# Patient Record
Sex: MALE | Race: WHITE | NOT HISPANIC OR LATINO | Employment: OTHER | URBAN - METROPOLITAN AREA
[De-identification: names, ages, dates, MRNs, and addresses within clinical notes are randomized per-mention and may not be internally consistent; named-entity substitution may affect disease eponyms.]

---

## 2017-04-26 ENCOUNTER — OFFICE VISIT (OUTPATIENT)
Dept: FAMILY MEDICINE | Facility: CLINIC | Age: 77
End: 2017-04-26
Payer: COMMERCIAL

## 2017-04-26 VITALS
HEART RATE: 48 BPM | HEIGHT: 72 IN | TEMPERATURE: 98.5 F | SYSTOLIC BLOOD PRESSURE: 122 MMHG | DIASTOLIC BLOOD PRESSURE: 70 MMHG | BODY MASS INDEX: 27.41 KG/M2 | WEIGHT: 202.4 LBS

## 2017-04-26 DIAGNOSIS — K20.90 ESOPHAGITIS: ICD-10-CM

## 2017-04-26 DIAGNOSIS — I71.40 ABDOMINAL AORTIC ANEURYSM (AAA) WITHOUT RUPTURE (H): ICD-10-CM

## 2017-04-26 DIAGNOSIS — Z23 NEED FOR VACCINATION: ICD-10-CM

## 2017-04-26 DIAGNOSIS — K22.70 BARRETT'S ESOPHAGUS WITHOUT DYSPLASIA: Chronic | ICD-10-CM

## 2017-04-26 DIAGNOSIS — I10 HTN, GOAL BELOW 130/80: ICD-10-CM

## 2017-04-26 DIAGNOSIS — I25.810 CORONARY ATHEROSCLEROSIS OF AUTOLOGOUS VEIN BYPASS GRAFT WITHOUT ANGINA: ICD-10-CM

## 2017-04-26 DIAGNOSIS — E78.5 HYPERLIPIDEMIA LDL GOAL <100: Primary | ICD-10-CM

## 2017-04-26 LAB
ALT SERPL W P-5'-P-CCNC: 26 U/L (ref 0–70)
ANION GAP SERPL CALCULATED.3IONS-SCNC: 9 MMOL/L (ref 3–14)
BUN SERPL-MCNC: 29 MG/DL (ref 7–30)
CALCIUM SERPL-MCNC: 8.6 MG/DL (ref 8.5–10.1)
CHLORIDE SERPL-SCNC: 107 MMOL/L (ref 94–109)
CHOLEST SERPL-MCNC: 147 MG/DL
CO2 SERPL-SCNC: 24 MMOL/L (ref 20–32)
CREAT SERPL-MCNC: 1.11 MG/DL (ref 0.66–1.25)
GFR SERPL CREATININE-BSD FRML MDRD: 64 ML/MIN/1.7M2
GLUCOSE SERPL-MCNC: 88 MG/DL (ref 70–99)
HDLC SERPL-MCNC: 41 MG/DL
LDLC SERPL CALC-MCNC: 63 MG/DL
NONHDLC SERPL-MCNC: 106 MG/DL
POTASSIUM SERPL-SCNC: 3.9 MMOL/L (ref 3.4–5.3)
SODIUM SERPL-SCNC: 140 MMOL/L (ref 133–144)
TRIGL SERPL-MCNC: 213 MG/DL

## 2017-04-26 PROCEDURE — 99214 OFFICE O/P EST MOD 30 MIN: CPT | Mod: 25 | Performed by: FAMILY MEDICINE

## 2017-04-26 PROCEDURE — 90670 PCV13 VACCINE IM: CPT | Performed by: FAMILY MEDICINE

## 2017-04-26 PROCEDURE — 80048 BASIC METABOLIC PNL TOTAL CA: CPT | Performed by: FAMILY MEDICINE

## 2017-04-26 PROCEDURE — 36415 COLL VENOUS BLD VENIPUNCTURE: CPT | Performed by: FAMILY MEDICINE

## 2017-04-26 PROCEDURE — 84460 ALANINE AMINO (ALT) (SGPT): CPT | Performed by: FAMILY MEDICINE

## 2017-04-26 PROCEDURE — G0009 ADMIN PNEUMOCOCCAL VACCINE: HCPCS | Performed by: FAMILY MEDICINE

## 2017-04-26 PROCEDURE — 80061 LIPID PANEL: CPT | Performed by: FAMILY MEDICINE

## 2017-04-26 RX ORDER — OMEPRAZOLE 40 MG/1
40 CAPSULE, DELAYED RELEASE ORAL DAILY
Qty: 90 CAPSULE | Refills: 3 | Status: SHIPPED | OUTPATIENT
Start: 2017-04-26 | End: 2017-12-07

## 2017-04-26 RX ORDER — METOPROLOL SUCCINATE 100 MG/1
100 TABLET, EXTENDED RELEASE ORAL DAILY
Qty: 90 TABLET | Refills: 3 | Status: SHIPPED | OUTPATIENT
Start: 2017-04-26 | End: 2017-12-07

## 2017-04-26 RX ORDER — LISINOPRIL AND HYDROCHLOROTHIAZIDE 12.5; 2 MG/1; MG/1
2 TABLET ORAL DAILY
Qty: 180 TABLET | Refills: 3 | Status: SHIPPED | OUTPATIENT
Start: 2017-04-26 | End: 2017-12-07

## 2017-04-26 RX ORDER — ATORVASTATIN CALCIUM 80 MG/1
80 TABLET, FILM COATED ORAL DAILY
Qty: 90 TABLET | Refills: 3 | Status: SHIPPED | OUTPATIENT
Start: 2017-04-26 | End: 2017-12-07

## 2017-04-26 ASSESSMENT — PAIN SCALES - GENERAL: PAINLEVEL: NO PAIN (0)

## 2017-04-26 NOTE — PROGRESS NOTES
SUBJECTIVE:                                                    Richard You is a 77 year old male who presents to clinic today for the following health issues:    Chief Complaint   Patient presents with     Ear Problem     would like ears checked for wax     Hypertension     medication recheck on all medications and refills     Medication Reconciliation     pt know longer on Topamax and hydrocodone         Hyperlipidemia Follow-Up      Rate your low fat/cholesterol diet?: good    Taking statin?  Yes, no muscle aches from statin    Other lipid medications/supplements?:  none     Hypertension Follow-up      Outpatient blood pressures are being checked at home.  Results are 121/69.    Low Salt Diet: no added salt       Amount of exercise or physical activity: 6-7 days/week for an average of 45-60 minutes    Problems taking medications regularly: No    Medication side effects: none    Diet: regular (no restrictions)    ADDITIONAL HPI: 77 year old male here for above issue.  Also needs refills of omeprazole for GERD. This is working well. Also has a history of barrets, due for EGD this fall. Also has a history of AAA due for follow-up imaging.    ROS: 10 point review of systems negative except as per HPI.    PAST MEDICAL HISTORY:  Past Medical History:   Diagnosis Date     Abdominal aneurysm without mention of rupture 6/24/2005    Repaired with percutaneous stent 8/05   Has annual CT scan to monitor stent placement. Last done in sept 2012 and stable      Atrial fibrillation (H)      CAD 4/5/2005 12/05/12 Patient denies any recent chest pain or NTG use.  Patient is taking meds regularly and denies significant side effects. Last stress test many years ago, but bikes 30-40 mi/day without symptoms       Colon polyp, hyperplastic 5/14/2013    Hyperplastic  Polyp on colonoscopy 5-2013. 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        ACTIVE MEDICAL PROBLEMS:  Patient Active Problem List   Diagnosis     Coronary atherosclerosis     Abdominal aortic aneurysm (H)     Benign shuddering attack     Hyperlipidemia LDL goal <100     Advanced directives, counseling/discussion     HTN, goal below 130/80     [facility]     Macular degeneration, wet (H)     Colon polyp, hyperplastic     Heart murmur     Former smoker     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)     Esophagitis     Carey's esophagus     Arteriosclerotic vascular disease        FAMILY HISTORY:  Family History   Problem Relation Age of Onset     Cancer - colorectal Mother      Cardiovascular Father      Alcohol/Drug Father      Neurologic Disorder Father      tremor     Cardiovascular Brother      Breast Cancer Sister      CANCER Brother      bladder cancer       SOCIAL HISTORY:  Social History     Social History     Marital status:      Spouse name: Soledad You     Number of children: 2     Years of education: 12     Occupational History      Retired     Social History Main Topics     Smoking status: Former Smoker     Packs/day: 0.50     Years: 10.00     Types: Cigarettes     Quit date: 11/29/1995     Smokeless tobacco: Never Used     Alcohol use Yes      Comment: beer occ,     Drug use: No     Sexual activity: Not Currently     Partners: Female     Other Topics Concern     Parent/Sibling W/ Cabg, Mi Or Angioplasty Before 65f 55m? Yes     Social History Narrative        [employer]    Nonsmoker as of 2  Yrs ago    Drinks wine daily    Lives with wife soledad in [place]           MEDICATIONS:  Current Outpatient Prescriptions   Medication Sig Dispense Refill      metoprolol (TOPROL-XL) 100 MG 24 hr tablet Take 1 tablet (100 mg) by mouth daily 90 tablet 3     atorvastatin (LIPITOR) 80 MG tablet Take 1 tablet (80 mg) by mouth daily 90 tablet 3     omeprazole (PRILOSEC) 40 MG capsule Take 1 capsule (40 mg) by mouth daily Take 30-60 minutes before a meal. 90 capsule 3     lisinopril-hydrochlorothiazide (PRINZIDE/ZESTORETIC) 20-12.5 MG per tablet Take 2 tablets by mouth daily 180 tablet 3     aspirin 81 MG chewable tablet Take 1 tablet (81 mg) by mouth daily 36 tablet 3     Multiple Vitamins-Minerals (ICAPS PO)        Multiple Vitamin (MULTI-VITAMIN DAILY PO) Take 1 tablet by mouth daily       FISH OIL 1000 MG OR CAPS 2 DAILY  0     HYDROcodone-acetaminophen (NORCO) 5-325 MG per tablet Take 1-2 tablets by mouth every 4 hours as needed for moderate to severe pain (Patient not taking: Reported on 4/26/2017) 15 tablet 0     topiramate (TOPAMAX) 25 MG tablet Take 1 tablet (25 mg) at bedtime for 1 week, then 2 tablets daily for 1 week, then 3 tabs daily 1 week, then 4 tabs daily. (Patient not taking: Reported on 4/26/2017) 120 tablet 0     [DISCONTINUED] metoprolol (TOPROL-XL) 100 MG 24 hr tablet Take 1 tablet (100 mg) by mouth daily 90 tablet 3     [DISCONTINUED] atorvastatin (LIPITOR) 80 MG tablet Take 1 tablet (80 mg) by mouth daily 90 tablet 3     [DISCONTINUED] lisinopril-hydrochlorothiazide (PRINZIDE,ZESTORETIC) 20-12.5 MG per tablet Take 2 tablets by mouth daily 180 tablet 3       ALLERGIES:     Allergies   Allergen Reactions     Nka [No Known Allergies]        Problem list, Medication list, Allergies, and Medical/Social/Surgical histories reviewed in [facility] and updated as appropriate.    OBJECTIVE:                                                    VITALS: /70 (BP Location: Right arm, Patient Position: Chair, Cuff Size: Adult Regular)  Pulse (!) 48  Temp 98.5  F (36.9  C) (Tympanic)  Ht 6' (1.829 m)  Wt 202 lb 6.4 oz (91.8 kg)  BMI 27.45 kg/m2 Body mass index is 27.45  kg/(m^2).  GENERAL: Pleasant, well appearing male.  HEENT: PERRL, EOMI, oropharynx clear.  NECK: supple, no thyromegaly or thyroid masses, no lymphadenopathy.  CV: RRR, no murmurs, rubs or gallops.  LUNGS: Clear to auscultation bilaterally, normal effort.  ABDOMEN: Soft, non-distended, non-tender.  No hepatosplenomegaly or palpable masses.    SKIN: warm and dry without obvious rashes.   EXTREMITIES: No edema, normal pulses.     ASSESSMENT/PLAN:                                                    1. Hyperlipidemia LDL goal <100  Stable. Refilled medication and checked labs.    - atorvastatin (LIPITOR) 80 MG tablet; Take 1 tablet (80 mg) by mouth daily  Dispense: 90 tablet; Refill: 3  - Lipid Profile (Chol, Trig, HDL, LDL calc)  - ALT    2. 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 Refilled medication and checked labs.    - atorvastatin (LIPITOR) 80 MG tablet; Take 1 tablet (80 mg) by mouth daily  Dispense: 90 tablet; Refill: 3    3. Abdominal aortic aneurysm (AAA) without rupture (H)  Due for follow-up imaging.    4. Esophagitis  Stable. Refilled medication.    - omeprazole (PRILOSEC) 40 MG capsule; Take 1 capsule (40 mg) by mouth daily Take 30-60 minutes before a meal.  Dispense: 90 capsule; Refill: 3    5. Carey's esophagus without dysplasia  Stable. Refilled medication.  Due for EGD this fall  - omeprazole (PRILOSEC) 40 MG capsule; Take 1 capsule (40 mg) by mouth daily Take 30-60 minutes before a meal.  Dispense: 90 capsule; Refill: 3    6. HTN, goal below 130/80  Stable. Refilled medication and checked labs.    - metoprolol (TOPROL-XL) 100 MG 24 hr tablet; Take 1 tablet (100 mg) by mouth daily  Dispense: 90 tablet; Refill: 3  - lisinopril-hydrochlorothiazide (PRINZIDE/ZESTORETIC) 20-12.5 MG per tablet; Take 2 tablets by mouth daily  Dispense: 180 tablet; Refill: 3  - Basic metabolic panel    7. Need for vaccination  - 1st  Administration  [81756]  - Pneumococcal vaccine 13  valent PCV13 IM (Prevnar) [29611]

## 2017-04-26 NOTE — PATIENT INSTRUCTIONS
Thank you for choosing [facility].  You may be receiving a survey in the mail from [facility] regarding your visit today.  Please take a few minutes to complete and return the survey to let us know how we are doing.      Our Clinic hours are:  Mondays    7:20 am - 7 pm  Tues -  Fri  7:20 am - 5 pm    Clinic Phone: [phone]    The clinic lab opens at 7:30 am Mon - Fri and appointments are required.    [facility]. [phone]  Monday-Thursday 8 am - 7pm  Tues/Wed/Fri 8 am - 5:30 pm

## 2017-04-26 NOTE — MR AVS SNAPSHOT
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  You may be receiving a survey in the mail from [facility] regarding your visit today.  Please take a few minutes to complete and return the survey to let us know how we are doing.      Our Clinic hours are:  Mondays    7:20 am - 7 pm  Tues -  Fri  7:20 am - 5 pm    Clinic Phone: [phone]    The clinic lab opens at 7:30 am Mon - Fri and appointments are required.    [facility]  Ph. [phone]  Monday-Thursday 8 am - 7pm  Tues/Wed/Fri 8 am - 5:30 pm               Follow-ups after your visit        Who to contact     If you have questions or need follow up information about today's clinic visit or your schedule please contact [facility] directly at [phone].  Normal or non-critical lab and imaging results will be communicated to you by MyChart, letter or phone within 4 business days after the clinic has received the results. If you do not hear from us within 7 days, please contact the clinic through MyChart or phone. If you have a critical or abnormal lab result, we will notify you by phone as soon as possible.  Submit refill requests through [employer] or call your pharmacy and they will forward the refill request to us. Please allow 3 business days for your refill to be completed.          Additional Information About Your Visit        MyChart Information      [employer] gives you secure access to your electronic health record. If you see a primary care provider, you can also send messages to your care team and make appointments. If you have questions, please call your primary care clinic.  If you do not have a primary care provider, please call [phone] and they will assist you.        Care EveryWhere ID     This is your Care EveryWhere ID. This could be used by other organizations to access your [place] medical records  [id number]        Your Vitals Were     Pulse Temperature Height BMI (Body Mass Index)          48 98.5  F (36.9  C) (Tympanic) 6' (1.829 m) 27.45 kg/m2         Blood Pressure from Last 3 Encounters:   04/26/17 122/70   12/15/16 134/75   11/30/16 138/86    Weight from Last 3 Encounters:   04/26/17 202 lb 6.4 oz (91.8 kg)   12/14/16 200 lb (90.7 kg)   11/30/16 206 lb 6.4 oz (93.6 kg)              We Performed the Following     1st  Administration  [00967]     ALT     Basic metabolic panel     Lipid Profile (Chol, Trig, HDL, LDL calc)     Pneumococcal vaccine 13 valent PCV13 IM (Prevnar) [35782]          Where to get your medicines      These medications were sent to [facility] [place], [place] - [address]   [address] [facility] [address]     Phone:  [phone]     atorvastatin 80 MG tablet    lisinopril-hydrochlorothiazide 20-12.5 MG per tablet    metoprolol 100 MG 24 hr tablet    omeprazole 40 MG capsule          Primary Care Provider Office Phone # Fax #    Kamari Rojas [phone]       [facility] [address] [facility] [address]        Thank you!     Thank you for choosing [facility]  for your care. Our goal is always to provide you with excellent care. Hearing back from our patients is one way we can continue to improve our services. Please take a few minutes to complete the written survey that you may receive in the mail after your visit  with us. Thank you!             Your Updated Medication List - Protect others around you: Learn how to safely use, store and throw away your medicines at www.disposemymeds.org.          This list is accurate as of: 4/26/17  2:19 PM.  Always use your most recent med list.                   Brand Name Dispense Instructions for use    aspirin 81 MG chewable tablet     36 tablet    Take 1 tablet (81 mg) by mouth daily       atorvastatin 80 MG tablet    LIPITOR    90 tablet    Take 1 tablet (80 mg) by mouth daily       fish oil-omega-3 fatty acids 1000 MG capsule      2 DAILY       HYDROcodone-acetaminophen 5-325 MG per tablet    NORCO    15 tablet    Take 1-2 tablets by mouth every 4 hours as needed for moderate to severe pain       ICAPS PO          lisinopril-hydrochlorothiazide 20-12.5 MG per tablet    PRINZIDE/ZESTORETIC    180 tablet    Take 2 tablets by mouth daily       metoprolol 100 MG 24 hr tablet    TOPROL-XL    90 tablet    Take 1 tablet (100 mg) by mouth daily       MULTI-VITAMIN DAILY PO      Take 1 tablet by mouth daily       omeprazole 40 MG capsule    priLOSEC    90 capsule    Take 1 capsule (40 mg) by mouth daily Take 30-60 minutes before a meal.       topiramate 25 MG tablet    TOPAMAX    120 tablet    Take 1 tablet (25 mg) at bedtime for 1 week, then 2 tablets daily for 1 week, then 3 tabs daily 1 week, then 4 tabs daily.

## 2017-04-26 NOTE — NURSING NOTE
Chief Complaint   Patient presents with     Ear Problem     would like ears checked for wax     Hypertension     medication recheck on all medications and refills     Medication Reconciliation     pt know longer on Topamax and hydrocodone       Initial /70 (BP Location: Right arm, Patient Position: Chair, Cuff Size: Adult Regular)  Pulse (!) 48  Temp 98.5  F (36.9  C) (Tympanic)  Ht 6' (1.829 m)  Wt 202 lb 6.4 oz (91.8 kg)  BMI 27.45 kg/m2 Estimated body mass index is 27.45 kg/(m^2) as calculated from the following:    Height as of this encounter: 6' (1.829 m).    Weight as of this encounter: 202 lb 6.4 oz (91.8 kg).  Medication Reconciliation: complete

## 2017-10-20 DIAGNOSIS — I71.40 ABDOMINAL AORTIC ANEURYSM (H): Primary | ICD-10-CM

## 2017-10-24 ENCOUNTER — ALLIED HEALTH/NURSE VISIT (OUTPATIENT)
Dept: FAMILY MEDICINE | Facility: CLINIC | Age: 77
End: 2017-10-24
Payer: COMMERCIAL

## 2017-10-24 DIAGNOSIS — Z23 NEED FOR PROPHYLACTIC VACCINATION AND INOCULATION AGAINST INFLUENZA: Primary | ICD-10-CM

## 2017-10-24 PROCEDURE — G0008 ADMIN INFLUENZA VIRUS VAC: HCPCS

## 2017-10-24 PROCEDURE — 99207 ZZC NO CHARGE NURSE ONLY: CPT

## 2017-10-24 PROCEDURE — 90662 IIV NO PRSV INCREASED AG IM: CPT

## 2017-10-24 NOTE — MR AVS SNAPSHOT
After Visit Summary   10/24/2017    Richard You    MRN: [id number]           Patient Information     Date Of Birth          1940        Visit Information        Provider Department      10/24/2017 10:30 AM Shira/Zulema Kuo [facility]        Today's Diagnoses     Need for prophylactic vaccination and inoculation against influenza    -  1       Follow-ups after your visit        Who to contact     If you have questions or need follow up information about today's clinic visit or your schedule please contact [facility] directly at [phone].  Normal or non-critical lab and imaging results will be communicated to you by [employer], letter or phone within 4 business days after the clinic has received the results. If you do not hear from us within 7 days, please contact the clinic through "[employer]t or phone. If you have a critical or abnormal lab result, we will notify you by phone as soon as possible.  Submit refill requests through [employer] or call your pharmacy and they will forward the refill request to us. Please allow 3 business days for your refill to be completed.          Additional Information About Your Visit        MyChart Information     [employer] gives you secure access to your electronic health record. If you see a primary care provider, you can also send messages to your care team and make appointments. If you have questions, please call your primary care clinic.  If you do not have a primary care provider, please call [phone] and they will assist you.        Care EveryWhere ID     This is your Care EveryWhere ID. This could be used by other organizations to access your [place] medical records  [id number]         Blood Pressure from Last 3 Encounters:   04/26/17 122/70   12/15/16 134/75   11/30/16 138/86    Weight from Last 3 Encounters:   04/26/17 202 lb 6.4 oz (91.8 kg)   12/14/16 200 lb (90.7 kg)   11/30/16 206 lb 6.4 oz (93.6 kg)               We Performed the Following     ADMIN INFLUENZA (For MEDICARE Patients ONLY) []     FLU VACCINE, INCREASED ANTIGEN, PRESV FREE, AGE 65+ [27820]        Primary Care Provider Office Phone # Fax #    Lolitaewamarissa Jennifer Rojas [phone] [address]  [facility] [address]        Equal Access to Services     [facility] : Hadii aad ku hadasho Soomaali, waaxda luqadaha, qaybta kaalmada adeegyada, waxay idiin hayaan adeeg kharash la'aan . So [facility] [phone].    ATENCIÓN: Si habla español, tiene a lechuga disposición servicios gratuitos de asistencia lingüística. Llame al [phone].    We comply with applicable federal civil rights laws and [place] laws. We do not discriminate on the basis of race, color, national origin, age, disability, sex, sexual orientation, or gender identity.            Thank you!     Thank you for choosing [facility]  for your care. Our goal is always to provide you with excellent care. Hearing back from our patients is one way we can continue to improve our services. Please take a few minutes to complete the written survey that you may receive in the mail after your visit with us. Thank you!             Your Updated Medication List - Protect others around you: Learn how to safely use, store and throw away your medicines at www.disposemymeds.org.          This list is accurate as of: 10/24/17 10:30 AM.  Always use your most recent med list.                   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       fish  oil-omega-3 fatty acids 1000 MG capsule      2 DAILY        HYDROcodone-acetaminophen 5-325 MG per tablet    NORCO    15 tablet    Take 1-2 tablets by mouth every 4 hours as needed for moderate to severe pain        ICAPS PO           lisinopril-hydrochlorothiazide 20-12.5 MG per tablet    PRINZIDE/ZESTORETIC    180 tablet    Take 2 tablets by mouth daily    HTN, goal below 130/80       metoprolol 100 MG 24 hr tablet    TOPROL-XL    90 tablet    Take 1 tablet (100 mg) by mouth daily    HTN, goal below 130/80       MULTI-VITAMIN DAILY PO      Take 1 tablet by mouth daily        omeprazole 40 MG capsule    priLOSEC    90 capsule    Take 1 capsule (40 mg) by mouth daily Take 30-60 minutes before a meal.    Esophagitis, Carey's esophagus without dysplasia       topiramate 25 MG tablet    TOPAMAX    120 tablet    Take 1 tablet (25 mg) at bedtime for 1 week, then 2 tablets daily for 1 week, then 3 tabs daily 1 week, then 4 tabs daily.    Tremor

## 2017-10-24 NOTE — NURSING NOTE
Prior to injection verified patient identity using patient's name and date of birth.  Per orders of Dr. Vargas, injection of Influenza Vaccine given by Marina Jackson. Patient instructed to remain in clinic for 15 minutes afterwards, and to report any adverse reaction to me immediately.

## 2017-10-24 NOTE — PROGRESS NOTES

## 2017-12-07 ENCOUNTER — OFFICE VISIT (OUTPATIENT)
Dept: FAMILY MEDICINE | Facility: CLINIC | Age: 77
End: 2017-12-07
Payer: COMMERCIAL

## 2017-12-07 ENCOUNTER — TELEPHONE (OUTPATIENT)
Dept: PEDIATRICS | Facility: CLINIC | Age: 77
End: 2017-12-07

## 2017-12-07 VITALS
TEMPERATURE: 98.2 F | DIASTOLIC BLOOD PRESSURE: 84 MMHG | HEIGHT: 72 IN | HEART RATE: 62 BPM | BODY MASS INDEX: 28.5 KG/M2 | SYSTOLIC BLOOD PRESSURE: 148 MMHG | WEIGHT: 210.4 LBS

## 2017-12-07 DIAGNOSIS — E78.5 HYPERLIPIDEMIA LDL GOAL <100: ICD-10-CM

## 2017-12-07 DIAGNOSIS — K20.90 ESOPHAGITIS: ICD-10-CM

## 2017-12-07 DIAGNOSIS — I25.810 CORONARY ATHEROSCLEROSIS OF AUTOLOGOUS VEIN BYPASS GRAFT WITHOUT ANGINA: ICD-10-CM

## 2017-12-07 DIAGNOSIS — K22.70 BARRETT'S ESOPHAGUS WITHOUT DYSPLASIA: Chronic | ICD-10-CM

## 2017-12-07 DIAGNOSIS — R25.1 TREMOR: ICD-10-CM

## 2017-12-07 DIAGNOSIS — G25.0 BENIGN ESSENTIAL TREMOR: Primary | ICD-10-CM

## 2017-12-07 DIAGNOSIS — I10 HTN, GOAL BELOW 130/80: ICD-10-CM

## 2017-12-07 PROCEDURE — 99214 OFFICE O/P EST MOD 30 MIN: CPT | Performed by: FAMILY MEDICINE

## 2017-12-07 RX ORDER — LISINOPRIL AND HYDROCHLOROTHIAZIDE 12.5; 2 MG/1; MG/1
2 TABLET ORAL DAILY
Qty: 180 TABLET | Refills: 3 | Status: SHIPPED | OUTPATIENT
Start: 2017-12-07 | End: 2018-05-03

## 2017-12-07 RX ORDER — ATORVASTATIN CALCIUM 80 MG/1
80 TABLET, FILM COATED ORAL DAILY
Qty: 90 TABLET | Refills: 3 | Status: SHIPPED | OUTPATIENT
Start: 2017-12-07 | End: 2018-12-15

## 2017-12-07 RX ORDER — METOPROLOL SUCCINATE 100 MG/1
100 TABLET, EXTENDED RELEASE ORAL DAILY
Qty: 90 TABLET | Refills: 3 | Status: SHIPPED | OUTPATIENT
Start: 2017-12-07 | End: 2018-05-03

## 2017-12-07 RX ORDER — OMEPRAZOLE 40 MG/1
40 CAPSULE, DELAYED RELEASE ORAL DAILY
Qty: 90 CAPSULE | Refills: 3 | Status: SHIPPED | OUTPATIENT
Start: 2017-12-07 | End: 2018-12-15

## 2017-12-07 RX ORDER — TOPIRAMATE 25 MG/1
TABLET, FILM COATED ORAL
Qty: 60 TABLET | Refills: 5 | Status: SHIPPED | OUTPATIENT
Start: 2017-12-07 | End: 2018-05-03

## 2017-12-07 ASSESSMENT — PAIN SCALES - GENERAL: PAINLEVEL: NO PAIN (0)

## 2017-12-07 NOTE — TELEPHONE ENCOUNTER
Wife called with concerns about metoprolol  patient was seen today 12/7; and restarted on metoprolol but he has been taking metoprolol. The reason for the visit was tremors, wife thought Topamax would be restarted.     Marika COOLEY  Station

## 2017-12-07 NOTE — PATIENT INSTRUCTIONS
Thank you for choosing [facility].  You may be receiving a survey in the mail from [facility] regarding your visit today.  Please take a few minutes to complete and return the survey to let us know how we are doing.      Our Clinic hours are:  Mondays    7:20 am - 7 pm  Tues -  Fri  7:20 am - 5 pm    Clinic Phone: [phone]    The clinic lab opens at 7:30 am Mon - Fri and appointments are required.    [facility]. [phone]  Monday-Thursday 8 am - 7pm  Tues/Wed/Fri 8 am - 5:30 pm

## 2017-12-07 NOTE — TELEPHONE ENCOUNTER
If he's only taken a few doses of the topamax then I would not expect it to work. Let's plan to re-start that and take daily as per the titration schedule. Give this 2-4 weeks and let me know if it's not working.

## 2017-12-07 NOTE — MR AVS SNAPSHOT
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  You may be receiving a survey in the mail from [facility] your visit today.  Please take a few minutes to complete and return the survey to let us know how we are doing.      Our Clinic hours are:  Mondays    7:20 am - 7 pm  Tues -  Fri  7:20 am - 5 pm    Clinic Phone: [phone]    The clinic lab opens at 7:30 am Mon - Fri and appointments are required.    [facility]  Ph. [phone]  Monday-Thursday 8 am - 7pm  Tues/Wed/Fri 8 am - 5:30 pm                 Follow-ups after your visit        Your next 10 appointments already scheduled     Dec 19, 2017  7:30 AM CST   US AORTA/IVC/ILIAC DUPLEX COMPLETE with [id number] [place] [address] Ultrasound ([facility])    [address] [facility] [address] [phone]           Please bring a list of your medicines (including vitamins, minerals and over-the-counter drugs). Also, tell your doctor about any allergies you may have. Wear comfortable clothes and leave your valuables at home.  Adults: No eating or drinking for 8 hours before the exam. You may take medicine with a small sip of water.  Children: - Children 6+ years: No food or drink for 6 hours before exam. - Children 1-5 years: No food or drink for 4 hours before exam. - Infants, breast-fed: may have breast milk up to 2 hours before exam. - Infants, formula: may have bottle until  4 hours before exam.  Please call the Imaging Department at your exam site with any questions.              Who to contact     If you have questions or need follow up information about today's clinic visit or your schedule please contact [facility] directly at [phone].  Normal or non-critical lab and imaging results will be communicated to you by MyChart, letter or phone within 4 business days after the clinic has received the results. If you do not hear from us within 7 days, please contact the clinic through [employer] or phone. If you have a critical or abnormal lab result, we will notify you by phone as soon as possible.  Submit refill requests through [employer] or call your pharmacy and they will forward the refill request to us. Please allow 3 business days for your refill to be completed.          Additional Information About Your Visit        [employer] Information     [employer] gives you secure access to your electronic health record. If you see a primary care provider, you can also send messages to your care team and make appointments. If you have questions, please call your primary care clinic.  If you do not have a primary care provider, please call [phone] and they will assist you.        Care EveryWhere ID     This is your Care EveryWhere ID. This could be used by other organizations to access your [facility] medical records  [id number]        Your Vitals Were     Pulse Temperature Height BMI (Body Mass Index)          48 98.2  F (36.8  C) (Tympanic) 6' (1.829 m) 28.54 kg/m2         Blood Pressure from Last 3 Encounters:   12/07/17 154/80   04/26/17 122/70   12/15/16 134/75    Weight from Last 3 Encounters:   12/07/17 210 lb 6.4 oz (95.4 kg)   04/26/17 202 lb 6.4 oz (91.8 kg)   12/14/16 200 lb (90.7 kg)              Today, you had the following     No orders found for display         Today's Medication Changes          These changes are accurate as of: 12/7/17  8:24 AM.  If you have  any questions, ask your nurse or doctor.               Stop taking these medicines if you haven't already. Please contact your care team if you have questions.     HYDROcodone-acetaminophen 5-325 MG per tablet   Commonly known as:  NORCO   Stopped by:  Kamari Rojas MD           topiramate 25 MG tablet   Commonly known as:  TOPAMAX   Stopped by:  Kamari Rojas MD                Where to get your medicines      These medications were sent to [facility] [place], [place] - [address]   [address] [facility] [address]     Phone:  [phone]     atorvastatin 80 MG tablet    lisinopril-hydrochlorothiazide 20-12.5 MG per tablet    metoprolol 100 MG 24 hr tablet    omeprazole 40 MG capsule                Primary Care Provider Office Phone # Fax #    Kamari Rojas [phone] [address] [facility] [address]        Equal Access to Services     [facility] AH: Hadii aad ku hadasho Soomaali, waaxda luqadaha, qaybta kaalmada adeegyada, waxay idiin hayaan nuhaeg kharash eileen . So [facility] [phone].    ATENCIÓN: Si paula shah, tiene a lechuga disposición servicios gratuitos de asistencia lingüística. Llame al [phone].    We comply with applicable federal civil rights laws and [place] laws. We do not discriminate on the basis of race, color, national origin, age, disability, sex, sexual orientation, or gender identity.            Thank you!     Thank you for choosing [facility]  for your care. Our goal is always to provide you with excellent care. Hearing back from our patients is one way we can continue to improve our services. Please take a few minutes to complete the written survey that you may receive in the mail after your visit with us. Thank you!             Your Updated Medication List - Protect others around you: Learn how to safely use, store and throw away your medicines at www.disposemymeds.org.           This list is accurate as of: 12/7/17  8:24 AM.  Always use your most recent med list.                   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    Esophagitis, Carey's esophagus without dysplasia

## 2017-12-07 NOTE — NURSING NOTE
Chief Complaint   Patient presents with     Tremors     hand tremors seem to be worse       Initial /82 (BP Location: Right arm, Cuff Size: Adult Regular)  Pulse (!) 48  Temp 98.2  F (36.8  C) (Tympanic)  Ht 6' (1.829 m)  Wt 210 lb 6.4 oz (95.4 kg)  BMI 28.54 kg/m2 Estimated body mass index is 28.54 kg/(m^2) as calculated from the following:    Height as of this encounter: 6' (1.829 m).    Weight as of this encounter: 210 lb 6.4 oz (95.4 kg).  Medication Reconciliation: complete

## 2017-12-07 NOTE — PROGRESS NOTES
SUBJECTIVE:   Richard You is a 77 year old male who presents to clinic today for the following health issues:      Problem:   Chief Complaint   Patient presents with     Tremors     hand tremors seem to be worse     ADDITIONAL HPI: 77 year old male here for above issue.  He thinks he stopped metoprolol - couldn't remember which drug he stopped but had been on it for tremors. We had tried topamax at one point but prescription was without refills so he could not really have taken that for any significant amount of time.  I think he means the metoprolol but he is unsure.  BP is slightly higher today so that's probable.    Also has a history of hyperlipidemia, hypertension, GERD with [facility] needs medication refills. Going to [place] for the winter 12/25/17-5/2018    ROS: 10 point review of systems negative except as per HPI.    PAST MEDICAL HISTORY:  Past Medical History:   Diagnosis Date     Abdominal aneurysm without mention of rupture 6/24/2005    Repaired with percutaneous stent 8/05   Has annual CT scan to monitor stent placement. Last done in sept 2012 and stable      Atrial fibrillation (H)      CAD 4/5/2005 12/05/12 Patient denies any recent chest pain or NTG use.  Patient is taking meds regularly and denies significant side effects. Last stress test many years ago, but bikes 30-40 mi/day without symptoms       Colon polyp, hyperplastic 5/14/2013    Hyperplastic  Polyp on colonoscopy 5-2013. 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     Macular degeneration, wet (H)     Colon polyp, hyperplastic     Heart murmur     Former smoker     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)     Esophagitis     Carey's esophagus     Arteriosclerotic vascular disease        FAMILY HISTORY:  Family History   Problem Relation Age of Onset     Cancer - colorectal Mother      Cardiovascular Father      Alcohol/Drug Father      Neurologic Disorder Father      tremor     Cardiovascular Brother      Breast Cancer Sister      CANCER Brother      bladder cancer       SOCIAL HISTORY:  Social History     Social History     Marital status:      Spouse name: Soledad You     Number of children: 2     Years of education: 12     Occupational History      Retired     Social History Main Topics     Smoking status: Former Smoker     Packs/day: 0.50     Years: 10.00     Types: Cigarettes     Quit date: 11/29/1995     Smokeless tobacco: Never Used     Alcohol use Yes      Comment: beer occ,     Drug use: No     Sexual activity: Not Currently     Partners: Female     Other Topics Concern     Parent/Sibling W/ Cabg, Mi Or Angioplasty Before 65f 55m? Yes     Social History Narrative        [employer]    Nonsmoker as of 2  Yrs ago    Drinks wine daily    Lives with wife soledad in [place]           MEDICATIONS:  Current Outpatient Prescriptions   Medication Sig Dispense Refill     metoprolol (TOPROL-XL) 100 MG 24 hr tablet Take 1 tablet (100 mg) by mouth daily 90 tablet 3     atorvastatin (LIPITOR) 80 MG tablet Take 1 tablet (80 mg) by mouth daily 90 tablet 3     omeprazole (PRILOSEC) 40 MG capsule Take 1 capsule (40 mg) by mouth daily Take 30-60 minutes before a meal. 90 capsule 3     lisinopril-hydrochlorothiazide (PRINZIDE/ZESTORETIC) 20-12.5 MG per tablet Take 2 tablets  "by mouth daily 180 tablet 3     aspirin 81 MG chewable tablet Take 1 tablet (81 mg) by mouth daily 36 tablet 3     Multiple Vitamins-Minerals (ICAPS PO)        Multiple Vitamin (MULTI-VITAMIN DAILY PO) Take 1 tablet by mouth daily       FISH OIL 1000 MG OR CAPS 2 DAILY  0     [DISCONTINUED] metoprolol (TOPROL-XL) 100 MG 24 hr tablet Take 1 tablet (100 mg) by mouth daily 90 tablet 3     [DISCONTINUED] atorvastatin (LIPITOR) 80 MG tablet Take 1 tablet (80 mg) by mouth daily 90 tablet 3     [DISCONTINUED] lisinopril-hydrochlorothiazide (PRINZIDE/ZESTORETIC) 20-12.5 MG per tablet Take 2 tablets by mouth daily 180 tablet 3       ALLERGIES:     Allergies   Allergen Reactions     Nka [No Known Allergies]        Problem list, Medication list, Allergies, and Medical/Social/Surgical histories reviewed in EPIC and updated as appropriate.    OBJECTIVE:                                                    VITALS: /84  Pulse 62  Temp 98.2  F (36.8  C) (Tympanic)  Ht 6' (1.829 m)  Wt 210 lb 6.4 oz (95.4 kg)  BMI 28.54 kg/m2 Body mass index is 28.54 kg/(m^2).  GENERAL: Pleasant, well appearing male.  NEURO: Cranial nerves II through XII grossly intact. No focal deficits. He has a high frequency intention tremor. No resting component to the tremor. No cogwheel rigidity. DTR 2+ and symmetric.    ASSESSMENT/PLAN:                                                    1. Benign essential tremor  Advised to re-start metoprolol.  Will have him start at 1/2 tab daily and if lightheadedness or dizziness then call me.  If not the  Increase to 1 tab daily. If he gets home and it turns out that he has been taking metoprolol then advised him to call me and we'll try re-starting topamax again.  - metoprolol (TOPROL-XL) 100 MG 24 hr tablet; Take 1 tablet (100 mg) by mouth daily  Dispense: 90 tablet; Refill: 3    Addendum: after visit wife called and he has been taking metoprolol. Only ever took \"a few\" doses of topamax. Did not titrate as " instructed. Did not have side effects.  Advised to retry this since mysoline had not been helpful. Start 25mg daily x 1 week the increase to 50mg daily. Can increase further PRN but will see how he responds to this initial titration.     2. HTN, goal below 130/80  Suboptimally controlled likely from metoprolol discontinuation. Given history of CAD would like to see him back on this.  - metoprolol (TOPROL-XL) 100 MG 24 hr tablet; Take 1 tablet (100 mg) by mouth daily  Dispense: 90 tablet; Refill: 3  - lisinopril-hydrochlorothiazide (PRINZIDE/ZESTORETIC) 20-12.5 MG per tablet; Take 2 tablets by mouth daily  Dispense: 180 tablet; Refill: 3    3. Hyperlipidemia LDL goal <100  Stable. Refilled medication.    - atorvastatin (LIPITOR) 80 MG tablet; Take 1 tablet (80 mg) by mouth daily  Dispense: 90 tablet; Refill: 3    4. 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 Refilled medication.    - atorvastatin (LIPITOR) 80 MG tablet; Take 1 tablet (80 mg) by mouth daily  Dispense: 90 tablet; Refill: 3    5. Esophagitis  Stable. Refilled medication.    - omeprazole (PRILOSEC) 40 MG capsule; Take 1 capsule (40 mg) by mouth daily Take 30-60 minutes before a meal.  Dispense: 90 capsule; Refill: 3    6. Carey's esophagus without dysplasia  Stable. Refilled medication.    - omeprazole (PRILOSEC) 40 MG capsule; Take 1 capsule (40 mg) by mouth daily Take 30-60 minutes before a meal.  Dispense: 90 capsule; Refill: 3

## 2017-12-07 NOTE — TELEPHONE ENCOUNTER
"Patient was seen today for tremors and HTN.  In clinic patient believes he had d/c'd his metoprolol.  When he got home and discussed with his wife, he has NOT stopped his metoprolol (taking 100mg daily).  Wife states last year he had tried the Topamax a \"couple times\" and did not notice a difference in tremors.   They are willing to give this med another try for a longer period of time to see if this will help - med and pharm ready.    In regards to HTN:  Wife states they take their bps at home daily and patient's systolic never runs higher than 130.  His diastolic averages about 70's.    Routing to provider.  Zulema ANAYA RN      "

## 2017-12-19 ENCOUNTER — HOSPITAL ENCOUNTER (OUTPATIENT)
Dept: ULTRASOUND IMAGING | Facility: CLINIC | Age: 77
Discharge: HOME | End: 2017-12-19
Attending: RADIOLOGY | Admitting: RADIOLOGY
Payer: MEDICARE

## 2017-12-19 DIAGNOSIS — I71.40 ABDOMINAL AORTIC ANEURYSM (H): ICD-10-CM

## 2017-12-19 PROCEDURE — 93978 VASCULAR STUDY: CPT

## 2017-12-21 ENCOUNTER — TELEPHONE (OUTPATIENT)
Dept: OTHER | Facility: CLINIC | Age: 77
End: 2017-12-21

## 2017-12-21 DIAGNOSIS — I71.40 ABDOMINAL AORTIC ANEURYSM (H): Primary | ICD-10-CM

## 2017-12-21 NOTE — TELEPHONE ENCOUNTER
Called patient with results. Recommend f/u in 2 years.  Pt has been stable for 10 years.  Pt agreed to plan.  [facility] will call patient to yair.  La Zacarias RN  IR nurse clinician  [phone]  Show images for US Aorta/Ivc/Iliac Duplex Complete   Study Result   ULTRASOUND AORTA/IVC/ILIAC DUPLEX COMPLETE  12/19/2017 7:55 AM      HISTORY:  History of EVAR done on 6/30/2005 with Dr. Mo/Pancho.   Comparison CT scan done on 12/14/2016.  Annual followup. Abdominal  aortic aneurysm (H).     COMPARISON: 12/14/2016     TECHNIQUE: Color Doppler and spectral waveform analysis performed.     FINDINGS: There is an aortic endograft for treatment of abdominal  aortic aneurysm. Maximum aneurysm sac size is 4.9 x 5.0 cm, similar to  the prior CT scan. Right common iliac artery measures up to 1.0 cm.  Left common iliac artery measures up to 0.9 cm. Measured aortic and  iliac artery velocities are normal. There is no Doppler evidence of  endoleak.         IMPRESSION: Stable aortic aneurysm sac size. No evidence of endoleak.  The graft is patent.     KADY LARIOS MD

## 2018-02-22 ENCOUNTER — ALLIED HEALTH/NURSE VISIT (OUTPATIENT)
Dept: FAMILY MEDICINE | Facility: CLINIC | Age: 78
End: 2018-02-22
Payer: COMMERCIAL

## 2018-02-22 DIAGNOSIS — I70.90 ARTERIOSCLEROTIC VASCULAR DISEASE: Primary | ICD-10-CM

## 2018-02-22 PROCEDURE — 99207 ZZC NO CHARGE LOS: CPT | Performed by: FAMILY MEDICINE

## 2018-05-03 ENCOUNTER — TELEPHONE (OUTPATIENT)
Dept: FAMILY MEDICINE | Facility: CLINIC | Age: 78
End: 2018-05-03

## 2018-05-03 ENCOUNTER — OFFICE VISIT (OUTPATIENT)
Dept: FAMILY MEDICINE | Facility: CLINIC | Age: 78
End: 2018-05-03
Payer: COMMERCIAL

## 2018-05-03 VITALS
SYSTOLIC BLOOD PRESSURE: 137 MMHG | WEIGHT: 209.6 LBS | HEIGHT: 72 IN | RESPIRATION RATE: 20 BRPM | BODY MASS INDEX: 28.39 KG/M2 | TEMPERATURE: 97.9 F | HEART RATE: 82 BPM | DIASTOLIC BLOOD PRESSURE: 70 MMHG

## 2018-05-03 DIAGNOSIS — G25.0 BENIGN ESSENTIAL TREMOR: Primary | ICD-10-CM

## 2018-05-03 DIAGNOSIS — I10 HTN, GOAL BELOW 130/80: ICD-10-CM

## 2018-05-03 DIAGNOSIS — K22.70 BARRETT'S ESOPHAGUS WITHOUT DYSPLASIA: Chronic | ICD-10-CM

## 2018-05-03 DIAGNOSIS — G25.0 BENIGN ESSENTIAL TREMOR: ICD-10-CM

## 2018-05-03 DIAGNOSIS — Z00.00 WELL ADULT EXAM: Primary | ICD-10-CM

## 2018-05-03 DIAGNOSIS — I71.40 ABDOMINAL AORTIC ANEURYSM (AAA) WITHOUT RUPTURE (H): ICD-10-CM

## 2018-05-03 LAB
ANION GAP SERPL CALCULATED.3IONS-SCNC: 4 MMOL/L (ref 3–14)
BUN SERPL-MCNC: 32 MG/DL (ref 7–30)
CALCIUM SERPL-MCNC: 9.3 MG/DL (ref 8.5–10.1)
CHLORIDE SERPL-SCNC: 110 MMOL/L (ref 94–109)
CHOLEST SERPL-MCNC: 154 MG/DL
CO2 SERPL-SCNC: 30 MMOL/L (ref 20–32)
CREAT SERPL-MCNC: 1.01 MG/DL (ref 0.66–1.25)
GFR SERPL CREATININE-BSD FRML MDRD: 71 ML/MIN/1.7M2
GLUCOSE SERPL-MCNC: 89 MG/DL (ref 70–99)
HDLC SERPL-MCNC: 47 MG/DL
LDLC SERPL CALC-MCNC: 83 MG/DL
NONHDLC SERPL-MCNC: 107 MG/DL
POTASSIUM SERPL-SCNC: 4.3 MMOL/L (ref 3.4–5.3)
SODIUM SERPL-SCNC: 144 MMOL/L (ref 133–144)
TRIGL SERPL-MCNC: 118 MG/DL

## 2018-05-03 PROCEDURE — 36415 COLL VENOUS BLD VENIPUNCTURE: CPT | Performed by: FAMILY MEDICINE

## 2018-05-03 PROCEDURE — 80061 LIPID PANEL: CPT | Performed by: FAMILY MEDICINE

## 2018-05-03 PROCEDURE — 99213 OFFICE O/P EST LOW 20 MIN: CPT | Mod: 25 | Performed by: FAMILY MEDICINE

## 2018-05-03 PROCEDURE — 80048 BASIC METABOLIC PNL TOTAL CA: CPT | Performed by: FAMILY MEDICINE

## 2018-05-03 PROCEDURE — G0438 PPPS, INITIAL VISIT: HCPCS | Performed by: FAMILY MEDICINE

## 2018-05-03 RX ORDER — PRIMIDONE 50 MG/1
50 TABLET ORAL AT BEDTIME
Qty: 35 TABLET | Refills: 0 | Status: SHIPPED | OUTPATIENT
Start: 2018-05-03 | End: 2018-06-01

## 2018-05-03 RX ORDER — METOPROLOL SUCCINATE 100 MG/1
100 TABLET, EXTENDED RELEASE ORAL DAILY
Qty: 90 TABLET | Refills: 3 | Status: ON HOLD | OUTPATIENT
Start: 2018-05-03 | End: 2018-07-27

## 2018-05-03 RX ORDER — LISINOPRIL AND HYDROCHLOROTHIAZIDE 12.5; 2 MG/1; MG/1
2 TABLET ORAL DAILY
Qty: 180 TABLET | Refills: 3 | Status: SHIPPED | OUTPATIENT
Start: 2018-05-03 | End: 2019-04-26

## 2018-05-03 NOTE — TELEPHONE ENCOUNTER
Please call Primidone doesn't come as a liquid formulation - possible that this was discontinued since his 2014 visit with neurology. Our pharmacists even checked with the .    What we could have him do is get the 50mg tabs (that's the lowest dose tab) and have him 1/4 these (that would equal 12.5mg). They may crumble but can just gather together about 1/4 tab of fragments and take that and then toss the rest. I will write the prescription for 1 tab daily so that insurance provides a full tab per day of titration given likelihood of it crumbling.     Since the bottle will say 1 tab daily Follow the following titration at first:  1/4 tab once daily x 1 week   then 1/2 tab once daily x 1 week   then 3/4 tab once daily x 1 week   then full tab daily    Then stay at full tab daily x 1 month and let me know how it goes. If not helping as much as he'd like we can go up to as high as 150mg from there.

## 2018-05-03 NOTE — PROGRESS NOTES
SUBJECTIVE:   Richard You is a 78 year old male who presents for Preventive Visit.      Are you in the first 12 months of your Medicare Part B coverage?  No    Healthy Habits:    Do you get at least three servings of calcium containing foods daily (dairy, green leafy vegetables, etc.)? yes    Amount of exercise or daily activities, outside of work: 6 day(s) per week    Problems taking medications regularly No    Medication side effects: No    Have you had an eye exam in the past two years? yes    Do you see a dentist twice per year? No, pt. Has dentures    Do you have sleep apnea, excessive snoring or daytime drowsiness?no      Ability to successfully perform activities of daily living: Yes, no assistance needed    Home safety:  none identified     Hearing impairment: No    Fall risk:  Fallen 2 or more times in the past year?: No  Any fall with injury in the past year?: No        COGNITIVE SCREEN  1) Repeat 3 items (Banana, Sunrise, Chair)    2) Clock draw: NORMAL  3) 3 item recall: Recalls 2 objects   Results: NORMAL clock, 1-2 items recalled: COGNITIVE IMPAIRMENT LESS LIKELY    Mini-CogTM Copyright S Ghanshyam. Licensed by the author for use in [facility]; reprinted with permission (chan@[email or web address]). All rights reserved.            Hypertension Follow-up      Outpatient blood pressures are not being checked.    Low Salt Diet: no added salt    Also has a history of Barrets esophagus with/o dysplasia and need follow-up EGD. Also has a history of AAA repair and needs yearly CT angio to follow-up on this.  Also has a history of benign essential tremor. On metoprolol for this.    Reviewed and updated as needed this visit by clinical staff  Allergies         Reviewed and updated as needed this visit by Provider        Social History   Substance Use Topics     Smoking status: Former Smoker     Packs/day: 0.50     Years: 10.00     Types: Cigarettes     Quit date: 11/29/1995     Smokeless tobacco: Never Used      Alcohol use Yes      Comment: beer occ,       If you drink alcohol do you typically have >3 drinks per day or >7 drinks per week? No                        Today's PHQ-2 Score:   PHQ-2 ( 1999 Pf[employer]) 5/3/2018 12/7/2017   Q1: Little interest or pleasure in doing things 0 0   Q2: Feeling down, depressed or hopeless 0 0   PHQ-2 Score 0 0       Do you feel safe in your environment - Yes    Do you have a Health Care Directive?: Yes: Advance Directive has been received and scanned.    Current providers sharing in care for this patient include:   Patient Care Team:  Kamari Rojas MD as PCP - General (Family Practice)    The following health maintenance items are reviewed in Epic and correct as of today:  Health Maintenance   Topic Date Due     BMP Q1 YR  04/26/2018     LIPID MONITORING Q1 YEAR  04/26/2018     COLONOSCOPY Q5 YR  05/10/2018     TETANUS IMMUNIZATION (SYSTEM ASSIGNED)  07/01/2018     ADVANCE DIRECTIVE PLANNING Q5 YRS  10/24/2018     FALL RISK ASSESSMENT  12/07/2018     PNEUMOCOCCAL  Completed     INFLUENZA VACCINE  Completed     Labs reviewed in EPIC  Patient Active Problem List   Diagnosis     Coronary atherosclerosis     Abdominal aortic aneurysm (H)     Benign shuddering attack     Hyperlipidemia LDL goal <100     Advanced directives, counseling/discussion     HTN, goal below 130/80     [facility]     Macular degeneration, wet (H)     Colon polyp, hyperplastic     Heart murmur     Former smoker     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)     Esophagitis     Carey's esophagus     Arteriosclerotic vascular disease     Benign essential tremor     Past Surgical History:   Procedure Laterality Date     BYPASS CARDIOPULMONARY  12/1996    CA bypass x2 LAD     CARDIAC SURGERY       CYSTECTOMY PILONIDAL       ESOPHAGOSCOPY, GASTROSCOPY, DUODENOSCOPY (EGD), COMBINED N/A 9/4/2015    Procedure: COMBINED ESOPHAGOSCOPY, GASTROSCOPY, DUODENOSCOPY (EGD), BIOPSY SINGLE OR MULTIPLE;   Surgeon: Fuad Holder MD;  Location: [facility]     ESOPHAGOSCOPY, GASTROSCOPY, DUODENOSCOPY (EGD), COMBINED N/A 9/20/2016    Procedure: COMBINED ESOPHAGOSCOPY, GASTROSCOPY, DUODENOSCOPY (EGD), BIOPSY SINGLE OR MULTIPLE;  Surgeon: Lucas Dang MD;  Location: [facility]     HERNIA REPAIR       VASECTOMY         Social History   Substance Use Topics     Smoking status: Former Smoker     Packs/day: 0.50     Years: 10.00     Types: Cigarettes     Quit date: 11/29/1995     Smokeless tobacco: Never Used     Alcohol use Yes      Comment: beer occ,     Family History   Problem Relation Age of Onset     Cancer - colorectal Mother      Cardiovascular Father      Alcohol/Drug Father      Neurologic Disorder Father      tremor     Cardiovascular Brother      Breast Cancer Sister      CANCER Brother      bladder cancer         Current Outpatient Prescriptions   Medication Sig Dispense Refill     aspirin 81 MG chewable tablet Take 1 tablet (81 mg) by mouth daily 36 tablet 3     atorvastatin (LIPITOR) 80 MG tablet Take 1 tablet (80 mg) by mouth daily 90 tablet 3     FISH OIL 1000 MG OR CAPS 2 DAILY  0     lisinopril-hydrochlorothiazide (PRINZIDE/ZESTORETIC) 20-12.5 MG per tablet Take 2 tablets by mouth daily 180 tablet 3     metoprolol succinate (TOPROL-XL) 100 MG 24 hr tablet Take 1 tablet (100 mg) by mouth daily 90 tablet 3     Multiple Vitamin (MULTI-VITAMIN DAILY PO) Take 1 tablet by mouth daily       Multiple Vitamins-Minerals (ICAPS PO)        omeprazole (PRILOSEC) 40 MG capsule Take 1 capsule (40 mg) by mouth daily Take 30-60 minutes before a meal. 90 capsule 3     primidone (MYSOLINE) 50 MG tablet Take 1 tablet (50 mg) by mouth At Bedtime 35 tablet 0     Allergies   Allergen Reactions     Nka [No Known Allergies]        ROS:  Constitutional, neuro, ENT, endocrine, pulmonary, cardiac, gastrointestinal, genitourinary, musculoskeletal, integument and psychiatric systems are negative, except as otherwise noted.      OBJECTIVE:   /70  Pulse 82  Temp 97.9  F (36.6  C) (Tympanic)  Resp 20  Ht 6' (1.829 m)  Wt 209 lb 9.6 oz (95.1 kg)  BMI 28.43 kg/m2 Estimated body mass index is 28.43 kg/(m^2) as calculated from the following:    Height as of this encounter: 6' (1.829 m).    Weight as of this encounter: 209 lb 9.6 oz (95.1 kg).  EXAM:   GENERAL: healthy, alert and no distress  EYES: Eyes grossly normal to inspection, PERRL and conjunctivae and sclerae normal  HENT: ear canals and TM's normal, nose and mouth without ulcers or lesions  NECK: no adenopathy, no asymmetry, masses, or scars and thyroid normal to palpation  RESP: lungs clear to auscultation - no rales, rhonchi or wheezes  CV: regular rate and rhythm, normal S1 S2, no S3 or S4, no murmur, click or rub, no peripheral edema and peripheral pulses strong  ABDOMEN: soft, nontender, no hepatosplenomegaly, no masses and bowel sounds normal  MS: no gross musculoskeletal defects noted, no edema  SKIN: no suspicious lesions or rashes  NEURO: Normal strength and tone, mentation intact and speech normal  PSYCH: mentation appears normal, affect normal/bright    ASSESSMENT / PLAN:   1. Well adult exam    2. Abdominal aortic aneurysm (AAA) without rupture (H)  CT angio due for annual monitoring.    3. HTN, goal below 130/80  Well controlled. Refilled medication. Check labs.    - lisinopril-hydrochlorothiazide (PRINZIDE/ZESTORETIC) 20-12.5 MG per tablet; Take 2 tablets by mouth daily  Dispense: 180 tablet; Refill: 3  - metoprolol succinate (TOPROL-XL) 100 MG 24 hr tablet; Take 1 tablet (100 mg) by mouth daily  Dispense: 90 tablet; Refill: 3  - Lipid panel reflex to direct LDL Fasting  - Basic metabolic panel    4. Benign essential tremor  - metoprolol succinate (TOPROL-XL) 100 MG 24 hr tablet; Take 1 tablet (100 mg) by mouth daily  Dispense: 90 tablet; Refill: 3    5. Carey's esophagus without dysplasia  Due for EGD for monitoring.      End of Life Planning:  Patient  currently has an advanced directive: Yes.  Practitioner is supportive of decision.    COUNSELING:  Reviewed preventive health counseling, as reflected in patient instructions        Estimated body mass index is 28.43 kg/(m^2) as calculated from the following:    Height as of this encounter: 6' (1.829 m).    Weight as of this encounter: 209 lb 9.6 oz (95.1 kg).  Weight management plan: Discussed healthy diet and exercise guidelines and patient will follow up in 12 months in clinic to re-evaluate.     reports that he quit smoking about 22 years ago. His smoking use included Cigarettes. He has a 5.00 pack-year smoking history. He has never used smokeless tobacco.      Appropriate preventive services were discussed with this patient, including applicable screening as appropriate for cardiovascular disease, diabetes, osteopenia/osteoporosis, and glaucoma.  As appropriate for age/gender, discussed screening for colorectal cancer, prostate cancer, breast cancer, and cervical cancer. Checklist reviewing preventive services available has been given to the patient.    Reviewed patients plan of care and provided an AVS. The Intermediate Care Plan ( asthma action plan, low back pain action plan, and migraine action plan) for Richard meets the Care Plan requirement. This Care Plan has been established and reviewed with the Patient and spouse.    Counseling Resources:  ATP IV Guidelines  Pooled Cohorts Equation Calculator  Breast Cancer Risk Calculator  FRAX Risk Assessment  ICSI Preventive Guidelines  Dietary Guidelines for Americans, 2010  USDA's MyPlate  ASA Prophylaxis  Lung CA Screening    Kamari Rojas MD  [facility]

## 2018-05-03 NOTE — MR AVS SNAPSHOT
After Visit Summary   5/3/2018    Richard You    MRN: [id number]           Patient Information     Date Of Birth          1940        Visit Information        Provider Department      5/3/2018 8:00 AM Kamari Rojas MD [facility]        Today's Diagnoses     HTN, goal below 130/80        Benign essential tremor          Care Instructions      Preventive Health Recommendations:       Male Ages 65 and over    Yearly exam:             See your health care provider every year in order to  o   Review health changes.   o   Discuss preventive care.    o   Review your medicines if your doctor has prescribed any.    Talk with your health care provider about whether you should have a test to screen for prostate cancer (PSA).    Every 3 years, have a diabetes test (fasting glucose). If you are at risk for diabetes, you should have this test more often.    Every 5 years, have a cholesterol test. Have this test more often if you are at risk for high cholesterol or heart disease.     Every 10 years, have a colonoscopy. Or, have a yearly FIT test (stool test). These exams will check for colon cancer.    Talk to with your health care provider about screening for Abdominal Aortic Aneurysm if you have a family history of AAA or have a history of smoking.  Shots:     Get a flu shot each year.     Get a tetanus shot every 10 years.     Talk to your doctor about your pneumonia vaccines. There are now two you should receive - Pneumovax (PPSV 23) and Prevnar (PCV 13).    Talk to your doctor about a shingles vaccine.     Talk to your doctor about the hepatitis B vaccine.  Nutrition:     Eat at least 5 servings of fruits and vegetables each day.     Eat whole-grain bread, whole-wheat pasta and brown rice instead of white grains and rice.     Talk to your doctor about Calcium and Vitamin D.   Lifestyle    Exercise for at least 150 minutes a week (30 minutes a day, 5 days a week). This will  help you control your weight and prevent disease.     Limit alcohol to one drink per day.     No smoking.     Wear sunscreen to prevent skin cancer.     See your dentist every six months for an exam and cleaning.     See your eye doctor every 1 to 2 years to screen for conditions such as glaucoma, macular degeneration and cataracts.          Follow-ups after your visit        Who to contact     If you have questions or need follow up information about today's clinic visit or your schedule please contact [facility] [place] directly at [phone].  Normal or non-critical lab and imaging results will be communicated to you by [employer], letter or phone within 4 business days after the clinic has received the results. If you do not hear from us within 7 days, please contact the clinic through [employer] or phone. If you have a critical or abnormal lab result, we will notify you by phone as soon as possible.  Submit refill requests through [employer] or call your pharmacy and they will forward the refill request to us. Please allow 3 business days for your refill to be completed.          Additional Information About Your Visit        [employer] Information     [employer] gives you secure access to your electronic health record. If you see a primary care provider, you can also send messages to your care team and make appointments. If you have questions, please call your primary care clinic.  If you do not have a primary care provider, please call [phone] and they will assist you.        Care EveryWhere ID     This is your Care EveryWhere ID. This could be used by other organizations to access your [place] medical records  [id number]        Your Vitals Were     Pulse Temperature Respirations Height BMI (Body Mass Index)       82 97.9  F (36.6  C) (Tympanic) 20 6' (1.829 m) 28.43 kg/m2        Blood Pressure from Last 3 Encounters:   05/03/18 137/70   12/07/17 148/84   04/26/17 122/70    Weight from Last 3  Encounters:   05/03/18 209 lb 9.6 oz (95.1 kg)   12/07/17 210 lb 6.4 oz (95.4 kg)   04/26/17 202 lb 6.4 oz (91.8 kg)              We Performed the Following     Basic metabolic panel     Lipid panel reflex to direct LDL Fasting          Today's Medication Changes          These changes are accurate as of 5/3/18  4:23 PM.  If you have any questions, ask your nurse or doctor.               Start taking these medicines.        Dose/Directions    primidone 50 MG tablet   Commonly known as:  MYSOLINE   Used for:  Benign essential tremor   Started by:  Kamari Rojas MD        Dose:  50 mg   Take 1 tablet (50 mg) by mouth At Bedtime   Quantity:  35 tablet   Refills:  0         Stop taking these medicines if you haven't already. Please contact your care team if you have questions.     topiramate 25 MG tablet   Commonly known as:  TOPAMAX   Stopped by:  Kamari Rojas MD                Where to get your medicines      These medications were sent to [facility] [address] [place] [address] [address]  [address] [facility] [address]     Phone:  [phone]     lisinopril-hydrochlorothiazide 20-12.5 MG per tablet    metoprolol succinate 100 MG 24 hr tablet    primidone 50 MG tablet                Primary Care Provider Office Phone # Fax #    Kamari Rojas [phone] [address] [facility] [address]        Equal Access to Services     [facility] AH: Hadii cyn mcnultyo Soantony, waaxda luqadaha, qaybta kaalmada adeegyada, cortney manuel. So [facility] [phone].    ATENCIÓN: Si habla español, tiene a lechuga disposición servicios gratuitos de asistencia lingüística. Llame al [phone].    We comply with applicable federal civil rights laws and [place] laws. We do not discriminate on the basis of race, color, national origin, age, disability, sex, sexual orientation, or gender identity.            Thank you!     Thank you  for choosing [facility]  for your care. Our goal is always to provide you with excellent care. Hearing back from our patients is one way we can continue to improve our services. Please take a few minutes to complete the written survey that you may receive in the mail after your visit with us. Thank you!             Your Updated Medication List - Protect others around you: Learn how to safely use, store and throw away your medicines at www.disposemymeds.org.          This list is accurate as of 5/3/18  4:23 PM.  Always use your most recent med list.                   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       fish oil-omega-3 fatty acids 1000 MG capsule      2 DAILY        ICAPS PO           lisinopril-hydrochlorothiazide 20-12.5 MG per tablet    PRINZIDE/ZESTORETIC    180 tablet    Take 2 tablets by mouth daily    HTN, goal below 130/80       metoprolol succinate 100 MG 24 hr tablet    TOPROL-XL    90 tablet    Take 1 tablet (100 mg) by mouth daily    HTN, goal below 130/80, Benign essential tremor       MULTI-VITAMIN DAILY PO      Take 1 tablet by mouth daily        omeprazole 40 MG capsule    priLOSEC    90 capsule    Take 1 capsule (40 mg) by mouth daily Take 30-60 minutes before a meal.    Esophagitis, Carey's esophagus without dysplasia       primidone 50 MG tablet    MYSOLINE    35 tablet    Take 1 tablet (50 mg) by mouth At Bedtime    Benign essential tremor

## 2018-05-13 ENCOUNTER — TELEPHONE (OUTPATIENT)
Dept: FAMILY MEDICINE | Facility: CLINIC | Age: 78
End: 2018-05-13

## 2018-05-13 DIAGNOSIS — I71.40 ABDOMINAL AORTIC ANEURYSM (AAA) WITHOUT RUPTURE (H): ICD-10-CM

## 2018-05-13 DIAGNOSIS — K22.70 BARRETT'S ESOPHAGUS WITHOUT DYSPLASIA: Primary | Chronic | ICD-10-CM

## 2018-05-14 NOTE — TELEPHONE ENCOUNTER
Please call: I was reviewing his chart after his visit and he is due for EGD to follow-up on Barrets changes. And abdomen CT angio for follow-up of his AAA repair. Orders placed.

## 2018-05-31 ENCOUNTER — MYC MEDICAL ADVICE (OUTPATIENT)
Dept: FAMILY MEDICINE | Facility: CLINIC | Age: 78
End: 2018-05-31

## 2018-05-31 DIAGNOSIS — G25.0 BENIGN ESSENTIAL TREMOR: ICD-10-CM

## 2018-06-01 RX ORDER — PRIMIDONE 50 MG/1
100 TABLET ORAL AT BEDTIME
Qty: 60 TABLET | Refills: 0 | Status: ON HOLD | OUTPATIENT
Start: 2018-06-01 | End: 2018-07-25

## 2018-06-07 ENCOUNTER — TELEPHONE (OUTPATIENT)
Dept: FAMILY MEDICINE | Facility: CLINIC | Age: 78
End: 2018-06-07

## 2018-06-07 NOTE — TELEPHONE ENCOUNTER
Reason for Call:  FYI ONLY    Procedure  has reached out to schedule diagnostic upper gi endoscopy X 3    Patient has failed to return call to schedule    Detailed comments: please follow up as necessary    Phone Number Patient can be reached at: Home number on file [phone] (home)    Best Time: NA    Can we leave a detailed message on this number? Not Applicable    Call taken on 6/7/2018 at 11:00 AM by Funmi Bruce

## 2018-07-10 ENCOUNTER — ANESTHESIA EVENT (OUTPATIENT)
Dept: GASTROENTEROLOGY | Facility: CLINIC | Age: 78
End: 2018-07-10
Payer: MEDICARE

## 2018-07-10 ASSESSMENT — ENCOUNTER SYMPTOMS: DYSRHYTHMIAS: 1

## 2018-07-10 ASSESSMENT — LIFESTYLE VARIABLES: TOBACCO_USE: 1

## 2018-07-10 NOTE — ANESTHESIA PREPROCEDURE EVALUATION
Anesthesia Evaluation     . Pt has had prior anesthetic. Type: General and MAC           ROS/MED HX    ENT/Pulmonary:     (+)tobacco use, Past use , . .    Neurologic: Comment: Macular degeneration, wet (H)    Benign shuddering attack     - neg neurologic ROS     Cardiovascular: Comment: Abdominal aneurysm without mention of rupture   murmur        (+) Dyslipidemia, hypertension--CAD, -past MI,CABG-date: 25 years ago, . : . . . :. dysrhythmias a-fib, valvular problems/murmurs . Previous cardiac testing Echodate:6-15-15results:Interpretation Summary     Ischemic Cardiomyopathy  Apical. anteroseptal, and inferoseptal hypokineis  The visual ejection fraction is estimated at 40%.  The transmitral spectral Doppler flow pattern is suggestive of impaired LV  relaxation.  The mitral valve is normal in structure and function.  Normal tricuspid aortic valve  Mild aortic root dilatation.  Dilatation of the ascending aorta  There is no pericardial effusion.  The rhythm was normal sinus.  LV dysfunction has developed since the previous study.  Stress Testdate:6-22-15 results:   GATED MYOCARDIAL PERFUSION SCINTIGRAPHY EXERCISE- ONE DAY STUDY            6/22/2015 10:49 AM  EDWIGE BLANCO  75 years  Male  1940.        Indication/Clinical History: coronary artery disease.        Impression  1.  Myocardial perfusion imaging using single isotope technique  demonstrated an chang-apical and apical non-transmural infarction in  the distribution of the LAD without signs of clinically significant  reversible ischemia..    2. Gated images demonstrated apical LV hypokinesis.  .  The left ventricular  EF was 49% post stress..  3. Compared to the prior study from 6/3/2013, no significant change  has occurred. The previous study also showed an apical infarction . date: results:Cath date: 6-26-15 results:LEFT HEART CATHETERIZATION     HISTORY: This is a gentleman who had bypass surgery and a previous  anterior infarct approximately 19  years ago with an LIMA graft to the  LAD and a vein graft to the diagonal. Patient has not had any anginal  symptoms. He had a stress test done for which he went into  nonsustained V. tach.  He did not reach target heart rate, and  therefore the test could not be interpreted for clear ischemia.  Because of the exercise-induced arrhythmia heart catheterization is  requested.  Echocardiogram had suggested ejection fraction of 40% with  anterior wall motion abnormality.     RESULTS:   Left main artery: There is very mild narrowing of the ostium of the  left main artery under 20%.    Left circumflex: The left circumflex is a large vessel. It has trivial  plaque. No narrowing appears to be greater than 20%.     Ramus branch: There is a small ramus branch which is normal.    Left anterior descending: The LAD has a flush occlusion at the origin.    Right coronary artery: The right coronary artery is a large dominant  vessel. It is calcified. There is mild disease in the proximal to  midportion of 20%. There is scattered disease at the mid to distal  right coronary artery of 35% to at the very most 40%. The PDA vessel  is normal. There is a large posterolateral network with mild scattered  disease of 20-30%. Of note there are no collaterals to the LAD from  the right coronary artery.     Vein graft to the diagonal: The vein graft to the diagonal is in very  good shape. It enters a small diagonal vessel which then backfills  towards the LAD, and one can see a septal  off the LAD, but  there is not backflow all the way to the origin, and there is not  forward flow past the LAD septal . The diagonal itself is  well revascularized.    LIMA graft to the LAD: The LIMA graft is a small graft but is in very  good shape. It enters the mid LAD and fills out the LAD distally. It  is a small LAD system. There is minimal backfilling retrogradely to a  small septal . It is likely that there is probably a  "\"bald  area\" in the proximal LAD before the portion that is filled from the  retrograde diagonal graft and then another \"bald area\" in the mid LAD  in between the first septal  and a small backfilling from  the LIMA graft. Otherwise all areas are well revascularized.    Left ventriculogram: The left ventricle size appears to be borderline  and mildly enlarged. There is hypokinesis but not akinesis along the  anterior wall and apex. Wall motion abnormality is actually a little  worse in the mid anterior wall and a little bit less on in the apex.  This would correspond to my interpretation of the LAD anatomy.    There is mild hypokinesis of the distal inferoapical wall.  Visual  estimated ejection fraction is proximal to 35-40%. There is no  significant mitral valve insufficiency. LV pressure 94/12. There is no  gradient on pullback. Total contrast 70 mL. Total fluoroscopy time  2.56 minutes.     CONCLUSION: All areas are relatively well revascularized. The ejection  fraction is reduced to approximately 35-40%. There may be \"bald areas\"  in the LAD distribution in the very proximal portion and in the  midportion but no intervention is indicated here.              METS/Exercise Tolerance:  >4 METS   Hematologic:     (+) History of Transfusion -      Musculoskeletal:  - neg musculoskeletal ROS       GI/Hepatic: Comment: Esophagitis     Carey's esophagus    Colon polyp, hyperplastic    (+) Other GI/Hepatic esophageal strictures, polyps      Renal/Genitourinary:  - ROS Renal section negative       Endo:  - neg endo ROS       Psychiatric:  - neg psychiatric ROS       Infectious Disease:  - neg infectious disease ROS       Malignancy:      - no malignancy   Other:    - neg other ROS                 Physical Exam  Normal systems: cardiovascular and pulmonary    Airway   Mallampati: I  TM distance: >3 FB  Neck ROM: full    Dental   (+) upper dentures and lower dentures    Cardiovascular       Pulmonary            "              Anesthesia Plan      History & Physical Review      ASA Status:  3 .    NPO Status:  > 4 hours    Plan for MAC with Propofol induction.          Postoperative Care      Consents  Anesthetic plan, risks, benefits and alternatives discussed with:  Patient..                          .

## 2018-07-12 ENCOUNTER — SURGERY (OUTPATIENT)
Age: 78
End: 2018-07-12

## 2018-07-12 ENCOUNTER — HOSPITAL ENCOUNTER (OUTPATIENT)
Facility: CLINIC | Age: 78
Discharge: HOME | End: 2018-07-12
Attending: SURGERY | Admitting: SURGERY
Payer: MEDICARE

## 2018-07-12 ENCOUNTER — ANESTHESIA (OUTPATIENT)
Dept: GASTROENTEROLOGY | Facility: CLINIC | Age: 78
End: 2018-07-12
Payer: MEDICARE

## 2018-07-12 VITALS
HEIGHT: 72 IN | BODY MASS INDEX: 27.77 KG/M2 | SYSTOLIC BLOOD PRESSURE: 145 MMHG | DIASTOLIC BLOOD PRESSURE: 56 MMHG | WEIGHT: 205 LBS | RESPIRATION RATE: 16 BRPM | TEMPERATURE: 97.7 F | OXYGEN SATURATION: 98 %

## 2018-07-12 LAB — UPPER GI ENDOSCOPY: NORMAL

## 2018-07-12 PROCEDURE — A9270 NON-COVERED ITEM OR SERVICE: HCPCS | Mod: GY | Performed by: SURGERY

## 2018-07-12 PROCEDURE — 43239 EGD BIOPSY SINGLE/MULTIPLE: CPT | Performed by: SURGERY

## 2018-07-12 PROCEDURE — 25000128 H RX IP 250 OP 636: Performed by: SURGERY

## 2018-07-12 PROCEDURE — 88305 TISSUE EXAM BY PATHOLOGIST: CPT | Performed by: SURGERY

## 2018-07-12 PROCEDURE — 25000125 ZZHC RX 250: Performed by: SURGERY

## 2018-07-12 PROCEDURE — 37000008 ZZH ANESTHESIA TECHNICAL FEE, 1ST 30 MIN: Performed by: SURGERY

## 2018-07-12 PROCEDURE — 88305 TISSUE EXAM BY PATHOLOGIST: CPT | Mod: 26 | Performed by: SURGERY

## 2018-07-12 PROCEDURE — 25000132 ZZH RX MED GY IP 250 OP 250 PS 637: Mod: GY | Performed by: SURGERY

## 2018-07-12 PROCEDURE — 25000128 H RX IP 250 OP 636: Performed by: NURSE ANESTHETIST, CERTIFIED REGISTERED

## 2018-07-12 PROCEDURE — 25000125 ZZHC RX 250: Performed by: NURSE ANESTHETIST, CERTIFIED REGISTERED

## 2018-07-12 RX ORDER — LIDOCAINE 40 MG/G
CREAM TOPICAL
Status: DISCONTINUED | OUTPATIENT
Start: 2018-07-12 | End: 2018-07-12 | Stop reason: HOSPADM

## 2018-07-12 RX ORDER — SODIUM CHLORIDE, SODIUM LACTATE, POTASSIUM CHLORIDE, CALCIUM CHLORIDE 600; 310; 30; 20 MG/100ML; MG/100ML; MG/100ML; MG/100ML
INJECTION, SOLUTION INTRAVENOUS CONTINUOUS
Status: DISCONTINUED | OUTPATIENT
Start: 2018-07-12 | End: 2018-07-12 | Stop reason: HOSPADM

## 2018-07-12 RX ORDER — PROPOFOL 10 MG/ML
INJECTION, EMULSION INTRAVENOUS PRN
Status: DISCONTINUED | OUTPATIENT
Start: 2018-07-12 | End: 2018-07-12

## 2018-07-12 RX ORDER — GLYCOPYRROLATE 0.2 MG/ML
INJECTION, SOLUTION INTRAMUSCULAR; INTRAVENOUS PRN
Status: DISCONTINUED | OUTPATIENT
Start: 2018-07-12 | End: 2018-07-12

## 2018-07-12 RX ORDER — SIMETHICONE 40MG/0.6ML
SUSPENSION, DROPS(FINAL DOSAGE FORM)(ML) ORAL PRN
Status: DISCONTINUED | OUTPATIENT
Start: 2018-07-12 | End: 2018-07-12 | Stop reason: HOSPADM

## 2018-07-12 RX ORDER — ONDANSETRON 2 MG/ML
4 INJECTION INTRAMUSCULAR; INTRAVENOUS
Status: DISCONTINUED | OUTPATIENT
Start: 2018-07-12 | End: 2018-07-12 | Stop reason: HOSPADM

## 2018-07-12 RX ADMIN — SIMETHICONE 40 MG: 20 SUSPENSION/ DROPS ORAL at 13:31

## 2018-07-12 RX ADMIN — TOPICAL ANESTHETIC 1 SPRAY: 200 SPRAY DENTAL; PERIODONTAL at 13:30

## 2018-07-12 RX ADMIN — SODIUM CHLORIDE, POTASSIUM CHLORIDE, SODIUM LACTATE AND CALCIUM CHLORIDE: 600; 310; 30; 20 INJECTION, SOLUTION INTRAVENOUS at 13:07

## 2018-07-12 RX ADMIN — LIDOCAINE HYDROCHLORIDE 1 ML: 10 INJECTION, SOLUTION EPIDURAL; INFILTRATION; INTRACAUDAL; PERINEURAL at 13:07

## 2018-07-12 RX ADMIN — GLYCOPYRROLATE 0.2 MG: 0.2 INJECTION, SOLUTION INTRAMUSCULAR; INTRAVENOUS at 13:28

## 2018-07-12 RX ADMIN — PROPOFOL 150 MG: 10 INJECTION, EMULSION INTRAVENOUS at 13:30

## 2018-07-12 NOTE — OP NOTE
EGD - GE junction consistent with Carey's. Biopsied. Mild gastritis without ulceration. Normal duodenum.

## 2018-07-12 NOTE — H&P
78 year old year old male here for upper endoscopy for Carey's        Patient Active Problem List   Diagnosis     Coronary atherosclerosis     Abdominal aortic aneurysm (H)     Benign shuddering attack     Hyperlipidemia LDL goal <100     Advanced directives, counseling/discussion     HTN, goal below 130/80     [facility]     Macular degeneration, wet (H)     Colon polyp, hyperplastic     Heart murmur     Former smoker     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)     Esophagitis     Carey's esophagus     Arteriosclerotic vascular disease     Benign essential tremor       Past Medical History:   Diagnosis Date     Abdominal aneurysm without mention of rupture 6/24/2005    Repaired with percutaneous stent 8/05   Has annual CT scan to monitor stent placement. Last done in sept 2012 and stable      Atrial fibrillation (H)      CAD 4/5/2005 12/05/12 Patient denies any recent chest pain or NTG use.  Patient is taking meds regularly and denies significant side effects. Last stress test many years ago, but bikes 30-40 mi/day without symptoms       Colon polyp, hyperplastic 5/14/2013    Hyperplastic  Polyp on colonoscopy 5-2013. 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       Past Surgical History:   Procedure Laterality Date     BYPASS CARDIOPULMONARY  12/1996    CA bypass x2 LAD     CARDIAC SURGERY       CYSTECTOMY PILONIDAL       ESOPHAGOSCOPY, GASTROSCOPY, DUODENOSCOPY (EGD), COMBINED N/A 9/4/2015    Procedure: COMBINED ESOPHAGOSCOPY, GASTROSCOPY, DUODENOSCOPY (EGD), BIOPSY SINGLE OR MULTIPLE;  Surgeon: Fuad Holder,  MD;  Location: [facility]     ESOPHAGOSCOPY, GASTROSCOPY, DUODENOSCOPY (EGD), COMBINED N/A 9/20/2016    Procedure: COMBINED ESOPHAGOSCOPY, GASTROSCOPY, DUODENOSCOPY (EGD), BIOPSY SINGLE OR MULTIPLE;  Surgeon: Lucas Dang MD;  Location: [facility]     HERNIA REPAIR       VASECTOMY         Family History   Problem Relation Age of Onset     Cancer - colorectal Mother      Cardiovascular Father      Alcohol/Drug Father      Neurologic Disorder Father      tremor     Cardiovascular Brother      Breast Cancer Sister      Cancer Brother      bladder cancer       No current outpatient prescriptions on file.       Allergies   Allergen Reactions     Nka [No Known Allergies]        Pt reports that he quit smoking about 22 years ago. His smoking use included Cigarettes. He has a 5.00 pack-year smoking history. He has never used smokeless tobacco. He reports that he drinks alcohol. He reports that he does not use illicit drugs.    Exam:    Awake, Alert OX3  Lungs - CTA bilaterally  CV - RRR, no murmurs, distal pulses intact  Abd - soft, non-distended, non-tender, +BS  Extr - No cyanosis or edema    A/P 78 year old year old male in need of upper endoscopy for lopez's. Risks, benefits, alternatives, and complications were discussed including the possibility of perforation and the patient agreed to proceed.    Richard Crowder MD

## 2018-07-12 NOTE — ANESTHESIA POSTPROCEDURE EVALUATION
Patient: Richard You    Procedure(s):  gastroscopy - Wound Class: II-Clean Contaminated    Diagnosis:Barretts esophagus without dysplasia    diagnostic  Diagnosis Additional Information: No value filed.    Anesthesia Type:  MAC    Note:  Anesthesia Post Evaluation    Patient location during evaluation: Bedside  Patient participation: Able to fully participate in evaluation  Level of consciousness: awake and alert  Pain management: adequate  Airway patency: patent  Cardiovascular status: acceptable  Respiratory status: acceptable  Hydration status: acceptable  PONV: none     Anesthetic complications: None          Last vitals:  Vitals:    07/12/18 1228   BP: 171/83   Resp: 16   Temp: 36.5  C (97.7  F)   SpO2: 97%         Electronically Signed By: MAMADOU Morales CRNA  July 12, 2018  1:51 PM

## 2018-07-14 ENCOUNTER — HEALTH MAINTENANCE LETTER (OUTPATIENT)
Age: 78
End: 2018-07-14

## 2018-07-19 LAB — COPATH REPORT: NORMAL

## 2018-07-25 ENCOUNTER — HOSPITAL ENCOUNTER (INPATIENT)
Facility: CLINIC | Age: 78
LOS: 2 days | Discharge: HOME | DRG: 310 | End: 2018-07-27
Attending: INTERNAL MEDICINE | Admitting: INTERNAL MEDICINE
Payer: MEDICARE

## 2018-07-25 ENCOUNTER — OFFICE VISIT (OUTPATIENT)
Dept: FAMILY MEDICINE | Facility: CLINIC | Age: 78
End: 2018-07-25
Payer: COMMERCIAL

## 2018-07-25 VITALS
OXYGEN SATURATION: 96 % | BODY MASS INDEX: 27.87 KG/M2 | SYSTOLIC BLOOD PRESSURE: 148 MMHG | RESPIRATION RATE: 20 BRPM | HEIGHT: 72 IN | HEART RATE: 44 BPM | DIASTOLIC BLOOD PRESSURE: 80 MMHG | WEIGHT: 205.8 LBS | TEMPERATURE: 99.6 F

## 2018-07-25 DIAGNOSIS — G25.0 BENIGN ESSENTIAL TREMOR: ICD-10-CM

## 2018-07-25 DIAGNOSIS — R00.1 BRADYCARDIA: Primary | ICD-10-CM

## 2018-07-25 DIAGNOSIS — I10 HTN, GOAL BELOW 130/80: ICD-10-CM

## 2018-07-25 DIAGNOSIS — I25.810 CORONARY ATHEROSCLEROSIS OF AUTOLOGOUS VEIN BYPASS GRAFT WITHOUT ANGINA: ICD-10-CM

## 2018-07-25 DIAGNOSIS — I48.91 ATRIAL FIBRILLATION, UNSPECIFIED TYPE (H): ICD-10-CM

## 2018-07-25 LAB
ANION GAP SERPL CALCULATED.3IONS-SCNC: 5 MMOL/L (ref 3–14)
BUN SERPL-MCNC: 27 MG/DL (ref 7–30)
CALCIUM SERPL-MCNC: 8.6 MG/DL (ref 8.5–10.1)
CHLORIDE SERPL-SCNC: 107 MMOL/L (ref 94–109)
CO2 SERPL-SCNC: 30 MMOL/L (ref 20–32)
CREAT SERPL-MCNC: 0.98 MG/DL (ref 0.66–1.25)
ERYTHROCYTE [DISTWIDTH] IN BLOOD BY AUTOMATED COUNT: 12.8 % (ref 10–15)
GFR SERPL CREATININE-BSD FRML MDRD: 74 ML/MIN/1.7M2
GLUCOSE SERPL-MCNC: 94 MG/DL (ref 70–99)
HCT VFR BLD AUTO: 40.6 % (ref 40–53)
HGB BLD-MCNC: 13.6 G/DL (ref 13.3–17.7)
MAGNESIUM SERPL-MCNC: 2.2 MG/DL (ref 1.6–2.3)
MCH RBC QN AUTO: 31.9 PG (ref 26.5–33)
MCHC RBC AUTO-ENTMCNC: 33.5 G/DL (ref 31.5–36.5)
MCV RBC AUTO: 95 FL (ref 78–100)
PLATELET # BLD AUTO: 155 10E9/L (ref 150–450)
POTASSIUM SERPL-SCNC: 3.8 MMOL/L (ref 3.4–5.3)
RBC # BLD AUTO: 4.27 10E12/L (ref 4.4–5.9)
SODIUM SERPL-SCNC: 142 MMOL/L (ref 133–144)
TSH SERPL DL<=0.005 MIU/L-ACNC: 1.26 MU/L (ref 0.4–4)
WBC # BLD AUTO: 5.2 10E9/L (ref 4–11)

## 2018-07-25 PROCEDURE — 80048 BASIC METABOLIC PNL TOTAL CA: CPT | Performed by: PHYSICIAN ASSISTANT

## 2018-07-25 PROCEDURE — 93000 ELECTROCARDIOGRAM COMPLETE: CPT | Performed by: FAMILY MEDICINE

## 2018-07-25 PROCEDURE — 21000001 ZZH R&B HEART CARE

## 2018-07-25 PROCEDURE — 84443 ASSAY THYROID STIM HORMONE: CPT | Performed by: PHYSICIAN ASSISTANT

## 2018-07-25 PROCEDURE — 93010 ELECTROCARDIOGRAM REPORT: CPT | Performed by: INTERNAL MEDICINE

## 2018-07-25 PROCEDURE — 25000132 ZZH RX MED GY IP 250 OP 250 PS 637: Mod: GY | Performed by: PHYSICIAN ASSISTANT

## 2018-07-25 PROCEDURE — 99207 ZZC APP CREDIT; MD BILLING SHARED VISIT: CPT | Performed by: PHYSICIAN ASSISTANT

## 2018-07-25 PROCEDURE — 85027 COMPLETE CBC AUTOMATED: CPT | Performed by: PHYSICIAN ASSISTANT

## 2018-07-25 PROCEDURE — A9270 NON-COVERED ITEM OR SERVICE: HCPCS | Mod: GY | Performed by: PHYSICIAN ASSISTANT

## 2018-07-25 PROCEDURE — 99222 1ST HOSP IP/OBS MODERATE 55: CPT | Performed by: INTERNAL MEDICINE

## 2018-07-25 PROCEDURE — 93005 ELECTROCARDIOGRAM TRACING: CPT

## 2018-07-25 PROCEDURE — 99207 ZZC OFFICE-HOSPITAL ADMIT: CPT | Performed by: FAMILY MEDICINE

## 2018-07-25 PROCEDURE — 99223 1ST HOSP IP/OBS HIGH 75: CPT | Mod: AI | Performed by: INTERNAL MEDICINE

## 2018-07-25 PROCEDURE — 83735 ASSAY OF MAGNESIUM: CPT | Performed by: PHYSICIAN ASSISTANT

## 2018-07-25 PROCEDURE — 36415 COLL VENOUS BLD VENIPUNCTURE: CPT | Performed by: PHYSICIAN ASSISTANT

## 2018-07-25 RX ORDER — LISINOPRIL AND HYDROCHLOROTHIAZIDE 12.5; 2 MG/1; MG/1
2 TABLET ORAL DAILY
Status: DISCONTINUED | OUTPATIENT
Start: 2018-07-26 | End: 2018-07-27 | Stop reason: HOSPADM

## 2018-07-25 RX ORDER — ATORVASTATIN CALCIUM 80 MG/1
80 TABLET, FILM COATED ORAL EVERY EVENING
Status: DISCONTINUED | OUTPATIENT
Start: 2018-07-25 | End: 2018-07-27 | Stop reason: HOSPADM

## 2018-07-25 RX ORDER — ASPIRIN 81 MG/1
81 TABLET, CHEWABLE ORAL EVERY EVENING
Status: DISCONTINUED | OUTPATIENT
Start: 2018-07-25 | End: 2018-07-27 | Stop reason: HOSPADM

## 2018-07-25 RX ORDER — ACETAMINOPHEN 325 MG/1
650 TABLET ORAL EVERY 4 HOURS PRN
Status: DISCONTINUED | OUTPATIENT
Start: 2018-07-25 | End: 2018-07-27 | Stop reason: HOSPADM

## 2018-07-25 RX ORDER — NALOXONE HYDROCHLORIDE 0.4 MG/ML
.1-.4 INJECTION, SOLUTION INTRAMUSCULAR; INTRAVENOUS; SUBCUTANEOUS
Status: DISCONTINUED | OUTPATIENT
Start: 2018-07-25 | End: 2018-07-27 | Stop reason: HOSPADM

## 2018-07-25 RX ORDER — HYDRALAZINE HYDROCHLORIDE 20 MG/ML
10 INJECTION INTRAMUSCULAR; INTRAVENOUS EVERY 4 HOURS PRN
Status: DISCONTINUED | OUTPATIENT
Start: 2018-07-25 | End: 2018-07-27 | Stop reason: HOSPADM

## 2018-07-25 RX ORDER — PRIMIDONE 50 MG/1
100 TABLET ORAL AT BEDTIME
Qty: 60 TABLET | Refills: 0 | Status: CANCELLED | OUTPATIENT
Start: 2018-07-25

## 2018-07-25 RX ADMIN — ATORVASTATIN CALCIUM 80 MG: 80 TABLET, FILM COATED ORAL at 19:41

## 2018-07-25 RX ADMIN — ASPIRIN 81 MG 81 MG: 81 TABLET ORAL at 19:41

## 2018-07-25 ASSESSMENT — ACTIVITIES OF DAILY LIVING (ADL)
ADLS_ACUITY_SCORE: 11
ADLS_ACUITY_SCORE: 9

## 2018-07-25 ASSESSMENT — PAIN SCALES - GENERAL: PAINLEVEL: NO PAIN (0)

## 2018-07-25 NOTE — IP AVS SNAPSHOT
[facility]    [address] [place] [address]., [address]    [place] [address]    Phone:  [phone]                                       After Visit Summary   7/25/2018    Richard You    MRN: [id number]           After Visit Summary Signature Page     I have received my discharge instructions, and my questions have been answered. I have discussed any challenges I see with this plan with the nurse or doctor.    ..........................................................................................................................................  Patient/Patient Representative Signature      ..........................................................................................................................................  Patient Representative Print Name and Relationship to Patient    ..................................................               ................................................  Date                                            Time    ..........................................................................................................................................  Reviewed by Signature/Title    ...................................................              ..............................................  Date                                                            Time

## 2018-07-25 NOTE — IP AVS SNAPSHOT
MRN:[id number]                      After Visit Summary   7/25/2018    Richard You    MRN: [id number]           Thank you!     Thank you for choosing [facility] for your care. Our goal is always to provide you with excellent care. Hearing back from our patients is one way we can continue to improve our services. Please take a few minutes to complete the written survey that you may receive in the mail after you visit with us. Thank you!        Patient Information     Date Of Birth          1940        Designated Caregiver       Most Recent Value    Caregiver    Will someone help with your care after discharge? yes    Name of designated caregiver Soledad You    Phone number of caregiver [phone]    Caregiver address [address] [address]      About your hospital stay     You were admitted on:  July 25, 2018 You last received care in the:  [facility]    You were discharged on:  July 27, 2018        Reason for your hospital stay       You were hospitalized for a slow heart rate, with newly diagnosed atrial fibrillation.                  Who to Call     For medical emergencies, please call 911.  For non-urgent questions about your medical care, please call your primary care provider or clinic, [phone]          Attending Provider     Provider Specialty    Joseph Donovan MD Internal Medicine       Primary Care Provider Office Phone # Fax #    Kamari Rojas [phone]      After Care Instructions     Activity       Your activity upon discharge: activity as tolerated            Diet       Follow this diet upon discharge: Regular diet                  Follow-up Appointments     Follow-up and recommended labs and tests        Follow up with primary care provider, Kamari Rojas, within 7 days for hospital follow- up.  No follow up labs or test are needed. Follow-up with cardiology as directed.                   Your next 10 appointments already scheduled     Jul 28, 2018  6:30 PM CDT   IP Cardiac Rehab Satellite Treatment with Jeison Heath OT   [facility] ([facility])    [address], [address]  [place] [address] [phone]              Additional Services     Follow-Up with Cardiac Advanced Practice Provider           Follow-Up with Cardiologist                 Future tests that were ordered for you     Cardiac Event Monitor - Peds/Adult                 Further instructions from your care team       [facility] will contact you this week to help you schedule an appointment with a Nurse Practitioner/Physicians Assistant/Cardiologist  [facility]  [address], [address] [place] [address]  Phone: [phone]    Pending Results     Date and Time Order Name Status Description    7/25/2018 1250 EKG 12-lead, tracing only Preliminary             Statement of Approval     Ordered          07/27/18 1309  I have reviewed and agree with all the recommendations and orders detailed in this document.  EFFECTIVE NOW     Approved and electronically signed by:  Joseph Donovan MD             Admission Information     Date & Time Provider Department Dept. Phone    7/25/2018 Joseph Donovan MD [facility] [phone]      Your Vitals Were     Blood Pressure Pulse Temperature Respirations Weight Pulse Oximetry    135/76 (BP Location: Right arm) 53 97.7  F (36.5  C) (Oral) 18 90.6 kg (199 lb 12.8 oz) 97%    BMI (Body Mass Index)                   27.1 kg/m2           MyChart Information     [employer] gives you secure access to your electronic health record. If you see a primary care provider, you can also send messages to your care team and make appointments. If you have questions, please call your primary care clinic.  If you do not have a primary care provider, please  call [phone] and they will assist you.        Care EveryWhere ID     This is your Care EveryWhere ID. This could be used by other organizations to access your [facility] medical records  [id number]        Equal Access to Services     HARRY HOPPER : Hadii aad ku hadmukulcally Claudia, waaxda luqadaha, qaybta kaalmada anthony, cortney williamin hayaadonavan breenyevgeniy lozatarah manuel. So [facility] [phone].    ATENCIÓN: Si habla español, tiene a lechuga disposición servicios gratuitos de asistencia lingüística. Llame al [phone].    We comply with applicable federal civil rights laws and [place] laws. We do not discriminate on the basis of race, color, national origin, age, disability, sex, sexual orientation, or gender identity.               Review of your medicines      START taking        Dose / Directions    amLODIPine 5 MG tablet   Commonly known as:  NORVASC   Used for:  HTN, goal below 130/80        Dose:  5 mg   Start taking on:  7/28/2018   Take 1 tablet (5 mg) by mouth daily   Quantity:  90 tablet   Refills:  0       rivaroxaban ANTICOAGULANT 20 MG Tabs tablet   Commonly known as:  XARELTO   Used for:  Atrial fibrillation, unspecified type (H)        Dose:  20 mg   Take 1 tablet (20 mg) by mouth daily (with dinner)   Quantity:  30 tablet   Refills:  0         CONTINUE these medicines which may have CHANGED, or have new prescriptions. If we are uncertain of the size of tablets/capsules you have at home, strength may be listed as something that might have changed.        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   Quantity:  90 capsule   Refills:  3         STOP taking     metoprolol succinate 100 MG 24 hr tablet   Commonly known as:  TOPROL-XL                Where to get your medicines      These medications were sent to [facility] [place]  [place], [place] - [address]  [address] [facility] [address], [facility] [address]     Phone:  [phone]     amLODIPine 5 MG tablet    rivaroxaban ANTICOAGULANT 20 MG Tabs tablet                Protect others around you: Learn how to safely use, store and throw away your medicines at www.disposemymeds.org.             Medication List: This is a list of all your medications and when to take them. Check marks below indicate your daily home schedule. Keep this list as a reference.      Medications           Morning Afternoon Evening Bedtime As Needed    amLODIPine 5 MG tablet   Commonly known as:  NORVASC   Take 1 tablet (5 mg) by mouth daily   Start taking on:  7/28/2018   Last  time this was given:  5 mg on 7/27/2018 12:01 PM   Next Dose Due:  7-28-18 AM                                   aspirin 81 MG chewable tablet   Take 1 tablet (81 mg) by mouth daily   Last time this was given:  81 mg on 7/26/2018  8:31 PM   Next Dose Due:  7-27-18 PM                                   atorvastatin 80 MG tablet   Commonly known as:  LIPITOR   Take 1 tablet (80 mg) by mouth daily   Last time this was given:  80 mg on 7/26/2018  8:31 PM   Next Dose Due:  7-27-18 PM                                   fish oil-omega-3 fatty acids 1000 MG capsule   Take 1 capsule by mouth in the evening.   Next Dose Due:  7-27-18 PM                                   ICAPS PO   Take 1 tablet by mouth 2 times daily   Next Dose Due:  7-27-18 PM                                      lisinopril-hydrochlorothiazide 20-12.5 MG per tablet   Commonly known as:  PRINZIDE/ZESTORETIC   Take 2 tablets by mouth daily   Last time this was given:  2 tablets on 7/27/2018  8:17 AM   Next Dose Due:  7-28-18 AM                                   MULTI-VITAMIN DAILY PO   Take 1 tablet by mouth daily   Next Dose Due:  7-28-18 AM                                   omeprazole 40 MG capsule   Commonly known as:  priLOSEC   Take 1 capsule (40 mg) by mouth daily Take 30-60 minutes before a meal.   Last time this was given:  40 mg on 7/27/2018  8:17 AM   Next Dose Due:  7-28-18 AM                                   rivaroxaban ANTICOAGULANT 20 MG Tabs tablet   Commonly known as:  XARELTO   Take 1 tablet (20 mg) by mouth daily (with dinner)   Next Dose Due:  7-27-18 Take with dinner.                                             More Information        Rivaroxaban Oral tablet  What is this medicine?  RIVAROXABAN (ri va JUDAH a ban) is an anticoagulant (blood thinner). It is used to treat blood clots in the lungs or in the veins. It is also used after knee or hip surgeries to prevent blood clots. It is also used to lower the chance of stroke in people with a  medical condition called atrial fibrillation.  This medicine may be used for other purposes; ask your health care provider or pharmacist if you have questions.  What should I tell my health care provider before I take this medicine?  They need to know if you have any of these conditions:    bleeding disorders    bleeding in the brain    blood in your stools (black or tarry stools) or if you have blood in your vomit    history of stomach bleeding    kidney disease    liver disease    low blood counts, like low white cell, platelet, or red cell counts    recent or planned spinal or epidural procedure    take medicines that treat or prevent blood clots    an unusual or allergic reaction to rivaroxaban, other medicines, foods, dyes, or preservatives    pregnant or trying to get pregnant    breast-feeding  How should I use this medicine?  Take this medicine by mouth with a glass of water. Follow the directions on the prescription label. Take your medicine at regular intervals. Do not take it more often than directed. Do not stop taking except on your doctor's advice. Stopping this medicine may increase your risk of a blot clot. Be sure to refill your prescription before you run out of medicine.  If you are taking this medicine after hip or knee replacement surgery, take it with or without food. If you are taking this medicine for atrial fibrillation, take it with your evening meal. If you are taking this medicine to treat blood clots, take it with food at the same time each day. If you are unable to swallow your tablet, you may crush the tablet and mix it in applesauce. Then, immediately eat the applesauce. You should eat more food right after you eat the applesauce containing the crushed tablet.  Talk to your pediatrician regarding the use of this medicine in children. Special care may be needed.  Overdosage: If you think you have taken too much of this medicine contact a poison control center or emergency room at  once.  NOTE: This medicine is only for you. Do not share this medicine with others.  What if I miss a dose?  If you take your medicine once a day and miss a dose, take the missed dose as soon as you remember. If you take your medicine twice a day and miss a dose, take the missed dose immediately. In this instance, 2 tablets may be taken at the same time. The next day you should take 1 tablet twice a day as directed.  What may interact with this medicine?    aspirin and aspirin-like medicines    certain antibiotics like erythromycin, azithromycin, and clarithromycin    certain medicines for fungal infections like ketoconazole and itraconazole    certain medicines for irregular heart beat like amiodarone, quinidine, dronedarone    certain medicines for seizures like carbamazepine, phenytoin    certain medicines that treat or prevent blood clots like warfarin, enoxaparin, and dalteparin    conivaptan    diltiazem    felodipine    indinavir    lopinavir; ritonavir    NSAIDS, medicines for pain and inflammation, like ibuprofen or naproxen    ranolazine    rifampin    ritonavir    Clint's wort    verapamil  This list may not describe all possible interactions. Give your health care provider a list of all the medicines, herbs, non-prescription drugs, or dietary supplements you use. Also tell them if you smoke, drink alcohol, or use illegal drugs. Some items may interact with your medicine.  What should I watch for while using this medicine?  Visit your doctor or health care professional for regular checks on your progress. Your condition will be monitored carefully while you are receiving this medicine.  If you are going to have surgery, tell your doctor or health care professional that you are taking this medicine.  Avoid sports and activities that might cause injury while you are using this medicine. Severe falls or injuries can cause unseen bleeding. Be careful when using sharp tools or knives. Consider using an  electric razor. Take special care brushing or flossing your teeth. Report any injuries, bruising, or red spots on the skin to your doctor or health care professional.  What side effects may I notice from receiving this medicine?  Side effects that you should report to your doctor or health care professional as soon as possible:    allergic reactions like skin rash, itching or hives, swelling of the face, lips, or tongue    back pain    confusion, trouble speaking or understanding    redness, blistering, peeling or loosening of the skin, including inside the mouth    signs and symptoms of bleeding such as bloody or black, tarry stools; red or dark-brown urine; spitting up blood or brown material that looks like coffee grounds; red spots on the skin; unusual bruising or bleeding from the eye, gums, or nose    signs and symptoms of a blood clot such as breathing problems; changes in vision; chest pain; severe, sudden headache; pain, swelling, warmth in the leg; trouble speaking; sudden numbness or weakness of the face, arm, or leg    trouble walking, dizziness, loss of balance or coordination  Side effects that usually do not require medical attention (Report these to your doctor or health care professional if they continue or are bothersome.):    dizziness    muscle pain  This list may not describe all possible side effects. Call your doctor for medical advice about side effects. You may report side effects to FDA at 7-032-FDA-9454.  Where should I keep my medicine?  Keep out of the reach of children.  Store at room temperature between 15 and 30 degrees C (59 and 86 degrees F). Throw away any unused medicine after the expiration date.  NOTE: This sheet is a summary. It may not cover all possible information. If you have questions about this medicine, talk to your doctor, pharmacist, or health care provider.  NOTE:This sheet is a summary. It may not cover all possible information. If you have questions about this  medicine, talk to your doctor, pharmacist, or health care provider. Copyright  2014 [employer]                Amlodipine tablets  Brand Name: Norvasc  What is this medicine?  AMLODIPINE (am DENIS martinez haim) is a calcium-channel blocker. It affects the amount of calcium found in your heart and muscle cells. This relaxes your blood vessels, which can reduce the amount of work the heart has to do. This medicine is used to lower high blood pressure. It is also used to prevent chest pain.  How should I use this medicine?  Take this medicine by mouth with a glass of water. Follow the directions on the prescription label. Take your medicine at regular intervals. Do not take more medicine than directed.  Talk to your pediatrician regarding the use of this medicine in children. Special care may be needed. This medicine has been used in children as young as 6.  Persons over 65 years old may have a stronger reaction to this medicine and need smaller doses.  What side effects may I notice from receiving this medicine?  Side effects that you should report to your doctor or health care professional as soon as possible:    allergic reactions like skin rash, itching or hives, swelling of the face, lips, or tongue    breathing problems    changes in vision or hearing    chest pain    fast, irregular heartbeat    swelling of legs or ankles  Side effects that usually do not require medical attention (report to your doctor or health care professional if they continue or are bothersome):    dry mouth    facial flushing    nausea, vomiting    stomach gas, pain    tired, weak    trouble sleeping  What may interact with this medicine?    herbal or dietary supplements    local or general anesthetics    medicines for high blood pressure    medicines for prostate problems    rifampin    What if I miss a dose?  If you miss a dose, take it as soon as you can. If it is almost time for your next dose, take only that dose. Do not take double or  extra doses.  Where should I keep my medicine?  Keep out of the reach of children.  Store at room temperature between 59 and 86 degrees F (15 and 30 degrees C). Protect from light. Keep container tightly closed. Throw away any unused medicine after the expiration date.  What should I tell my health care provider before I take this medicine?  They need to know if you have any of these conditions:    heart problems like heart failure or aortic stenosis    liver disease    an unusual or allergic reaction to amlodipine, other medicines, foods, dyes, or preservatives    pregnant or trying to get pregnant    breast-feeding  What should I watch for while using this medicine?  Visit your doctor or health care professional for regular check ups. Check your blood pressure and pulse rate regularly. Ask your health care professional what your blood pressure and pulse rate should be, and when you should contact him or her.  This medicine may make you feel confused, dizzy or lightheaded. Do not drive, use machinery, or do anything that needs mental alertness until you know how this medicine affects you. To reduce the risk of dizzy or fainting spells, do not sit or stand up quickly, especially if you are an older patient. Avoid alcoholic drinks; they can make you more dizzy.  Do not suddenly stop taking amlodipine. Ask your doctor or health care professional how you can gradually reduce the dose.  NOTE:This sheet is a summary. It may not cover all possible information. If you have questions about this medicine, talk to your doctor, pharmacist, or health care provider. Copyright  2018 [employer]

## 2018-07-25 NOTE — PROGRESS NOTES
SUBJECTIVE:   Richard You is a 78 year old male who presents to clinic today for the following health issues:      Problem:   Chief Complaint   Patient presents with     Bradycardia     concerns with slow pulse rate.     Refill Request     primidone, has been off medication x 2 weeks.     ADDITIONAL HPI: 78 year old male here for above issue.      Was seen for EGD 7/12/18. Noted to have bradycardia prior to procedure. Was advised after procedure to follow-up with me to evaluate. He exercises regularly. Denies any lightheadedness, disease, chest pain, shortness of breath, of palpations.    History of coronary artery disease status post coronary artery bypass surgery in 1996 with a LIMA to the LAD, saphenous vein graft to the diagonal artery, ischemic cardiomyopathy with an ejection fraction of roughly 40%, mild aortic root dilatation and mild to moderate dilatation of the ascending aorta    ROS: 10 point review of systems negative except as per HPI.    PAST MEDICAL HISTORY:  Past Medical History:   Diagnosis Date     Abdominal aneurysm without mention of rupture 6/24/2005    Repaired with percutaneous stent 8/05   Has annual CT scan to monitor stent placement. Last done in sept 2012 and stable      Atrial fibrillation (H)      CAD 4/5/2005 12/05/12 Patient denies any recent chest pain or NTG use.  Patient is taking meds regularly and denies significant side effects. Last stress test many years ago, but bikes 30-40 mi/day without symptoms       Colon polyp, hyperplastic 5/14/2013    Hyperplastic  Polyp on colonoscopy 5-2013. Recommend that the next colonoscopy be in 5 years because of family history      Family history of tremor 7/14/2014    father      Former smoker 7/14/2014     HTN, goal below 130/80 5/10/2012    History of AAA      Hyperlipidaemia      Hyperlipidemia LDL goal <100 10/31/2010     Hypertension      Macular degeneration, wet (H) 4/22/2013     Myocardial infarction      S/P CABG (coronary  artery bypass graft) 7/14/2014        ACTIVE MEDICAL PROBLEMS:  Patient Active Problem List   Diagnosis     Coronary atherosclerosis     Abdominal aortic aneurysm (H)     Benign shuddering attack     Hyperlipidemia LDL goal <100     Advanced directives, counseling/discussion     HTN, goal below 130/80     [facility]     Macular degeneration, wet (H)     Colon polyp, hyperplastic     Heart murmur     Former smoker     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)     Esophagitis     Carey's esophagus     Arteriosclerotic vascular disease     Benign essential tremor        FAMILY HISTORY:  Family History   Problem Relation Age of Onset     Cancer - colorectal Mother      Cardiovascular Father      Alcohol/Drug Father      Neurologic Disorder Father      tremor     Cardiovascular Brother      Breast Cancer Sister      Cancer Brother      bladder cancer       SOCIAL HISTORY:  Social History     Social History     Marital status:      Spouse name: Soledad You     Number of children: 2     Years of education: 12     Occupational History      Retired     Social History Main Topics     Smoking status: Former Smoker     Packs/day: 0.50     Years: 10.00     Types: Cigarettes     Quit date: 11/29/1995     Smokeless tobacco: Never Used     Alcohol use Yes      Comment: beer occ,     Drug use: No     Sexual activity: Not Currently     Partners: Female     Other Topics Concern     Parent/Sibling W/ Cabg, Mi Or Angioplasty Before 65f 55m? Yes     Social History Narrative        [employer]    Nonsmoker as of 2  Yrs ago    Drinks wine daily    Lives with wife soledad in [place]           MEDICATIONS:  Current Outpatient Prescriptions   Medication Sig Dispense Refill     aspirin 81 MG chewable tablet Take 1 tablet (81 mg) by mouth daily 36 tablet 3     atorvastatin (LIPITOR) 80 MG tablet Take 1 tablet (80 mg) by mouth daily 90 tablet 3     FISH OIL 1000 MG OR CAPS 2 DAILY  0      lisinopril-hydrochlorothiazide (PRINZIDE/ZESTORETIC) 20-12.5 MG per tablet Take 2 tablets by mouth daily 180 tablet 3     metoprolol succinate (TOPROL-XL) 100 MG 24 hr tablet Take 1 tablet (100 mg) by mouth daily 90 tablet 3     Multiple Vitamin (MULTI-VITAMIN DAILY PO) Take 1 tablet by mouth daily       Multiple Vitamins-Minerals (ICAPS PO)        omeprazole (PRILOSEC) 40 MG capsule Take 1 capsule (40 mg) by mouth daily Take 30-60 minutes before a meal. 90 capsule 3     primidone (MYSOLINE) 50 MG tablet Take 2 tablets (100 mg) by mouth At Bedtime (Patient not taking: Reported on 7/25/2018) 60 tablet 0       ALLERGIES:     Allergies   Allergen Reactions     Nka [No Known Allergies]        Problem list, Medication list, Allergies, and Medical/Social/Surgical histories reviewed in EPIC and updated as appropriate.    OBJECTIVE:                                                    VITALS: /80 (BP Location: Right arm)  Pulse (!) 44  Temp 99.6  F (37.6  C) (Tympanic)  Resp 20  Ht 6' (1.829 m)  Wt 205 lb 12.8 oz (93.4 kg)  SpO2 96%  BMI 27.91 kg/m2 Body mass index is 27.91 kg/(m^2).  GENERAL: Pleasant, well appearing male.  CV: marked bradycardia, no murmurs, rubs or gallops. No carotid bruits.   LUNGS: CTAB, normal effort.     EKG: (read and interpreted by me): Undetermined bradycardia. HR 35.  No acute ST-T changes. Called to discuss EKG with cardiology. They felt either a. fib or 3rd degree heart block but unclear from EKG per cardiology..    ASSESSMENT/PLAN:                                                    1. Bradycardia  Discussed with cardiology. Unclear if this is a. Fib vs heart block. They recommended admission with [facility] for further evaluation. Discussed with patient and his wife ddx and risk of death if heart block.. Also discussed potential for pacemaker need.  Called [facility] service and will admit directly to [facility] for evaluation and management.  Discussed transport with patient  and wife. Given he has likely been bradycardic for >2 weeks (was discovered at EGD 7/12/2018) and he is asymptomatic they would prefer to go by car.  They understand risks. Signout called. He will go to [facility] Room 251.  - EKG 12-lead complete w/read - Clinics    2. Coronary atherosclerosis of autologous vein bypass graft without angina  See HPI. No angina symptoms.

## 2018-07-25 NOTE — PLAN OF CARE
Problem: Patient Care Overview  Goal: Plan of Care/Patient Progress Review  Pt alert and oriented. Up ind. HR in the 40s. Tele A fib with SVR. Metoprolol on hold. Denies lightheadedness and dizziness. Steady on feet. Full code. Watched pacemaker education video on previous shift. Denies pain, lungs clear. Will continue to monitor.

## 2018-07-25 NOTE — PATIENT INSTRUCTIONS
Admitted to [facility] Rm#  [id number]    Appt reschedule for the [facility] [address] Mikey Cooper July 30th @ 9:40 am    [address] [place] [place] [phone]    Thank you for choosing [facility].  You may be receiving a survey in the mail from [employer] regarding your visit today.  Please take a few minutes to complete and return the survey to let us know how we are doing.      Our Clinic hours are:  Mondays    7:20 am - 7 pm  Tues -  Fri  7:20 am - 5 pm    Clinic Phone: [phone]    The clinic lab opens at 7:30 am Mon - Fri and appointments are required.    [facility]. [phone]  Monday  8 am - 7pm  Tues - Fri 8 am - 5:30 pm

## 2018-07-25 NOTE — H&P
H&P dictated. Patient presents with bradycardia with rates in 30s. EKG looks most consistent with afib with slow response. He is completely asymptomatic at this time with stable BP. He is on 100mg of metoprolol every evening.    Plan:  --Cardiology consult  --telemetry  --hold metoprolol     Lengthy discussion regarding CODE status with patient and family. He would like to be full code at this time.     Full dictation to follow.     Nereida Clay PA-C

## 2018-07-25 NOTE — PHARMACY-ADMISSION MEDICATION HISTORY
Admission medication history interview status for the 7/25/2018  admission is complete. See EPIC admission navigator for prior to admission medications     Medication history source reliability:Good    Actions taken by pharmacist (provider contacted, etc):None     Additional medication history information not noted on PTA med list :None    Medication reconciliation/reorder completed by provider prior to medication history? No    Time spent in this activity: 10 minutes    Prior to Admission medications    Medication Sig Last Dose Taking? Auth Provider   aspirin 81 MG chewable tablet Take 1 tablet (81 mg) by mouth daily  Patient taking differently: Take 81 mg by mouth every evening  7/24/2018 at pm Yes Mendoza Reveles MD   atorvastatin (LIPITOR) 80 MG tablet Take 1 tablet (80 mg) by mouth daily  Patient taking differently: Take 80 mg by mouth every evening  7/24/2018 at pm Yes Kamari Rojas MD   FISH OIL 1000 MG OR CAPS Take 1 capsule by mouth in the evening. 7/24/2018 at pm Yes Nikita Storm MD   lisinopril-hydrochlorothiazide (PRINZIDE/ZESTORETIC) 20-12.5 MG per tablet Take 2 tablets by mouth daily 7/25/2018 at am Yes Kamari Rojas MD   metoprolol succinate (TOPROL-XL) 100 MG 24 hr tablet Take 1 tablet (100 mg) by mouth daily  Patient taking differently: Take 100 mg by mouth every evening  7/24/2018 at pm Yes Kamari Rojas MD   Multiple Vitamin (MULTI-VITAMIN DAILY PO) Take 1 tablet by mouth daily 7/25/2018 at am Yes Reported, Patient   Multiple Vitamins-Minerals (ICAPS PO) Take 1 tablet by mouth 2 times daily  7/25/2018 at am x 1 dose Yes Reported, Patient   omeprazole (PRILOSEC) 40 MG capsule Take 1 capsule (40 mg) by mouth daily Take 30-60 minutes before a meal. 7/25/2018 at am Yes Kamari Rojas MD

## 2018-07-25 NOTE — H&P
Admitted:     07/25/2018      CHIEF COMPLAINT:  Bradycardia.      HISTORY OBTAINED:  From the patient and his wife who is present at bedside.      HISTORY OF PRESENT ILLNESS:  Mr. Richard You is a very pleasant 78-year-old gentleman with a past medical history significant for coronary artery disease status post CABG, hypertension, hyperlipidemia, history of atrial fibrillation, history of abdominal aortic aneurysm status post repair, Carey esophagus, benign essential tremor, history of NSVT, ischemic cardiomyopathy who is a direct admission to [facility] from clinic after being found to have a heart rate in the 40s.  The patient reports that he underwent EGD for Carey esophagus on 07/12/2018 and was informed that he had a low heart rate at that time.  He made an appointment to follow up with his primary care provider for today.  In clinic, was found to have a heart rate in the 30s.  Cardiology was contacted, I believe this was the Cardiology Service at the [facility], and felt the EKG either represented AFib with slow response or third-degree heart block.  Admission was requested.  The patient himself reports that he has been completely asymptomatic in regards to his heart rate.  He has been feeling well recently without any lightheadedness, palpitations, shortness of breath, or decreased exercise tolerance.  He is quite active and has been riding his bike several miles each day without any issues.  He denies any presyncope or syncope.      The patient is presently evaluated [facility].  He reports he is feeling well at this time.  Denies any chest pain, shortness of breath, palpitations, dizziness, lightheadedness, nausea.  In regards to cardiac history, he does have a history of coronary artery disease with previous bypass.  He reports he had a brief episode of AFib after an EGD previously, but was not aware of any chronic or paroxysmal atrial fibrillation formal  diagnosis.  He is on anticoagulation.  He remains asymptomatic despite heart rates in the mid 30s.      REVIEW OF SYSTEMS:  A 10-point review of systems was conducted and is negative aside from information in the HPI.      PAST MEDICAL HISTORY:   1.  Coronary artery disease status post bypass surgery in 1996.  He sees Dr. Reveles as an outpatient.  Last visit was in 2016 and his metoprolol dose was increased to 100 mg from 50 mg at that time.   2.  Hypertension.   3.  Hyperlipidemia.   4.  Atrial fibrillation, patient reports this occurred briefly after an EGD and he is otherwise unaware of any ongoing diagnosis.  He is not anticoagulated.   5.  History of AAA status post repair in 2005.  Last CT scan from 12/2017 showed a measurement of 4.9 x 5.   6.  Carey esophagus, undergoes EGD q.3 years.   7.  Benign essential tremor.   8.  Ischemic cardiomyopathy, last echocardiogram from 2015 showed a reduced ejection fraction of 40%.  He underwent cardiac catheterization in 2015 as well which was without significant disease.   9.  History of nonsustained ventricular tachycardia.  This was noted during a stress test, prompting a coronary angiography.      PAST SURGICAL HISTORY:    Past Surgical History:   Procedure Laterality Date     BYPASS CARDIOPULMONARY  12/1996    CA bypass x2 LAD     CARDIAC SURGERY       CYSTECTOMY PILONIDAL       ESOPHAGOSCOPY, GASTROSCOPY, DUODENOSCOPY (EGD), COMBINED N/A 9/4/2015    Procedure: COMBINED ESOPHAGOSCOPY, GASTROSCOPY, DUODENOSCOPY (EGD), BIOPSY SINGLE OR MULTIPLE;  Surgeon: Fuad Holder MD;  Location: [facility]     ESOPHAGOSCOPY, GASTROSCOPY, DUODENOSCOPY (EGD), COMBINED N/A 9/20/2016    Procedure: COMBINED ESOPHAGOSCOPY, GASTROSCOPY, DUODENOSCOPY (EGD), BIOPSY SINGLE OR MULTIPLE;  Surgeon: Lucas Dang MD;  Location: [facility]     ESOPHAGOSCOPY, GASTROSCOPY, DUODENOSCOPY (EGD), COMBINED N/A 7/12/2018    Procedure: COMBINED ESOPHAGOSCOPY, GASTROSCOPY, DUODENOSCOPY (EGD),  BIOPSY SINGLE OR MULTIPLE;  gastroscopy;  Surgeon: Richard Crowder MD;  Location: [facility]     HERNIA REPAIR       VASECTOMY          ALLERGIES:  NO KNOWN DRUG ALLERGIES.      PRIOR TO ADMISSION MEDICATIONS:    Prescriptions Prior to Admission   Medication Sig Dispense Refill Last Dose     aspirin 81 MG chewable tablet Take 1 tablet (81 mg) by mouth daily (Patient taking differently: Take 81 mg by mouth every evening ) 36 tablet 3 7/24/2018 at pm     atorvastatin (LIPITOR) 80 MG tablet Take 1 tablet (80 mg) by mouth daily (Patient taking differently: Take 80 mg by mouth every evening ) 90 tablet 3 7/24/2018 at pm     FISH OIL 1000 MG OR CAPS Take 1 capsule by mouth in the evening.  0 7/24/2018 at pm     lisinopril-hydrochlorothiazide (PRINZIDE/ZESTORETIC) 20-12.5 MG per tablet Take 2 tablets by mouth daily 180 tablet 3 7/25/2018 at am     metoprolol succinate (TOPROL-XL) 100 MG 24 hr tablet Take 1 tablet (100 mg) by mouth daily (Patient taking differently: Take 100 mg by mouth every evening ) 90 tablet 3 7/24/2018 at pm     Multiple Vitamin (MULTI-VITAMIN DAILY PO) Take 1 tablet by mouth daily   7/25/2018 at am     Multiple Vitamins-Minerals (ICAPS PO) Take 1 tablet by mouth 2 times daily    7/25/2018 at am x 1 dose     omeprazole (PRILOSEC) 40 MG capsule Take 1 capsule (40 mg) by mouth daily Take 30-60 minutes before a meal. 90 capsule 3 7/25/2018 at am           SOCIAL HISTORY:  The patient reports he drinks an 8-ounce glass of wine daily.  He denies any issues with withdrawal.  He is a former smoker who smoked a pack a day for approximately 50 years and quit approximately 10 years ago.  He is very active and rides his bike nearly every day.  He is .      FAMILY HISTORY:  Denies any family history of heart disease.  His father did have a history of COPD.      LABORATORY DATA:  BMP is unremarkable with normal creatinine of 0.98.  Normal electrolytes.  TSH within normal limits.  CBC is largely unremarkable as  well.  His EKG seems most consistent with atrial fibrillation with slow ventricular response as no clear P waves can be visualized per my read.      PHYSICAL EXAMINATION:   VITAL SIGNS:  Temperature 99 degrees, heart rate 44, blood pressure 162/85, respiratory rate 20, oxygen saturation is 98% on room air.   GENERAL:  Alert and oriented gentleman sitting up in a chair, appears comfortable and is appropriately conversant.   HEENT:  Pupils equal and react to light.  EOMI.  Mucous membranes moist.   CARDIOVASCULAR:  Irregular rhythm with bradycardia.  No murmur is appreciated.   RESPIRATORY:  Lungs are primarily clear with the occasional mild expiratory wheeze, no increased work of breathing, no crackles are appreciated.   GASTROINTESTINAL:  Positive bowel sounds.  Abdomen soft, nontender to palpation.   SKIN:  Warm and dry.   EXTREMITIES:  No lower extremity edema.  Warm and well perfused.   MUSCULOSKELETAL:  The patient moves all 4 extremities.   NEUROLOGIC:  Cranial nerves II-XII are grossly intact.  No focal deficits.      ASSESSMENT AND PLAN:  Richard You is a very pleasant 78-year-old gentleman with a history of hypertension, hyperlipidemia, coronary artery disease, ischemic cardiomyopathy, abdominal aortic aneurysm, Carey esophagus, benign essential tremor, history of an SVT who is a direct admission to [facility] from [facility] in [place] for evaluation of bradycardia.      1.  Bradycardia.  He has remained asymptomatic.  The bradycardia was initially noted during his EGD on 07/12/2018.  EKG today in clinic showed bradycardia with a rate in the 40s, unclear whether rhythm represented a complete heart block versus atrial fibrillation with slow ventricular response.  EKG was repeated on arrival and as no clear P waves can be seen, it looks more consistent with atrial fibrillation.  The patient has been completely asymptomatic and able to continue biking daily and going about his  daily activities.  His blood pressure is stable on arrival.  He is notably on a beta blocker for coronary artery disease, metoprolol  mg daily, which he takes in the evening.  Patient's chart does list atrial fibrillation as a problem, he reports this was 1 episode briefly during an EGD in the past.  He is not aware of any ongoing diagnosis of AFib and is not anticoagulated.   -- Cardiology consulted, appreciate assistance.   -- Will keep n.p.o. until seen by Cardiology today.    --Monitor on telemetry.    --Hold prior to admission metoprolol.   -- TSH and electrolytes are within normal limits.   2.  Coronary artery disease, status post CABG in .  The patient denies any recent issues with chest pain or shortness of breath.  We will continue his prior to admission aspirin, atorvastatin, lisinopril and metoprolol.     3.  Ischemic cardiomyopathy.  Last echocardiogram from 2015 showed an ejection fraction of 40%.  He is not on any diuretic outside of hydrochlorothiazide prior to admission, he appears euvolemic clinically.  Continue prior to admission beta blocker, ACE inhibitor, thiazide diuretic.  Intake and output and daily weights will be monitored.  Will defer repeat echo to Cardiology.   4.  Carey esophagus.  Continue prior to admission omeprazole.   5.  Hyperlipidemia.  Continue prior to admission atorvastatin.   6.  Abdominal aortic aneurysm.  Last measured 4.9 x 5 in 2017.  Follow up as advised.      CODE STATUS:  The patient is FULL CODE This was discussed with him and his wife on admission.       The patient was seen and examined with Dr. Joseph Esteban who agrees with the above plan.         JOSEPH ESTEBAN MD       As dictated by VANI RUSHING PA-C            D: 2018   T: 2018   MT: NTS      Name:     EDWIGE BLANCO   MRN:      [id number]        Account:      [id number]   :      1940        Admitted:     2018                   Document: [id number]

## 2018-07-25 NOTE — PROGRESS NOTES
Physician Attestation   I, Joseph Donovan, saw and evaluated Richard You as part of a shared visit.  I have reviewed and discussed with the advanced practice provider their history, physical and plan.    I personally reviewed the vital signs, medications, labs and imaging.    My key history or physical exam findings: 79 yo M with history of CAD s/p CABG, lone episode of atrial fibrillation who presents with bradycardia initially noted incidentally during EGD 2 weeks prior. Admitted directly from PCP who saw in follow-up for this issue on date of admission, discussed case with PCP via phone. At present denies light-headedness, dyspnea, chest pain, syncope. On exam heart is bradycardic, sounds mostly regular. Lungs clear. EKG appears to be atrial fibrillation with slow ventricular response, difficult to appreciate P waves in part due to baseline tremor providing some static to baseline.     Key management decisions made by me:   Asymptomatic bradycardia   - Cardiology consulted, appreciate assistance  - Hold beta blocker  - Monitor on telemetry    Joseph Donovan  Date of Service (when I saw the patient): 07/25/18

## 2018-07-26 ENCOUNTER — APPOINTMENT (OUTPATIENT)
Dept: CARDIOLOGY | Facility: CLINIC | Age: 78
DRG: 310 | End: 2018-07-26
Attending: NURSE PRACTITIONER
Payer: MEDICARE

## 2018-07-26 PROCEDURE — 99232 SBSQ HOSP IP/OBS MODERATE 35: CPT | Performed by: INTERNAL MEDICINE

## 2018-07-26 PROCEDURE — 99222 1ST HOSP IP/OBS MODERATE 55: CPT | Performed by: INTERNAL MEDICINE

## 2018-07-26 PROCEDURE — 40000264 ECHO COMPLETE WITH OPTISON

## 2018-07-26 PROCEDURE — 25000132 ZZH RX MED GY IP 250 OP 250 PS 637: Mod: GY | Performed by: PHYSICIAN ASSISTANT

## 2018-07-26 PROCEDURE — 93306 TTE W/DOPPLER COMPLETE: CPT | Mod: 26 | Performed by: INTERNAL MEDICINE

## 2018-07-26 PROCEDURE — 21000001 ZZH R&B HEART CARE

## 2018-07-26 PROCEDURE — A9270 NON-COVERED ITEM OR SERVICE: HCPCS | Mod: GY | Performed by: PHYSICIAN ASSISTANT

## 2018-07-26 PROCEDURE — 25500064 ZZH RX 255 OP 636: Performed by: INTERNAL MEDICINE

## 2018-07-26 RX ADMIN — OMEPRAZOLE 40 MG: 20 CAPSULE, DELAYED RELEASE ORAL at 10:43

## 2018-07-26 RX ADMIN — HUMAN ALBUMIN MICROSPHERES AND PERFLUTREN 8 ML: 10; .22 INJECTION, SOLUTION INTRAVENOUS at 10:12

## 2018-07-26 RX ADMIN — LISINOPRIL AND HYDROCHLOROTHIAZIDE 2 TABLET: 12.5; 2 TABLET ORAL at 10:43

## 2018-07-26 RX ADMIN — ASPIRIN 81 MG 81 MG: 81 TABLET ORAL at 20:31

## 2018-07-26 RX ADMIN — ATORVASTATIN CALCIUM 80 MG: 80 TABLET, FILM COATED ORAL at 20:31

## 2018-07-26 ASSESSMENT — ACTIVITIES OF DAILY LIVING (ADL)
ADLS_ACUITY_SCORE: 9

## 2018-07-26 NOTE — PROVIDER NOTIFICATION
MD Notification    Notified Person: MD    Notified Person Name: Bonds    Notification Date/Time: 07/25/18 10:47 PM     Notification Interaction: Talked with MD    Purpose of Notification: FYI pt HR dropping to 29, pt asymptomatic    Orders Received: continue to monitor    Comments:

## 2018-07-26 NOTE — CONSULTS
Consult Date:  07/25/2018      CARDIOLOGY CONSULTATION      PATIENT HISTORY:  Mr. Richard You is 78 years old and has been transferred from [facility] to [facility] for bradycardia.  He is 78 years old and Cardiology consultation has been requested by the Hospitalist Service.      His past medical history is significant for coronary artery disease 22 years ago.  A LIMA was placed to the LAD and a vein graft to the diagonal branch.  His left ventricular ejection fraction has been demonstrated to be about 40% to possibly 45%.  Mr. You reminded me that we had met once previously.  He is feeling well and is not experiencing any near-death syncope, shortness of breath, chest discomfort or syncope.  He notes that he underwent a swallow evaluation about 3 weeks ago and the anesthesiologist had thought about canceling the procedure because his heart rate was reportedly in the low 30s beats per minute.      Apparently, the procedure performed was an endoscopy.  It was performed to evaluate Carey's esophagitis.  Following the procedure, it was recommended that he see his primary care provider for the low heart rate.  That appointment was yesterday and he was referred to [facility] after a cardiologist was contacted.      He denies any symptoms such as near-death syncope or syncope, shortness of breath or chest discomfort, as noted above.  He is on a high dose of metoprolol.  That is now being held. Two family members were present in the room with him.  His blood pressure is slightly elevated with a systolic of 148 mmHg.  His renal function is normal.  His EKG demonstrates atrial fibrillation with a slow ventricular response rate of 35 beats per minute with RR intervals that are slightly irregular.  This could be complete heart block or atrial fibrillation with a slow response rate.      PAST MEDICAL HISTORY:   1.  Coronary artery disease with coronary artery bypass graft surgery  in 1996.   2.  History of abdominal aortic aneurysm repair, 2005.   3.  Hypertension.   4.  Dyslipidemia.   5.  History of transient atrial fibrillation.   6.  History of Carey's esophagitis.   7.  Benign essential tremor.   8.  Mild to moderate ischemic cardiomyopathy.   9.  Prior history of ventricular tachycardia, nonsustained on a treadmill.   10.  Removal of a pilonidal cyst.   11.  Prior herniorrhaphy.   12.  Prior vasectomy.      ALLERGIES:  NONE KNOWN.      MEDICATIONS:   1.  Aspirin 81 mg p.o. daily.   2.  Atorvastatin 80 mg p.o. daily.   3.  Lisinopril/hydrochlorothiazide 20/12.5 mg 2 tablets p.o. daily.   4.  Metoprolol  mg p.o. daily.   5.  Omeprazole 40 mg p.o. daily.      SOCIAL HISTORY:  The patient has a glass of wine daily (8 ounces), has a 50-pack-year smoking history, but has previously stopped smoking.  He is  as well.      FAMILY HISTORY:  COPD in his father.      REVIEW OF SYSTEMS:  The 10-point review of systems is negative except as noted above.      PHYSICAL EXAMINATION:   GENERAL:  This is a man sitting comfortably in a recliner. He was alert, oriented to person, place and time and in no distress.   VITAL SIGNS:  Blood pressure was approximately 148/80 mmHg, heart rate 40 beats per minute and nearly regular, and the respiratory rate 16-20 per minute.     HEAD:  Normocephalic.   EYES:  Notable for an absence of scleral icterus.     NECK: Failed to reveal thyromegaly.  The carotid upstrokes were normal without bruits.   HEART:  On cardiac auscultation, there was an S1 and an S2.  There were no extra sounds.  The rhythm was nearly, but not completely regular.   EXTREMITIES:  There was no peripheral pedal edema.   NEUROLOGIC:  Facies were symmetric and he moved all extremities without focal limitation.      LABORATORY DATA:  The sodium was 142, potassium 3.8, BUN 27 and creatinine 0.98.  The white blood cell count was 5.2, hemoglobin 13.6 and platelet count 155,000.  The TSH was  1.26.  The INR was normal.        DIAGNOSTIC: The EKG was as described.      ASSESSMENT AND RECOMMENDATIONS:  This is an individual transferred for a slow but asymptomatic heart rate.  He has normal renal function, normal mental status and his blood pressure is quite adequate.  As such, I explained to him that he would undergo monitoring until his heart rate began to rise.  I told his visitors that it is difficult to predict when his heart rate would rise further.  He may indeed need a permanent pacemaker, but given that he was on such a high dose of metoprolol XL, it is more likely that he will eventually be discharged on a very low dose of metoprolol.      He expressed an understanding.  He is currently feeling well.  With regard to his history of coronary artery bypass graft surgery, he is asymptomatic and his troponin is normal.      With regard to the atrial fibrillation, this is an apparently new problem.  He is not on anticoagulation.  There is a risk of stroke given his relatively high CHADS2-VASc score.  I would recommend anticoagulation from this time forward.  He does not require any urgent intervention overnight.  Intravenous heparin will be initiated in the morning.      Again, with regard to the atrial fibrillation, I would recommend initiation of anticoagulation before discharge from this admission.  His risk of stroke is relatively high given that he has a cardiomyopathy, history of hypertension, coronary artery disease and age greater than 75 years.  In the absence of a contraindication, I would recommend initiation of warfarin.  If a pacemaker will be needed, it should be relatively apparent within the next 24-48 hours, and his INR is unlikely to become therapeutic within that time.      With regard to his coronary disease, he is asymptomatic.  He is due to have followup with Dr. Reveles at the [facility] in [place].      Further recommendations will depend upon his response to  discontinuation of metoprolol.  If his heart rate increases appropriately, it is possible that a low dose could be reinitiated, but he may require other antihypertensive therapy if metoprolol cannot be reinitiated.            DEWEY PABLO MD          D: 2018   T: 2018   MT:       Name:     EDWIGE BLANCO   MRN:      [id number]        Account:       [id number]   :      1940           Consult Date:  2018      Document: [id number]       cc: Kamari Rojas MD

## 2018-07-26 NOTE — PROVIDER NOTIFICATION
Brief update:    Paged re: bradycardia    Magno of 29. Asymptomatic. Cardiology following and metoprolol has been held since admission    Markus Bonds MD  10:52 PM

## 2018-07-26 NOTE — PLAN OF CARE
Problem: Arrhythmia/Dysrhythmia (Symptomatic) (Adult)  Goal: Signs and Symptoms of Listed Potential Problems Will be Absent, Minimized or Managed (Arrhythmia/Dysrhythmia)  Signs and symptoms of listed potential problems will be absent, minimized or managed by discharge/transition of care (reference Arrhythmia/Dysrhythmia (Symptomatic) (Adult) CPG).  Outcome: No Change  Rate has been ranging between 40-50, rhythm Afib/Flutter pt asymptomatic, ambulated in hallway. Stable BP and denies any complaint.

## 2018-07-26 NOTE — PLAN OF CARE
Problem: Patient Care Overview  Goal: Plan of Care/Patient Progress Review  Outcome: No Change  Patient is A/Ox4. Fritz and HRN, otherwise VSS on RA. Tele afib with SVR. LS clear. Denies SOB/MATUTE. IVSL. Up independently. Cardiac diet, tolerating well. Plan on continuing to monitor heart rate.

## 2018-07-26 NOTE — PHARMACY
Medication coverage check for NOAC. Patient can get either Eliquis or Xarelto.  1st fill: Free with voucher from discharge pharmacy.  2nd fill: About $300 in order to meet deductible.  After that: About $64 monthly.  Spoke to patient and his wife about medication cost. They sound accepting of cost of Eliquis or Xarelto. They prefer this due to travelling for a few months out of the year, and the ease of not having to make INR clinic visits. Left the couple with a chart of costs. They noted that they prefer to use the [facility] discharge pharmacy for discharge medications. Elaborated on medication follow-up process.  Valentine Bray CphT  [facility] Discharge Pharmacy Liaison  Liason Cell: [phone]

## 2018-07-26 NOTE — PROGRESS NOTES
[facility] Cardiology Progress Note          Assessment and Plan:   Assessment:   1. Bradycardia - This is an individual transferred for a slow but asymptomatic heart rate.  He has normal renal function, normal mental status and his blood pressure is quite adequate.  He will undergo monitoring as his heart rate rises.   It is difficult to predict how rapidly his heart rate will rise.  He may indeed need a permanent pacemaker, but given that he was on such a high dose of metoprolol XL, it is more likely that he will eventually be discharged on no or a very low dose of metoprolol.   His HR today was low 50's; will start rehab' tomorrow to look for exercise HR rise.  2. Atrial fibrillation - With regard to the atrial fibrillation, this is an apparently new problem.  He is not on anticoagulation.  There is a risk of stroke given his relatively high CHADS2-VASc score.  I would recommend anticoagulation from this time forward.   The risks and benefits were discussed including the risk of bleeding - he prefers Xarelto because that is what his wife uses.  3. CAD - asymptomatic, had CABG 22 years ago.      Plan:   As above.  Spoke to his wife on the phone to update.            Significant Problems:     Patient Active Problem List    Diagnosis Date Noted     HTN, goal below 130/80 05/10/2012     Priority: High     History of AAA       Coronary atherosclerosis 04/05/2005     Priority: High     12/05/12 Patient denies any recent chest pain or NTG use.  Patient is taking meds regularly and denies significant side effects. Last stress test many years ago, but bikes 30-40 mi/day without symptoms   Problem list name updated by automated process. Provider to review       Bradycardia 07/25/2018     Priority: Medium     Benign essential tremor 05/03/2018     Priority: Medium     Arteriosclerotic vascular disease 11/30/2016     Priority: Medium     Carey's esophagus 09/10/2015     Priority: Medium     Needs yearly EGD. Needs  chronic PPI.         Esophagitis 09/04/2015     Priority: Medium     Noted on EGD 9/4/2015. Needs chronic PPI.       Former smoker 07/14/2014     Priority: Medium     Heart murmur 05/09/2014     Priority: Medium     [place] by cardiology April 2014 and consistent with possible aortic sclerosis. Plan for ECHO in one year prior to follow up with cardiology April 2015.        Colon polyp, hyperplastic 05/14/2013     Priority: Medium     Hyperplastic  Polyp on colonoscopy 5-2013. Recommend that the next colonoscopy be in 5 years because of family history       Macular degeneration, wet (H) 04/22/2013     Priority: Medium     Hyperlipidemia LDL goal <100 10/31/2010     Priority: Medium     Benign shuddering attack 05/10/2006     Priority: Medium     Father also had this.  Problem list name updated by automated process. Provider to review       Abdominal aortic aneurysm (H) 06/24/2005     Priority: Medium     Repaired with percutaneous stent 8/05     Has annual CT scan to monitor stent placement. Last done in sept 2012 and stable  Problem list name updated by automated process. Provider to review       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) 12/28/1996     Priority: Medium     done on 12/28/1996: coronary artery bypass x2 utilizing saphenous vein graft from the aorta to the first diagonal branch of the LAD and left internal mammary artery to the LAD.          [facility] 12/03/2012     Priority: Low     EMERGENCY CARE PLAN  October 18, 2013: No current Care Coordination follow up planned. Please refer if Care Coordination services are needed.    Presenting Problem Signs and Symptoms Treatment Plan   Questions or concerns   during clinic hours   I will call my clinic directly:  [address] [address]  [phone].    Questions or concerns outside clinic hours   I will call the 24 hour nurse line at   [phone] or [phone].   Need to schedule an appointment   I  will call the 24 hour scheduling team at [phone] or my clinic directly at [phone].    Same day treatment     I will call my clinic first, nurse line if after hours, urgent care and express care if needed.   Clinic care coordination services (regular clinic hours)     I will call a clinic care coordinator directly:     Rodrigo Day RN  Mon, Tues, Fri - [phone]  Wed, Thurs - [phone]    Dolores Sheridan, SW:    [phone]    Or call my clinic at [phone] and ask to speak with care coordination.   Crisis Services: Behavioral or Mental Health  [facility] 24 Hour Phone Line  [phone]    [facility] 24 Hour Crisis Services  [phone]    [facility] (Behavioral Health Providers) Network [phone]    [facility]   [phone]       Emergency treatment -- Immediately    CAll 911                    Subjective:     No light-headedness or palpitations.          Medications:   Scheduled:    aspirin  81 mg Oral QPM     atorvastatin  80 mg Oral QPM     lisinopril-hydrochlorothiazide  2 tablet Oral Daily     omeprazole  40 mg Oral Daily     PRN:  acetaminophen, hydrALAZINE, melatonin, naloxone          Physical Exam:   All vitals have been reviewed  Temp:  [97.7  F (36.5  C)-98.7  F (37.1  C)] 97.7  F (36.5  C)  Pulse:  [29-50] 50  Resp:  [18-20] 18  BP: (112-151)/(48-88) 124/81  SpO2:  [95 %-97 %] 95 %  Vitals:    07/26/18 0525   Weight: 91.1 kg (200 lb 14.4 oz)     I/O last 3 completed shifts:  In: 240 [P.O.:240]  Out: -     Neck:   No JVD.     Lungs:   Clear without wheezes, rhonchi.     Cardiovascular:   S1, S2, irregular, no murmur.     Ext's: no edema.          Data:   Last Basic Metabolic Panel:  Lab Results   Component Value Date     07/25/2018      Lab Results   Component Value Date    POTASSIUM 3.8 07/25/2018     Lab Results   Component Value Date    CHLORIDE 107 07/25/2018     Lab Results   Component Value Date    MARLENE 8.6 07/25/2018     Lab Results   Component  Value Date    CO2 30 07/25/2018     Lab Results   Component Value Date    BUN 27 07/25/2018     Lab Results   Component Value Date    CR 0.98 07/25/2018     Lab Results   Component Value Date    GLC 94 07/25/2018       Lab Results   Component Value Date    WBC 5.2 07/25/2018    HGB 13.6 07/25/2018    HCT 40.6 07/25/2018    MCV 95 07/25/2018     07/25/2018     Lab Results   Component Value Date    INR 0.99 06/26/2015     No results found for: TROPONIN, TROPI, TROPR       Shauna Armstrong MD  7/26/2018

## 2018-07-26 NOTE — PROGRESS NOTES
Pt VSS on RA. Tele sb primarily  in the 30's. A&Ox4. Up ind/sba, using bathroom. Skin integrity intact. Denies pain. Slept. Plan to continue holding metoprolol and monitoring HR. Continue to monitor.

## 2018-07-26 NOTE — PROGRESS NOTES
[facility]    Hospitalist Progress Note    Date of Service (when I saw the patient): 07/26/2018    Assessment & Plan   Richard You is a 78 year-old male with a history of hypertension, hyperlipidemia, coronary artery disease, ischemic cardiomyopathy, abdominal aortic aneurysm, Carey's esophagus, benign essential tremor, history of SVT who is a direct admission to [facility] from [facility] in [place] for evaluation of asymptomatic bradycardia. Admitted 7/25/2018.     Atrial fibrillation/flutter with slow ventricular response / asymptomatic bradycardia  Bradycardia was initially noted during his EGD on 07/12/2018.  EKG in clinic showed bradycardia with a rate in the 40s, difficult to appreciate P waves in part due to baseline tremor. TSH and electrolytes are within normal limits.  - Hold metoprolol  - Echocardiogram with no significant change from previous, with exception of atrial flutter  - Anticipate starting anticoagulation at discharge, patient prefers DOAC. Pharmacy liaison consulted and cost affordable for patient. Will await determination of need for pacemaker prior to initiation.   - Continue telemetry    Coronary artery disease, status post CABG 1996  Denies any chest pain or shortness of breath.  - Continue prior to admission aspirin, atorvastatin, lisinopril    Ischemic cardiomyopathy  Last echocardiogram from 06/2015 showed an ejection fraction of 40%.  He is not on any diuretic outside of hydrochlorothiazide prior to admission. Appears euvolemic clinically.   - Continue prior to admission lisinopril-hydrochlorothiazide. Metoprolol on hold.   - Echocardiogram unchanged from previous     Carey's esophagus  Continue prior to admission omeprazole.     Hyperlipidemia  Continue prior to admission atorvastatin.     Abdominal aortic aneurysm  Last measured 4.9 x 5 in 12/2017. Outpatient follow-up as previously recommended      # Pain Assessment:  Current Pain  Score 7/26/2018   Patient currently in pain? denies   Pain score (0-10) -   Richard s pain level was assessed and he currently denies pain.      DVT Prophylaxis: Pneumatic Compression Devices  Code Status: Full Code    Disposition: Expected discharge pending determination of need for pacemaker, cardiology clearance.     Joseph Donovan    Interval History   No acute events overnight. Denies any symptoms. Denies any chest pain, shortness of breath, light-headedness.     -Data reviewed today: I reviewed all new labs and imaging results over the last 24 hours. I personally reviewed no images or EKG's today.    Physical Exam   Temp: 98.4  F (36.9  C) Temp src: Oral BP: 112/69 Pulse: (!) 47   Resp: 18 SpO2: 95 % O2 Device: None (Room air)    Vitals:    07/26/18 0525   Weight: 91.1 kg (200 lb 14.4 oz)     Vital Signs with Ranges  Temp:  [97.7  F (36.5  C)-98.7  F (37.1  C)] 98.4  F (36.9  C)  Pulse:  [29-47] 47  Resp:  [18-20] 18  BP: (112-177)/(48-88) 112/69  SpO2:  [95 %-98 %] 95 %  I/O last 3 completed shifts:  In: 240 [P.O.:240]  Out: -     Constitutional: Well-appearing, NAD  Respiratory: Clear to auscultation bilaterally, good air movement bilaterally  Cardiovascular: Bradycardic, irregular, no m/r/g. No peripheral edema.  GI: Soft, non-tender, non-distended. BS normoactive.   Skin/Integumen: Warm, dry  Other:     Medications       aspirin  81 mg Oral QPM     atorvastatin  80 mg Oral QPM     lisinopril-hydrochlorothiazide  2 tablet Oral Daily     omeprazole  40 mg Oral Daily       Data     Recent Labs  Lab 07/25/18  1320   WBC 5.2   HGB 13.6   MCV 95         POTASSIUM 3.8   CHLORIDE 107   CO2 30   BUN 27   CR 0.98   ANIONGAP 5   MARLENE 8.6   GLC 94       No results found for this or any previous visit (from the past 24 hour(s)).

## 2018-07-27 ENCOUNTER — APPOINTMENT (OUTPATIENT)
Dept: OCCUPATIONAL THERAPY | Facility: CLINIC | Age: 78
DRG: 310 | End: 2018-07-27
Attending: INTERNAL MEDICINE
Payer: MEDICARE

## 2018-07-27 VITALS
SYSTOLIC BLOOD PRESSURE: 135 MMHG | DIASTOLIC BLOOD PRESSURE: 76 MMHG | BODY MASS INDEX: 27.1 KG/M2 | OXYGEN SATURATION: 97 % | RESPIRATION RATE: 18 BRPM | TEMPERATURE: 97.7 F | WEIGHT: 199.8 LBS | HEART RATE: 53 BPM

## 2018-07-27 LAB
ANION GAP SERPL CALCULATED.3IONS-SCNC: 8 MMOL/L (ref 3–14)
BUN SERPL-MCNC: 25 MG/DL (ref 7–30)
CALCIUM SERPL-MCNC: 8.6 MG/DL (ref 8.5–10.1)
CHLORIDE SERPL-SCNC: 108 MMOL/L (ref 94–109)
CO2 SERPL-SCNC: 27 MMOL/L (ref 20–32)
CREAT SERPL-MCNC: 0.86 MG/DL (ref 0.66–1.25)
GFR SERPL CREATININE-BSD FRML MDRD: 86 ML/MIN/1.7M2
GLUCOSE SERPL-MCNC: 92 MG/DL (ref 70–99)
POTASSIUM SERPL-SCNC: 4.2 MMOL/L (ref 3.4–5.3)
SODIUM SERPL-SCNC: 143 MMOL/L (ref 133–144)

## 2018-07-27 PROCEDURE — 25000132 ZZH RX MED GY IP 250 OP 250 PS 637: Mod: GY | Performed by: PHYSICIAN ASSISTANT

## 2018-07-27 PROCEDURE — 99232 SBSQ HOSP IP/OBS MODERATE 35: CPT | Performed by: INTERNAL MEDICINE

## 2018-07-27 PROCEDURE — 93272 ECG/REVIEW INTERPRET ONLY: CPT | Performed by: INTERNAL MEDICINE

## 2018-07-27 PROCEDURE — 93270 REMOTE 30 DAY ECG REV/REPORT: CPT | Performed by: NURSE PRACTITIONER

## 2018-07-27 PROCEDURE — A9270 NON-COVERED ITEM OR SERVICE: HCPCS | Mod: GY | Performed by: NURSE PRACTITIONER

## 2018-07-27 PROCEDURE — 25000132 ZZH RX MED GY IP 250 OP 250 PS 637: Mod: GY | Performed by: NURSE PRACTITIONER

## 2018-07-27 PROCEDURE — 97110 THERAPEUTIC EXERCISES: CPT | Mod: GO

## 2018-07-27 PROCEDURE — 97535 SELF CARE MNGMENT TRAINING: CPT | Mod: GO

## 2018-07-27 PROCEDURE — A9270 NON-COVERED ITEM OR SERVICE: HCPCS | Mod: GY | Performed by: PHYSICIAN ASSISTANT

## 2018-07-27 PROCEDURE — 97165 OT EVAL LOW COMPLEX 30 MIN: CPT | Mod: GO

## 2018-07-27 PROCEDURE — 36415 COLL VENOUS BLD VENIPUNCTURE: CPT | Performed by: INTERNAL MEDICINE

## 2018-07-27 PROCEDURE — 40000133 ZZH STATISTIC OT WARD VISIT

## 2018-07-27 PROCEDURE — 80048 BASIC METABOLIC PNL TOTAL CA: CPT | Performed by: INTERNAL MEDICINE

## 2018-07-27 RX ORDER — AMLODIPINE BESYLATE 5 MG/1
5 TABLET ORAL DAILY
Qty: 90 TABLET | Refills: 0 | Status: SHIPPED | OUTPATIENT
Start: 2018-07-28 | End: 2018-08-10

## 2018-07-27 RX ORDER — AMLODIPINE BESYLATE 5 MG/1
5 TABLET ORAL DAILY
Status: DISCONTINUED | OUTPATIENT
Start: 2018-07-27 | End: 2018-07-27 | Stop reason: HOSPADM

## 2018-07-27 RX ORDER — PRIMIDONE 50 MG/1
100 TABLET ORAL AT BEDTIME
Qty: 60 TABLET | Refills: 0 | Status: SHIPPED | OUTPATIENT
Start: 2018-07-27 | End: 2018-07-27

## 2018-07-27 RX ADMIN — AMLODIPINE BESYLATE 5 MG: 5 TABLET ORAL at 12:01

## 2018-07-27 RX ADMIN — OMEPRAZOLE 40 MG: 20 CAPSULE, DELAYED RELEASE ORAL at 08:17

## 2018-07-27 RX ADMIN — LISINOPRIL AND HYDROCHLOROTHIAZIDE 2 TABLET: 12.5; 2 TABLET ORAL at 08:17

## 2018-07-27 ASSESSMENT — ACTIVITIES OF DAILY LIVING (ADL)
ADLS_ACUITY_SCORE: 9

## 2018-07-27 NOTE — DISCHARGE INSTRUCTIONS
[facility] will contact you this week to help you schedule an appointment with a Nurse Practitioner/Physicians Assistant/Cardiologist  [facility]  [address], [address] [place] [address]  Phone: [phone]

## 2018-07-27 NOTE — PROGRESS NOTES
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 Also bicycles regularly.   Functional Level Prior   Ambulation 0-->independent   Transferring 0-->independent   Toileting 0-->independent   Bathing 0-->independent   Dressing 0-->independent   Eating 0-->independent   Communication 0-->understands/communicates without difficulty   Swallowing 0-->swallows foods/liquids without difficulty   Cognition 0 - no cognition issues reported   Fall history within last six months no   Which of the above functional risks had a recent onset or change? none   Prior Functional Level Comment Indep all IADLS   General Information   Onset of Illness/Injury or Date of Surgery - Date 07/25/18   Referring Physician Dr. Shauna Armstrong   Patient/Family Goals Statement return home   Cognitive Status Examination   Orientation orientation to person, place and time   Level of Consciousness alert   Able to Follow Commands WNL/WFL   Personal Safety (Cognitive) WNL/WFL   Visual Perception   Visual Perception No deficits were identified;Wears glasses   Visual Perception Comments However, pt reports he has dx of macular degeneration both eyes but not yet affecting function able to read, drive   Pain Assessment   Patient Currently in Pain No   Range of Motion (ROM)   ROM Quick Adds No  "deficits were identified   Strength   Manual Muscle Testing Quick Adds No deficits were identified   Mobility   Bed Mobility Comments Independent   Transfer Skill: Sit to Stand   Level of [place]: Sit/Stand independent   Transfer Skill: Toilet Transfer   Level of [place]: Toilet independent   Balance   Balance Comments Pt up indep in room, amb in hallway with spouse no AD    Activities of Daily Living Analysis   ADL Comments Pt performing all self-cares in room indep   General Therapy Interventions   Planned Therapy Interventions home program guidelines;progressive activity/exercise;risk factor education   Clinical Impression   Criteria for Skilled Therapeutic Interventions Met yes, treatment indicated   OT Diagnosis decreased IADL performance   Influenced by the following impairments cardiac activity tolerance   Assessment of Occupational Performance 1-3 Performance Deficits   Identified Performance Deficits social/community/exercise activities   Clinical Decision Making (Complexity) Low complexity   Predicted Duration of Therapy Intervention (days/wks) eval and one treatment only   Anticipated Discharge Disposition Home   Risks and Benefits of Treatment have been explained. Yes   Patient, Family & other staff in agreement with plan of care Yes   [facility] [employer] TM \"6 Clicks\"   2016, Trustees of [facility], under license to [employer].  All rights reserved.   6 Clicks Short Forms Daily Activity Inpatient Short Form   [facility] AM-PAC  \"6 Clicks\" Daily Activity Inpatient Short Form   1. Putting on and taking off regular lower body clothing? 4 - None   2. Bathing (including washing, rinsing, drying)? 4 - None   3. Toileting, which includes using toilet, bedpan or urinal? 4 - None   4. Putting on and taking off regular upper body clothing? 4 - None   5. Taking care of personal grooming such as brushing teeth? 4 - None   6. Eating meals? 4 - None   Daily Activity Raw Score (Score out " of 24.Lower scores equate to lower levels of function) 24   Total Evaluation Time   Total Evaluation Time (Minutes) 15

## 2018-07-27 NOTE — PLAN OF CARE
Problem: Patient Care Overview  Goal: Plan of Care/Patient Progress Review  Discharge Planner OT   Patient plan for discharge: home  Current status: OT/cardiac rehab orders received, chart reviewed and eval and treatment completed on 79yo male admitted with bradycardia, new Afib. 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 Also bicycles. Today he was up indep in his room, walking hallways indep. Tolerated 15min on treadmill up to 2mi/hr, denied symptoms, chatted throughout activity, HR 65-71 throughout. Prior to activity /114, at completion of treadmill activity 170/128.    Barriers to return to prior living situation: none noted  Recommendations for discharge: home w/continued activity   Rationale for recommendations: pt tolerated exercise activity well - he states he normally goes up to 3mph on treadmill and we did not attempt that pace this am. Recommended he consult with cardiology prior to dc as to activity guidelines       Entered by: Robert Cuenca 07/27/2018 9:46 AM

## 2018-07-27 NOTE — PLAN OF CARE
Problem: Patient Care Overview  Goal: Plan of Care/Patient Progress Review  Outcome: No Change  Pt stable over night, VSS, no c/o Chest pain or sob, Pt's HR between 37 and 65, in Afib with SVR.  No new issues noted.  Pt will have a treadmill test today to assess HR with activity.  Will continue to monitor.

## 2018-07-27 NOTE — PROGRESS NOTES
[facility] Cardiology Progress Note          Assessment and Plan:   Assessment:   1. Bradycardia - This is an individual transferred for a slow but asymptomatic heart rate on toprol  daily.  He has normal renal function, normal mental status and his blood pressure is quite adequate.    Heart rate is rising off of BB. Today with cardiac rehab his HR went into the 70s.    He may indeed need a permanent pacemaker, but given that he was on such a high dose of metoprolol XL and the effects are still wearing off, will follow up in out patient setting.    2. New Atrial fibrillation -   There is a risk of stroke given his relatively high CHADS2-VASc score.    The risks and benefits were discussed including the risk of bleeding   Plan: Xarelto 20    3.  Hypertension  -170   continue lisinopril-hydrochlorothiazide  Add amlodipine 5    4.  CAD - asymptomatic, had CABG 22 years ago.  Cont ASA, statin    Okay to discharge today on event monitor.  Follow up with Angie and Dr. Reveles in [place]                   Significant Problems:     Patient Active Problem List    Diagnosis Date Noted     HTN, goal below 130/80 05/10/2012     Priority: High     History of AAA       Coronary atherosclerosis 04/05/2005     Priority: High     12/05/12 Patient denies any recent chest pain or NTG use.  Patient is taking meds regularly and denies significant side effects. Last stress test many years ago, but bikes 30-40 mi/day without symptoms   Problem list name updated by automated process. Provider to review       Bradycardia 07/25/2018     Priority: Medium     Benign essential tremor 05/03/2018     Priority: Medium     Arteriosclerotic vascular disease 11/30/2016     Priority: Medium     Carey's esophagus 09/10/2015     Priority: Medium     Needs yearly EGD. Needs chronic PPI.         Esophagitis 09/04/2015     Priority: Medium     Noted on EGD 9/4/2015. Needs chronic PPI.       Former smoker 07/14/2014     Priority:  Medium     Heart murmur 05/09/2014     Priority: Medium     [place] by cardiology April 2014 and consistent with possible aortic sclerosis. Plan for ECHO in one year prior to follow up with cardiology April 2015.        Colon polyp, hyperplastic 05/14/2013     Priority: Medium     Hyperplastic  Polyp on colonoscopy 5-2013. Recommend that the next colonoscopy be in 5 years because of family history       Macular degeneration, wet (H) 04/22/2013     Priority: Medium     Hyperlipidemia LDL goal <100 10/31/2010     Priority: Medium     Benign shuddering attack 05/10/2006     Priority: Medium     Father also had this.  Problem list name updated by automated process. Provider to review       Abdominal aortic aneurysm (H) 06/24/2005     Priority: Medium     Repaired with percutaneous stent 8/05     Has annual CT scan to monitor stent placement. Last done in sept 2012 and stable  Problem list name updated by automated process. Provider to review       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) 12/28/1996     Priority: Medium     done on 12/28/1996: coronary artery bypass x2 utilizing saphenous vein graft from the aorta to the first diagonal branch of the LAD and left internal mammary artery to the LAD.          [facility] 12/03/2012     Priority: Low     EMERGENCY CARE PLAN  October 18, 2013: No current Care Coordination follow up planned. Please refer if Care Coordination services are needed.    Presenting Problem Signs and Symptoms Treatment Plan   Questions or concerns   during clinic hours   I will call my clinic directly:  [facility]  [id number] [place] [id number]  [phone].    Questions or concerns outside clinic hours   I will call the 24 hour nurse line at   [phone] or [place].   Need to schedule an appointment   I will call the 24 hour scheduling team at [phone] or my clinic directly at [phone].    Same day treatment     I will call my clinic first, nurse  line if after hours, urgent care and express care if needed.   Clinic care coordination services (regular clinic hours)     I will call a clinic care coordinator directly:     Rodrigo Day RN  Mon, Tues, Fri - [phone]  Wed, Thurs - [phone]    Dolores Sheridan, SW:    [phone]    Or call my clinic at [phone] and ask to speak with care coordination.   Crisis Services: Behavioral or Mental Health  [facility] 24 Hour Phone Line  [phone]    [facility] 24 Hour Crisis Services  [phone]    [facility] (Behavioral Health Providers) Network [phone]    [facility]   [phone]       Emergency treatment -- Immediately    CAll 911                    Subjective:     No light-headedness or palpitations.          Medications:   Scheduled:    aspirin  81 mg Oral QPM     atorvastatin  80 mg Oral QPM     lisinopril-hydrochlorothiazide  2 tablet Oral Daily     omeprazole  40 mg Oral Daily     PRN:  acetaminophen, hydrALAZINE, melatonin, naloxone          Physical Exam:   All vitals have been reviewed  Temp:  [97.4  F (36.3  C)-98.4  F (36.9  C)] 97.4  F (36.3  C)  Pulse:  [47-63] 53  Heart Rate:  [65-70] 65  Resp:  [16-18] 18  BP: (112-170)/() 134/85  SpO2:  [95 %-97 %] 96 %  Vitals:    07/26/18 0525 07/27/18 0500   Weight: 91.1 kg (200 lb 14.4 oz) 90.6 kg (199 lb 12.8 oz)     I/O last 3 completed shifts:  In: 240 [P.O.:240]  Out: -     Neck:   No JVD.     Lungs:   Clear without wheezes, rhonchi.     Cardiovascular:   S1, S2, irregular, no murmur.     Ext's: no edema.          Data:   Last Basic Metabolic Panel:  Lab Results   Component Value Date     07/25/2018      Lab Results   Component Value Date    POTASSIUM 3.8 07/25/2018     Lab Results   Component Value Date    CHLORIDE 107 07/25/2018     Lab Results   Component Value Date    MARLENE 8.6 07/25/2018     Lab Results   Component Value Date    CO2 30 07/25/2018     Lab Results   Component Value Date    BUN 27 07/25/2018      Lab Results   Component Value Date    CR 0.98 07/25/2018     Lab Results   Component Value Date    GLC 94 07/25/2018       Lab Results   Component Value Date    WBC 5.2 07/25/2018    HGB 13.6 07/25/2018    HCT 40.6 07/25/2018    MCV 95 07/25/2018     07/25/2018     Lab Results   Component Value Date    INR 0.99 06/26/2015     No results found for: TROPONIN, TROPI, TROPR       Kallie Armendariz, MAMADOU CNP  7/26/2018

## 2018-07-27 NOTE — DISCHARGE SUMMARY
[facility]    Discharge Summary  Hospitalist    Date of Admission:  7/25/2018  Date of Discharge:  7/27/2018  Discharging Provider: Joseph Donovan  Date of Service (when I saw the patient): 07/27/18    Discharge Diagnoses   Atrial fibrillation/flutter with slow ventricular response / asymptomatic bradycardia  Coronary artery disease, status post CABG  Hypertension  Ischemic cardiomyopathy  Carey's esophagus  Hyperlipidemia  Abdominal aortic aneurysm    History of Present Illness   Richard You is a 78 year-old male with a history of hypertension, hyperlipidemia, coronary artery disease, ischemic cardiomyopathy, abdominal aortic aneurysm, Carey's esophagus, benign essential tremor, history of SVT who is a direct admission to [facility] on 7/25/2018 for evaluation of asymptomatic bradycardia. Admitted.     Hospital Course   Richard You was admitted on 7/25/2018.  The following problems were addressed during his hospitalization:    Atrial fibrillation/flutter with slow ventricular response / asymptomatic bradycardia  Bradycardia was initially noted during his EGD on 07/12/2018.  EKG in clinic showed bradycardia with a rate in the 40s, appeared to be atrial fibrillation with absence of P waves on repeat here. TSH and electrolytes are within normal limits. Cardiology consulted. Echocardiogram with no significant change from previous, with exception of new atrial flutter noted. Held prior to admission metoprolol with improvement in heart rate, and chronotropic sufficiency noted with ambulation with cardiac rehab. Discharged with rivaroxaban for anticoagulation, event monitor and will follow-up with cardiology.     Coronary artery disease, status post CABG 1996  Hypertension   Denies any chest pain or shortness of breath. Continue prior to admission aspirin, atorvastatin, lisinopril. Amlodipine started on discharge in place of metoprolol XL.      Ischemic cardiomyopathy  Last  echocardiogram from 06/2015 showed an ejection fraction of 40%. Echocardiogram unchanged from previous      Carey's esophagus  Continue prior to admission omeprazole.      Hyperlipidemia  Continue prior to admission atorvastatin.      Abdominal aortic aneurysm  Last measured 4.9 x 5 in 12/2017. Outpatient follow-up as previously recommended    # Discharge Pain Plan:   - Patient currently has NO PAIN and is not being prescribed pain medications on discharge.    Joseph Donovan    Significant Results and Procedures   Echocardiogram   Interpretation Summary     The left ventricle is borderline dilated.  There is mild concentric left ventricular hypertrophy.  Anteroseptal and apical hypokinesis.  Left ventricular systolic function is mild to moderately reduced.  The visual ejection fraction is estimated at 40-45%.  Mildly decreased right ventricular systolic function  The ascending aorta is Mildly dilated.  The rhythm was atrial flutter.     Since the previous study, the atrial flutter is new. No other significant  changes.    Code Status   Full Code       Primary Care Physician   Kamari Rojas    Physical Exam   Temp: 97.7  F (36.5  C) Temp src: Oral BP: 135/76 Pulse: 53 Heart Rate: 50 Resp: 18 SpO2: 97 % O2 Device: None (Room air)    Vitals:    07/26/18 0525 07/27/18 0500   Weight: 91.1 kg (200 lb 14.4 oz) 90.6 kg (199 lb 12.8 oz)     Vital Signs with Ranges  Temp:  [97.4  F (36.3  C)-97.7  F (36.5  C)] 97.7  F (36.5  C)  Pulse:  [50-63] 53  Heart Rate:  [50-70] 50  Resp:  [16-18] 18  BP: (119-170)/() 135/76  SpO2:  [95 %-97 %] 97 %  I/O last 3 completed shifts:  In: 240 [P.O.:240]  Out: -     Constitutional: Well-appearing, NAD  Respiratory: Clear to auscultation bilaterally, good air movement bilaterally  Cardiovascular: RRR, no m/r/g. No peripheral edema.  GI: Soft, non-tender, non-distended. BS normoactive.   Skin/Integumen: Warm, dry  Other:       Discharge Disposition   Discharged to  home  Condition at discharge: Stable    Consultations This Hospital Stay   CARDIOLOGY IP CONSULT  PHARMACY LIAISON FOR MEDICATION COVERAGE CONSULT  CARDIAC REHAB IP CONSULT    Time Spent on this Encounter   IJoseph, personally saw the patient today and spent greater than 30 minutes discharging this patient.    Discharge Orders     Follow-Up with Cardiac Advanced Practice Provider     Follow-Up with Cardiologist     Cardiac Event Monitor - Peds/Adult     Reason for your hospital stay   You were hospitalized for a slow heart rate, with newly diagnosed atrial fibrillation.     Follow-up and recommended labs and tests    Follow up with primary care provider, Kamari Rojas, within 7 days for hospital follow- up.  No follow up labs or test are needed. Follow-up with cardiology as directed.     Activity   Your activity upon discharge: activity as tolerated     Full Code     Diet   Follow this diet upon discharge: Regular diet       Discharge Medications   Current Discharge Medication List      START taking these medications    Details   amLODIPine (NORVASC) 5 MG tablet Take 1 tablet (5 mg) by mouth daily  Qty: 90 tablet, Refills: 0    Associated Diagnoses: HTN, goal below 130/80      rivaroxaban ANTICOAGULANT (XARELTO) 20 MG TABS tablet Take 1 tablet (20 mg) by mouth daily (with dinner)  Qty: 30 tablet, Refills: 0    Associated Diagnoses: Atrial fibrillation, unspecified type (H)         CONTINUE these medications which have NOT CHANGED    Details   aspirin 81 MG chewable tablet Take 1 tablet (81 mg) by mouth daily  Qty: 36 tablet, Refills: 3    Associated Diagnoses: Coronary artery disease involving native coronary artery without angina pectoris, unspecified whether native or transplanted heart      atorvastatin (LIPITOR) 80 MG tablet Take 1 tablet (80 mg) by mouth daily  Qty: 90 tablet, Refills: 3    Associated Diagnoses: Hyperlipidemia LDL goal <100; Coronary atherosclerosis of autologous vein  bypass graft without angina      FISH OIL 1000 MG OR CAPS Take 1 capsule by mouth in the evening.  Refills: 0      lisinopril-hydrochlorothiazide (PRINZIDE/ZESTORETIC) 20-12.5 MG per tablet Take 2 tablets by mouth daily  Qty: 180 tablet, Refills: 3    Associated Diagnoses: HTN, goal below 130/80      Multiple Vitamin (MULTI-VITAMIN DAILY PO) Take 1 tablet by mouth daily      Multiple Vitamins-Minerals (ICAPS PO) Take 1 tablet by mouth 2 times daily       omeprazole (PRILOSEC) 40 MG capsule Take 1 capsule (40 mg) by mouth daily Take 30-60 minutes before a meal.  Qty: 90 capsule, Refills: 3    Associated Diagnoses: Esophagitis; Carey's esophagus without dysplasia         STOP taking these medications       metoprolol succinate (TOPROL-XL) 100 MG 24 hr tablet Comments:   Reason for Stopping:             Allergies   Allergies   Allergen Reactions     Nka [No Known Allergies]      Data   Most Recent 3 CBC's:  Recent Labs   Lab Test  07/25/18   1320  12/14/16   2225  06/26/15   0930   WBC  5.2  6.6  5.2   HGB  13.6  12.3*  13.3   MCV  95  92  92   PLT  155  195  187      Most Recent 3 BMP's:  Recent Labs   Lab Test  07/27/18   0540  07/25/18   1320  05/03/18   0907   NA  143  142  144   POTASSIUM  4.2  3.8  4.3   CHLORIDE  108  107  110*   CO2  27  30  30   BUN  25  27  32*   CR  0.86  0.98  1.01   ANIONGAP  8  5  4   MARLENE  8.6  8.6  9.3   GLC  92  94  89     Most Recent 2 LFT's:  Recent Labs   Lab Test  04/26/17   1346  12/14/16   2225   AST   --   23   ALT  26  27   ALKPHOS   --   61   BILITOTAL   --   0.5     Most Recent INR's and Anticoagulation Dosing History:  Anticoagulation Dose History     Recent Dosing and Labs Latest Ref Rng & Units 6/24/2005 6/26/2015    INR 0.86 - 1.14 0.99 0.99        Most Recent 3 Troponin's:No lab results found.  Most Recent Cholesterol Panel:  Recent Labs   Lab Test  05/03/18   0907   CHOL  154   LDL  83   HDL  47   TRIG  118     Most Recent 6 Bacteria Isolates From Any Culture (See EPIC  Reports for Culture Details):  Recent Labs   Lab Test  12/15/16   0002  12/14/16   2210   CULT  Canceled, Test credited Duplicate request Test canceled - Lab  error  50,000 to 100,000 colonies/mL mixed urogenital mindy     Most Recent TSH, T4 and A1c Labs:  Recent Labs   Lab Test  07/25/18   1320   TSH  1.26     Results for orders placed or performed during the hospital encounter of 12/19/17   US Aorta/Ivc/Iliac Duplex Complete    Narrative    ULTRASOUND AORTA/IVC/ILIAC DUPLEX COMPLETE  12/19/2017 7:55 AM     HISTORY:  History of EVAR done on 6/30/2005 with Dr. Mo/Pancho.   Comparison CT scan done on 12/14/2016.  Annual followup. Abdominal  aortic aneurysm (H).    COMPARISON: 12/14/2016    TECHNIQUE: Color Doppler and spectral waveform analysis performed.    FINDINGS: There is an aortic endograft for treatment of abdominal  aortic aneurysm. Maximum aneurysm sac size is 4.9 x 5.0 cm, similar to  the prior CT scan. Right common iliac artery measures up to 1.0 cm.  Left common iliac artery measures up to 0.9 cm. Measured aortic and  iliac artery velocities are normal. There is no Doppler evidence of  endoleak.      Impression    IMPRESSION: Stable aortic aneurysm sac size. No evidence of endoleak.  The graft is patent.    KADY LARIOS MD

## 2018-07-27 NOTE — PLAN OF CARE
Problem: Patient Care Overview  Goal: Plan of Care/Patient Progress Review  Outcome: Adequate for Discharge Date Met: 07/27/18  Discharge instructions discussed with pt and spouse.  Pt will start new meds amlodipine and xarelto, filled at [place] [facility] and sent with pt.  Literature discussed and sent with pt.  Xarelto will start tonight with dinner, pt given dose of amlodipine today, will have next dose tomorrow am.  Pt is discharging on a 30 day event monitor.  F/u will be in [place], unable to make appts prior to discharge, so order sent and clinic will call pt to schedule cardiology NP appt and cardiologist appt.  Pt was also given clinic phone number to call if they have not heard from clinic by Monday afternoon.  Pt will schedule own appt with Primary MD in 1 week.  All information reviewed with pt and spouse, all questions answered.  Dr. Armstrong also recommended that pt stop taking fish oil.  Pt discharged to home with spouse providing transportation, pt left unit around 1515.

## 2018-07-28 LAB — INTERPRETATION ECG - MUSE: NORMAL

## 2018-07-30 ENCOUNTER — TELEPHONE (OUTPATIENT)
Dept: CARDIOLOGY | Facility: CLINIC | Age: 78
End: 2018-07-30

## 2018-07-30 ENCOUNTER — TELEPHONE (OUTPATIENT)
Dept: FAMILY MEDICINE | Facility: CLINIC | Age: 78
End: 2018-07-30

## 2018-07-30 DIAGNOSIS — I48.91 ATRIAL FIBRILLATION (H): Primary | ICD-10-CM

## 2018-07-30 NOTE — TELEPHONE ENCOUNTER
"ED / Discharge Outreach Protocol    Patient Contact    Attempt # 1    Was call answered?  Yes.  \"May I please speak with <patient name>\"  Is patient available?   Yes  Hospital/TCU/ED for chronic condition Discharge Protocol    \"Hi, my name is Victorina Buck, a registered nurse, and I am calling from [facility].  I am calling to follow up and see how things are going for you after your recent emergency visit/hospital/TCU stay.\"    Tell me how you are doing now that you are home?\" \"doing fine. \"        Discharge Instructions    \"Let's review your discharge instructions.  What is/are the follow-up recommendations?  Pt. Response: has appt set up with Dr JOSE Rojas, and also with the cardiology folks, one in Sept and one in Oct.       \"Has an appointment with your primary care provider been scheduled?\"   Yes. (confirm)    \"When you see the provider, I would recommend that you bring your medications with you.\"    Medications    \"Tell me what changed about your medicines when you discharged?\"    Changes to chronic meds?    0-1    \"What questions do you have about your medications?\"    None     New diagnoses of heart failure, COPD, diabetes, or MI?    No              Medication reconciliation completed? Yes  Was MTM referral placed (*Make sure to put transitions as reason for referral)?   No    Call Summary    \"What questions or concerns do you have about your recent visit and your follow-up care?\"     none    \"If you have questions or things don't continue to improve, we encourage you contact us through the main clinic number [phone] (give number).  Even if the clinic is not open, triage nurses are available 24/7 to help you.     We would like you to know that our clinic has extended hours (provide information).  We also have urgent care noon to eight in the ED daily   (provide details on closest location and hours/contact info)\"      \"Thank you for your time and take care!\"    Victorina Buck RNC             "

## 2018-07-30 NOTE — TELEPHONE ENCOUNTER
Pt's wife, Soledad, called back and pt has not had any c/o lightheadedness or presyncopal episodes. Instructed to call our clinic if these symptoms should occur. Instructed to call 911 for any syncopal episodes. Pt is wearing cardiac event monitor and will reschedule cardiology KRISTINE OV for one week post completion monitoring period. Wife verbalized understanding and has no further questions. Phone numbers provided for after hours concerns and scheduling. JORJE Calderon RN.

## 2018-07-30 NOTE — TELEPHONE ENCOUNTER
RN received phone call from [employer] reporting an auto triggered event for new onset afib/flutter 60BPM on 7/27/18 at 3:10pm. RN reviewed patient's chart and patient dx with new onset afib in hospital and started on xarelto. RN requested [facility] fax strips to clinic.

## 2018-07-30 NOTE — TELEPHONE ENCOUNTER
ED/UC/IP follow up phone call:    RN please call to follow up - Pt does have a clinic appt with Dr. CHELE Rojas 8/10/18.      Number of ED visits in past 12 months = 1

## 2018-07-30 NOTE — TELEPHONE ENCOUNTER
Patient was evaluated by cardiology while inpatient for asymptomatic bradycardia and new onset A. Fib. Pt's Metoprolol is being held for persistent bradycardia at time of discharge. Taking Xarelto and wearing cardiac event monitor. Called patient to discuss any post hospital d/c questions he may have, review medication changes, and confirm f/u appts, but no answer. VM left to return my phone call. Patient needs to schedule f/u OV with cardiology KRISTINE and Cardiologist as ordered. JORJE Calderon RN.

## 2018-08-03 NOTE — TELEPHONE ENCOUNTER
Returned pt's wife's phone message. Pt was diagnosed with new onset of PAF during most recent hospitalization and started on Xarelto. Hospitalist discontinued Primidone as Xarelto due to interaction of medications. Since discontinuing Primidone, pt's essential tremors have worsened. Wife states spoke with local pharmacist, and suggested Eliquis does not interact with Primidione. Wife asking if pt can be switched to Eliquis, so Primidone can be restarted. Reviewed with Kallie Armendariz, whom seen pt in hospital, and she states pt can discontinue Xarelto and start Eliquis 5 mg po BID. Primidone will have to be restarted by PMD. Writer called pt's wife with med changes as listed and she verbalized understanding and appreciation. JORJE Calderon RN.

## 2018-08-06 DIAGNOSIS — I48.91 ATRIAL FIBRILLATION (H): ICD-10-CM

## 2018-08-08 NOTE — TELEPHONE ENCOUNTER
Bio tel strips received     7/27/18 15:08:58 symptomatic event afib/flutter [id number]...   30BPM occurred at 21:59:00 (auto event)    7/28/18 00:02:42 auto event  Afib/flutter [id number]    7/29/18 00:00:41 auto event  Afib/flutter HR 37BPM    7/30/18 05:01:23 auto event  Afib/flutter HR 30BPM    7/31/18 00:22:50 auto event  Afib/flutter 32-90  HR BPM    8/1/18 00:07:18 auto event  Afib/flutter 30 HR BPM    8/2/18 00:13:12 auto event  Afib/flutter HR 60 BPM    8/3/18 00:36:14 auto event  Afitb/flutter with 2 second pause HR 30-40BPM    8/5/18 11:33:46 auto event  afib HR 60 BPM    All strips filed in folder     Will send to Dr. Armstrong for review and recommendation.

## 2018-08-10 ENCOUNTER — OFFICE VISIT (OUTPATIENT)
Dept: FAMILY MEDICINE | Facility: CLINIC | Age: 78
End: 2018-08-10
Payer: COMMERCIAL

## 2018-08-10 VITALS
SYSTOLIC BLOOD PRESSURE: 138 MMHG | WEIGHT: 205.4 LBS | RESPIRATION RATE: 16 BRPM | HEIGHT: 72 IN | OXYGEN SATURATION: 97 % | BODY MASS INDEX: 27.82 KG/M2 | HEART RATE: 61 BPM | DIASTOLIC BLOOD PRESSURE: 70 MMHG | TEMPERATURE: 98.1 F

## 2018-08-10 DIAGNOSIS — I48.20 CHRONIC ATRIAL FIBRILLATION (H): Primary | ICD-10-CM

## 2018-08-10 DIAGNOSIS — G25.0 BENIGN ESSENTIAL TREMOR: ICD-10-CM

## 2018-08-10 DIAGNOSIS — I10 HTN, GOAL BELOW 130/80: ICD-10-CM

## 2018-08-10 DIAGNOSIS — Z12.11 SPECIAL SCREENING FOR MALIGNANT NEOPLASMS, COLON: ICD-10-CM

## 2018-08-10 PROCEDURE — 99495 TRANSJ CARE MGMT MOD F2F 14D: CPT | Performed by: FAMILY MEDICINE

## 2018-08-10 RX ORDER — AMLODIPINE BESYLATE 5 MG/1
5 TABLET ORAL DAILY
Qty: 90 TABLET | Refills: 3 | Status: SHIPPED | OUTPATIENT
Start: 2018-08-10 | End: 2019-04-26

## 2018-08-10 ASSESSMENT — PAIN SCALES - GENERAL: PAINLEVEL: NO PAIN (0)

## 2018-08-10 NOTE — PATIENT INSTRUCTIONS
Card    Thank you for choosing [facility].  You may be receiving a survey in the mail from [facility] regarding your visit today.  Please take a few minutes to complete and return the survey to let us know how we are doing.      Our Clinic hours are:  Mondays    7:20 am - 7 pm  Tues -  Fri  7:20 am - 5 pm    Clinic Phone: [phone]    The clinic lab opens at 7:30 am Mon - Fri and appointments are required.    [facility]. [phone]  Monday  8 am - 7pm  Tues - Fri 8 am - 5:30 pm

## 2018-08-10 NOTE — PROGRESS NOTES
SUBJECTIVE:   Richard You is a 78 year old male who presents to clinic today for the following health issues:        Hospital Follow-up Visit:    Hospital/Nursing Home/IP Rehab Facility: [facility]  Date of Admission: 7/25/18  Date of Discharge: 7/27/18  Reason(s) for Admission: Bradycardia            Problems taking medications regularly:  None       Medication changes since discharge: None       Problems adhering to non-medication therapy:  None  Summary of hospitalization:  [facility] discharge summary reviewed  Diagnostic Tests/Treatments reviewed.  Follow up needed: cardiology and event monitor - not functioning  Other Healthcare Providers Involved in Patient s Care:         cardiology  Update since discharge: improved. Has noticed improved exercise stamina.    Post Discharge Medication Reconciliation: discharge medications reconciled, continue medications without change.  Plan of care communicated with patient and family     Coding guidelines for this visit:  Type of Medical   Decision Making Face-to-Face Visit       within 7 Days of discharge Face-to-Face Visit        within 14 days of discharge   Moderate Complexity [id number]   High Complexity [id number]          ADDITIONAL HPI: 78 year old male here for above issue.      Discharge Diagnoses      Atrial fibrillation/flutter with slow ventricular response / asymptomatic bradycardia  Coronary artery disease, status post CABG  Hypertension  Ischemic cardiomyopathy  Carey's esophagus  Hyperlipidemia  Abdominal aortic aneurysm    Atrial fibrillation/flutter with slow ventricular response / asymptomatic bradycardia  Bradycardia was initially noted during his EGD on 07/12/2018.  EKG in clinic showed bradycardia with a rate in the 40s, appeared to be atrial fibrillation with absence of P waves on repeat here. TSH and electrolytes are within normal limits. Cardiology consulted. Echocardiogram with no significant change from  previous, with exception of new atrial flutter noted. Held prior to admission metoprolol with improvement in heart rate, and chronotropic sufficiency noted with ambulation with cardiac rehab. Discharged with rivaroxaban for anticoagulation, event monitor and will follow-up with cardiology.      Coronary artery disease, status post CABG 1996  Hypertension   Denies any chest pain or shortness of breath. Continue prior to admission aspirin, atorvastatin, lisinopril. Amlodipine started on discharge in place of metoprolol XL.       Ischemic cardiomyopathy  Last echocardiogram from 06/2015 showed an ejection fraction of 40%. Echocardiogram unchanged from previous     ROS: 10 point review of systems negative except as per HPI.    PAST MEDICAL HISTORY:  Past Medical History:   Diagnosis Date     Abdominal aneurysm without mention of rupture 6/24/2005    Repaired with percutaneous stent 8/05   Has annual CT scan to monitor stent placement. Last done in sept 2012 and stable      Atrial fibrillation (H)      CAD 4/5/2005 12/05/12 Patient denies any recent chest pain or NTG use.  Patient is taking meds regularly and denies significant side effects. Last stress test many years ago, but bikes 30-40 mi/day without symptoms       Colon polyp, hyperplastic 5/14/2013    Hyperplastic  Polyp on colonoscopy 5-2013. 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        ACTIVE MEDICAL PROBLEMS:  Patient Active Problem List   Diagnosis     Coronary atherosclerosis     Abdominal aortic aneurysm (H)     Benign shuddering attack     Hyperlipidemia LDL goal <100     HTN, goal  below 130/80     [facility]     Macular degeneration, wet (H)     Colon polyp, hyperplastic     Heart murmur     Former smoker     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)     Esophagitis     Carey's esophagus     Arteriosclerotic vascular disease     Benign essential tremor     Bradycardia        FAMILY HISTORY:  Family History   Problem Relation Age of Onset     Cancer - colorectal Mother      Cardiovascular Father      Alcohol/Drug Father      Neurologic Disorder Father      tremor     Cardiovascular Brother      Breast Cancer Sister      Cancer Brother      bladder cancer       SOCIAL HISTORY:  Social History     Social History     Marital status:      Spouse name: Soledad You     Number of children: 2     Years of education: 12     Occupational History      Retired     Social History Main Topics     Smoking status: Former Smoker     Packs/day: 0.50     Years: 10.00     Types: Cigarettes     Quit date: 11/29/1995     Smokeless tobacco: Never Used     Alcohol use Yes      Comment: beer occ,     Drug use: No     Sexual activity: Not Currently     Partners: Female     Other Topics Concern     Parent/Sibling W/ Cabg, Mi Or Angioplasty Before 65f 55m? Yes     Social History Narrative        [employer]    Nonsmoker as of 2  Yrs ago    Drinks wine daily    Lives with wife soledad in [place]           MEDICATIONS:  Current Outpatient Prescriptions   Medication Sig Dispense Refill     amLODIPine (NORVASC) 5 MG tablet Take 1 tablet (5 mg) by mouth daily 90 tablet 3     aspirin 81 MG chewable tablet Take 1 tablet (81 mg) by mouth daily (Patient taking differently: Take 81 mg by mouth every evening ) 36 tablet 3     atorvastatin (LIPITOR) 80 MG tablet Take 1 tablet (80 mg) by mouth daily (Patient taking differently: Take 80 mg by mouth every evening ) 90 tablet 3     lisinopril-hydrochlorothiazide (PRINZIDE/ZESTORETIC) 20-12.5 MG per tablet Take 2 tablets by mouth daily 180 tablet 3      Multiple Vitamin (MULTI-VITAMIN DAILY PO) Take 1 tablet by mouth daily       Multiple Vitamins-Minerals (ICAPS PO) Take 1 tablet by mouth 2 times daily        omeprazole (PRILOSEC) 40 MG capsule Take 1 capsule (40 mg) by mouth daily Take 30-60 minutes before a meal. 90 capsule 3     rivaroxaban ANTICOAGULANT (XARELTO) 20 MG TABS tablet Take 1 tablet (20 mg) by mouth daily (with dinner) 90 tablet 3     primidone (MYSOLINE) 50 MG tablet Take 2 tablets (100 mg) by mouth At Bedtime (Patient not taking: Reported on 8/10/2018) 180 tablet 3     [DISCONTINUED] amLODIPine (NORVASC) 5 MG tablet Take 1 tablet (5 mg) by mouth daily 90 tablet 0     [DISCONTINUED] rivaroxaban ANTICOAGULANT (XARELTO) 20 MG TABS tablet Take 20 mg by mouth daily (with dinner)         ALLERGIES:     Allergies   Allergen Reactions     Nka [No Known Allergies]        Problem list, Medication list, Allergies, and Medical/Social/Surgical histories reviewed in [facility] and updated as appropriate.    OBJECTIVE:                                                    VITALS: /70 (BP Location: Right arm, Cuff Size: Adult Regular)  Pulse 61  Temp 98.1  F (36.7  C) (Tympanic)  Resp 16  Ht 6' (1.829 m)  Wt 205 lb 6.4 oz (93.2 kg)  SpO2 97%  BMI 27.86 kg/m2 Body mass index is 27.86 kg/(m^2).  GENERAL: Pleasant, well appearing male.  CV: RRR, no murmurs, rubs or gallops.  LUNGS: Clear to auscultation bilaterally, normal effort.   SKIN: warm and dry without obvious rashes.   EXTREMITIES: No edema, normal pulses.     ASSESSMENT/PLAN:                                                    1. Chronic atrial fibrillation (H)  Continue with xarelto. Rate improved. Still on the slower side @60's today. Discussed we will continue to monitor. May still need eventual pacemaker.  Event monitor malfunctioning. I will contact cardiology dept to get this addressed. Continue with Xarelto indefinitely. Refills provided.  - rivaroxaban ANTICOAGULANT (XARELTO) 20 MG TABS tablet;  Take 1 tablet (20 mg) by mouth daily (with dinner)  Dispense: 90 tablet; Refill: 3    2. Benign essential tremor  Has had to stop primidone due to interaction with Xarelto. Initially tremor worsened significant - likely due in part to abrupt discontinuation of primidone. Has settled down some now. Had tried beta blockers with some success in the past - but these now would be contraindicated due to triggering severe bradycardia.  Trying over the counter magnesium supplement. Discussed this may not work but not harmful. Consider neurology consult if bothersome to explore additional medication options.    3. HTN, goal below 130/80  Well controlled. Refilled medication.     - amLODIPine (NORVASC) 5 MG tablet; Take 1 tablet (5 mg) by mouth daily  Dispense: 90 tablet; Refill: 3    4. Special screening for malignant neoplasms, colon  Mother had colon cancer. He has had Q5 year colonoscopies and only ever had hyperplastic polyps. Discussed I think risk of colonoscopy due to needing to discontinue anticoagulation likely outweigh any benefit at this age.  Consider FIT if he would still like to screen. Or consider colonoscopy with next EGD so we can do both at the same time and only hold blood thinner once.  - GASTROENTEROLOGY ADULT REF PROCEDURE ONLY [facility] [phone]

## 2018-08-10 NOTE — MR AVS SNAPSHOT
After Visit Summary   8/10/2018    Richard You    MRN: [id number]           Patient Information     Date Of Birth          1940        Visit Information        Provider Department      8/10/2018 7:20 AM Kamari Rojas MD [facility]        Today's Diagnoses     Chronic atrial fibrillation (H)    -  1    Benign essential tremor        HTN, goal below 130/80        Special screening for malignant neoplasms, colon          Care Instructions      Card    Thank you for choosing [facility].  You may be receiving a survey in the mail from [facility] regarding your visit today.  Please take a few minutes to complete and return the survey to let us know how we are doing.      Our Clinic hours are:  Mondays    7:20 am - 7 pm  Tues -  Fri  7:20 am - 5 pm    Clinic Phone: [phone]    The clinic lab opens at 7:30 am Mon - Fri and appointments are required.    [facility]  Ph. [phone]  Monday  8 am - 7pm  Tues - Fri 8 am - 5:30 pm                 Follow-ups after your visit        Additional Services     GASTROENTEROLOGY ADULT REF PROCEDURE ONLY [facility] [phone]       Last Lab Result: Creatinine (mg/dL)       Date                     Value                 07/27/2018               0.86             ----------  Body mass index is 27.86 kg/(m^2).     Needed:  No  Language:  English    Patient will be contacted to schedule procedure.     Please be aware that coverage of these services is subject to the terms and limitations of your health insurance plan.  Call member services at your health plan with any benefit or coverage questions.  Any procedures must be performed at a [facility] facility OR coordinated by your clinic's referral office.    Please bring the following with you to your appointment:    (1) Any X-Rays, CTs or MRIs which have been performed.  Contact the facility where they were done to arrange for  prior  to your scheduled appointment.    (2) List of current medications   (3) This referral request   (4) Any documents/labs given to you for this referral                  Your next 10 appointments already scheduled     Sep 06, 2018 11:00 AM CDT   Return Visit with MAMADOU Condon CNP   [facility])    [address] [address]   [phone]            Oct 03, 2018 10:00 AM CDT   Return Visit with Shauna Armstrong MD   [facility])    [address] [address]   [phone]              Who to contact     If you have questions or need follow up information about today's clinic visit or your schedule please contact [facility] directly at [phone].  Normal or non-critical lab and imaging results will be communicated to you by MyChart, letter or phone within 4 business days after the clinic has received the results. If you do not hear from us within 7 days, please contact the clinic through [employer] or phone. If you have a critical or abnormal lab result, we will notify you by phone as soon as possible.  Submit refill requests through [employer] or call your pharmacy and they will forward the refill request to us. Please allow 3 business days for your refill to be completed.          Additional Information About Your Visit        MyChart Information     [employer] gives you secure access to your electronic health record. If you see a primary care provider, you can also send messages to your care team and make appointments. If you have questions, please call your primary care clinic.  If you do not have a primary care provider, please call [phone] and they will assist you.        Care EveryWhere ID     This is your Care EveryWhere ID. This could be used by other organizations to access your [facility] medical records  [id number]         Your Vitals Were     Pulse Temperature Respirations Height Pulse Oximetry BMI (Body Mass Index)    61 98.1  F (36.7  C) (Tympanic) 16 6' (1.829 m) 97% 27.86 kg/m2       Blood Pressure from Last 3 Encounters:   08/10/18 138/70   07/27/18 135/76   07/25/18 148/80    Weight from Last 3 Encounters:   08/10/18 205 lb 6.4 oz (93.2 kg)   07/27/18 199 lb 12.8 oz (90.6 kg)   07/25/18 205 lb 12.8 oz (93.4 kg)              We Performed the Following     GASTROENTEROLOGY ADULT REF PROCEDURE ONLY [facility] [phone]          Today's Medication Changes          These changes are accurate as of 8/10/18  9:17 AM.  If you have any questions, ask your nurse or doctor.               These medicines have changed or have updated prescriptions.        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 Please contact your care team if you have questions.     apixaban ANTICOAGULANT 5 MG tablet   Commonly known as:  ELIQUIS   Stopped by:  Kamari Rojas MD                Where to get your medicines      These medications were sent to [facility] - [place], [place] - [address]  [address] [address]     Phone:  [phone]     amLODIPine 5 MG tablet    rivaroxaban ANTICOAGULANT 20 MG Tabs tablet                Primary Care Provider Office  Phone # Fax #    Kamari Jennifer Rojas [phone] [address] [facility] [address]        Equal Access to Services     LILIANERMELINDA WARD : Hadii cyn cagle hamleto Sokhurramali, waaxda luqadaha, qaybta kaalmada adeyevgeniyyada, cortney dee laDenizfelipa manuel. So [facility] [phone].    ATENCIÓN: Si habla español, tiene a lechuga disposición servicios gratuitos de asistencia lingüística. Llame al [phone].    We comply with applicable federal civil rights laws and [place] laws. We do not discriminate on the basis of race, color, national origin, age, disability, sex, sexual orientation, or gender identity.            Thank you!     Thank you for choosing [facility]  for your care. Our goal is always to provide you with excellent care. Hearing back from our patients is one way we can continue to improve our services. Please take a few minutes to complete the written survey that you may receive in the mail after your visit with us. Thank you!             Your Updated Medication List - Protect others around you: Learn how to safely use, store and throw away your medicines at www.disposemymeds.org.          This list is accurate as of 8/10/18  9:17 AM.  Always use your most recent med list.                   Brand Name Dispense Instructions for use Diagnosis    amLODIPine 5 MG tablet    NORVASC    90 tablet    Take 1 tablet (5 mg) by mouth daily    HTN, goal below 130/80       aspirin 81 MG chewable tablet     36 tablet    Take 1 tablet (81 mg) by mouth daily    Coronary artery disease involving native coronary artery without angina pectoris, unspecified whether native or transplanted heart       atorvastatin 80 MG tablet    LIPITOR    90 tablet    Take 1 tablet (80 mg) by mouth daily    Hyperlipidemia LDL goal <100, Coronary atherosclerosis of autologous vein bypass graft without angina       ICAPS PO      Take 1 tablet by mouth 2 times daily         lisinopril-hydrochlorothiazide 20-12.5 MG per tablet    PRINZIDE/ZESTORETIC    180 tablet    Take 2 tablets by mouth daily    HTN, goal below 130/80       MULTI-VITAMIN DAILY PO      Take 1 tablet by mouth daily        omeprazole 40 MG capsule    priLOSEC    90 capsule    Take 1 capsule (40 mg) by mouth daily Take 30-60 minutes before a meal.    Esophagitis, Carey's esophagus without dysplasia       primidone 50 MG tablet    MYSOLINE    180 tablet    Take 2 tablets (100 mg) by mouth At Bedtime    Benign essential tremor       rivaroxaban ANTICOAGULANT 20 MG Tabs tablet    XARELTO    90 tablet    Take 1 tablet (20 mg) by mouth daily (with dinner)    Chronic atrial fibrillation (H)

## 2018-08-13 NOTE — TELEPHONE ENCOUNTER
Mr. You should see EP in [place] as soon as he can be scheduled.  Recommend permanent pacemaker - not an emergency but think this heart rate will eventually be a problem for him.  Thanks. CD

## 2018-08-15 DIAGNOSIS — R00.1 BRADYCARDIA: Primary | ICD-10-CM

## 2018-08-15 NOTE — TELEPHONE ENCOUNTER
RN called patient and left  advising patient to call clinic to discuss Dr. Armstrong's recommendations below. RN provided patient with direct callback number in  to discuss plan.     Mr. You should see EP in [place] as soon as he can be scheduled.  Recommend permanent pacemaker - not an emergency but think this heart rate will eventually be a problem for him.  Thanks. CD

## 2018-08-17 NOTE — TELEPHONE ENCOUNTER
Strips received from 8/6/18-8/9/18 8/6/18 05:28:25  Auto event afib/flutter with PVC Hr 50 BPM    8/7/18 19:40:22  Auto event afib/flutter HR 40BPM    8/7/18 21:00:15  Auto event afib/flutter HR 30BPM    8/7/18 21:33:10  Auto event afib/flutter HR 60BPM    8/8/18 21:32:54  Auto event afib/flutter HR 50 BPM    8/8/18 21:49:43  Auto event afib/flutter with Vtach 8 beat run  BPM    8/9/18 08:51:47  Auto event afib/flutter with PVC's HR 80 BPM    8/9/18 21:05:10   Auto event afib/flutter HR 40 BPM    8/9/18 21:06:43  Auto event afib/flutter HR 35 BPM

## 2018-08-21 NOTE — TELEPHONE ENCOUNTER
Strips received from 8/13/18 - 8/17/18 8/13/18   4 symptomatic events, a fib/flutter, a fib/flutter with PVCs, HR 60- 80, no symptoms or activities noted     8/14/18   2 automatic events, a fib/flutter, HR 32, 40    8/15/18   2 automatic events, a fib/flutter, HR 30, 40     8/16/18   2 automatic events, a fib/flutter with PVCs, HR 30, 80     8/17/18   4 automatic events, a fib/flutter, a fib with PVC, HR 30-90     Pt scheduled to see Dr. Marques 9/12/18     Filed in cabinet, continue to monitor

## 2018-08-23 NOTE — TELEPHONE ENCOUNTER
Strip received from 8/18/18   Automatic event, A fib/flutter, HR 40     Filed in cabinet. Continue to monitor

## 2018-08-24 NOTE — TELEPHONE ENCOUNTER
[employer] strip received from 8/19/18 indicating automatic event of atrial fibrillation/flutter at 40 BPM. Strip placed in event folder. Continue to monitor.

## 2018-08-27 NOTE — TELEPHONE ENCOUNTER
Strips received from 8/19/18- 8/21/18 8/19/18   22:41 Automatic event, A fib/fllutter, HR 36    8/20/18   00:27 Automatic event, a fib/flutter, HR 35   11:44 Automatic event, a fib/flutter, HR 70     8/21/18   00:04 Automatic event, A fib/flutter with PVC, HR 50   00:06 Automatic event, A fib/lflutter, HR 30     Filed in cabinet. Continue to monitor

## 2018-08-28 NOTE — TELEPHONE ENCOUNTER
Strips received from 8/22/18     00:45 Automatic event, A fib/flutter, HR 40    00:47 Automatic event, A fib/flutter, HR 40     00:52 Automatic event, A fib/flutter, HR 30     Pt scheduled to see Dr. Marques 9/12/18     Filed in cabinet. Continue to monitor

## 2018-08-29 NOTE — TELEPHONE ENCOUNTER
Strip received from 8/23/18     00:05 Automatic event, A fib/fluttler, HR 32     Filed in cabinet. Continue to monitor

## 2018-09-12 ENCOUNTER — OFFICE VISIT (OUTPATIENT)
Dept: CARDIOLOGY | Facility: CLINIC | Age: 78
End: 2018-09-12
Payer: COMMERCIAL

## 2018-09-12 VITALS
OXYGEN SATURATION: 98 % | SYSTOLIC BLOOD PRESSURE: 148 MMHG | HEIGHT: 72 IN | DIASTOLIC BLOOD PRESSURE: 88 MMHG | WEIGHT: 205 LBS | BODY MASS INDEX: 27.77 KG/M2 | HEART RATE: 59 BPM

## 2018-09-12 DIAGNOSIS — R00.1 BRADYCARDIA: ICD-10-CM

## 2018-09-12 PROCEDURE — 99203 OFFICE O/P NEW LOW 30 MIN: CPT | Performed by: INTERNAL MEDICINE

## 2018-09-12 NOTE — PROGRESS NOTES
HPI and Plan:   See dictation  [id number]  No orders of the defined types were placed in this encounter.      No orders of the defined types were placed in this encounter.      There are no discontinued medications.      Encounter Diagnosis   Name Primary?     Bradycardia        CURRENT MEDICATIONS:  Current Outpatient Prescriptions   Medication Sig Dispense Refill     amLODIPine (NORVASC) 5 MG tablet Take 1 tablet (5 mg) by mouth daily 90 tablet 3     aspirin 81 MG chewable tablet Take 1 tablet (81 mg) by mouth daily (Patient taking differently: Take 81 mg by mouth every evening ) 36 tablet 3     atorvastatin (LIPITOR) 80 MG tablet Take 1 tablet (80 mg) by mouth daily (Patient taking differently: Take 80 mg by mouth every evening ) 90 tablet 3     lisinopril-hydrochlorothiazide (PRINZIDE/ZESTORETIC) 20-12.5 MG per tablet Take 2 tablets by mouth daily 180 tablet 3     Multiple Vitamin (MULTI-VITAMIN DAILY PO) Take 1 tablet by mouth daily       Multiple Vitamins-Minerals (ICAPS PO) Take 1 tablet by mouth 2 times daily        omeprazole (PRILOSEC) 40 MG capsule Take 1 capsule (40 mg) by mouth daily Take 30-60 minutes before a meal. 90 capsule 3     primidone (MYSOLINE) 50 MG tablet Take 2 tablets (100 mg) by mouth At Bedtime (Patient not taking: Reported on 9/12/2018) 180 tablet 3     rivaroxaban ANTICOAGULANT (XARELTO) 20 MG TABS tablet Take 1 tablet (20 mg) by mouth daily (with dinner) 90 tablet 3       ALLERGIES     Allergies   Allergen Reactions     Nka [No Known Allergies]        PAST MEDICAL HISTORY:  Past Medical History:   Diagnosis Date     Abdominal aneurysm without mention of rupture 6/24/2005    Repaired with percutaneous stent 8/05   Has annual CT scan to monitor stent placement. Last done in sept 2012 and stable      Atrial fibrillation (H)      CAD 4/5/2005 12/05/12 Patient denies any recent chest pain or NTG use.  Patient is taking meds regularly and denies significant side effects. Last stress test  many years ago, but bikes 30-40 mi/day without symptoms       Colon polyp, hyperplastic 5/14/2013    Hyperplastic  Polyp on colonoscopy 5-2013. 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       PAST SURGICAL HISTORY:  Past Surgical History:   Procedure Laterality Date     BYPASS CARDIOPULMONARY  12/1996    CA bypass x2 LAD     CARDIAC SURGERY       CYSTECTOMY PILONIDAL       ESOPHAGOSCOPY, GASTROSCOPY, DUODENOSCOPY (EGD), COMBINED N/A 9/4/2015    Procedure: COMBINED ESOPHAGOSCOPY, GASTROSCOPY, DUODENOSCOPY (EGD), BIOPSY SINGLE OR MULTIPLE;  Surgeon: Fuad Holder MD;  Location: [facility]     ESOPHAGOSCOPY, GASTROSCOPY, DUODENOSCOPY (EGD), COMBINED N/A 9/20/2016    Procedure: COMBINED ESOPHAGOSCOPY, GASTROSCOPY, DUODENOSCOPY (EGD), BIOPSY SINGLE OR MULTIPLE;  Surgeon: Lucas Dang MD;  [facility]: [facility]     ESOPHAGOSCOPY, GASTROSCOPY, DUODENOSCOPY (EGD), COMBINED N/A 7/12/2018    Procedure: COMBINED ESOPHAGOSCOPY, GASTROSCOPY, DUODENOSCOPY (EGD), BIOPSY SINGLE OR MULTIPLE;  gastroscopy;  Surgeon: Richard Crowder MD;  Location: [facility]     HERNIA REPAIR       VASECTOMY         FAMILY HISTORY:  Family History   Problem Relation Age of Onset     Cancer - colorectal Mother      Cardiovascular Father      Alcohol/Drug Father      Neurologic Disorder Father      tremor     Cardiovascular Brother      Breast Cancer Sister      Cancer Brother      bladder cancer       SOCIAL HISTORY:  Social History     Social History     Marital status:      Spouse name: Soledad You     Number of children: 2     Years of education: 12      Occupational History      Retired     Social History Main Topics     Smoking status: Former Smoker     Packs/day: 0.50     Years: 10.00     Types: Cigarettes     Quit date: 11/29/1995     Smokeless tobacco: Never Used     Alcohol use No      Comment: beer occ,     Drug use: No     Sexual activity: Not Currently     Partners: Female     Other Topics Concern     Parent/Sibling W/ Cabg, Mi Or Angioplasty Before 65f 55m? Yes     Social History Narrative        [employer]    Nonsmoker as of 2  Yrs ago    Drinks wine daily    Lives with wife kelly in [place]           Review of Systems:  Skin:  Negative       Eyes:  Positive for glasses    ENT:  Negative      Respiratory:  Negative       Cardiovascular:  Negative      Gastroenterology: Negative      Genitourinary:  Negative      Musculoskeletal:  Negative      Neurologic:  Positive for tremors    Psychiatric:  Negative      Heme/Lymph/Imm:  Negative      Endocrine:  Negative        Physical Exam:  Vitals: /88 (BP Location: Right arm, Patient Position: Chair, Cuff Size: Adult Regular)  Pulse 59  Ht 1.829 m (6')  Wt 93 kg (205 lb)  SpO2 98%  BMI 27.8 kg/m2    Constitutional:  cooperative, alert and oriented, well developed, well nourished, in no acute distress        Skin:  warm and dry to the touch, no apparent skin lesions or masses noted          Head:  normocephalic, no masses or lesions        Eyes:  pupils equal and round, conjunctivae and lids unremarkable, sclera white, no xanthalasma, EOMS intact, no nystagmus        Lymph:No Cervical lymphadenopathy present     ENT:  no pallor or cyanosis, dentition good        Neck:  carotid pulses are full and equal bilaterally, JVP normal, no carotid bruit        Respiratory:  normal breath sounds, clear to auscultation, normal A-P diameter, normal symmetry, normal respiratory excursion, no use of accessory muscles         Cardiac:   irregularly irregular rhythm   no presence of murmur          pulses full  and equal, no bruits auscultated                                        GI:  abdomen soft, non-tender, BS normoactive, no mass, no HSM, no bruits        Extremities and Muscular Skeletal:  no deformities, clubbing, cyanosis, erythema observed              Neurological:  no gross motor deficits        Psych:  Alert and Oriented x 3        CC  Kamari Rojas MD  [address] [place] [address]

## 2018-09-12 NOTE — LETTER
9/12/2018      Kamari Rojas MD  [address]  [facility] [address]      RE: Richard BAILEY Kenyatta       Dear Colleague,    I had the pleasure of seeing Richard You in the [facility].    Service Date: 09/12/2018      HISTORY OF PRESENT ILLNESS:  Thank you for allowing me to participate in the care of your delightful patient.  As you know, Richard is an 78-year-old gentleman with history of stable CAD, status post LIMA to the LAD and a vein graft to diagonal, associated with mild LV dysfunction with ejection fraction about 45%.  He has been on a high dose of beta blocker in light of his nonsustained VT as well.  The patient was hospitalized this past July because of new onset of atrial fibrillation associated with slow ventricular response noted during a routine GI evaluation.  He was recommended to be evaluated in the hospital and was seen by my partner, Dr. Armstrong.  Metoprolol was discontinued at that time along with starting warfarin.  The echocardiogram demonstrated ejection fraction about 45% with severely dilated left atrium suggesting that atrial fibrillation may be chronic.  EKG that was done back 2 years ago prior showed sinus rhythm.      Richard stated that after stopping metoprolol, he did realize he had more energy than before.  He denies having palpitation, however.  He did subsequently wear a 30-day event monitor, and he is here to discuss the result.      I reviewed the monitor which showed atrial fibrillation with a heart rate as low as 30 when he is sleeping.  He goes to bed usually between 7 and 8 p.m. and would wake up around 4:00 a.m.  During waking hours, his heart rate would go from 60 to 90 beats per minute.  Again, the patient denies having shortness of breath, near-syncope or syncope.      We discussed at length about his atrial fibrillation.  In his case, I would agree of not attempting to restore sinus rhythm given the chronicity of atrial  fibrillation, especially given the fact that his left atrium is quite dilated and therefore the chance of restoring sinus rhythm is extremely low without an antiarrhythmic drug on board.  Moreover, the patient has no symptoms from it, so I would agree with rate control strategy.  At this point in time, his rate is pretty well controlled without medication, suggestive of intrinsic conduction disease, but the fact that when he is awake his heart rate is in the 50s-90s and he has no symptoms, there is no indication for pacemaker at this point in time.  The heart rate in the 30s when the patient is asleep is not unexpected and is not a reason to recommend a pacemaker.  I would believe it is matter of time he would need a pacemaker, however.  I did go over potential symptoms of daytime bradycardia, including fatigue, low energy, shortness of breath.  If that is the case, we will repeat the monitor to confirm it, and if it indeed shows truly bradycardia during waking hours, then the pacemaker is needed at that time.  For now, I would avoid any AV mike blocker agents, and the patient will continue warfarin indefinitely.  The patient and his wife are quite happy to hear this news and did acknowledge the symptoms I outlined, and should the patient experience the symptoms in the future, then he will contact our office.  Otherwise, I encouraged the patient to follow with Dr. Armstrong for followup.      cc:   [place] [address]         JASSI OWENS MD             D: 2018   T: 2018   MT: LEDY      Name:     EDWIGE BLANCO   MRN:      [id number]        Account:      [id number]   :      1940           Service Date: 2018      Document: [id number]           Outpatient Encounter Prescriptions as of 2018   Medication Sig Dispense Refill     amLODIPine (NORVASC) 5 MG tablet Take 1 tablet (5 mg) by mouth daily 90 tablet 3      aspirin 81 MG chewable tablet Take 1 tablet (81 mg) by mouth daily (Patient taking differently: Take 81 mg by mouth every evening ) 36 tablet 3     atorvastatin (LIPITOR) 80 MG tablet Take 1 tablet (80 mg) by mouth daily (Patient taking differently: Take 80 mg by mouth every evening ) 90 tablet 3     lisinopril-hydrochlorothiazide (PRINZIDE/ZESTORETIC) 20-12.5 MG per tablet Take 2 tablets by mouth daily 180 tablet 3     Multiple Vitamin (MULTI-VITAMIN DAILY PO) Take 1 tablet by mouth daily       Multiple Vitamins-Minerals (ICAPS PO) Take 1 tablet by mouth 2 times daily        omeprazole (PRILOSEC) 40 MG capsule Take 1 capsule (40 mg) by mouth daily Take 30-60 minutes before a meal. 90 capsule 3     primidone (MYSOLINE) 50 MG tablet Take 2 tablets (100 mg) by mouth At Bedtime (Patient not taking: Reported on 9/12/2018) 180 tablet 3     rivaroxaban ANTICOAGULANT (XARELTO) 20 MG TABS tablet Take 1 tablet (20 mg) by mouth daily (with dinner) 90 tablet 3     No facility-administered encounter medications on file as of 9/12/2018.        Again, thank you for allowing me to participate in the care of your patient.      Sincerely,    Heri Lemus MD     [facility]

## 2018-09-12 NOTE — MR AVS SNAPSHOT
After Visit Summary   9/12/2018    Richard You    MRN: [id number]           Patient Information     Date Of Birth          1940        Visit Information        Provider Department      9/12/2018 1:15 PM Heri Marques MD [facility]        Today's Diagnoses     Bradycardia           Follow-ups after your visit        Your next 10 appointments already scheduled     Oct 03, 2018 10:00 AM CDT   Return Visit with Shauna Armstrong MD   [facility] ([facility])    [address] [facility] [address] [phone]              Who to contact     If you have questions or need follow up information about today's clinic visit or your schedule please contact [facility] directly at [phone].  Normal or non-critical lab and imaging results will be communicated to you by MyChart, letter or phone within 4 business days after the clinic has received the results. If you do not hear from us within 7 days, please contact the clinic through MyChart or phone. If you have a critical or abnormal lab result, we will notify you by phone as soon as possible.  Submit refill requests through [employer] or call your pharmacy and they will forward the refill request to us. Please allow 3 business days for your refill to be completed.          Additional Information About Your Visit        MyChart Information     [employer] gives you secure access to your electronic health record. If you see a primary care provider, you can also send messages to your care team and make appointments. If you have questions, please call your primary care clinic.  If you do not have a primary care provider, please call [phone] and they will assist you.        Care EveryWhere ID     This is your Care EveryWhere ID. This could be used by other organizations to access your [facility] medical records  [id number]         Your Vitals Were     Pulse Height Pulse Oximetry BMI (Body Mass Index)          59 1.829 m (6') 98% 27.8 kg/m2         Blood Pressure from Last 3 Encounters:   09/12/18 148/88   08/10/18 138/70   07/27/18 135/76    Weight from Last 3 Encounters:   09/12/18 93 kg (205 lb)   08/10/18 93.2 kg (205 lb 6.4 oz)   07/27/18 90.6 kg (199 lb 12.8 oz)              We Performed the Following     Follow-Up with Electrophysiologist          Today's Medication Changes          These changes are accurate as of 9/12/18  1:45 PM.  If you have any questions, ask your nurse or doctor.               These medicines have changed or have updated prescriptions.        Dose/Directions    aspirin 81 MG chewable tablet   This may have changed:  when to take this   Used for:  Coronary artery disease involving native coronary artery without angina pectoris, unspecified whether native or transplanted heart        Dose:  81 mg   Take 1 tablet (81 mg) by mouth daily   Quantity:  36 tablet   Refills:  3       atorvastatin 80 MG tablet   Commonly known as:  LIPITOR   This may have changed:  when to take this   Used for:  Hyperlipidemia LDL goal <100, Coronary atherosclerosis of autologous vein bypass graft without angina        Dose:  80 mg   Take 1 tablet (80 mg) by mouth daily   Quantity:  90 tablet   Refills:  3                Primary Care Provider Office Phone # Fax #    Kamari Jennifer Rojas [phone] [address] [facility] [address]        Equal Access to Services     HARRY HOPPER AH: Hadii cyn mcnultyo Soantony, waaxda luqadaha, qaybta kaalmada adeegyada, waxay madelin manuel. So [facility] [phone].    ATENCIÓN: Si habla español, tiene a lechuga disposición servicios gratuitos de asistencia lingüística. Llame al [phone].    We comply with applicable federal civil rights laws and [place] laws. We do not discriminate on the basis of race, color, national origin, age, disability,  sex, sexual orientation, or gender identity.            Thank you!     Thank you for choosing [facility]  for your care. Our goal is always to provide you with excellent care. Hearing back from our patients is one way we can continue to improve our services. Please take a few minutes to complete the written survey that you may receive in the mail after your visit with us. Thank you!             Your Updated Medication List - Protect others around you: Learn how to safely use, store and throw away your medicines at www.disposemymeds.org.          This list is accurate as of 9/12/18  1:45 PM.  Always use your most recent med list.                   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    Esophagitis, Carey's esophagus without dysplasia       primidone 50 MG tablet    MYSOLINE    180 tablet    Take 2 tablets (100 mg) by mouth At Bedtime    Benign essential tremor       rivaroxaban ANTICOAGULANT 20 MG Tabs tablet    XARELTO    90  tablet    Take 1 tablet (20 mg) by mouth daily (with dinner)    Chronic atrial fibrillation (H)

## 2018-09-12 NOTE — LETTER
9/12/2018    Kamari Rojas MD  [address]  [facility] [address]    RE: Richard You       Dear Colleague,    I had the pleasure of seeing Richard You in the [facility].    HPI and Plan:   See dictation  [id number]  No orders of the defined types were placed in this encounter.      No orders of the defined types were placed in this encounter.      There are no discontinued medications.      Encounter Diagnosis   Name Primary?     Bradycardia        CURRENT MEDICATIONS:  Current Outpatient Prescriptions   Medication Sig Dispense Refill     amLODIPine (NORVASC) 5 MG tablet Take 1 tablet (5 mg) by mouth daily 90 tablet 3     aspirin 81 MG chewable tablet Take 1 tablet (81 mg) by mouth daily (Patient taking differently: Take 81 mg by mouth every evening ) 36 tablet 3     atorvastatin (LIPITOR) 80 MG tablet Take 1 tablet (80 mg) by mouth daily (Patient taking differently: Take 80 mg by mouth every evening ) 90 tablet 3     lisinopril-hydrochlorothiazide (PRINZIDE/ZESTORETIC) 20-12.5 MG per tablet Take 2 tablets by mouth daily 180 tablet 3     Multiple Vitamin (MULTI-VITAMIN DAILY PO) Take 1 tablet by mouth daily       Multiple Vitamins-Minerals (ICAPS PO) Take 1 tablet by mouth 2 times daily        omeprazole (PRILOSEC) 40 MG capsule Take 1 capsule (40 mg) by mouth daily Take 30-60 minutes before a meal. 90 capsule 3     primidone (MYSOLINE) 50 MG tablet Take 2 tablets (100 mg) by mouth At Bedtime (Patient not taking: Reported on 9/12/2018) 180 tablet 3     rivaroxaban ANTICOAGULANT (XARELTO) 20 MG TABS tablet Take 1 tablet (20 mg) by mouth daily (with dinner) 90 tablet 3       ALLERGIES     Allergies   Allergen Reactions     Nka [No Known Allergies]        PAST MEDICAL HISTORY:  Past Medical History:   Diagnosis Date     Abdominal aneurysm without mention of rupture 6/24/2005    Repaired with percutaneous stent 8/05   Has annual CT scan to monitor stent placement. Last  done in sept 2012 and stable      Atrial fibrillation (H)      CAD 4/5/2005 12/05/12 Patient denies any recent chest pain or NTG use.  Patient is taking meds regularly and denies significant side effects. Last stress test many years ago, but bikes 30-40 mi/day without symptoms       Colon polyp, hyperplastic 5/14/2013    Hyperplastic  Polyp on colonoscopy 5-2013. 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       PAST SURGICAL HISTORY:  Past Surgical History:   Procedure Laterality Date     BYPASS CARDIOPULMONARY  12/1996    CA bypass x2 LAD     CARDIAC SURGERY       CYSTECTOMY PILONIDAL       ESOPHAGOSCOPY, GASTROSCOPY, DUODENOSCOPY (EGD), COMBINED N/A 9/4/2015    Procedure: COMBINED ESOPHAGOSCOPY, GASTROSCOPY, DUODENOSCOPY (EGD), BIOPSY SINGLE OR MULTIPLE;  Surgeon: Fuad Holder MD;  Location: [facility]     ESOPHAGOSCOPY, GASTROSCOPY, DUODENOSCOPY (EGD), COMBINED N/A 9/20/2016    Procedure: COMBINED ESOPHAGOSCOPY, GASTROSCOPY, DUODENOSCOPY (EGD), BIOPSY SINGLE OR MULTIPLE;  Surgeon: Lucas Dang MD;  Location: [facility]     ESOPHAGOSCOPY, GASTROSCOPY, DUODENOSCOPY (EGD), COMBINED N/A 7/12/2018    Procedure: COMBINED ESOPHAGOSCOPY, GASTROSCOPY, DUODENOSCOPY (EGD), BIOPSY SINGLE OR MULTIPLE;  gastroscopy;  Surgeon: Richard Crowder MD;  Location: [facility]     HERNIA REPAIR       VASECTOMY         FAMILY HISTORY:  Family History   Problem Relation Age of Onset     Cancer - colorectal Mother      Cardiovascular Father      Alcohol/Drug Father      Neurologic Disorder Father      tremor     Cardiovascular Brother      Breast Cancer Sister       Cancer Brother      bladder cancer       SOCIAL HISTORY:  Social History     Social History     Marital status:      Spouse name: Soledad You     Number of children: 2     Years of education: 12     Occupational History      Retired     Social History Main Topics     Smoking status: Former Smoker     Packs/day: 0.50     Years: 10.00     Types: Cigarettes     Quit date: 11/29/1995     Smokeless tobacco: Never Used     Alcohol use No      Comment: beer occ,     Drug use: No     Sexual activity: Not Currently     Partners: Female     Other Topics Concern     Parent/Sibling W/ Cabg, Mi Or Angioplasty Before 65f 55m? Yes     Social History Narrative        [employer]    Nonsmoker as of 2  Yrs ago    Drinks wine daily    Lives with wife soledad in [place]           Review of Systems:  Skin:  Negative       Eyes:  Positive for glasses    ENT:  Negative      Respiratory:  Negative       Cardiovascular:  Negative      Gastroenterology: Negative      Genitourinary:  Negative      Musculoskeletal:  Negative      Neurologic:  Positive for tremors    Psychiatric:  Negative      Heme/Lymph/Imm:  Negative      Endocrine:  Negative        Physical Exam:  Vitals: /88 (BP Location: Right arm, Patient Position: Chair, Cuff Size: Adult Regular)  Pulse 59  Ht 1.829 m (6')  Wt 93 kg (205 lb)  SpO2 98%  BMI 27.8 kg/m2    Constitutional:  cooperative, alert and oriented, well developed, well nourished, in no acute distress        Skin:  warm and dry to the touch, no apparent skin lesions or masses noted          Head:  normocephalic, no masses or lesions        Eyes:  pupils equal and round, conjunctivae and lids unremarkable, sclera white, no xanthalasma, EOMS intact, no nystagmus        Lymph:No Cervical lymphadenopathy present     ENT:  no pallor or cyanosis, dentition good        Neck:  carotid pulses are full and equal bilaterally, JVP normal, no carotid bruit        Respiratory:  normal breath sounds,  clear to auscultation, normal A-P diameter, normal symmetry, normal respiratory excursion, no use of accessory muscles         Cardiac:   irregularly irregular rhythm   no presence of murmur          pulses full and equal, no bruits auscultated                                        GI:  abdomen soft, non-tender, BS normoactive, no mass, no HSM, no bruits        Extremities and Muscular Skeletal:  no deformities, clubbing, cyanosis, erythema observed              Neurological:  no gross motor deficits        Psych:  Alert and Oriented x 3        CC  Kamari Rojas MD  [id number] [place] [address]                Thank you for allowing me to participate in the care of your patient.      Sincerely,     Heri Lemus MD     [facility]    cc:   Kamari Rojas MD  [address] [id number]

## 2018-09-13 ENCOUNTER — MYC MEDICAL ADVICE (OUTPATIENT)
Dept: FAMILY MEDICINE | Facility: CLINIC | Age: 78
End: 2018-09-13

## 2018-09-13 NOTE — PROGRESS NOTES
Service Date: 09/12/2018      HISTORY OF PRESENT ILLNESS:  Thank you for allowing me to participate in the care of your delightful patient.  As you know, Richard is an 78-year-old gentleman with history of stable CAD, status post LIMA to the LAD and a vein graft to diagonal, associated with mild LV dysfunction with ejection fraction about 45%.  He has been on a high dose of beta blocker in light of his nonsustained VT as well.  The patient was hospitalized this past July because of new onset of atrial fibrillation associated with slow ventricular response noted during a routine GI evaluation.  He was recommended to be evaluated in the hospital and was seen by my partner, Dr. Armstrnog.  Metoprolol was discontinued at that time along with starting warfarin.  The echocardiogram demonstrated ejection fraction about 45% with severely dilated left atrium suggesting that atrial fibrillation may be chronic.  EKG that was done back 2 years ago prior showed sinus rhythm.      Richard stated that after stopping metoprolol, he did realize he had more energy than before.  He denies having palpitation, however.  He did subsequently wear a 30-day event monitor, and he is here to discuss the result.      I reviewed the monitor which showed atrial fibrillation with a heart rate as low as 30 when he is sleeping.  He goes to bed usually between 7 and 8 p.m. and would wake up around 4:00 a.m.  During waking hours, his heart rate would go from 60 to 90 beats per minute.  Again, the patient denies having shortness of breath, near-syncope or syncope.      We discussed at length about his atrial fibrillation.  In his case, I would agree of not attempting to restore sinus rhythm given the chronicity of atrial fibrillation, especially given the fact that his left atrium is quite dilated and therefore the chance of restoring sinus rhythm is extremely low without an antiarrhythmic drug on board.  Moreover, the patient has no symptoms from it, so I  would agree with rate control strategy.  At this point in time, his rate is pretty well controlled without medication, suggestive of intrinsic conduction disease, but the fact that when he is awake his heart rate is in the 50s-90s and he has no symptoms, there is no indication for pacemaker at this point in time.  The heart rate in the 30s when the patient is asleep is not unexpected and is not a reason to recommend a pacemaker.  I would believe it is matter of time he would need a pacemaker, however.  I did go over potential symptoms of daytime bradycardia, including fatigue, low energy, shortness of breath.  If that is the case, we will repeat the monitor to confirm it, and if it indeed shows truly bradycardia during waking hours, then the pacemaker is needed at that time.  For now, I would avoid any AV mike blocker agents, and the patient will continue warfarin indefinitely.  The patient and his wife are quite happy to hear this news and did acknowledge the symptoms I outlined, and should the patient experience the symptoms in the future, then he will contact our office.  Otherwise, I encouraged the patient to follow with Dr. Armstrong for followup.      cc:   [place] [address]         JASSI OWENS MD             D: 2018   T: 2018   MT: LEDY      Name:     EDWIGE BLANCO   MRN:      [id number]        Account:      [id number]   :      1940           Service Date: 2018      Document: [id number]

## 2018-10-03 ENCOUNTER — OFFICE VISIT (OUTPATIENT)
Dept: CARDIOLOGY | Facility: CLINIC | Age: 78
End: 2018-10-03
Attending: INTERNAL MEDICINE
Payer: COMMERCIAL

## 2018-10-03 VITALS
SYSTOLIC BLOOD PRESSURE: 130 MMHG | DIASTOLIC BLOOD PRESSURE: 78 MMHG | BODY MASS INDEX: 28.48 KG/M2 | WEIGHT: 210 LBS | HEART RATE: 71 BPM | OXYGEN SATURATION: 98 %

## 2018-10-03 DIAGNOSIS — I25.5 ISCHEMIC CARDIOMYOPATHY: Primary | ICD-10-CM

## 2018-10-03 DIAGNOSIS — R00.1 BRADYCARDIA: ICD-10-CM

## 2018-10-03 DIAGNOSIS — I25.10 CORONARY ARTERY DISEASE INVOLVING NATIVE CORONARY ARTERY OF NATIVE HEART WITHOUT ANGINA PECTORIS: ICD-10-CM

## 2018-10-03 PROCEDURE — 99214 OFFICE O/P EST MOD 30 MIN: CPT | Performed by: INTERNAL MEDICINE

## 2018-10-03 NOTE — LETTER
10/3/2018    Kamari Rojas MD  [address]  [facility] [address]    RE: Richard You       Dear Colleague,    I had the pleasure of seeing Richard You in the [facility].    HPI and Plan:     Mr. Richard You is 78 years old and has a history of coronary artery disease. More recently, he has been evaluated for bradycardia.  He typically follows with Dr. Reveles and today he was here a month after seeing Dr. Marques of .  His wife accompanied him.    He was hospitalized during July 2018 with asymptomatic bradycardia at [facility] after transfer from [facility].  Metoprolol was stopped.  He saw Dr. Marques who did not feel a pacemaker was currently indicated.  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  He can do everything he needs to do without exertional intolerance or light-headedness.     He has no anginal symptoms or dyspnea.  His last echo of July 2018 indicated a stable LV EF of 40-45%.  There was anteroseptal and apical hypokinesis.     Mr. You denies any chest, neck, arm or back discomfort.  He denies any palpitations, lightheadedness or syncope.  He is status post coronary artery bypass surgery in 1996 with a LIMA to the LAD, saphenous vein graft to the diagonal artery, ischemic cardiomyopathy  And mild aortic root dilatation and mild to moderate dilatation of the ascending aorta   In 2015, he had had a repeat echo showing an EF that was felt to be lower and he subsequently underwent a stress test showing nonsustained VT.  Cardiac catheterization was performed and did not detail any obstructive disease requiring revascularization..      Exam:  This is a healthy man in no apparent distress.  The blood pressure was 130/78 mmHg, heart rate 71 beats per minute and respiratory rate 14-18 per minute.  The chest was clear to auscultation.  Breathing was unlabored.  On cardiac auscultation,  there was an S1 and S2 without extra sounds.  There were no murmurs.  The rhythm was regular.  On examination of the extremities, there was no peripheral pedal edema.      Assessment/Plan:  Mr. You is currently doing well from the aspect of his history of CAD and prior bypass surgery.  Follow-up has been arranged with Dr. Reveles for June 2019, one year after the last visit.  Mr You will call if he has any adverse symptoms in the interim.  Mr. You continues on lisinopril/hydrochlorothiazide, high-dose aspirin and statin therapy.  His lipids are under excellent control. Dr. Marques recommended against restarting AV mike blockade.     With regard then to his mild to moderate decrease in LV function, he is asymptomatic with vasodilator therapy.    With regard to his atrial fibrillation, he is on anticoagulation, asymptomatic and without tachycardia.  He has had asymptomatic bradycardia.  A repeat Holter monitor at 6 to 12 months has been recommended.  He will call if he experiences near-syncope, syncope or decreased exertional tolerance.            Medications Discontinued During This Encounter   Medication Reason     primidone (MYSOLINE) 50 MG tablet          Encounter Diagnosis   Name Primary?     Bradycardia        CURRENT MEDICATIONS:  Current Outpatient Prescriptions   Medication Sig Dispense Refill     amLODIPine (NORVASC) 5 MG tablet Take 1 tablet (5 mg) by mouth daily 90 tablet 3     aspirin 81 MG chewable tablet Take 1 tablet (81 mg) by mouth daily (Patient taking differently: Take 81 mg by mouth every evening ) 36 tablet 3     atorvastatin (LIPITOR) 80 MG tablet Take 1 tablet (80 mg) by mouth daily (Patient taking differently: Take 80 mg by mouth every evening ) 90 tablet 3     lisinopril-hydrochlorothiazide (PRINZIDE/ZESTORETIC) 20-12.5 MG per tablet Take 2 tablets by mouth daily 180 tablet 3     Multiple Vitamin (MULTI-VITAMIN DAILY PO) Take 1 tablet by mouth daily       Multiple Vitamins-Minerals  (ICAPS PO) Take 1 tablet by mouth 2 times daily        omeprazole (PRILOSEC) 40 MG capsule Take 1 capsule (40 mg) by mouth daily Take 30-60 minutes before a meal. 90 capsule 3     rivaroxaban ANTICOAGULANT (XARELTO) 20 MG TABS tablet Take 1 tablet (20 mg) by mouth daily (with dinner) 90 tablet 3       ALLERGIES     Allergies   Allergen Reactions     Nka [No Known Allergies]        PAST MEDICAL HISTORY:  Past Medical History:   Diagnosis Date     Abdominal aneurysm without mention of rupture 6/24/2005    Repaired with percutaneous stent 8/05   Has annual CT scan to monitor stent placement. Last done in sept 2012 and stable      Atrial fibrillation (H)      CAD 4/5/2005 12/05/12 Patient denies any recent chest pain or NTG use.  Patient is taking meds regularly and denies significant side effects. Last stress test many years ago, but bikes 30-40 mi/day without symptoms       Colon polyp, hyperplastic 5/14/2013    Hyperplastic  Polyp on colonoscopy 5-2013. 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       PAST SURGICAL HISTORY:  Past Surgical History:   Procedure Laterality Date     BYPASS CARDIOPULMONARY  12/1996    CA bypass x2 LAD     CARDIAC SURGERY       CYSTECTOMY PILONIDAL       ESOPHAGOSCOPY, GASTROSCOPY, DUODENOSCOPY (EGD), COMBINED N/A 9/4/2015    Procedure: COMBINED ESOPHAGOSCOPY, GASTROSCOPY, DUODENOSCOPY (EGD), BIOPSY SINGLE OR MULTIPLE;  Surgeon: Fuad Holder MD;  Location: [facility]     ESOPHAGOSCOPY, GASTROSCOPY, DUODENOSCOPY (EGD), COMBINED N/A 9/20/2016    Procedure: COMBINED ESOPHAGOSCOPY, GASTROSCOPY, DUODENOSCOPY  (EGD), BIOPSY SINGLE OR MULTIPLE;  Surgeon: Lucas Dang MD;  Location: [facility]     ESOPHAGOSCOPY, GASTROSCOPY, DUODENOSCOPY (EGD), COMBINED N/A 7/12/2018    Procedure: COMBINED ESOPHAGOSCOPY, GASTROSCOPY, DUODENOSCOPY (EGD), BIOPSY SINGLE OR MULTIPLE;  gastroscopy;  Surgeon: Richard Crowder MD;  Location: [facility]     HERNIA REPAIR       VASECTOMY         FAMILY HISTORY:  Family History   Problem Relation Age of Onset     Cancer - colorectal Mother      Cardiovascular Father      Alcohol/Drug Father      Neurologic Disorder Father      tremor     Cardiovascular Brother      Breast Cancer Sister      Cancer Brother      bladder cancer       SOCIAL HISTORY:  Social History     Social History     Marital status:      Spouse name: Soledad You     Number of children: 2     Years of education: 12     Occupational History      Retired     Social History Main Topics     Smoking status: Former Smoker     Packs/day: 0.50     Years: 10.00     Types: Cigarettes     Quit date: 11/29/1995     Smokeless tobacco: Never Used     Alcohol use No      Comment: beer occ,     Drug use: No     Sexual activity: Not Currently     Partners: Female     Other Topics Concern     Parent/Sibling W/ Cabg, Mi Or Angioplasty Before 65f 55m? Yes     Social History Narrative        Baker    Nonsmoker as of 2  Yrs ago    Drinks wine daily    Lives with wife soledad in [place]           Review of Systems:  Skin:  Negative       Eyes:  Positive for glasses    ENT:  Negative      Respiratory:  Negative for shortness of breath;cough     Cardiovascular:  Negative for;palpitations;chest pain;edema;syncope or near-syncope;fatigue;lightheadedness;dizziness      Gastroenterology: Negative for nausea;vomiting;heartburn    Genitourinary:  Negative      Musculoskeletal:  Negative      Neurologic:  Positive for tremors    Psychiatric:  Negative      Heme/Lymph/Imm:  Negative      Endocrine:  Negative        Physical Exam:  Vitals: BP  130/78  Pulse 71  Wt 95.3 kg (210 lb)  SpO2 98%  BMI 28.48 kg/m2    Constitutional:  cooperative, alert and oriented, well developed, well nourished, in no acute distress        Skin:  warm and dry to the touch          Head:  normocephalic        Eyes:  sclera white        Lymph:      ENT:           Neck:           Respiratory:  normal breath sounds, clear to auscultation, normal A-P diameter, normal symmetry, normal respiratory excursion, no use of accessory muscles         Cardiac: regular rhythm;normal S1 and S2;no S3 or S4     no presence of murmur                                                   GI:           Extremities and Muscular Skeletal:  no deformities, clubbing, cyanosis, erythema observed;no edema              Neurological:  no gross motor deficits;affect appropriate        Psych:  Alert and Oriented x 3;affect appropriate, oriented to time, person and place          Thank you for allowing me to participate in the care of your patient.    Sincerely,     Shauna Armstrong MD     [facility]

## 2018-10-03 NOTE — LETTER
10/3/2018    Kamari Rojas MD  [address]  [facility] [address]    RE: Richard You       Dear Colleague,    I had the pleasure of seeing Richard Yuo in the [facility].    HPI and Plan:     Mr. Richard You is 78 years old and has a history of coronary artery disease. More recently, he has been evaluated for bradycardia.  He typically follows with Dr. Reveles and today he was here a month after seeing Dr. Marques of .  His wife accompanied him.    He was hospitalized during July 2018 with asymptomatic bradycardia at [facility] after transfer from [facility].  Metoprolol was stopped.  He saw Dr. Marques who did not feel a pacemaker was currently indicated.  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  He can do everything he needs to do without exertional intolerance or light-headedness.     He has no anginal symptoms or dyspnea.  His last echo of July 2018 indicated a stable LV EF of 40-45%.  There was anteroseptal and apical hypokinesis.     Mr. You denies any chest, neck, arm or back discomfort.  He denies any palpitations, lightheadedness or syncope.  He is status post coronary artery bypass surgery in 1996 with a LIMA to the LAD, saphenous vein graft to the diagonal artery, ischemic cardiomyopathy  And mild aortic root dilatation and mild to moderate dilatation of the ascending aorta   In 2015, he had had a repeat echo showing an EF that was felt to be lower and he subsequently underwent a stress test showing nonsustained VT.  Cardiac catheterization was performed and did not detail any obstructive disease requiring revascularization..      Exam:  This is a healthy man in no apparent distress.  The blood pressure was 130/78 mmHg, heart rate 71 beats per minute and respiratory rate 14-18 per minute.  The chest was clear to auscultation.  Breathing was unlabored.  On cardiac auscultation,  there was an S1 and S2 without extra sounds.  There were no murmurs.  The rhythm was regular.  On examination of the extremities, there was no peripheral pedal edema.      Assessment/Plan:  Mr. You is currently doing well from the aspect of his history of CAD and prior bypass surgery.  Follow-up has been arranged with Dr. Reveles for June 2019, one year after the last visit.  Mr You will call if he has any adverse symptoms in the interim.  Mr. You continues on lisinopril/hydrochlorothiazide, high-dose aspirin and statin therapy.  His lipids are under excellent control. Dr. Marques recommended against restarting AV mike blockade.     With regard then to his mild to moderate decrease in LV function, he is asymptomatic with vasodilator therapy.    With regard to his atrial fibrillation, he is on anticoagulation, asymptomatic and without tachycardia.  He has had asymptomatic bradycardia.  A repeat Holter monitor at 6 to 12 months has been recommended.  He will call if he experiences near-syncope, syncope or decreased exertional tolerance.            Medications Discontinued During This Encounter   Medication Reason     primidone (MYSOLINE) 50 MG tablet          Encounter Diagnosis   Name Primary?     Bradycardia        CURRENT MEDICATIONS:  Current Outpatient Prescriptions   Medication Sig Dispense Refill     amLODIPine (NORVASC) 5 MG tablet Take 1 tablet (5 mg) by mouth daily 90 tablet 3     aspirin 81 MG chewable tablet Take 1 tablet (81 mg) by mouth daily (Patient taking differently: Take 81 mg by mouth every evening ) 36 tablet 3     atorvastatin (LIPITOR) 80 MG tablet Take 1 tablet (80 mg) by mouth daily (Patient taking differently: Take 80 mg by mouth every evening ) 90 tablet 3     lisinopril-hydrochlorothiazide (PRINZIDE/ZESTORETIC) 20-12.5 MG per tablet Take 2 tablets by mouth daily 180 tablet 3     Multiple Vitamin (MULTI-VITAMIN DAILY PO) Take 1 tablet by mouth daily       Multiple Vitamins-Minerals  (ICAPS PO) Take 1 tablet by mouth 2 times daily        omeprazole (PRILOSEC) 40 MG capsule Take 1 capsule (40 mg) by mouth daily Take 30-60 minutes before a meal. 90 capsule 3     rivaroxaban ANTICOAGULANT (XARELTO) 20 MG TABS tablet Take 1 tablet (20 mg) by mouth daily (with dinner) 90 tablet 3       ALLERGIES     Allergies   Allergen Reactions     Nka [No Known Allergies]        PAST MEDICAL HISTORY:  Past Medical History:   Diagnosis Date     Abdominal aneurysm without mention of rupture 6/24/2005    Repaired with percutaneous stent 8/05   Has annual CT scan to monitor stent placement. Last done in sept 2012 and stable      Atrial fibrillation (H)      CAD 4/5/2005 12/05/12 Patient denies any recent chest pain or NTG use.  Patient is taking meds regularly and denies significant side effects. Last stress test many years ago, but bikes 30-40 mi/day without symptoms       Colon polyp, hyperplastic 5/14/2013    Hyperplastic  Polyp on colonoscopy 5-2013. 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       PAST SURGICAL HISTORY:  Past Surgical History:   Procedure Laterality Date     BYPASS CARDIOPULMONARY  12/1996    CA bypass x2 LAD     CARDIAC SURGERY       CYSTECTOMY PILONIDAL       ESOPHAGOSCOPY, GASTROSCOPY, DUODENOSCOPY (EGD), COMBINED N/A 9/4/2015    Procedure: COMBINED ESOPHAGOSCOPY, GASTROSCOPY, DUODENOSCOPY (EGD), BIOPSY SINGLE OR MULTIPLE;  Surgeon: Fuad Holder MD;  Location: [facility]     ESOPHAGOSCOPY, GASTROSCOPY, DUODENOSCOPY (EGD), COMBINED N/A 9/20/2016    Procedure: COMBINED ESOPHAGOSCOPY, GASTROSCOPY, DUODENOSCOPY  (EGD), BIOPSY SINGLE OR MULTIPLE;  Surgeon: Lucas Dang MD;  Location: [facility]     ESOPHAGOSCOPY, GASTROSCOPY, DUODENOSCOPY (EGD), COMBINED N/A 7/12/2018    Procedure: COMBINED ESOPHAGOSCOPY, GASTROSCOPY, DUODENOSCOPY (EGD), BIOPSY SINGLE OR MULTIPLE;  gastroscopy;  Surgeon: Richard Crowder MD;  Location: [facility]     HERNIA REPAIR       VASECTOMY         FAMILY HISTORY:  Family History   Problem Relation Age of Onset     Cancer - colorectal Mother      Cardiovascular Father      Alcohol/Drug Father      Neurologic Disorder Father      tremor     Cardiovascular Brother      Breast Cancer Sister      Cancer Brother      bladder cancer       SOCIAL HISTORY:  Social History     Social History     Marital status:      Spouse name: Soledad You     Number of children: 2     Years of education: 12     Occupational History      Retired     Social History Main Topics     Smoking status: Former Smoker     Packs/day: 0.50     Years: 10.00     Types: Cigarettes     Quit date: 11/29/1995     Smokeless tobacco: Never Used     Alcohol use No      Comment: beer occ,     Drug use: No     Sexual activity: Not Currently     Partners: Female     Other Topics Concern     Parent/Sibling W/ Cabg, Mi Or Angioplasty Before 65f 55m? Yes     Social History Narrative        Baker    Nonsmoker as of 2  Yrs ago    Drinks wine daily    Lives with wife soledad in [place]           Review of Systems:  Skin:  Negative       Eyes:  Positive for glasses    ENT:  Negative      Respiratory:  Negative for shortness of breath;cough     Cardiovascular:  Negative for;palpitations;chest pain;edema;syncope or near-syncope;fatigue;lightheadedness;dizziness      Gastroenterology: Negative for nausea;vomiting;heartburn    Genitourinary:  Negative      Musculoskeletal:  Negative      Neurologic:  Positive for tremors    Psychiatric:  Negative      Heme/Lymph/Imm:  Negative      Endocrine:  Negative        Physical Exam:  Vitals: BP  130/78  Pulse 71  Wt 95.3 kg (210 lb)  SpO2 98%  BMI 28.48 kg/m2    Constitutional:  cooperative, alert and oriented, well developed, well nourished, in no acute distress        Skin:  warm and dry to the touch          Head:  normocephalic        Eyes:  sclera white        Lymph:      ENT:           Neck:           Respiratory:  normal breath sounds, clear to auscultation, normal A-P diameter, normal symmetry, normal respiratory excursion, no use of accessory muscles         Cardiac: regular rhythm;normal S1 and S2;no S3 or S4     no presence of murmur                                                   GI:           Extremities and Muscular Skeletal:  no deformities, clubbing, cyanosis, erythema observed;no edema              Neurological:  no gross motor deficits;affect appropriate        Psych:  Alert and Oriented x 3;affect appropriate, oriented to time, person and place          CC  Shauna Armstrong MD  [id number]  [place] [address]                    Thank you for allowing me to participate in the care of your patient.      Sincerely,     Shauna Armstrong MD     [facility]    cc:   Shauna Armstrong MD  [id number]  [place] [address]

## 2018-10-03 NOTE — MR AVS SNAPSHOT
After Visit Summary   10/3/2018    Richard You    MRN: [id number]           Patient Information     Date Of Birth          1940        Visit Information        Provider Department      10/3/2018 10:00 AM Shauna Armstrong MD [facility]        Today's Diagnoses     Bradycardia           Follow-ups after your visit        Who to contact     If you have questions or need follow up information about today's clinic visit or your schedule please contact [facility] directly at [phone].  Normal or non-critical lab and imaging results will be communicated to you by MyChart, letter or phone within 4 business days after the clinic has received the results. If you do not hear from us within 7 days, please contact the clinic through InstaEDUt or phone. If you have a critical or abnormal lab result, we will notify you by phone as soon as possible.  Submit refill requests through Sharalike or call your pharmacy and they will forward the refill request to us. Please allow 3 business days for your refill to be completed.          Additional Information About Your Visit        MyChart Information     Sharalike gives you secure access to your electronic health record. If you see a primary care provider, you can also send messages to your care team and make appointments. If you have questions, please call your primary care clinic.  If you do not have a primary care provider, please call [phone] and they will assist you.        Care EveryWhere ID     This is your Care EveryWhere ID. This could be used by other organizations to access your [facility] medical records  [id number]        Your Vitals Were     Pulse Pulse Oximetry BMI (Body Mass Index)             71 98% 28.48 kg/m2          Blood Pressure from Last 3 Encounters:   10/03/18 130/78   09/12/18 148/88   08/10/18 138/70    Weight from Last 3 Encounters:   10/03/18 95.3 kg  (210 lb)   09/12/18 93 kg (205 lb)   08/10/18 93.2 kg (205 lb 6.4 oz)              We Performed the Following     Follow-Up with Cardiologist          Today's Medication Changes          These changes are accurate as of 10/3/18 10:45 AM.  If you have any questions, ask your nurse or doctor.               These medicines have changed or have updated prescriptions.        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 Please contact your care team if you have questions.     primidone 50 MG tablet   Commonly known as:  MYSOLINE   Stopped by:  Shauna Armstrong MD                    Primary Care Provider Office Phone # Fax #    Kamari Jennifer Rojas [phone] [address] [facility] [address]        Equal Access to Services     ERMELINDA HOPPER AH: Reed mcnultyo Sokhurramali, waaxda luqadaha, qaybta kaalmada adeegyada, cortney manuel. So [facility] [phone].    ATENCIÓN: Si habla español, tiene a lechuga disposición servicios gratuitos de asistencia lingüística. Llame al [phone].    We comply with applicable federal civil rights laws and [place] laws. We do not discriminate on the basis of race, color, national origin, age, disability, sex, sexual orientation, or gender identity.            Thank you!     Thank  you for choosing [facility]  for your care. Our goal is always to provide you with excellent care. Hearing back from our patients is one way we can continue to improve our services. Please take a few minutes to complete the written survey that you may receive in the mail after your visit with us. Thank you!             Your Updated Medication List - Protect others around you: Learn how to safely use, store and throw away your medicines at www.disposemymeds.org.          This list is accurate as of 10/3/18 10:45 AM.  Always use your most recent med list.                   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    Esophagitis, Carey's esophagus without dysplasia       rivaroxaban ANTICOAGULANT 20 MG Tabs tablet    XARELTO    90 tablet    Take 1 tablet (20 mg) by mouth daily (with dinner)    Chronic atrial fibrillation (H)

## 2018-10-24 ENCOUNTER — ALLIED HEALTH/NURSE VISIT (OUTPATIENT)
Dept: FAMILY MEDICINE | Facility: CLINIC | Age: 78
End: 2018-10-24
Payer: COMMERCIAL

## 2018-10-24 DIAGNOSIS — Z23 NEED FOR PROPHYLACTIC VACCINATION AND INOCULATION AGAINST INFLUENZA: Primary | ICD-10-CM

## 2018-10-24 PROCEDURE — G0008 ADMIN INFLUENZA VIRUS VAC: HCPCS

## 2018-10-24 PROCEDURE — 90662 IIV NO PRSV INCREASED AG IM: CPT

## 2018-10-24 NOTE — MR AVS SNAPSHOT
After Visit Summary   10/24/2018    Richard You    MRN: [id number]           Patient Information     Date Of Birth          1940        Visit Information        Provider Department      10/24/2018 10:30 AM [place] NB FAISAL/LPN [facility]        Today's Diagnoses     Need for prophylactic vaccination and inoculation against influenza    -  1       Follow-ups after your visit        Who to contact     If you have questions or need follow up information about today's clinic visit or your schedule please contact [facility] directly at [phone].  Normal or non-critical lab and imaging results will be communicated to you by Envision Solarhart, letter or phone within 4 business days after the clinic has received the results. If you do not hear from us within 7 days, please contact the clinic through VulevÃƒÂºt or phone. If you have a critical or abnormal lab result, we will notify you by phone as soon as possible.  Submit refill requests through i-Nalysis or call your pharmacy and they will forward the refill request to us. Please allow 3 business days for your refill to be completed.          Additional Information About Your Visit        MyChart Information     i-Nalysis gives you secure access to your electronic health record. If you see a primary care provider, you can also send messages to your care team and make appointments. If you have questions, please call your primary care clinic.  If you do not have a primary care provider, please call [phone] and they will assist you.        Care EveryWhere ID     This is your Care EveryWhere ID. This could be used by other organizations to access your [facility] medical records  [id number]         Blood Pressure from Last 3 Encounters:   10/03/18 130/78   09/12/18 148/88   08/10/18 138/70    Weight from Last 3 Encounters:   10/03/18 210 lb (95.3 kg)   09/12/18 205 lb (93 kg)   08/10/18 205 lb 6.4 oz (93.2 kg)              We  Performed the Following     FLU VACCINE, INCREASED ANTIGEN, PRESV FREE, AGE 65+ [79900]     Vaccine Administration, Initial [15068]          Today's Medication Changes          These changes are accurate as of 10/24/18 10:49 AM.  If you have any questions, ask your nurse or doctor.               These medicines have changed or have updated prescriptions.        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 So [facility] [phone].    ATENCIÓN: Si habla español, tiene a lechuga disposición servicios gratuitos de asistencia lingüística. Llame al [phone].    We comply with applicable federal civil rights laws and [place] laws. We do not discriminate on the basis of race, color, national origin, age, disability, sex, sexual orientation, or gender identity.            Thank you!     Thank you for choosing [facility]  for your care. Our goal is always to provide you with excellent care. Hearing back from our patients is one way we can continue to improve our services. Please take a few  minutes to complete the written survey that you may receive in the mail after your visit with us. Thank you!             Your Updated Medication List - Protect others around you: Learn how to safely use, store and throw away your medicines at www.disposemymeds.org.          This list is accurate as of 10/24/18 10:49 AM.  Always use your most recent med list.                   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    Esophagitis, Carey's esophagus without dysplasia       rivaroxaban ANTICOAGULANT 20 MG Tabs tablet    XARELTO    90 tablet    Take 1 tablet (20 mg) by mouth daily (with dinner)    Chronic atrial fibrillation (H)

## 2018-10-24 NOTE — PROGRESS NOTES
Injectable Influenza Immunization Documentation    1.  Is the person to be vaccinated sick today?   No    2. Does the person to be vaccinated have an allergy to a component   of the vaccine?   No  Egg Allergy Algorithm Link    3. Has the person to be vaccinated ever had a serious reaction   to influenza vaccine in the past?   No    4. Has the person to be vaccinated ever had Guillain-Barré syndrome?   No    Form completed by Cyndi Rodriguez CMA    Prior to injection verified patient identity using patient's name and date of birth.  Due to injection administration, patient instructed to remain in clinic for 15 minutes  afterwards, and to report any adverse reaction to me immediately.    Cyndi Rodriguez CMA

## 2018-12-07 ENCOUNTER — OFFICE VISIT (OUTPATIENT)
Dept: FAMILY MEDICINE | Facility: CLINIC | Age: 78
End: 2018-12-07
Payer: COMMERCIAL

## 2018-12-07 VITALS
BODY MASS INDEX: 28.66 KG/M2 | HEART RATE: 54 BPM | OXYGEN SATURATION: 99 % | SYSTOLIC BLOOD PRESSURE: 124 MMHG | WEIGHT: 211.6 LBS | RESPIRATION RATE: 16 BRPM | TEMPERATURE: 98.4 F | DIASTOLIC BLOOD PRESSURE: 78 MMHG | HEIGHT: 72 IN

## 2018-12-07 DIAGNOSIS — Z23 NEED FOR VACCINATION: ICD-10-CM

## 2018-12-07 DIAGNOSIS — G25.0 BENIGN ESSENTIAL TREMOR: Primary | ICD-10-CM

## 2018-12-07 PROCEDURE — 99213 OFFICE O/P EST LOW 20 MIN: CPT | Mod: 25 | Performed by: FAMILY MEDICINE

## 2018-12-07 PROCEDURE — 90715 TDAP VACCINE 7 YRS/> IM: CPT | Performed by: FAMILY MEDICINE

## 2018-12-07 PROCEDURE — 90471 IMMUNIZATION ADMIN: CPT | Performed by: FAMILY MEDICINE

## 2018-12-07 ASSESSMENT — PAIN SCALES - GENERAL: PAINLEVEL: NO PAIN (0)

## 2018-12-07 NOTE — PROGRESS NOTES
SUBJECTIVE:   Richard You is a 78 year old male who presents to clinic today for the following health issues:      Problem:   Chief Complaint   Patient presents with     Patient Request     discuss the use of cannibis for his hand tremors.     ADDITIONAL HPI: 78 year old male here for above issue.      ROS: 10 point review of systems negative except as per HPI.    PAST MEDICAL HISTORY:  Past Medical History:   Diagnosis Date     Abdominal aneurysm without mention of rupture 6/24/2005    Repaired with percutaneous stent 8/05   Has annual CT scan to monitor stent placement. Last done in sept 2012 and stable      Atrial fibrillation (H)      CAD 4/5/2005 12/05/12 Patient denies any recent chest pain or NTG use.  Patient is taking meds regularly and denies significant side effects. Last stress test many years ago, but bikes 30-40 mi/day without symptoms       Colon polyp, hyperplastic 5/14/2013    Hyperplastic  Polyp on colonoscopy 5-2013. 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     Macular degeneration, wet (H)     Colon polyp, hyperplastic     Heart murmur     Former smoker     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)     Esophagitis     Carey's esophagus     Arteriosclerotic vascular disease     Benign essential tremor     Bradycardia     Chronic atrial fibrillation (H)         FAMILY HISTORY:  Family History   Problem Relation Age of Onset     Cancer - colorectal Mother      Cardiovascular Father      Alcohol/Drug Father      Neurologic Disorder Father         tremor     Cardiovascular Brother      Breast Cancer Sister      Cancer Brother         bladder cancer       SOCIAL HISTORY:  Social History     Socioeconomic History     Marital status:      Spouse name: Soledad You     Number of children: 2     Years of education: 12     Highest education level: Not on file   Social Needs     Financial resource strain: Not on file     Food insecurity - worry: Not on file     Food insecurity - inability: Not on file     Transportation needs - medical: Not on file     Transportation needs - non-medical: Not on file   Occupational History     Employer: RETIRED   Tobacco Use     Smoking status: Former Smoker     Packs/day: 0.50     Years: 10.00     Pack years: 5.00     Types: Cigarettes     Last attempt to quit: 1995     Years since quittin.0     Smokeless tobacco: Never Used   Substance and Sexual Activity     Alcohol use: No     Comment: beer occ,     Drug use: No     Sexual activity: Not Currently     Partners: Female   Other Topics Concern     Parent/sibling w/ CABG, MI or angioplasty before 65F 55M? Yes   Social History Narrative        [employer]    Nonsmoker as of 2  Yrs ago    Drinks wine daily    Lives with wife soledad in [place]       MEDICATIONS:  Current Outpatient Medications   Medication Sig Dispense Refill     amLODIPine (NORVASC) 5 MG tablet Take 1 tablet (5 mg) by mouth daily 90 tablet 3     aspirin 81 MG chewable tablet Take 1 tablet (81 mg) by mouth daily 36 tablet 3     lisinopril-hydrochlorothiazide (PRINZIDE/ZESTORETIC) 20-12.5 MG per tablet Take 2 tablets by mouth daily 180 tablet 3     Multiple Vitamin (MULTI-VITAMIN DAILY PO) Take 1 tablet by mouth daily       Multiple Vitamins-Minerals (ICAPS PO) Take 1 tablet by mouth 2 times daily         rivaroxaban ANTICOAGULANT (XARELTO) 20 MG TABS tablet Take 1 tablet (20 mg) by mouth daily (with dinner) 90 tablet 3     atorvastatin (LIPITOR) 80 MG tablet TAKE ONE TABLET BY MOUTH ONCE DAILY 90 tablet 1     omeprazole (PRILOSEC) 40 MG DR capsule TAKE ONE CAPSULE BY MOUTH ONCE DAILY. TAKE 30 TO 60 MINUTES BEFORE A MEAL 90 capsule 2       ALLERGIES:     Allergies   Allergen Reactions     Nka [No Known Allergies]        Problem list, Medication list, Allergies, and Medical/Social/Surgical histories reviewed in [facility] and updated as appropriate.    OBJECTIVE:                                                    VITALS: /78 (BP Location: Right arm, Cuff Size: Adult Regular)   Pulse 54   Temp 98.4  F (36.9  C) (Tympanic)   Resp 16   Ht 1.829 m (6')   Wt 96 kg (211 lb 9.6 oz)   SpO2 99%   BMI 28.70 kg/m   Body mass index is 28.7 kg/m .  GENERAL: Pleasant, well appearing male.    ASSESSMENT/PLAN:                                                    1. Benign essential tremor  Discussed I am not licensed to certify patients for marijuana in [place]. Discussed qualifying criteria and fees. Additional option would be to see neurology for other medication options.    2. Need for vaccination  - TDAP VACCINE (ADACEL) [96805.002]  - 1st  Administration  [46003]

## 2018-12-07 NOTE — PATIENT INSTRUCTIONS
Thank you for choosing [facility].  You may be receiving a survey in the mail from [facility] regarding your visit today.  Please take a few minutes to complete and return the survey to let us know how we are doing.      Our Clinic hours are:  Mondays    7:20 am - 7 pm  Tues - Fri  7:20 am - 5 pm    Clinic Phone: [phone]    The clinic lab opens at 7:30 am Mon - Fri and appointments are required.    [facility]. [phone]  Monday  8 am - 7pm  Tues - Fri 8 am - 5:30 pm

## 2018-12-09 ENCOUNTER — TELEPHONE (OUTPATIENT)
Dept: FAMILY MEDICINE | Facility: CLINIC | Age: 78
End: 2018-12-09

## 2018-12-10 ENCOUNTER — TRANSFERRED RECORDS (OUTPATIENT)
Dept: HEALTH INFORMATION MANAGEMENT | Facility: CLINIC | Age: 78
End: 2018-12-10

## 2018-12-10 NOTE — TELEPHONE ENCOUNTER
Perfect!  I just had it set as an electronic reminder which actually just popped up this weekend after I saw him.  Sounds good.  I won't reset a reminder then. I'll let cardiology run the show.

## 2018-12-10 NOTE — TELEPHONE ENCOUNTER
Spoke with pt/wife and pt doesn't need any testing until 2019 per [facility].   She's stated he needs it every 2 yrs so that would be the US?  Does he need a CT or Angiogram?  Please advise.  Goldie

## 2018-12-10 NOTE — TELEPHONE ENCOUNTER
Please call - he's due for abdominal aneurysm follow-up imaging. These are ordered. Please have him schedule these.

## 2018-12-15 DIAGNOSIS — K20.90 ESOPHAGITIS: ICD-10-CM

## 2018-12-15 DIAGNOSIS — K22.70 BARRETT'S ESOPHAGUS WITHOUT DYSPLASIA: Chronic | ICD-10-CM

## 2018-12-15 DIAGNOSIS — E78.5 HYPERLIPIDEMIA LDL GOAL <100: ICD-10-CM

## 2018-12-15 DIAGNOSIS — I25.810 CORONARY ATHEROSCLEROSIS OF AUTOLOGOUS VEIN BYPASS GRAFT WITHOUT ANGINA: ICD-10-CM

## 2018-12-18 RX ORDER — OMEPRAZOLE 40 MG/1
CAPSULE, DELAYED RELEASE ORAL
Qty: 90 CAPSULE | Refills: 2 | Status: SHIPPED | OUTPATIENT
Start: 2018-12-18 | End: 2019-04-26

## 2018-12-18 RX ORDER — ATORVASTATIN CALCIUM 80 MG/1
TABLET, FILM COATED ORAL
Qty: 90 TABLET | Refills: 1 | Status: SHIPPED | OUTPATIENT
Start: 2018-12-18 | End: 2019-04-26

## 2018-12-18 NOTE — TELEPHONE ENCOUNTER
Prescription approved per [facility] Refill Protocol.  Ling GOODMAN RN

## 2018-12-18 NOTE — TELEPHONE ENCOUNTER
"Both meds:  Last Written Prescription Date:  12/7/2017  Last Fill Quantity: 90,  # refills: 3   Last office visit: 12/7/2017 with prescribing provider:  Chaz    Future Office Visit:    Requested Prescriptions   Pending Prescriptions Disp Refills     atorvastatin (LIPITOR) 80 MG tablet [Pharmacy Med Name: ATORVASTATIN 80MG   TAB] 90 tablet 3     Sig: TAKE ONE TABLET BY MOUTH ONCE DAILY    Statins Protocol Passed - 12/15/2018  9:56 AM       Passed - LDL on file in past 12 months    Recent Labs   Lab Test 05/03/18  0907   LDL 83            Passed - No abnormal creatine kinase in past 12 months    No lab results found.            Passed - Recent (12 mo) or future (30 days) visit within the authorizing provider's specialty    Patient had office visit in the last 12 months or has a visit in the next 30 days with authorizing provider or within the authorizing provider's specialty.  See \"Patient Info\" tab in inbasket, or \"Choose Columns\" in Meds & Orders section of the refill encounter.             Passed - Patient is age 18 or older        omeprazole (PRILOSEC) 40 MG DR capsule [Pharmacy Med Name: OMEPRAZOLE DR 40MG  CAP] 90 capsule 3     Sig: TAKE ONE CAPSULE BY MOUTH ONCE DAILY. TAKE 30 TO 60 MINUTES BEFORE A MEAL    PPI Protocol Passed - 12/15/2018  9:56 AM       Passed - Not on Clopidogrel (unless Pantoprazole ordered)       Passed - No diagnosis of osteoporosis on record       Passed - Recent (12 mo) or future (30 days) visit within the authorizing provider's specialty    Patient had office visit in the last 12 months or has a visit in the next 30 days with authorizing provider or within the authorizing provider's specialty.  See \"Patient Info\" tab in inbasket, or \"Choose Columns\" in Meds & Orders section of the refill encounter.             Passed - Patient is age 18 or older          "

## 2019-02-07 ENCOUNTER — MYC MEDICAL ADVICE (OUTPATIENT)
Dept: FAMILY MEDICINE | Facility: CLINIC | Age: 79
End: 2019-02-07

## 2019-02-07 DIAGNOSIS — G25.0 BENIGN ESSENTIAL TREMOR: Primary | ICD-10-CM

## 2019-02-08 ENCOUNTER — MYC MEDICAL ADVICE (OUTPATIENT)
Dept: FAMILY MEDICINE | Facility: CLINIC | Age: 79
End: 2019-02-08

## 2019-02-08 NOTE — TELEPHONE ENCOUNTER
Yes. Dr. Jackie Concepcion. [facility] neurology. When he has an appointment set let me know and I can fax over his records.

## 2019-02-08 NOTE — TELEPHONE ENCOUNTER
Dr. Rojas,    See [employer] message, this RN does not see any specific names.    Shayy FITZPATRICK RN

## 2019-02-14 ENCOUNTER — MYC MEDICAL ADVICE (OUTPATIENT)
Dept: FAMILY MEDICINE | Facility: CLINIC | Age: 79
End: 2019-02-14

## 2019-04-26 ENCOUNTER — OFFICE VISIT (OUTPATIENT)
Dept: FAMILY MEDICINE | Facility: CLINIC | Age: 79
End: 2019-04-26
Payer: COMMERCIAL

## 2019-04-26 VITALS
WEIGHT: 207 LBS | DIASTOLIC BLOOD PRESSURE: 78 MMHG | TEMPERATURE: 97.8 F | HEART RATE: 47 BPM | OXYGEN SATURATION: 96 % | SYSTOLIC BLOOD PRESSURE: 128 MMHG | RESPIRATION RATE: 16 BRPM | HEIGHT: 71 IN | BODY MASS INDEX: 28.98 KG/M2

## 2019-04-26 DIAGNOSIS — I10 HTN, GOAL BELOW 130/80: ICD-10-CM

## 2019-04-26 DIAGNOSIS — K20.90 ESOPHAGITIS: ICD-10-CM

## 2019-04-26 DIAGNOSIS — I48.20 CHRONIC ATRIAL FIBRILLATION (H): ICD-10-CM

## 2019-04-26 DIAGNOSIS — E78.5 HYPERLIPIDEMIA LDL GOAL <100: ICD-10-CM

## 2019-04-26 DIAGNOSIS — I25.810 CORONARY ATHEROSCLEROSIS OF AUTOLOGOUS VEIN BYPASS GRAFT WITHOUT ANGINA: ICD-10-CM

## 2019-04-26 DIAGNOSIS — I71.40 ABDOMINAL AORTIC ANEURYSM (AAA) WITHOUT RUPTURE (H): ICD-10-CM

## 2019-04-26 DIAGNOSIS — K22.70 BARRETT'S ESOPHAGUS WITHOUT DYSPLASIA: Chronic | ICD-10-CM

## 2019-04-26 LAB
ANION GAP SERPL CALCULATED.3IONS-SCNC: 5 MMOL/L (ref 3–14)
BUN SERPL-MCNC: 32 MG/DL (ref 7–30)
CALCIUM SERPL-MCNC: 8.9 MG/DL (ref 8.5–10.1)
CHLORIDE SERPL-SCNC: 107 MMOL/L (ref 94–109)
CHOLEST SERPL-MCNC: 159 MG/DL
CO2 SERPL-SCNC: 29 MMOL/L (ref 20–32)
CREAT SERPL-MCNC: 1.1 MG/DL (ref 0.66–1.25)
GFR SERPL CREATININE-BSD FRML MDRD: 63 ML/MIN/{1.73_M2}
GLUCOSE SERPL-MCNC: 95 MG/DL (ref 70–99)
HDLC SERPL-MCNC: 47 MG/DL
LDLC SERPL CALC-MCNC: 84 MG/DL
NONHDLC SERPL-MCNC: 112 MG/DL
POTASSIUM SERPL-SCNC: 4.7 MMOL/L (ref 3.4–5.3)
SODIUM SERPL-SCNC: 141 MMOL/L (ref 133–144)
TRIGL SERPL-MCNC: 139 MG/DL

## 2019-04-26 PROCEDURE — 99214 OFFICE O/P EST MOD 30 MIN: CPT | Performed by: FAMILY MEDICINE

## 2019-04-26 PROCEDURE — 80048 BASIC METABOLIC PNL TOTAL CA: CPT | Performed by: FAMILY MEDICINE

## 2019-04-26 PROCEDURE — 80061 LIPID PANEL: CPT | Performed by: FAMILY MEDICINE

## 2019-04-26 PROCEDURE — 36415 COLL VENOUS BLD VENIPUNCTURE: CPT | Performed by: FAMILY MEDICINE

## 2019-04-26 RX ORDER — ATORVASTATIN CALCIUM 80 MG/1
80 TABLET, FILM COATED ORAL DAILY
Qty: 90 TABLET | Refills: 3 | Status: SHIPPED | OUTPATIENT
Start: 2019-04-26 | End: 2020-05-15

## 2019-04-26 RX ORDER — OMEPRAZOLE 40 MG/1
40 CAPSULE, DELAYED RELEASE ORAL DAILY
Qty: 90 CAPSULE | Refills: 3 | Status: SHIPPED | OUTPATIENT
Start: 2019-04-26 | End: 2020-05-15

## 2019-04-26 RX ORDER — AMLODIPINE BESYLATE 5 MG/1
5 TABLET ORAL DAILY
Qty: 90 TABLET | Refills: 3 | Status: SHIPPED | OUTPATIENT
Start: 2019-04-26 | End: 2020-04-15

## 2019-04-26 RX ORDER — LISINOPRIL AND HYDROCHLOROTHIAZIDE 12.5; 2 MG/1; MG/1
2 TABLET ORAL DAILY
Qty: 180 TABLET | Refills: 3 | Status: SHIPPED | OUTPATIENT
Start: 2019-04-26 | End: 2020-05-15

## 2019-04-26 ASSESSMENT — PAIN SCALES - GENERAL: PAINLEVEL: NO PAIN (0)

## 2019-04-26 ASSESSMENT — MIFFLIN-ST. JEOR: SCORE: 1666.08

## 2019-04-26 NOTE — PROGRESS NOTES
SUBJECTIVE:   Richard You is a 79 year old male who presents to clinic today for the following   health issues:    Chief Complaint   Patient presents with     Hypertension     /cholesterol check         Hyperlipidemia Follow-Up      Rate your low fat/cholesterol diet?: good    Taking statin?  No    Other lipid medications/supplements?:  none    Hypertension Follow-up      Outpatient blood pressures are being checked at home.  Results are 120/70.    Low Salt Diet: no added salt      Amount of exercise or physical activity: 6-7 days/week for an average of 45-60 minutes    Problems taking medications regularly: No    Medication side effects: none    Diet: low salt and low fat/cholesterol    ADDITIONAL HPI: 79 year old male here for above issue.      ROS: 10 point review of systems negative except as per HPI.    PAST MEDICAL HISTORY:  Past Medical History:   Diagnosis Date     Abdominal aneurysm without mention of rupture 6/24/2005    Repaired with percutaneous stent 8/05   Has annual CT scan to monitor stent placement. Last done in sept 2012 and stable      Atrial fibrillation (H)      CAD 4/5/2005 12/05/12 Patient denies any recent chest pain or NTG use.  Patient is taking meds regularly and denies significant side effects. Last stress test many years ago, but bikes 30-40 mi/day without symptoms       Colon polyp, hyperplastic 5/14/2013    Hyperplastic  Polyp on colonoscopy 5-2013. 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        ACTIVE MEDICAL PROBLEMS:  Patient Active Problem List   Diagnosis     Coronary atherosclerosis      Abdominal aortic aneurysm (H)     Benign shuddering attack     Hyperlipidemia LDL goal <100     HTN, goal below 130/80     [facility]     Macular degeneration, wet (H)     Colon polyp, hyperplastic     Heart murmur     Former smoker     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)     Esophagitis     Carey's esophagus     Arteriosclerotic vascular disease     Benign essential tremor     Bradycardia     Chronic atrial fibrillation (H)        FAMILY HISTORY:  Family History   Problem Relation Age of Onset     Cancer - colorectal Mother      Cardiovascular Father      Alcohol/Drug Father      Neurologic Disorder Father         tremor     Cardiovascular Brother      Breast Cancer Sister      Cancer Brother         bladder cancer       SOCIAL HISTORY:  Social History     Socioeconomic History     Marital status:      Spouse name: Soledad You     Number of children: 2     Years of education: 12     Highest education level: Not on file   Occupational History     Employer: RETIRED   Social Needs     Financial resource strain: Not on file     Food insecurity:     Worry: Not on file     Inability: Not on file     Transportation needs:     Medical: Not on file     Non-medical: Not on file   Tobacco Use     Smoking status: Former Smoker     Packs/day: 0.50     Years: 10.00     Pack years: 5.00     Types: Cigarettes     Last attempt to quit: 1995     Years since quittin.4     Smokeless tobacco: Never Used   Substance and Sexual Activity     Alcohol use: No     Comment: beer occ,     Drug use: No     Sexual activity: Not Currently     Partners: Female   Lifestyle     Physical activity:     Days per week: Not on file     Minutes per session: Not on file     Stress: Not on file   Relationships     Social connections:     Talks on phone: Not on file     Gets together: Not on file     Attends Caodaism service: Not on file     Active member of club or organization: Not on file     Attends  "meetings of clubs or organizations: Not on file     Relationship status: Not on file     Intimate partner violence:     Fear of current or ex partner: Not on file     Emotionally abused: Not on file     Physically abused: Not on file     Forced sexual activity: Not on file   Other Topics Concern     Parent/sibling w/ CABG, MI or angioplasty before 65F 55M? Yes   Social History Narrative        [employer]    Nonsmoker as of 2  Yrs ago    Drinks wine daily    Lives with wife kelly in [place]       MEDICATIONS:  Current Outpatient Medications   Medication Sig Dispense Refill     amLODIPine (NORVASC) 5 MG tablet Take 1 tablet (5 mg) by mouth daily 90 tablet 3     aspirin 81 MG chewable tablet Take 1 tablet (81 mg) by mouth daily 36 tablet 3     atorvastatin (LIPITOR) 80 MG tablet Take 1 tablet (80 mg) by mouth daily 90 tablet 3     lisinopril-hydrochlorothiazide (PRINZIDE/ZESTORETIC) 20-12.5 MG tablet Take 2 tablets by mouth daily 180 tablet 3     Multiple Vitamin (MULTI-VITAMIN DAILY PO) Take 1 tablet by mouth daily       Multiple Vitamins-Minerals (ICAPS PO) Take 1 tablet by mouth 2 times daily        omeprazole (PRILOSEC) 40 MG DR capsule Take 1 capsule (40 mg) by mouth daily 90 capsule 3     rivaroxaban ANTICOAGULANT (XARELTO) 20 MG TABS tablet Take 1 tablet (20 mg) by mouth daily (with dinner) 90 tablet 3       ALLERGIES:     Allergies   Allergen Reactions     Nka [No Known Allergies]        Problem list, Medication list, Allergies, and Medical/Social/Surgical histories reviewed in [facility] and updated as appropriate.    OBJECTIVE:                                                    VITALS: /78 (BP Location: Right arm, Cuff Size: Adult Regular)   Pulse (!) 47   Temp 97.8  F (36.6  C) (Tympanic)   Resp 16   Ht 1.803 m (5' 11\")   Wt 93.9 kg (207 lb)   SpO2 96%   BMI 28.87 kg/m   Body mass index is 28.87 kg/m .  GENERAL: Pleasant, well appearing male.  HEENT: PERRL, EOMI, oropharynx clear.  NECK: " supple, no thyromegaly or thyroid masses, no lymphadenopathy.  CV: irr irr, no murmurs, rubs or gallops.  LUNGS: Clear to auscultation bilaterally, normal effort.  ABDOMEN: Soft, non-distended, non-tender.  No hepatosplenomegaly or palpable masses.    SKIN: warm and dry without obvious rashes.   EXTREMITIES: No edema, normal pulses.     ASSESSMENT/PLAN:                                                    1. Abdominal aortic aneurysm (AAA) without rupture (H)  Due for follow-up U/S 12/2019. Future order placed.  - US Abdominal Aorta Imaging; Future    2. Chronic atrial fibrillation (H)  Stable. Refilled medication.  Follows with cardiology as well.  - rivaroxaban ANTICOAGULANT (XARELTO) 20 MG TABS tablet; Take 1 tablet (20 mg) by mouth daily (with dinner)  Dispense: 90 tablet; Refill: 3    3. Hyperlipidemia LDL goal <100  Well controlled. Refilled medication. Check labs.    - atorvastatin (LIPITOR) 80 MG tablet; Take 1 tablet (80 mg) by mouth daily  Dispense: 90 tablet; Refill: 3  - Lipid panel reflex to direct LDL Fasting    4. 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  - atorvastatin (LIPITOR) 80 MG tablet; Take 1 tablet (80 mg) by mouth daily  Dispense: 90 tablet; Refill: 3    5. HTN, goal below 130/80  Well controlled. Refilled medication. Check labs.    - lisinopril-hydrochlorothiazide (PRINZIDE/ZESTORETIC) 20-12.5 MG tablet; Take 2 tablets by mouth daily  Dispense: 180 tablet; Refill: 3  - amLODIPine (NORVASC) 5 MG tablet; Take 1 tablet (5 mg) by mouth daily  Dispense: 90 tablet; Refill: 3  - Basic metabolic panel    6. Esophagitis  Well controlled. Refilled medication.     - omeprazole (PRILOSEC) 40 MG DR capsule; Take 1 capsule (40 mg) by mouth daily  Dispense: 90 capsule; Refill: 3    7. Carey's esophagus without dysplasia  Well controlled. Refilled medication.   Life long PPI indicated. Next EGD due 7/2020.  - omeprazole (PRILOSEC) 40 MG DR  capsule; Take 1 capsule (40 mg) by mouth daily  Dispense: 90 capsule; Refill: 3

## 2019-04-26 NOTE — PATIENT INSTRUCTIONS
Our Clinic hours are:  Mondays    7:20 am - 7 pm  Tues -  Fri  7:20 am - 5 pm    Clinic Phone: [phone]    The clinic lab opens at 7:30 am Mon - Fri and appointments are required.    [facility]. [phone]  Monday  8 am - 7pm  Tues - Fri 8 am - 5:30 pm

## 2019-05-02 ENCOUNTER — TRANSFERRED RECORDS (OUTPATIENT)
Dept: HEALTH INFORMATION MANAGEMENT | Facility: CLINIC | Age: 79
End: 2019-05-02

## 2019-06-21 ENCOUNTER — OFFICE VISIT (OUTPATIENT)
Dept: CARDIOLOGY | Facility: CLINIC | Age: 79
End: 2019-06-21
Attending: INTERNAL MEDICINE
Payer: COMMERCIAL

## 2019-06-21 VITALS
BODY MASS INDEX: 29.12 KG/M2 | DIASTOLIC BLOOD PRESSURE: 78 MMHG | OXYGEN SATURATION: 99 % | HEART RATE: 50 BPM | SYSTOLIC BLOOD PRESSURE: 137 MMHG | WEIGHT: 208.8 LBS

## 2019-06-21 DIAGNOSIS — I25.10 CORONARY ARTERY DISEASE INVOLVING NATIVE CORONARY ARTERY OF NATIVE HEART WITHOUT ANGINA PECTORIS: ICD-10-CM

## 2019-06-21 DIAGNOSIS — R00.1 BRADYCARDIA: ICD-10-CM

## 2019-06-21 DIAGNOSIS — I25.5 ISCHEMIC CARDIOMYOPATHY: ICD-10-CM

## 2019-06-21 PROCEDURE — 99214 OFFICE O/P EST MOD 30 MIN: CPT | Performed by: INTERNAL MEDICINE

## 2019-06-21 NOTE — PROGRESS NOTES
HPI and Plan:   See dictation [id number]    No orders of the defined types were placed in this encounter.      No orders of the defined types were placed in this encounter.      There are no discontinued medications.      Encounter Diagnoses   Name Primary?     Bradycardia      Ischemic cardiomyopathy      Coronary artery disease involving native coronary artery of native heart without angina pectoris        CURRENT MEDICATIONS:  Current Outpatient Medications   Medication Sig Dispense Refill     amLODIPine (NORVASC) 5 MG tablet Take 1 tablet (5 mg) by mouth daily 90 tablet 3     aspirin 81 MG chewable tablet Take 1 tablet (81 mg) by mouth daily 36 tablet 3     atorvastatin (LIPITOR) 80 MG tablet Take 1 tablet (80 mg) by mouth daily 90 tablet 3     lisinopril-hydrochlorothiazide (PRINZIDE/ZESTORETIC) 20-12.5 MG tablet Take 2 tablets by mouth daily 180 tablet 3     Multiple Vitamin (MULTI-VITAMIN DAILY PO) Take 1 tablet by mouth daily       Multiple Vitamins-Minerals (ICAPS PO) Take 1 tablet by mouth 2 times daily        omeprazole (PRILOSEC) 40 MG DR capsule Take 1 capsule (40 mg) by mouth daily 90 capsule 3     rivaroxaban ANTICOAGULANT (XARELTO) 20 MG TABS tablet Take 1 tablet (20 mg) by mouth daily (with dinner) 90 tablet 3       ALLERGIES     Allergies   Allergen Reactions     Nka [No Known Allergies]        PAST MEDICAL HISTORY:  Past Medical History:   Diagnosis Date     Abdominal aneurysm without mention of rupture 6/24/2005    Repaired with percutaneous stent 8/05   Has annual CT scan to monitor stent placement. Last done in sept 2012 and stable      Atrial fibrillation (H)      CAD 4/5/2005 12/05/12 Patient denies any recent chest pain or NTG use.  Patient is taking meds regularly and denies significant side effects. Last stress test many years ago, but bikes 30-40 mi/day without symptoms       Colon polyp, hyperplastic 5/14/2013    Hyperplastic  Polyp on colonoscopy 5-2013. Recommend that the next  colonoscopy be in 5 years because of family history      Family history of tremor 7/14/2014    father      Former smoker 7/14/2014     HTN, goal below 130/80 5/10/2012    History of AAA      Hyperlipidaemia      Hyperlipidemia LDL goal <100 10/31/2010     Hypertension      Macular degeneration, wet (H) 4/22/2013     Myocardial infarction (H)      S/P CABG (coronary artery bypass graft) 7/14/2014       PAST SURGICAL HISTORY:  Past Surgical History:   Procedure Laterality Date     BYPASS CARDIOPULMONARY  12/1996    CA bypass x2 LAD     CARDIAC SURGERY       CYSTECTOMY PILONIDAL       ESOPHAGOSCOPY, GASTROSCOPY, DUODENOSCOPY (EGD), COMBINED N/A 9/4/2015    Procedure: COMBINED ESOPHAGOSCOPY, GASTROSCOPY, DUODENOSCOPY (EGD), BIOPSY SINGLE OR MULTIPLE;  Surgeon: Fuad Holder MD;  Location: [facility]     ESOPHAGOSCOPY, GASTROSCOPY, DUODENOSCOPY (EGD), COMBINED N/A 9/20/2016    Procedure: COMBINED ESOPHAGOSCOPY, GASTROSCOPY, DUODENOSCOPY (EGD), BIOPSY SINGLE OR MULTIPLE;  Surgeon: Lucas Dang MD;  Location: [facility]     ESOPHAGOSCOPY, GASTROSCOPY, DUODENOSCOPY (EGD), COMBINED N/A 7/12/2018    Procedure: COMBINED ESOPHAGOSCOPY, GASTROSCOPY, DUODENOSCOPY (EGD), BIOPSY SINGLE OR MULTIPLE;  gastroscopy;  Surgeon: Richard Crowder MD;  Location: [facility]     HERNIA REPAIR       VASECTOMY         FAMILY HISTORY:  Family History   Problem Relation Age of Onset     Cancer - colorectal Mother      Cardiovascular Father      Alcohol/Drug Father      Neurologic Disorder Father         tremor     Cardiovascular Brother      Breast Cancer Sister      Cancer Brother         bladder cancer       SOCIAL HISTORY:  Social History     Socioeconomic History     Marital status:      Spouse name: Soledad You     Number of children: 2     Years of education: 12     Highest education level: None   Occupational History     Employer: RETIRED   Social Needs     Financial resource strain: None     Food insecurity:     Worry:  None     Inability: None     Transportation needs:     Medical: None     Non-medical: None   Tobacco Use     Smoking status: Former Smoker     Packs/day: 0.50     Years: 10.00     Pack years: 5.00     Types: Cigarettes     Last attempt to quit: 1995     Years since quittin.5     Smokeless tobacco: Never Used   Substance and Sexual Activity     Alcohol use: No     Comment: beer occ,     Drug use: No     Sexual activity: Not Currently     Partners: Female   Lifestyle     Physical activity:     Days per week: None     Minutes per session: None     Stress: None   Relationships     Social connections:     Talks on phone: None     Gets together: None     Attends Congregation service: None     Active member of club or organization: None     Attends meetings of clubs or organizations: None     Relationship status: None     Intimate partner violence:     Fear of current or ex partner: None     Emotionally abused: None     Physically abused: None     Forced sexual activity: None   Other Topics Concern     Parent/sibling w/ CABG, MI or angioplasty before 65F 55M? Yes   Social History Narrative        [employer]    Nonsmoker as of 2  Yrs ago    Drinks wine daily    Lives with wife kelly in [place]       Review of Systems:  Skin:  Negative       Eyes:  Positive for glasses    ENT:  Negative      Respiratory:  Negative       Cardiovascular:  Negative      Gastroenterology: Negative      Genitourinary:  Negative      Musculoskeletal:  Negative      Neurologic:  Positive for tremors    Psychiatric:  Negative      Heme/Lymph/Imm:  Negative      Endocrine:  Negative        Physical Exam:  Vitals: /78 (BP Location: Right arm, Patient Position: Sitting, Cuff Size: Adult Regular)   Pulse 50   Wt 94.7 kg (208 lb 12.8 oz)   SpO2 99%   BMI 29.12 kg/m      Constitutional:  cooperative, alert and oriented, well developed, well nourished, in no acute distress        Skin:  warm and dry to the touch          Head:   normocephalic        Eyes:  sclera white        Lymph:      ENT:           Neck:           Respiratory:  normal breath sounds, clear to auscultation, normal A-P diameter, normal symmetry, normal respiratory excursion, no use of accessory muscles         Cardiac: regular rhythm;normal S1 and S2;no S3 or S4 irregularly irregular rhythm   no presence of murmur                                                   GI:           Extremities and Muscular Skeletal:                 Neurological:  no gross motor deficits;affect appropriate        Psych:  Alert and Oriented x 3;affect appropriate, oriented to time, person and place          CC  Shauna Armstrong MD  [id number]  [place] [id number]

## 2019-06-21 NOTE — LETTER
6/21/2019      Kamari Rojas MD  [address]  [facility] [address]      RE: Richard You       Dear Colleague,    I had the pleasure of seeing Richard You in the [facility].    Service Date: 06/21/2019      CARDIOLOGY FOLLOWUP      PATIENT HISTORY:  Mr. Richard You returned to [facility] in [place] accompanied by his wife.  He is now 79 years old and is seen for a history of coronary artery disease, but is also followed for asymptomatic bradycardia.  He has previously seen Dr. Reveles and has also had followup with Dr. Marques of Electrophysiology.      I had recommended a Holter monitor before this visit, but Mr. You and his wife declined until they could talk to me.  It turns out when he was hospitalized last year and left the hospital, he was given an event monitor, and it would sound a warning when his heart rate fell below a certain level.  As a result, it would sound the alarm several times during hours of sleep, awakening Mr. You and his wife, and they did not want to experience that again.  Apparently, the event monitor was used for nearly a month.      He states that he has not had any lightheadedness, near syncope or syncope.  He feels good.  He assured me with his wife's agreement that he is very physically active during the day.  He works part time as a baker and arises very early.  He notes that he has no difficulty keeping up with the work.      During 07/2018, he was hospitalized at [facility] after a transfer from the [facility].  He was transferred for asymptomatic bradycardia, and his metoprolol was stopped.  It has not been restarted.  Dr. Marques did not feel a pacemaker was indicated, and Mr. You has no interest in a pacemaker as he has been completely asymptomatic.        He has a history of coronary artery disease and prior coronary artery bypass graft surgery.  In 1996, a LIMA graft was placed to the LAD,  and a saphenous vein graft was also placed to the diagonal artery.  He had a mild to moderate decrease in left ventricular systolic performance with an ejection fraction last estimated at 40%-45%, also, during 07/2018.  There was anteroseptal and apical hypokineses.  He had mild aortic root dilation.  There had been evidence of ventricular tachycardia in 2015, and so he underwent repeat coronary angiography.  At that time, the LIMA graft was noted to be small, but patent.  The vein graft to the diagonal vessel was also patent.  The left main coronary artery had a 20% stenosis.  The LAD had a flush occlusion and was grafted, as described.  There was a small and normal ramus branch.  The right coronary artery was a large and dominant vessel with mild to moderate disease.      Mr. You had questions about using Inderal for his essential tremor.  When I explained Inderal is very much like metoprolol, he and his wife realized that he could not use the medication.  He has atrial fibrillation and is on anticoagulation with Xarelto.  Primidone had been used, but is contraindicated with Xarelto or Eliquis.  Even warfarin needs to be very carefully monitored.      As noted, Mr. You denies any chest discomfort, exertional intolerance or dyspnea.  His lipids from 04/2019 indicated an LDL of 84, HDL 47 and triglycerides 139 mg/dL.  He is currently on Xarelto as well as low-dose aspirin.  He is also on statin therapy, and he is on lisinopril/hydrochlorothiazide as well as amlodipine for hypertension.      PHYSICAL EXAMINATION:   GENERAL:  This is a man in no apparent distress who does indeed appear relatively strong and moves relatively well.   VITAL SIGNS:  Blood pressure 137/78 mmHg, heart rate 50 beats per minute and irregular and respiratory rate 14-18 per minute.   CHEST:  Clear to auscultation.   CARDIAC:  On cardiac auscultation, there was an S1 and S2 without extra sounds.  The rhythm was irregular.       ASSESSMENT/RECOMMENDATIONS:  At this time, Mr. Blanco is doing very well.  He is on appropriate medical therapy for his coronary artery disease, and he remains asymptomatic.  He actually underwent a coronary angiogram only 4 years ago, and for being 20 years status post bypass surgery at that time, he had excellent coronary blood supply through his grafts and his native vessels.      With regard to his bradycardia, he understands that he is likely to some day require permanent pacemaker placement, but is aware of the appropriate measures to take if he has any symptoms.  He will contact us and understands to look for near syncope, syncope or suddenly decreased exertional tolerance.  He is not that interested in a monitor right now, as he feels he can self monitor for symptoms.      With regard to the atrial fibrillation, he is appropriately on anticoagulation to reduce the risk of stroke.      I have recommended a 24 hour Holter monitor next year before his visit, and he and his wife are aware of.      He has a mild cardiomyopathy, ischemic, and he is on vasodilator therapy.  He is not on beta blockade for the reason described above, but he has had very stable left ventricular systolic performance.         DEWEY PABLO MD, [facility]             D: 2019   T: 2019   MT: nitish      Name:     EDWIGE BLANCO   MRN:      [id number]        Account:      [id number]   :      1940           Service Date: 2019      Document: [id number]         Outpatient Encounter Medications as of 2019   Medication Sig Dispense Refill     amLODIPine (NORVASC) 5 MG tablet Take 1 tablet (5 mg) by mouth daily 90 tablet 3     aspirin 81 MG chewable tablet Take 1 tablet (81 mg) by mouth daily 36 tablet 3     atorvastatin (LIPITOR) 80 MG tablet Take 1 tablet (80 mg) by mouth daily 90 tablet 3     lisinopril-hydrochlorothiazide (PRINZIDE/ZESTORETIC) 20-12.5 MG tablet Take 2 tablets by mouth daily 180 tablet 3      Multiple Vitamin (MULTI-VITAMIN DAILY PO) Take 1 tablet by mouth daily       Multiple Vitamins-Minerals (ICAPS PO) Take 1 tablet by mouth 2 times daily        omeprazole (PRILOSEC) 40 MG DR capsule Take 1 capsule (40 mg) by mouth daily 90 capsule 3     rivaroxaban ANTICOAGULANT (XARELTO) 20 MG TABS tablet Take 1 tablet (20 mg) by mouth daily (with dinner) 90 tablet 3     No facility-administered encounter medications on file as of 6/21/2019.        Again, thank you for allowing me to participate in the care of your patient.      Sincerely,    Shauna Armstrong MD     [facility]

## 2019-06-21 NOTE — LETTER
6/21/2019    Kamari Rojas MD  [address]  [facility] [address]    RE: Richard You       Dear Colleague,    I had the pleasure of seeing Richard You in the [facility].    HPI and Plan:   See dictation [id number]    No orders of the defined types were placed in this encounter.      No orders of the defined types were placed in this encounter.      There are no discontinued medications.      Encounter Diagnoses   Name Primary?     Bradycardia      Ischemic cardiomyopathy      Coronary artery disease involving native coronary artery of native heart without angina pectoris        CURRENT MEDICATIONS:  Current Outpatient Medications   Medication Sig Dispense Refill     amLODIPine (NORVASC) 5 MG tablet Take 1 tablet (5 mg) by mouth daily 90 tablet 3     aspirin 81 MG chewable tablet Take 1 tablet (81 mg) by mouth daily 36 tablet 3     atorvastatin (LIPITOR) 80 MG tablet Take 1 tablet (80 mg) by mouth daily 90 tablet 3     lisinopril-hydrochlorothiazide (PRINZIDE/ZESTORETIC) 20-12.5 MG tablet Take 2 tablets by mouth daily 180 tablet 3     Multiple Vitamin (MULTI-VITAMIN DAILY PO) Take 1 tablet by mouth daily       Multiple Vitamins-Minerals (ICAPS PO) Take 1 tablet by mouth 2 times daily        omeprazole (PRILOSEC) 40 MG DR capsule Take 1 capsule (40 mg) by mouth daily 90 capsule 3     rivaroxaban ANTICOAGULANT (XARELTO) 20 MG TABS tablet Take 1 tablet (20 mg) by mouth daily (with dinner) 90 tablet 3       ALLERGIES     Allergies   Allergen Reactions     Nka [No Known Allergies]        PAST MEDICAL HISTORY:  Past Medical History:   Diagnosis Date     Abdominal aneurysm without mention of rupture 6/24/2005    Repaired with percutaneous stent 8/05   Has annual CT scan to monitor stent placement. Last done in sept 2012 and stable      Atrial fibrillation (H)      CAD 4/5/2005 12/05/12 Patient denies any recent chest pain or NTG use.  Patient is taking meds regularly  and denies significant side effects. Last stress test many years ago, but bikes 30-40 mi/day without symptoms       Colon polyp, hyperplastic 5/14/2013    Hyperplastic  Polyp on colonoscopy 5-2013. 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       PAST SURGICAL HISTORY:  Past Surgical History:   Procedure Laterality Date     BYPASS CARDIOPULMONARY  12/1996    CA bypass x2 LAD     CARDIAC SURGERY       CYSTECTOMY PILONIDAL       ESOPHAGOSCOPY, GASTROSCOPY, DUODENOSCOPY (EGD), COMBINED N/A 9/4/2015    Procedure: COMBINED ESOPHAGOSCOPY, GASTROSCOPY, DUODENOSCOPY (EGD), BIOPSY SINGLE OR MULTIPLE;  Surgeon: Fuad Holder MD;  Location: [facility]     ESOPHAGOSCOPY, GASTROSCOPY, DUODENOSCOPY (EGD), COMBINED N/A 9/20/2016    Procedure: COMBINED ESOPHAGOSCOPY, GASTROSCOPY, DUODENOSCOPY (EGD), BIOPSY SINGLE OR MULTIPLE;  Surgeon: Lucas Dang MD;  Location: [facility]     ESOPHAGOSCOPY, GASTROSCOPY, DUODENOSCOPY (EGD), COMBINED N/A 7/12/2018    Procedure: COMBINED ESOPHAGOSCOPY, GASTROSCOPY, DUODENOSCOPY (EGD), BIOPSY SINGLE OR MULTIPLE;  gastroscopy;  Surgeon: Richard Crowder MD;  Location: [facility]     HERNIA REPAIR       VASECTOMY         FAMILY HISTORY:  Family History   Problem Relation Age of Onset     Cancer - colorectal Mother      Cardiovascular Father      Alcohol/Drug Father      Neurologic Disorder Father         tremor     Cardiovascular Brother      Breast Cancer Sister      Cancer Brother         bladder cancer       SOCIAL HISTORY:  Social History     Socioeconomic History     Marital status:      Spouse name: Soledad MARTINSb  Keny[place]     Number of children: 2     Years of education: 12     Highest education level: None   Occupational History     Employer: RETIRED   Social Needs     Financial resource strain: None     Food insecurity:     Worry: None     Inability: None     Transportation needs:     Medical: None     Non-medical: None   Tobacco Use     Smoking status: Former Smoker     Packs/day: 0.50     Years: 10.00     Pack years: 5.00     Types: Cigarettes     Last attempt to quit: 1995     Years since quittin.5     Smokeless tobacco: Never Used   Substance and Sexual Activity     Alcohol use: No     Comment: beer occ,     Drug use: No     Sexual activity: Not Currently     Partners: Female   Lifestyle     Physical activity:     Days per week: None     Minutes per session: None     Stress: None   Relationships     Social connections:     Talks on phone: None     Gets together: None     Attends Anglican service: None     Active member of club or organization: None     Attends meetings of clubs or organizations: None     Relationship status: None     Intimate partner violence:     Fear of current or ex partner: None     Emotionally abused: None     Physically abused: None     Forced sexual activity: None   Other Topics Concern     Parent/sibling w/ CABG, MI or angioplasty before 65F 55M? Yes   Social History Narrative        Baker    Nonsmoker as of 2  Yrs ago    Drinks wine daily    Lives with wife kelly in [place]       Review of Systems:  Skin:  Negative       Eyes:  Positive for glasses    ENT:  Negative      Respiratory:  Negative       Cardiovascular:  Negative      Gastroenterology: Negative      Genitourinary:  Negative      Musculoskeletal:  Negative      Neurologic:  Positive for tremors    Psychiatric:  Negative      Heme/Lymph/Imm:  Negative      Endocrine:  Negative        Physical Exam:  Vitals: /78 (BP Location: Right arm, Patient Position: Sitting, Cuff Size: Adult Regular)   Pulse 50   Wt  94.7 kg (208 lb 12.8 oz)   SpO2 99%   BMI 29.12 kg/m       Constitutional:  cooperative, alert and oriented, well developed, well nourished, in no acute distress        Skin:  warm and dry to the touch          Head:  normocephalic        Eyes:  sclera white        Lymph:      ENT:           Neck:           Respiratory:  normal breath sounds, clear to auscultation, normal A-P diameter, normal symmetry, normal respiratory excursion, no use of accessory muscles         Cardiac: regular rhythm;normal S1 and S2;no S3 or S4 irregularly irregular rhythm   no presence of murmur                                                   GI:           Extremities and Muscular Skeletal:                 Neurological:  no gross motor deficits;affect appropriate        Psych:  Alert and Oriented x 3;affect appropriate, oriented to time, person and place          CC  Shauna Armstrong MD  [address]  [place] [address]                    Thank you for allowing me to participate in the care of your patient.      Sincerely,     Shauna Armstrong MD     [facility]    cc:   Shauna Armstrong MD  [address]  [place] [id number]

## 2019-06-24 NOTE — PROGRESS NOTES
Service Date: 06/21/2019      CARDIOLOGY FOLLOWUP      PATIENT HISTORY:  Mr. Richard You returned to [facility] in [place] accompanied by his wife.  He is now 79 years old and is seen for a history of coronary artery disease, but is also followed for asymptomatic bradycardia.  He has previously seen Dr. Reveles and has also had followup with Dr. Marques of Electrophysiology.      I had recommended a Holter monitor before this visit, but Mr. You and his wife declined until they could talk to me.  It turns out when he was hospitalized last year and left the hospital, he was given an event monitor, and it would sound a warning when his heart rate fell below a certain level.  As a result, it would sound the alarm several times during hours of sleep, awakening Mr. You and his wife, and they did not want to experience that again.  Apparently, the event monitor was used for nearly a month.      He states that he has not had any lightheadedness, near syncope or syncope.  He feels good.  He assured me with his wife's agreement that he is very physically active during the day.  He works part time as a baker and arises very early.  He notes that he has no difficulty keeping up with the work.      During 07/2018, he was hospitalized at [facility] after a transfer from the [facility].  He was transferred for asymptomatic bradycardia, and his metoprolol was stopped.  It has not been restarted.  Dr. Marques did not feel a pacemaker was indicated, and Mr. You has no interest in a pacemaker as he has been completely asymptomatic.      He has a history of coronary artery disease and prior coronary artery bypass graft surgery.  In 1996, a LIMA graft was placed to the LAD, and a saphenous vein graft was also placed to the diagonal artery.  He had a mild to moderate decrease in left ventricular systolic performance with an ejection fraction last estimated at 40%-45%, also, during 07/2018.  There was  anteroseptal and apical hypokineses.  He had mild aortic root dilation.  There had been evidence of ventricular tachycardia in 2015, and so he underwent repeat coronary angiography.  At that time, the LIMA graft was noted to be small, but patent.  The vein graft to the diagonal vessel was also patent.  The left main coronary artery had a 20% stenosis.  The LAD had a flush occlusion and was grafted, as described.  There was a small and normal ramus branch.  The right coronary artery was a large and dominant vessel with mild to moderate disease.      Mr. You had questions about using Inderal for his essential tremor.  When I explained Inderal is very much like metoprolol, he and his wife realized that he could not use the medication.  He has atrial fibrillation and is on anticoagulation with Xarelto.  Primidone had been used, but is contraindicated with Xarelto or Eliquis.  Even warfarin needs to be very carefully monitored.      As noted, Mr. You denies any chest discomfort, exertional intolerance or dyspnea.  His lipids from 04/2019 indicated an LDL of 84, HDL 47 and triglycerides 139 mg/dL.  He is currently on Xarelto as well as low-dose aspirin.  He is also on statin therapy, and he is on lisinopril/hydrochlorothiazide as well as amlodipine for hypertension.      PHYSICAL EXAMINATION:   GENERAL:  This is a man in no apparent distress who does indeed appear relatively strong and moves relatively well.   VITAL SIGNS:  Blood pressure 137/78 mmHg, heart rate 50 beats per minute and irregular and respiratory rate 14-18 per minute.   CHEST:  Clear to auscultation.   CARDIAC:  On cardiac auscultation, there was an S1 and S2 without extra sounds.  The rhythm was irregular.      ASSESSMENT/RECOMMENDATIONS:  At this time, Mr. You is doing very well.  He is on appropriate medical therapy for his coronary artery disease, and he remains asymptomatic.  He actually underwent a coronary angiogram only 4 years ago, and  for being 20 years status post bypass surgery at that time, he had excellent coronary blood supply through his grafts and his native vessels.      With regard to his bradycardia, he understands that he is likely to some day require permanent pacemaker placement, but is aware of the appropriate measures to take if he has any symptoms.  He will contact us and understands to look for near syncope, syncope or suddenly decreased exertional tolerance.  He is not that interested in a monitor right now, as he feels he can self monitor for symptoms.      With regard to the atrial fibrillation, he is appropriately on anticoagulation to reduce the risk of stroke.      I have recommended a 24 hour Holter monitor next year before his visit, and he and his wife are aware of.      He has a mild cardiomyopathy, ischemic, and he is on vasodilator therapy.  He is not on beta blockade for the reason described above, but he has had very stable left ventricular systolic performance.         DEWEY PABLO MD, [facility]             D: 2019   T: 2019   MT: nitish      Name:     EDWIGE BLANCO   MRN:      [id number]        Account:      [id number]   :      1940           Service Date: 2019      Document: [id number]

## 2019-09-30 ENCOUNTER — HEALTH MAINTENANCE LETTER (OUTPATIENT)
Age: 79
End: 2019-09-30

## 2019-10-10 ENCOUNTER — ALLIED HEALTH/NURSE VISIT (OUTPATIENT)
Dept: FAMILY MEDICINE | Facility: CLINIC | Age: 79
End: 2019-10-10
Payer: COMMERCIAL

## 2019-10-10 DIAGNOSIS — Z23 NEED FOR PROPHYLACTIC VACCINATION AND INOCULATION AGAINST INFLUENZA: Primary | ICD-10-CM

## 2019-10-10 PROCEDURE — G0008 ADMIN INFLUENZA VIRUS VAC: HCPCS

## 2019-10-10 PROCEDURE — 99207 ZZC NO CHARGE NURSE ONLY: CPT

## 2019-10-10 PROCEDURE — 90662 IIV NO PRSV INCREASED AG IM: CPT

## 2019-12-15 ENCOUNTER — HEALTH MAINTENANCE LETTER (OUTPATIENT)
Age: 79
End: 2019-12-15

## 2020-05-12 DIAGNOSIS — I48.20 CHRONIC ATRIAL FIBRILLATION (H): ICD-10-CM

## 2020-05-12 RX ORDER — RIVAROXABAN 20 MG/1
TABLET, FILM COATED ORAL
Qty: 30 TABLET | Refills: 0 | Status: SHIPPED | OUTPATIENT
Start: 2020-05-12 | End: 2020-05-15

## 2020-05-12 NOTE — LETTER
[facility]  [address]  [facility] [address]  Phone: [phone]       May 12, 2020         Richard You  [address] [facility] [address]            Dear Richard:    We are concerned about your health care.  We recently provided you with medication refills.  Many medications require routine follow-up with your doctor.    Your prescription(s) have been refilled for 30 days so you may have time for the above noted follow-up. Please call to schedule soon so we can assure you have an appointment before your next refills are needed.    Thank you,      Kamari Rojas MD/ Ling GOODMAN RN

## 2020-05-12 NOTE — TELEPHONE ENCOUNTER
Requested Prescriptions   Pending Prescriptions Disp Refills     XARELTO ANTICOAGULANT 20 MG TABS tablet [Pharmacy Med Name: Xarelto 20 MG Oral Tablet] 90 tablet 0     Sig: TAKE 1 TABLET BY MOUTH ONCE DAILY WITH DINNER       Direct Oral Anticoagulant Agents Failed - 5/12/2020  9:38 AM        Failed - Normal Platelets on file in past 12 months     Recent Labs   Lab Test 07/25/18  1320                  Failed - Creatinine Clearance greater than 50 ml/min on file in past 3 mos     No lab results found.          Failed - Serum creatinine less than or equal to 1.4 on file in past 3 mos     Recent Labs   Lab Test 04/26/19  0821   CR 1.10       Ok to refill medication if creatinine is low          Failed - Recent (6 mo) or future (30 days) visit within the authorizing provider's specialty        Passed - Medication is active on med list        Passed - Patient is 18 years of age or older           Last Written Prescription Date:  4/26/19  Last Fill Quantity: 90,  # refills: 3   Last office visit: 4/26/2019 with prescribing provider:  Bob   Future Office Visit:

## 2020-05-12 NOTE — LETTER
[facility]  [address]  [facility] [address]  Phone: [phone]       May 12, 2020         Richard You  [address] [facility] [address]            Dear Richard:    We are concerned about your health care.  We recently provided you with medication refills.  Many medications require routine follow-up with your doctor.    Your prescription(s) have been refilled for 30 days so you may have time for the above noted follow-up. Please call to schedule soon so we can assure you have an appointment before your next refills are needed.    Thank you,      Kamari Rojas MD/ Ling GOODMAN RN

## 2020-05-15 ENCOUNTER — VIRTUAL VISIT (OUTPATIENT)
Dept: FAMILY MEDICINE | Facility: CLINIC | Age: 80
End: 2020-05-15
Payer: COMMERCIAL

## 2020-05-15 DIAGNOSIS — I25.810 CORONARY ATHEROSCLEROSIS OF AUTOLOGOUS VEIN BYPASS GRAFT WITHOUT ANGINA: ICD-10-CM

## 2020-05-15 DIAGNOSIS — I71.40 ABDOMINAL AORTIC ANEURYSM (AAA) WITHOUT RUPTURE (H): ICD-10-CM

## 2020-05-15 DIAGNOSIS — K22.70 BARRETT'S ESOPHAGUS WITHOUT DYSPLASIA: Chronic | ICD-10-CM

## 2020-05-15 DIAGNOSIS — E78.5 HYPERLIPIDEMIA LDL GOAL <100: ICD-10-CM

## 2020-05-15 DIAGNOSIS — K20.90 ESOPHAGITIS: ICD-10-CM

## 2020-05-15 DIAGNOSIS — I10 HTN, GOAL BELOW 130/80: ICD-10-CM

## 2020-05-15 DIAGNOSIS — I48.20 CHRONIC ATRIAL FIBRILLATION (H): ICD-10-CM

## 2020-05-15 PROCEDURE — 99214 OFFICE O/P EST MOD 30 MIN: CPT | Mod: 95 | Performed by: FAMILY MEDICINE

## 2020-05-15 RX ORDER — ATORVASTATIN CALCIUM 80 MG/1
80 TABLET, FILM COATED ORAL DAILY
Qty: 90 TABLET | Refills: 3 | Status: SHIPPED | OUTPATIENT
Start: 2020-05-15 | End: 2021-04-01

## 2020-05-15 RX ORDER — LISINOPRIL AND HYDROCHLOROTHIAZIDE 12.5; 2 MG/1; MG/1
2 TABLET ORAL DAILY
Qty: 180 TABLET | Refills: 3 | Status: SHIPPED | OUTPATIENT
Start: 2020-05-15 | End: 2021-04-01

## 2020-05-15 RX ORDER — OMEPRAZOLE 40 MG/1
40 CAPSULE, DELAYED RELEASE ORAL DAILY
Qty: 90 CAPSULE | Refills: 3 | Status: SHIPPED | OUTPATIENT
Start: 2020-05-15 | End: 2021-04-01

## 2020-05-15 RX ORDER — AMLODIPINE BESYLATE 5 MG/1
5 TABLET ORAL DAILY
Qty: 90 TABLET | Refills: 3 | Status: SHIPPED | OUTPATIENT
Start: 2020-05-15 | End: 2021-04-01

## 2020-05-15 NOTE — PROGRESS NOTES
"Richard You is a 80 year old male who is being evaluated via a billable telephone visit.      The patient has been notified of following:     \"This telephone visit will be conducted via a call between you and your physician/provider. We have found that certain health care needs can be provided without the need for a physical exam.  This service lets us provide the care you need with a short phone conversation.  If a prescription is necessary we can send it directly to your pharmacy.  If lab work is needed we can place an order for that and you can then stop by our lab to have the test done at a later time.    Telephone visits are billed at different rates depending on your insurance coverage. During this emergency period, for some insurers they may be billed the same as an in-person visit.  Please reach out to your insurance provider with any questions.    If during the course of the call the physician/provider feels a telephone visit is not appropriate, you will not be charged for this service.\"    Patient has given verbal consent for Telephone visit?  Yes    What phone number would you like to be contacted at? [phone]    How would you like to obtain your AVS? Disha Brantley     Richard You is a 80 year old male who presents to clinic today for the following health issues:    HPI  Hyperlipidemia Follow-Up      Are you regularly taking any medication or supplement to lower your cholesterol?   Yes- Lipitor    Are you having muscle aches or other side effects that you think could be caused by your cholesterol lowering medication?  No    Hypertension Follow-up      Do you check your blood pressure regularly outside of the clinic? Yes     Are you following a low salt diet? Yes    Are your blood pressures ever more than 140 on the top number (systolic) OR more   than 90 on the bottom number (diastolic), for example 140/90? No      How many servings of fruits and vegetables do you eat daily?  2-3    On " average, how many sweetened beverages do you drink each day (Examples: soda, juice, sweet tea, etc.  Do NOT count diet or artificially sweetened beverages)?   1 and 1 red wine a day    How many days per week do you exercise enough to make your heart beat faster? 3 or less    How many minutes a day do you exercise enough to make your heart beat faster? 10 - 19    How many days per week do you miss taking your medication? 0             Patient Active Problem List   Diagnosis     Coronary atherosclerosis     Abdominal aortic aneurysm (H)     Benign shuddering attack     Hyperlipidemia LDL goal <100     HTN, goal below 130/80     [facility]     Macular degeneration, wet (H)     Colon polyp, hyperplastic     Heart murmur     Former smoker     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)     Esophagitis     Carey's esophagus     Arteriosclerotic vascular disease     Benign essential tremor     Bradycardia     Chronic atrial fibrillation     Past Surgical History:   Procedure Laterality Date     BYPASS CARDIOPULMONARY  12/1996    CA bypass x2 LAD     CARDIAC SURGERY       CYSTECTOMY PILONIDAL       ESOPHAGOSCOPY, GASTROSCOPY, DUODENOSCOPY (EGD), COMBINED N/A 9/4/2015    Procedure: COMBINED ESOPHAGOSCOPY, GASTROSCOPY, DUODENOSCOPY (EGD), BIOPSY SINGLE OR MULTIPLE;  Surgeon: Fuad Holder MD;  Location: [facility]     ESOPHAGOSCOPY, GASTROSCOPY, DUODENOSCOPY (EGD), COMBINED N/A 9/20/2016    Procedure: COMBINED ESOPHAGOSCOPY, GASTROSCOPY, DUODENOSCOPY (EGD), BIOPSY SINGLE OR MULTIPLE;  Surgeon: Lucas Dang MD;  Location: [facility]     ESOPHAGOSCOPY, GASTROSCOPY, DUODENOSCOPY (EGD), COMBINED N/A 7/12/2018    Procedure: COMBINED ESOPHAGOSCOPY, GASTROSCOPY, DUODENOSCOPY (EGD), BIOPSY SINGLE OR MULTIPLE;  gastroscopy;  Surgeon: Richard Crowder MD;  Location: [facility]     HERNIA REPAIR       VASECTOMY         Social History     Tobacco Use     Smoking status: Former Smoker     Packs/day: 0.50     Years:  10.00     Pack years: 5.00     Types: Cigarettes     Last attempt to quit: 1995     Years since quittin.5     Smokeless tobacco: Never Used   Substance Use Topics     Alcohol use: No     Comment: beer occ, and wine     Family History   Problem Relation Age of Onset     Cancer - colorectal Mother      Cardiovascular Father      Alcohol/Drug Father      Neurologic Disorder Father         tremor     Cardiovascular Brother      Coronary Artery Disease Brother      Breast Cancer Sister      Cancer Brother         bladder cancer         Current Outpatient Medications   Medication Sig Dispense Refill     amLODIPine (NORVASC) 5 MG tablet Take 1 tablet (5 mg) by mouth daily 90 tablet 3     aspirin 81 MG chewable tablet Take 1 tablet (81 mg) by mouth daily 36 tablet 3     atorvastatin (LIPITOR) 80 MG tablet Take 1 tablet (80 mg) by mouth daily 90 tablet 3     lisinopril-hydrochlorothiazide (ZESTORETIC) 20-12.5 MG tablet Take 2 tablets by mouth daily 180 tablet 3     Multiple Vitamin (MULTI-VITAMIN DAILY PO) Take 1 tablet by mouth daily       Multiple Vitamins-Minerals (ICAPS PO) Take 1 tablet by mouth 2 times daily        omeprazole (PRILOSEC) 40 MG DR capsule Take 1 capsule (40 mg) by mouth daily 90 capsule 3     rivaroxaban ANTICOAGULANT (XARELTO ANTICOAGULANT) 20 MG TABS tablet Take 1 tablet (20 mg) by mouth daily (with dinner) 90 tablet 3     Allergies   Allergen Reactions     Nka [No Known Allergies]        Reviewed and updated as needed this visit by Provider  Tobacco  Allergies  Meds  Problems  Med Hx  Surg Hx  Fam Hx         Review of Systems   Constitutional, neuro, ENT, endocrine, pulmonary, cardiac, gastrointestinal, genitourinary, musculoskeletal, integument and psychiatric systems are negative, except as otherwise noted.        Objective   Reported vitals:  There were no vitals taken for this visit.   healthy, alert and no distress  PSYCH: Alert and oriented times 3; coherent speech, normal    rate and volume, able to articulate logical thoughts, able   to abstract reason, no tangential thoughts, no hallucinations   or delusions  His affect is normal and pleasant  RESP: No cough, no audible wheezing, able to talk in full sentences  Remainder of exam unable to be completed due to telephone visits.     Diagnostic Test Results:  Labs reviewed in Epic        Assessment/Plan:  1. HTN, goal below 130/80  Well controlled. Refilled medication. Check labs.    - amLODIPine (NORVASC) 5 MG tablet; Take 1 tablet (5 mg) by mouth daily  Dispense: 90 tablet; Refill: 3  - lisinopril-hydrochlorothiazide (ZESTORETIC) 20-12.5 MG tablet; Take 2 tablets by mouth daily  Dispense: 180 tablet; Refill: 3  - Basic metabolic panel; Future    2. Abdominal aortic aneurysm (H)  Due for repeat imaging as per vascular. Order released so he can schedule this.  - US Aorta/Ivc/Iliac Duplex Complete    3. Hyperlipidemia LDL goal <100  Well controlled. Refilled medication. Check labs.    - atorvastatin (LIPITOR) 80 MG tablet; Take 1 tablet (80 mg) by mouth daily  Dispense: 90 tablet; Refill: 3  - Lipid panel reflex to direct LDL Fasting; Future    4. Coronary atherosclerosis of autologous vein bypass graft without angina  Well controlled. Refilled medication.     - atorvastatin (LIPITOR) 80 MG tablet; Take 1 tablet (80 mg) by mouth daily  Dispense: 90 tablet; Refill: 3    5. Esophagitis  Stable. Refilled medication.    - omeprazole (PRILOSEC) 40 MG DR capsule; Take 1 capsule (40 mg) by mouth daily  Dispense: 90 capsule; Refill: 3    6. Carey's esophagus without dysplasia  Stable. Refilled medication.  follow-up EGD due in July.  - omeprazole (PRILOSEC) 40 MG DR capsule; Take 1 capsule (40 mg) by mouth daily  Dispense: 90 capsule; Refill: 3    7. Chronic atrial fibrillation  Stable. Refilled medication.    - rivaroxaban ANTICOAGULANT (XARELTO ANTICOAGULANT) 20 MG TABS tablet; Take 1 tablet (20 mg) by mouth daily (with dinner)  Dispense: 90  tablet; Refill: 3    Return in about 2 weeks (around 5/29/2020).      Phone call duration:  15 minutes    Kamari Rojas MD

## 2020-05-26 ENCOUNTER — HOSPITAL ENCOUNTER (OUTPATIENT)
Dept: ULTRASOUND IMAGING | Facility: CLINIC | Age: 80
Discharge: HOME | End: 2020-05-26
Attending: RADIOLOGY | Admitting: RADIOLOGY
Payer: COMMERCIAL

## 2020-05-26 PROCEDURE — 93978 VASCULAR STUDY: CPT

## 2020-05-27 ENCOUNTER — TELEPHONE (OUTPATIENT)
Dept: OTHER | Facility: CLINIC | Age: 80
End: 2020-05-27

## 2020-05-27 DIAGNOSIS — I71.40 ABDOMINAL AORTIC ANEURYSM (H): Primary | ICD-10-CM

## 2020-05-27 NOTE — TELEPHONE ENCOUNTER
Left message on VM, need to review ultrasound results with patient.  La Zacarias RN  IR nurse clinician  [phone]

## 2020-05-27 NOTE — PROGRESS NOTES
Called patient with results.  Ultrasound shows possible increase in the aneurysm sac.  Recommend CTA for further evaluation.  Patient agreed to plan.  Will contact central scheduling to contact patient to schedule at [facility].  La Zacarias RN  IR nurse clinician  [phone]    ULTRASOUND  OF THE ABDOMINAL AORTA  5/26/2020 10:00 AM      HISTORY: Patient is status post endovascular repair of an infrarenal  abdominal aortic aneurysm in 2005.     COMPARISON: 12/19/2017     FINDINGS:  An abdominal aortic endograft is patent.  The maximum  diameter of the abdominal aorta is 6.1 cm versus 5.0 cm on the  previous exam. There is no evidence for an endoleak. The right common  iliac artery measures 1.6 cm and the left common iliac artery measures  2.0 cm versus 1.0 and 0.9 cm respectively on the prior exam.                                                                         IMPRESSION:  Patent endograft. No evidence for endoleak.  An abdominal  aortic aneurysm sac measures larger than noted on the previous exam.  In addition, diameters of the common iliac arteries are larger than  noted on the previous exam. Therefore, suggest further evaluation with  a CT angiogram of the abdomen and pelvis.     SOCORRO SHEEHAN MD

## 2020-05-29 ENCOUNTER — TRANSFERRED RECORDS (OUTPATIENT)
Dept: HEALTH INFORMATION MANAGEMENT | Facility: CLINIC | Age: 80
End: 2020-05-29

## 2020-06-01 ENCOUNTER — HOSPITAL ENCOUNTER (OUTPATIENT)
Dept: CT IMAGING | Facility: CLINIC | Age: 80
Discharge: HOME | End: 2020-06-01
Attending: RADIOLOGY | Admitting: RADIOLOGY
Payer: COMMERCIAL

## 2020-06-01 DIAGNOSIS — I71.40 ABDOMINAL AORTIC ANEURYSM (H): ICD-10-CM

## 2020-06-01 LAB
CREAT BLD-MCNC: 1.1 MG/DL (ref 0.66–1.25)
GFR SERPL CREATININE-BSD FRML MDRD: 64 ML/MIN/{1.73_M2}

## 2020-06-01 PROCEDURE — 25000125 ZZHC RX 250: Performed by: RADIOLOGY

## 2020-06-01 PROCEDURE — 82565 ASSAY OF CREATININE: CPT

## 2020-06-01 PROCEDURE — 25000128 H RX IP 250 OP 636: Performed by: RADIOLOGY

## 2020-06-01 PROCEDURE — 74174 CTA ABD&PLVS W/CONTRAST: CPT

## 2020-06-01 RX ORDER — IOPAMIDOL 755 MG/ML
80 INJECTION, SOLUTION INTRAVASCULAR ONCE
Status: COMPLETED | OUTPATIENT
Start: 2020-06-01 | End: 2020-06-01

## 2020-06-01 RX ADMIN — SODIUM CHLORIDE 100 ML: 9 INJECTION, SOLUTION INTRAVENOUS at 08:26

## 2020-06-01 RX ADMIN — IOPAMIDOL 80 ML: 755 INJECTION, SOLUTION INTRAVENOUS at 08:26

## 2020-06-02 ENCOUNTER — TELEPHONE (OUTPATIENT)
Dept: OTHER | Facility: CLINIC | Age: 80
End: 2020-06-02

## 2020-06-02 DIAGNOSIS — I71.40 ABDOMINAL AORTIC ANEURYSM (H): Primary | ICD-10-CM

## 2020-06-02 DIAGNOSIS — N28.89 MASS OF LEFT KIDNEY: Primary | ICD-10-CM

## 2020-06-02 NOTE — TELEPHONE ENCOUNTER
Reviewed CTA done on 6/2/2020 with Dr. Wright.  No endoleak detected.  Does not appear to be significant increase in sac size compared to previous CTA imaging.  Recommend annual CTA.   Patient verbalizes understanding.  La Zacarias RN  IR nurse clinician  [phone]  EXAM: MULTIPHASE CT ARTERIOGRAPHY OF THE ABDOMEN AND PELVIS     TECHNIQUE: Multiphase helical acquisition through the abdomen and  pelvis was performed including nonenhanced, and arterial phase images  before and after the administration of 80 mL Isovue 370 intravenous  contrast. 2D and 3D reconstructions were performed by the CT  technologist. Dose reduction techniques were used.     INDICATION: Abdominal aortic aneurysm status post endovascular stent  graft repair, follow-up.     COMPARISON: 12/14/2016      ARTERIAL FINDINGS:   Postsurgical changes of aortobiiliac stent graft reconstruction of the  infrarenal abdominal aortic aneurysm. Stent graft appears widely  patent and in appropriate location. There is no evidence of endoleak.  The aneurysm sac measures 5.3 x 4.7 cm in maximal diameter.  Previously, on 12/14/2016, the aneurysm sac measured 5.0 x 4.6 cm. No  significant change in bilateral iliac artery aneurysms measuring 2.4  on the right and 2.1 on the left. Remaining iliac arteries and  visualized lower extremity arteries are unremarkable. INDIRA origin  occlusion with proximal reconstitution. Remaining visceral arteries  are unremarkable.     NON-ARTERIAL FINDINGS:  Cardiomegaly. Coronary artery calcifications. Respiratory motion  artifact with mild bibasilar atelectasis. No pleural effusion or  pneumothorax.     Suboptimal evaluation of the solid organs due to the arterial phase of  contrast enhancement. Postsurgical changes of cholecystectomy.  Cortical renal thinning. The lower pole the left kidney, there is a  1.5 cm mildly hyperdense mass which previously measured 0.8 cm. The  right kidney, right adrenal gland, liver, pancreas, spleen  are  unremarkable. Left adrenal nodule stable from 2016.     No abnormally dilated loops of bowel. No free fluid or free air.  Diverticulosis.     Scattered degenerative disease. No suspicious bony abnormality.  Fat-containing right inguinal hernia.                                                                      IMPRESSION:  1. Post surgical changes of aortobiiliac stent graft reconstruction of  the infrarenal abdominal aortic aneurysm. Stent graft remain slightly  patent and in good position. The excluded aneurysm sac is slightly  increased in size from the prior study measuring 5.8 x 4.7 cm.  Previously measuring 5.0 x 4.6 cm on 12/14/2016. No visualized  endoleak is seen.  2. Mildly hyperdense mass seen along the lower pole left kidney  increased in size from 12/14/2016, now measuring 1.5 cm in maximal  diameter. Finding is likely a hemorrhagic or proteinaceous cyst.  However, recommend ultrasound for further evaluation to rule out a  solid mass.  3. Diverticulosis     ANTHONY SAMUEL MD

## 2020-06-03 NOTE — TELEPHONE ENCOUNTER
Please call patient:    CT scan showed a mass of the left kidney.  The radiologist thought it was likely a hemorrhagic or proteinaceous cyst (which would be benign), but did recommend an ultrasound to further characterize this and rule out a solid mass.    Ultrasound ordered - call [phone] to schedule    Ailyn Jackson M.D.

## 2020-06-04 ENCOUNTER — HOSPITAL ENCOUNTER (OUTPATIENT)
Dept: ULTRASOUND IMAGING | Facility: CLINIC | Age: 80
Discharge: HOME | End: 2020-06-04
Attending: FAMILY MEDICINE | Admitting: FAMILY MEDICINE
Payer: COMMERCIAL

## 2020-06-04 DIAGNOSIS — N28.89 MASS OF LEFT KIDNEY: ICD-10-CM

## 2020-06-04 PROCEDURE — 76770 US EXAM ABDO BACK WALL COMP: CPT

## 2020-06-04 PROCEDURE — 76775 US EXAM ABDO BACK WALL LIM: CPT

## 2020-06-04 NOTE — RESULT ENCOUNTER NOTE
Good news- the lesion on the left kidney is a cyst.  No additional follow up is needed, these are benign.    Ailyn Jackson M.D.

## 2020-09-15 ENCOUNTER — ALLIED HEALTH/NURSE VISIT (OUTPATIENT)
Dept: FAMILY MEDICINE | Facility: CLINIC | Age: 80
End: 2020-09-15
Payer: COMMERCIAL

## 2020-09-15 DIAGNOSIS — Z23 NEED FOR PROPHYLACTIC VACCINATION AND INOCULATION AGAINST INFLUENZA: Primary | ICD-10-CM

## 2020-09-15 PROCEDURE — G0008 ADMIN INFLUENZA VIRUS VAC: HCPCS

## 2020-09-15 PROCEDURE — 99207 ZZC NO CHARGE NURSE ONLY: CPT

## 2020-09-15 PROCEDURE — 90662 IIV NO PRSV INCREASED AG IM: CPT

## 2020-09-21 ENCOUNTER — TRANSFERRED RECORDS (OUTPATIENT)
Dept: HEALTH INFORMATION MANAGEMENT | Facility: CLINIC | Age: 80
End: 2020-09-21

## 2020-12-21 ENCOUNTER — TRANSFERRED RECORDS (OUTPATIENT)
Dept: HEALTH INFORMATION MANAGEMENT | Facility: CLINIC | Age: 80
End: 2020-12-21

## 2021-01-15 ENCOUNTER — HEALTH MAINTENANCE LETTER (OUTPATIENT)
Age: 81
End: 2021-01-15

## 2021-01-25 ENCOUNTER — TRANSFERRED RECORDS (OUTPATIENT)
Dept: HEALTH INFORMATION MANAGEMENT | Facility: CLINIC | Age: 81
End: 2021-01-25

## 2021-02-08 ENCOUNTER — IMMUNIZATION (OUTPATIENT)
Dept: PEDIATRICS | Facility: CLINIC | Age: 81
End: 2021-02-08
Payer: COMMERCIAL

## 2021-02-08 PROCEDURE — 0001A PR COVID VAC PFIZER DIL RECON 30 MCG/0.3 ML IM: CPT

## 2021-02-08 PROCEDURE — 91300 PR COVID VAC PFIZER DIL RECON 30 MCG/0.3 ML IM: CPT

## 2021-03-01 ENCOUNTER — IMMUNIZATION (OUTPATIENT)
Dept: PEDIATRICS | Facility: CLINIC | Age: 81
End: 2021-03-01
Attending: INTERNAL MEDICINE
Payer: COMMERCIAL

## 2021-03-01 PROCEDURE — 91300 PR COVID VAC PFIZER DIL RECON 30 MCG/0.3 ML IM: CPT

## 2021-03-01 PROCEDURE — 0002A PR COVID VAC PFIZER DIL RECON 30 MCG/0.3 ML IM: CPT

## 2021-04-01 ENCOUNTER — OFFICE VISIT (OUTPATIENT)
Dept: FAMILY MEDICINE | Facility: CLINIC | Age: 81
End: 2021-04-01
Payer: COMMERCIAL

## 2021-04-01 VITALS
SYSTOLIC BLOOD PRESSURE: 136 MMHG | OXYGEN SATURATION: 98 % | TEMPERATURE: 97.8 F | HEART RATE: 51 BPM | WEIGHT: 195 LBS | DIASTOLIC BLOOD PRESSURE: 68 MMHG | HEIGHT: 72 IN | BODY MASS INDEX: 26.41 KG/M2

## 2021-04-01 DIAGNOSIS — K20.90 ESOPHAGITIS: ICD-10-CM

## 2021-04-01 DIAGNOSIS — G25.0 BENIGN ESSENTIAL TREMOR: ICD-10-CM

## 2021-04-01 DIAGNOSIS — K22.70 BARRETT'S ESOPHAGUS WITHOUT DYSPLASIA: Chronic | ICD-10-CM

## 2021-04-01 DIAGNOSIS — I48.20 CHRONIC ATRIAL FIBRILLATION (H): Primary | ICD-10-CM

## 2021-04-01 DIAGNOSIS — I10 HTN, GOAL BELOW 130/80: ICD-10-CM

## 2021-04-01 DIAGNOSIS — I71.40 ABDOMINAL AORTIC ANEURYSM (AAA) WITHOUT RUPTURE (H): ICD-10-CM

## 2021-04-01 DIAGNOSIS — L98.9 SKIN LESION: ICD-10-CM

## 2021-04-01 DIAGNOSIS — E78.5 HYPERLIPIDEMIA LDL GOAL <100: ICD-10-CM

## 2021-04-01 DIAGNOSIS — I25.810 CORONARY ATHEROSCLEROSIS OF AUTOLOGOUS VEIN BYPASS GRAFT WITHOUT ANGINA: ICD-10-CM

## 2021-04-01 LAB
ALBUMIN SERPL-MCNC: 3.7 G/DL (ref 3.4–5)
ALP SERPL-CCNC: 79 U/L (ref 40–150)
ALT SERPL W P-5'-P-CCNC: 28 U/L (ref 0–70)
ANION GAP SERPL CALCULATED.3IONS-SCNC: 3 MMOL/L (ref 3–14)
AST SERPL W P-5'-P-CCNC: 24 U/L (ref 0–45)
BILIRUB SERPL-MCNC: 0.9 MG/DL (ref 0.2–1.3)
BUN SERPL-MCNC: 21 MG/DL (ref 7–30)
CALCIUM SERPL-MCNC: 9.3 MG/DL (ref 8.5–10.1)
CHLORIDE SERPL-SCNC: 106 MMOL/L (ref 94–109)
CHOLEST SERPL-MCNC: 141 MG/DL
CO2 SERPL-SCNC: 28 MMOL/L (ref 20–32)
CREAT SERPL-MCNC: 1.11 MG/DL (ref 0.66–1.25)
GFR SERPL CREATININE-BSD FRML MDRD: 61 ML/MIN/{1.73_M2}
GLUCOSE SERPL-MCNC: 91 MG/DL (ref 70–99)
HDLC SERPL-MCNC: 56 MG/DL
LDLC SERPL CALC-MCNC: 62 MG/DL
NONHDLC SERPL-MCNC: 85 MG/DL
POTASSIUM SERPL-SCNC: 4.9 MMOL/L (ref 3.4–5.3)
PROT SERPL-MCNC: 7.8 G/DL (ref 6.8–8.8)
SODIUM SERPL-SCNC: 137 MMOL/L (ref 133–144)
TRIGL SERPL-MCNC: 116 MG/DL

## 2021-04-01 PROCEDURE — 80061 LIPID PANEL: CPT | Performed by: FAMILY MEDICINE

## 2021-04-01 PROCEDURE — 36415 COLL VENOUS BLD VENIPUNCTURE: CPT | Performed by: FAMILY MEDICINE

## 2021-04-01 PROCEDURE — 99214 OFFICE O/P EST MOD 30 MIN: CPT | Performed by: FAMILY MEDICINE

## 2021-04-01 PROCEDURE — 80053 COMPREHEN METABOLIC PANEL: CPT | Performed by: FAMILY MEDICINE

## 2021-04-01 RX ORDER — AMLODIPINE BESYLATE 5 MG/1
5 TABLET ORAL DAILY
Qty: 90 TABLET | Refills: 4 | Status: SHIPPED | OUTPATIENT
Start: 2021-04-01 | End: 2022-03-16

## 2021-04-01 RX ORDER — ATORVASTATIN CALCIUM 80 MG/1
80 TABLET, FILM COATED ORAL DAILY
Qty: 90 TABLET | Refills: 4 | Status: SHIPPED | OUTPATIENT
Start: 2021-04-01 | End: 2022-03-16

## 2021-04-01 RX ORDER — LISINOPRIL AND HYDROCHLOROTHIAZIDE 12.5; 2 MG/1; MG/1
2 TABLET ORAL DAILY
Qty: 180 TABLET | Refills: 4 | Status: SHIPPED | OUTPATIENT
Start: 2021-04-01 | End: 2022-03-16

## 2021-04-01 RX ORDER — OMEPRAZOLE 40 MG/1
40 CAPSULE, DELAYED RELEASE ORAL DAILY
Qty: 90 CAPSULE | Refills: 4 | Status: SHIPPED | OUTPATIENT
Start: 2021-04-01 | End: 2022-03-16

## 2021-04-01 RX ORDER — GABAPENTIN 100 MG/1
100 CAPSULE ORAL 2 TIMES DAILY
Qty: 60 CAPSULE | Refills: 1 | Status: SHIPPED | OUTPATIENT
Start: 2021-04-01 | End: 2022-03-16

## 2021-04-01 ASSESSMENT — MIFFLIN-ST. JEOR: SCORE: 1617.02

## 2021-04-01 NOTE — PROGRESS NOTES
Assessment & Plan     Chronic atrial fibrillation (H)  Stable. Refilled medication.  Follows with cardiology.  - rivaroxaban ANTICOAGULANT (XARELTO ANTICOAGULANT) 20 MG TABS tablet; Take 1 tablet (20 mg) by mouth daily (with dinner)    HTN, goal below 130/80  Well controlled. Refilled medication. Check labs.    - amLODIPine (NORVASC) 5 MG tablet; Take 1 tablet (5 mg) by mouth daily  - lisinopril-hydrochlorothiazide (ZESTORETIC) 20-12.5 MG tablet; Take 2 tablets by mouth daily  - Comprehensive metabolic panel    Esophagitis  Stable. Refilled medication.    - omeprazole (PRILOSEC) 40 MG DR capsule; Take 1 capsule (40 mg) by mouth daily    Carey's esophagus without dysplasia  We discussed EGD.  He is not inclined to do one at this time. He indicates he would not treat esophageal cancer even if it is found and would prefer not to do EGD. Shared decision making discussion. Discussed potential for progression to esophageal cancer. Questions answered and risks discussed.   - omeprazole (PRILOSEC) 40 MG DR capsule; Take 1 capsule (40 mg) by mouth daily    Hyperlipidemia LDL goal <100  Stable. Refilled medication and checked labs.    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 Refilled medication.    - atorvastatin (LIPITOR) 80 MG tablet; Take 1 tablet (80 mg) by mouth daily    Benign essential tremor  Risk of drug interaction with mysoline and Xarelto. He would like to retry gabapentin. Follow-up if not improving or if worsening.   - gabapentin (NEURONTIN) 100 MG capsule; Take 1 capsule (100 mg) by mouth 2 times daily    Skin lesion  Facial lesions worrisome for BCC. Follow-up with dermatology.  - DERMATOLOGY ADULT REFERRAL; Future    Abdominal aortic aneurysm (AAA) without rupture (H)  U/S up to date. Follows with vascular.           BMI:   Estimated body mass  "index is 26.7 kg/m  as calculated from the following:    Height as of this encounter: 1.82 m (5' 11.65\").    Weight as of this encounter: 88.5 kg (195 lb).           No follow-ups on file.    Kamari Rojas MD  [facility]    Fercho Ramos is a 80 year old who presents for the following health issues     HPI     Hyperlipidemia Follow-Up      Are you regularly taking any medication or supplement to lower your cholesterol?   Yes- statin    Are you having muscle aches or other side effects that you think could be caused by your cholesterol lowering medication?  No    Hypertension Follow-up      Do you check your blood pressure regularly outside of the clinic? Yes     Are you following a low salt diet? Yes    Are your blood pressures ever more than 140 on the top number (systolic) OR more   than 90 on the bottom number (diastolic), for example 140/90? Yes- Not very often      How many servings of fruits and vegetables do you eat daily?  2-3    On average, how many sweetened beverages do you drink each day (Examples: soda, juice, sweet tea, etc.  Do NOT count diet or artificially sweetened beverages)?   Half can of pop    How many days per week do you exercise enough to make your heart beat faster? 6    How many minutes a day do you exercise enough to make your heart beat faster? 30 - 60    How many days per week do you miss taking your medication? 0        Review of Systems   Constitutional, neuro, ENT, endocrine, pulmonary, cardiac, gastrointestinal, genitourinary, musculoskeletal, integument and psychiatric systems are negative, except as otherwise noted.       Objective    /68   Pulse 51   Temp 97.8  F (36.6  C) (Tympanic)   Ht 1.82 m (5' 11.65\")   Wt 88.5 kg (195 lb)   SpO2 98%   BMI 26.70 kg/m    Body mass index is 26.7 kg/m .  Physical Exam   GENERAL: Pleasant, well appearing male.  HEENT: PERRL, EOMI.  NECK: supple, no thyromegaly or thyroid masses, no " lymphadenopathy.  CV: RRR, no murmurs, rubs or gallops.  LUNGS: Clear to auscultation bilaterally, normal effort.  ABDOMEN: Soft, non-distended, non-tender.  No hepatosplenomegaly or palpable masses.    SKIN: ulcerated lesion right cheek.  EXTREMITIES: No edema, normal pulses.

## 2021-04-07 ENCOUNTER — OFFICE VISIT (OUTPATIENT)
Dept: DERMATOLOGY | Facility: CLINIC | Age: 81
End: 2021-04-07
Payer: COMMERCIAL

## 2021-04-07 VITALS
HEIGHT: 72 IN | DIASTOLIC BLOOD PRESSURE: 72 MMHG | HEART RATE: 56 BPM | SYSTOLIC BLOOD PRESSURE: 155 MMHG | BODY MASS INDEX: 26.45 KG/M2

## 2021-04-07 DIAGNOSIS — C44.319 BASAL CELL CARCINOMA (BCC) OF JAWLINE: ICD-10-CM

## 2021-04-07 DIAGNOSIS — L82.1 SEBORRHEIC KERATOSIS: Primary | ICD-10-CM

## 2021-04-07 DIAGNOSIS — L81.4 LENTIGO: ICD-10-CM

## 2021-04-07 PROCEDURE — 11102 TANGNTL BX SKIN SINGLE LES: CPT | Performed by: DERMATOLOGY

## 2021-04-07 PROCEDURE — 88331 PATH CONSLTJ SURG 1 BLK 1SPC: CPT | Performed by: DERMATOLOGY

## 2021-04-07 PROCEDURE — 99203 OFFICE O/P NEW LOW 30 MIN: CPT | Mod: 25 | Performed by: DERMATOLOGY

## 2021-04-07 NOTE — LETTER
4/7/2021         RE: Richard You  [id number] [facility] [address]        Dear Colleague,    Thank you for referring your patient, Richard You, to the [facility]. Please see a copy of my visit note below.    Richard You , a 81 year old year old male patient, I was asked to see by Dr. Rojas for spot on jawline.  Patient states this has been present for one year.  Patient reports the following symptoms:  Not healing .  Patient reports the following previous treatments none.  Patient reports the following modifying factors none.  Associated symptoms: none.  Patient has no other skin complaints today.  Remainder of the HPI, Meds, PMH, Allergies, FH, and SH was reviewed in chart.      Past Medical History:   Diagnosis Date     Abdominal aneurysm without mention of rupture 6/24/2005    Repaired with percutaneous stent 8/05   Has annual CT scan to monitor stent placement. Last done in sept 2012 and stable      Atrial fibrillation (H)      CAD 4/5/2005 12/05/12 Patient denies any recent chest pain or NTG use.  Patient is taking meds regularly and denies significant side effects. Last stress test many years ago, but bikes 30-40 mi/day without symptoms       Colon polyp, hyperplastic 5/14/2013    Hyperplastic  Polyp on colonoscopy 5-2013. 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       Past Surgical History:   Procedure Laterality Date     BYPASS CARDIOPULMONARY  12/1996    CA bypass x2 LAD     CARDIAC SURGERY       CYSTECTOMY PILONIDAL       ESOPHAGOSCOPY, GASTROSCOPY, DUODENOSCOPY (EGD), COMBINED N/A  2015    Procedure: COMBINED ESOPHAGOSCOPY, GASTROSCOPY, DUODENOSCOPY (EGD), BIOPSY SINGLE OR MULTIPLE;  Surgeon: Fuad Holder MD;  Location: [facility]     ESOPHAGOSCOPY, GASTROSCOPY, DUODENOSCOPY (EGD), COMBINED N/A 2016    Procedure: COMBINED ESOPHAGOSCOPY, GASTROSCOPY, DUODENOSCOPY (EGD), BIOPSY SINGLE OR MULTIPLE;  Surgeon: Lucas Dang MD;  Location: [facility]     ESOPHAGOSCOPY, GASTROSCOPY, DUODENOSCOPY (EGD), COMBINED N/A 2018    Procedure: COMBINED ESOPHAGOSCOPY, GASTROSCOPY, DUODENOSCOPY (EGD), BIOPSY SINGLE OR MULTIPLE;  gastroscopy;  Surgeon: Richard Crowder MD;  Location: [facility]     HERNIA REPAIR       VASECTOMY          Family History   Problem Relation Age of Onset     Cancer - colorectal Mother      Cardiovascular Father      Alcohol/Drug Father      Neurologic Disorder Father         tremor     Cardiovascular Brother      Coronary Artery Disease Brother      Breast Cancer Sister      Cancer Brother         bladder cancer       Social History     Socioeconomic History     Marital status:      Spouse name: Soledad You     Number of children: 2     Years of education: 12     Highest education level: Not on file   Occupational History     Employer: RETIRED   Social Needs     Financial resource strain: Not on file     Food insecurity     Worry: Not on file     Inability: Not on file     Transportation needs     Medical: Not on file     Non-medical: Not on file   Tobacco Use     Smoking status: Former Smoker     Packs/day: 0.50     Years: 10.00     Pack years: 5.00     Types: Cigarettes     Quit date: 1995     Years since quittin.3     Smokeless tobacco: Never Used   Substance and Sexual Activity     Alcohol use: No     Comment: beer occ, and wine     Drug use: No     Sexual activity: Not Currently     Partners: Female   Lifestyle     Physical activity     Days per week: Not on file     Minutes per session: Not on file     Stress: Not on file   Relationships      Social connections     Talks on phone: Not on file     Gets together: Not on file     Attends Christianity service: Not on file     Active member of club or organization: Not on file     Attends meetings of clubs or organizations: Not on file     Relationship status: Not on file     Intimate partner violence     Fear of current or ex partner: Not on file     Emotionally abused: Not on file     Physically abused: Not on file     Forced sexual activity: Not on file   Other Topics Concern     Parent/sibling w/ CABG, MI or angioplasty before 65F 55M? Yes   Social History Narrative        [employer]    Nonsmoker as of 2  Yrs ago    Drinks wine daily    Lives with wife kelly in [place]       Outpatient Encounter Medications as of 4/7/2021   Medication Sig Dispense Refill     amLODIPine (NORVASC) 5 MG tablet Take 1 tablet (5 mg) by mouth daily 90 tablet 4     aspirin 81 MG chewable tablet Take 1 tablet (81 mg) by mouth daily 36 tablet 3     atorvastatin (LIPITOR) 80 MG tablet Take 1 tablet (80 mg) by mouth daily 90 tablet 4     gabapentin (NEURONTIN) 100 MG capsule Take 1 capsule (100 mg) by mouth 2 times daily 60 capsule 1     lisinopril-hydrochlorothiazide (ZESTORETIC) 20-12.5 MG tablet Take 2 tablets by mouth daily 180 tablet 4     Multiple Vitamin (MULTI-VITAMIN DAILY PO) Take 1 tablet by mouth daily       Multiple Vitamins-Minerals (ICAPS PO) Take 1 tablet by mouth 2 times daily        omeprazole (PRILOSEC) 40 MG DR capsule Take 1 capsule (40 mg) by mouth daily 90 capsule 4     rivaroxaban ANTICOAGULANT (XARELTO ANTICOAGULANT) 20 MG TABS tablet Take 1 tablet (20 mg) by mouth daily (with dinner) 90 tablet 4     No facility-administered encounter medications on file as of 4/7/2021.              Review Of Systems  Skin: As above  Eyes: negative  Ears/Nose/Throat: negative  Respiratory: No shortness of breath, dyspnea on exertion, cough, or hemoptysis  Cardiovascular: negative  Gastrointestinal:  negative  Genitourinary: negative  Musculoskeletal: negative  Neurologic: negative  Psychiatric: negative  Hematologic/Lymphatic/Immunologic: negative  Endocrine: negative      O:   NAD, WDWN, Alert & Oriented, Mood & Affect wnl, Vitals stable   Here today with wife    BP (!) 155/72   Pulse 56   Ht 1.829 m (6')   BMI 26.45 kg/m     General appearance shakila ii   Vitals stbale   Alert, oriented and in no acute distress      Stuck on papules and brown macules on trunk and ext   R jawline 8mm pink pearly papule      The remainder of expanded problem focused exam was normal; the following areas were examined:  conjunctiva/lids, face, neck, lips digits/nails, RUE, LUE.      Eyes: Conjunctivae/lids:Normal     ENT: Lips, buccal mucosa, tongue: normal    MSK:Normal    Cardiovascular: peripheral edema none    Pulm: Breathing Normal    Neuro/Psych: Orientation:Normal; Mood/Affect:Normal      MICRO:   R jawline:Orthokeratosis of epidermis with a proliferation of nests of basaloid cells, with peripheral palisading and a haphazard arrangement in the center extending into the dermis, forming nodules.  The tumor cells have hyperchromatic nuclei. Poor cytoplasm and intercellular bridging.    A/P:  1. Seborrheic keratosis, lentigo,   2. R jawline r/o basal cell carcinoma   TANGENTIAL BIOPSY IN HOUSE:  After consent, anesthesia with LEC and prep, tangential excision performed and dx above confirmed with frozen section histology.  No complications and routine wound care.  Patient is on ASA  anticoagulants and risk of bleeding discussed with patient.       I have personally reviewed all specimens and/or slides and used them with my medical judgement to determine or confirm the final diagnosis.     Patient told result basal cell carcinoma schedule excision .      It was a pleasure speaking to Richard You today.  Previous clinic  notes and pertinent laboratory tests were reviewed prior to Richard You's visit.    Nature of benign  skin lesions dicussed with patient.  Signs and Symptoms of skin cancer discussed with patient.  Patient encouraged to perform monthly skin exams.  UV precautions reviewed with patient.  Richard to follow up with Primary Care provider regarding elevated blood pressure.  Risks of non-melanoma skin cancer discussed with patient   Return to clinic next appt        Again, thank you for allowing me to participate in the care of your patient.        Sincerely,        Nikita Seo MD

## 2021-04-07 NOTE — PROGRESS NOTES
Richard You , a 81 year old year old male patient, I was asked to see by Dr. Rojas for spot on jawline.  Patient states this has been present for one year.  Patient reports the following symptoms:  Not healing .  Patient reports the following previous treatments none.  Patient reports the following modifying factors none.  Associated symptoms: none.  Patient has no other skin complaints today.  Remainder of the HPI, Meds, PMH, Allergies, FH, and SH was reviewed in chart.      Past Medical History:   Diagnosis Date     Abdominal aneurysm without mention of rupture 6/24/2005    Repaired with percutaneous stent 8/05   Has annual CT scan to monitor stent placement. Last done in sept 2012 and stable      Atrial fibrillation (H)      CAD 4/5/2005 12/05/12 Patient denies any recent chest pain or NTG use.  Patient is taking meds regularly and denies significant side effects. Last stress test many years ago, but bikes 30-40 mi/day without symptoms       Colon polyp, hyperplastic 5/14/2013    Hyperplastic  Polyp on colonoscopy 5-2013. 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       Past Surgical History:   Procedure Laterality Date     BYPASS CARDIOPULMONARY  12/1996    CA bypass x2 LAD     CARDIAC SURGERY       CYSTECTOMY PILONIDAL       ESOPHAGOSCOPY, GASTROSCOPY, DUODENOSCOPY (EGD), COMBINED N/A 9/4/2015    Procedure: COMBINED ESOPHAGOSCOPY, GASTROSCOPY, DUODENOSCOPY (EGD), BIOPSY SINGLE OR MULTIPLE;  Surgeon: Fuad Holder MD;  Location: [facility]     ESOPHAGOSCOPY, GASTROSCOPY, DUODENOSCOPY (EGD), COMBINED N/A 9/20/2016     Procedure: COMBINED ESOPHAGOSCOPY, GASTROSCOPY, DUODENOSCOPY (EGD), BIOPSY SINGLE OR MULTIPLE;  Surgeon: Lucas Dang MD;  Location: [facility]     ESOPHAGOSCOPY, GASTROSCOPY, DUODENOSCOPY (EGD), COMBINED N/A 2018    Procedure: COMBINED ESOPHAGOSCOPY, GASTROSCOPY, DUODENOSCOPY (EGD), BIOPSY SINGLE OR MULTIPLE;  gastroscopy;  Surgeon: Richard Crowder MD;  Location: [facility]     HERNIA REPAIR       VASECTOMY          Family History   Problem Relation Age of Onset     Cancer - colorectal Mother      Cardiovascular Father      Alcohol/Drug Father      Neurologic Disorder Father         tremor     Cardiovascular Brother      Coronary Artery Disease Brother      Breast Cancer Sister      Cancer Brother         bladder cancer       Social History     Socioeconomic History     Marital status:      Spouse name: Soledad You     Number of children: 2     Years of education: 12     Highest education level: Not on file   Occupational History     Employer: RETIRED   Social Needs     Financial resource strain: Not on file     Food insecurity     Worry: Not on file     Inability: Not on file     Transportation needs     Medical: Not on file     Non-medical: Not on file   Tobacco Use     Smoking status: Former Smoker     Packs/day: 0.50     Years: 10.00     Pack years: 5.00     Types: Cigarettes     Quit date: 1995     Years since quittin.3     Smokeless tobacco: Never Used   Substance and Sexual Activity     Alcohol use: No     Comment: beer occ, and wine     Drug use: No     Sexual activity: Not Currently     Partners: Female   Lifestyle     Physical activity     Days per week: Not on file     Minutes per session: Not on file     Stress: Not on file   Relationships     Social connections     Talks on phone: Not on file     Gets together: Not on file     Attends Congregational service: Not on file     Active member of club or organization: Not on file     Attends meetings of clubs or organizations: Not on  file     Relationship status: Not on file     Intimate partner violence     Fear of current or ex partner: Not on file     Emotionally abused: Not on file     Physically abused: Not on file     Forced sexual activity: Not on file   Other Topics Concern     Parent/sibling w/ CABG, MI or angioplasty before 65F 55M? Yes   Social History Narrative        [employer]    Nonsmoker as of 2  Yrs ago    Drinks wine daily    Lives with wife kelly in [place]       Outpatient Encounter Medications as of 4/7/2021   Medication Sig Dispense Refill     amLODIPine (NORVASC) 5 MG tablet Take 1 tablet (5 mg) by mouth daily 90 tablet 4     aspirin 81 MG chewable tablet Take 1 tablet (81 mg) by mouth daily 36 tablet 3     atorvastatin (LIPITOR) 80 MG tablet Take 1 tablet (80 mg) by mouth daily 90 tablet 4     gabapentin (NEURONTIN) 100 MG capsule Take 1 capsule (100 mg) by mouth 2 times daily 60 capsule 1     lisinopril-hydrochlorothiazide (ZESTORETIC) 20-12.5 MG tablet Take 2 tablets by mouth daily 180 tablet 4     Multiple Vitamin (MULTI-VITAMIN DAILY PO) Take 1 tablet by mouth daily       Multiple Vitamins-Minerals (ICAPS PO) Take 1 tablet by mouth 2 times daily        omeprazole (PRILOSEC) 40 MG DR capsule Take 1 capsule (40 mg) by mouth daily 90 capsule 4     rivaroxaban ANTICOAGULANT (XARELTO ANTICOAGULANT) 20 MG TABS tablet Take 1 tablet (20 mg) by mouth daily (with dinner) 90 tablet 4     No facility-administered encounter medications on file as of 4/7/2021.              Review Of Systems  Skin: As above  Eyes: negative  Ears/Nose/Throat: negative  Respiratory: No shortness of breath, dyspnea on exertion, cough, or hemoptysis  Cardiovascular: negative  Gastrointestinal: negative  Genitourinary: negative  Musculoskeletal: negative  Neurologic: negative  Psychiatric: negative  Hematologic/Lymphatic/Immunologic: negative  Endocrine: negative      O:   NAD, WDWN, Alert & Oriented, Mood & Affect wnl, Vitals stable   Here today  with wife    BP (!) 155/72   Pulse 56   Ht 1.829 m (6')   BMI 26.45 kg/m     General appearance shakila ii   Vitals stbale   Alert, oriented and in no acute distress      Stuck on papules and brown macules on trunk and ext   R jawline 8mm pink pearly papule      The remainder of expanded problem focused exam was normal; the following areas were examined:  conjunctiva/lids, face, neck, lips digits/nails, RUE, LUE.      Eyes: Conjunctivae/lids:Normal     ENT: Lips, buccal mucosa, tongue: normal    MSK:Normal    Cardiovascular: peripheral edema none    Pulm: Breathing Normal    Neuro/Psych: Orientation:Normal; Mood/Affect:Normal      MICRO:   R jawline:Orthokeratosis of epidermis with a proliferation of nests of basaloid cells, with peripheral palisading and a haphazard arrangement in the center extending into the dermis, forming nodules.  The tumor cells have hyperchromatic nuclei. Poor cytoplasm and intercellular bridging.    A/P:  1. Seborrheic keratosis, lentigo,   2. R jawline r/o basal cell carcinoma   TANGENTIAL BIOPSY IN HOUSE:  After consent, anesthesia with LEC and prep, tangential excision performed and dx above confirmed with frozen section histology.  No complications and routine wound care.  Patient is on xeralto anticoagulants and risk of bleeding discussed with patient.       I have personally reviewed all specimens and/or slides and used them with my medical judgement to determine or confirm the final diagnosis.     Patient told result basal cell carcinoma schedule excision .      It was a pleasure speaking to Richard You today.  Previous clinic  notes and pertinent laboratory tests were reviewed prior to Richard You's visit.    Nature of benign skin lesions dicussed with patient.  Signs and Symptoms of skin cancer discussed with patient.  Patient encouraged to perform monthly skin exams.  UV precautions reviewed with patient.  Richard to follow up with Primary Care provider regarding elevated  blood pressure.  Risks of non-melanoma skin cancer discussed with patient   Return to clinic next appt

## 2021-04-07 NOTE — PATIENT INSTRUCTIONS
Wound Care Instructions     FOR SUPERFICIAL WOUNDS     [facility] [phone]    [facility] [phone]                       AFTER 24 HOURS YOU SHOULD REMOVE THE BANDAGE AND BEGIN DAILY DRESSING CHANGES AS FOLLOWS:     1) Remove Dressing.     2) Clean and dry the area with tap water using a Q-tip or sterile gauze pad.     3) Apply Vaseline, Aquaphor, Polysporin ointment or Bacitracin ointment over entire wound.  Do NOT use Neosporin ointment.     4) Cover the wound with a band-aid, or a sterile non-stick gauze pad and micropore paper tape      REPEAT THESE INSTRUCTIONS AT LEAST ONCE A DAY UNTIL THE WOUND HAS COMPLETELY HEALED.    It is an old wives tale that a wound heals better when it is exposed to air and allowed to dry out. The wound will heal faster with a better cosmetic result if it is kept moist with ointment and covered with a bandage.    **Do not let the wound dry out.**      Supplies Needed:      *Cotton tipped applicators (Q-tips)    *Polysporin Ointment or Bacitracin Ointment (NOT NEOSPORIN)    *Band-aids or non-stick gauze pads and micropore paper tape.      PATIENT INFORMATION:    During the healing process you will notice a number of changes. All wounds develop a small halo of redness surrounding the wound.  This means healing is occurring. Severe itching with extensive redness usually indicates sensitivity to the ointment or bandage tape used to dress the wound.  You should call our office if this develops.      Swelling  and/or discoloration around your surgical site is common, particularly when performed around the eye.    All wounds normally drain.  The larger the wound the more drainage there will be.  After 7-10 days, you will notice the wound beginning to shrink and new skin will begin to grow.  The wound is healed when you can see skin has formed over the entire area.  A healed wound has a healthy, shiny look to the surface and is red to dark pink in color  to normalize.  Wounds may take approximately 4-6 weeks to heal.  Larger wounds may take 6-8 weeks.  After the wound is healed you may discontinue dressing changes.    You may experience a sensation of tightness as your wound heals. This is normal and will gradually subside.    Your healed wound may be sensitive to temperature changes. This sensitivity improves with time, but if you re having a lot of discomfort, try to avoid temperature extremes.    Patients frequently experience itching after their wound appears to have healed because of the continue healing under the skin.  Plain Vaseline will help relieve the itching.        POSSIBLE COMPLICATIONS    BLEEDIN. Leave the bandage in place.  2. Use tightly rolled up gauze or a cloth to apply direct pressure over the bandage for 30  minutes.  3. Reapply pressure for an additional 30 minutes if necessary  4. Use additional gauze and tape to maintain pressure once the bleeding has stopped.

## 2021-04-09 NOTE — PATIENT INSTRUCTIONS
Your BMI is Body mass index is Body mass index is 26.7 kg/m .     A BMI of 18.5 to 24.9 is in the healthy range. A person with a BMI of 25 to 29.9 is considered overweight, and someone with a BMI of 30 or greater is considered obese. More than two-thirds of American adults are considered overweight or obese.  Weight management is a personal decision.  If you are interested in exploring weight loss strategies, the following discussion covers the approaches that may be successful. Body mass index (BMI) is one way to tell whether you are at a healthy weight, overweight, or obese. It measures your weight in relation to your height.  Being overweight or obese increases the risk for further weight gain. Excess weight may lead to heart disease and diabetes.  Creating and following plans for healthy eating and physical activity may help you improve your health.  Weight control is part of healthy lifestyle and includes exercise, emotional health, and healthy eating habits. Careful eating habits lifelong are the mainstay of weight control. Though there are significant health benefits from weight loss, long-term weight loss with diet alone may be very difficult to achieve- studies show long-term success with dietary management alone in less than 10% of people. Attaining a healthy weight may be especially difficult to achieve in those with severe obesity. In some cases, medications, devices and surgical management might be considered.  What can you do?    Keep a food journal to help with mindful eating and finding ways to modify your diet.      Reduce the amount of processed food in your diet. Focus on adding vegetables, and lean proteins.    Reduce dietary carbohydrates. Limiting to  gm of carbohydrates per day has been shows to help boost weight loss.  If you have diabetes or are on diabetic medications do not do this without talking to your physician or healthcare provider.    Diet combined with exercise helps maintain  muscle while optimizing fat loss. Strength training is particularly important for building and maintaining muscle mass. Exercise helps reduce stress, increase energy, and improves fitness. Increasing exercise without diet control, however, may not burn enough calories to loose weight.         Start walking three days a week 10-20 minutes at a time    Work towards walking thirty minutes five days a week      Eventually, increase the speed of your walking for 1-2 minutes at time    In addition, we recommend that you review healthy lifestyles and methods for weight loss available through the National Institutes of Health patient information sites:  http://win.niddk.nih.gov/publications/index.htm    Also look into health and wellness programs that may be available through your health insurance provider, employer, local community center, or [employer].

## 2021-04-14 ENCOUNTER — OFFICE VISIT (OUTPATIENT)
Dept: DERMATOLOGY | Facility: CLINIC | Age: 81
End: 2021-04-14
Payer: COMMERCIAL

## 2021-04-14 VITALS — HEART RATE: 54 BPM | OXYGEN SATURATION: 98 %

## 2021-04-14 DIAGNOSIS — C44.319 BASAL CELL CARCINOMA (BCC) OF JAWLINE: Primary | ICD-10-CM

## 2021-04-14 PROCEDURE — 17311 MOHS 1 STAGE H/N/HF/G: CPT | Performed by: DERMATOLOGY

## 2021-04-14 PROCEDURE — 13132 CMPLX RPR F/C/C/M/N/AX/G/H/F: CPT | Performed by: DERMATOLOGY

## 2021-04-14 PROCEDURE — 99207 PR NO CHARGE LOS: CPT | Performed by: DERMATOLOGY

## 2021-04-14 NOTE — NURSING NOTE
Chief Complaint   Patient presents with     Derm Problem     mohs       Vitals:    04/14/21 0725   Pulse: 54   SpO2: 98%     Wt Readings from Last 1 Encounters:   04/01/21 88.5 kg (195 lb)       Dagmar Durant LPN.................4/14/2021

## 2021-04-14 NOTE — PATIENT INSTRUCTIONS
Sutured Wound Care     [facility]: [phone]    [facility]: [phone]    Right jawline      ? No strenuous activity for 48 hours. Resume moderate activity in 48 hours. No heavy exercising until you are seen for follow up in one week.     ? Take Tylenol as needed for discomfort.                         ? Do not drink alcoholic beverages for 48 hours.     ? Keep the pressure bandage in place for 24 hours. If the bandage becomes blood tinged or loose, reinforce it with gauze and tape.        (Refer to the reverse side of this page for management of bleeding).    ? Remove pressure bandage in 24 hours     ? Leave the flat bandage in place until your follow up appointment.    ? Keep the bandage dry. Wash around it carefully.    ? If the tape becomes soiled or starts to come off, reinforce it with additional paper tape.    ? Do not smoke for 3 weeks; smoking is detrimental to wound healing.    ? It is normal to have swelling and bruising around the surgical site. The bruising will fade in approximately 10-14 days. Elevate the area to reduce swelling.    ? Numbness, itchiness and sensitivity to temperature changes can occur after surgery and may take up to 18 months to normalize.      POSSIBLE COMPLICATIONS    BLEEDIN. Leave the bandage in place.  2. Use tightly rolled up gauze or a cloth to apply direct pressure over the bandage for 20   minutes.  3. Reapply pressure for an additional 20 minutes if necessary  4. Call the office or go to the nearest emergency room if pressure fails to stop the bleeding.  5. Use additional gauze and tape to maintain pressure once the bleeding has stopped.        PAIN:    1. Post operative pain should slowly get better, never worse.  2. A severe increase in pain may indicate a problem. Call the office if this occurs.    In case of emergency phone:Dr Seo [phone]      **In one week, remove paper tape and steri strips. Wash the incision site gently  with water. Apply new steri strips and paper tape. Leave on for one more week    WOUND CARE INSTRUCTIONS  for  ONE WEEK AFTER SURGERY          1) Leave flat bandage on your skin for one week after today s bandage change.  2) In one week when you remove the bandage, you may resume your regular skin care routine, including washing with mild soap and water, applying moisturizer, make-up and sunscreen.    3) If there are any open or bleeding areas at the incision/graft site you should begin to cover the area with a bandage daily as follows:    1) Clean and dry the area with plain tap water using a Q-tip or sterile gauze pad.  2) Apply Polysporin or Bacitracin ointment to the open area.  3) Cover the wound with a band-aid or a sterile non-stick gauze pad and micropore paper tape.         SIGNS OF INFECTION  - If you notice any of these signs of infection, call your doctor right away: expanding redness around the wound.  - Yellow or greenish-colored pus or cloudy wound drainage.    - Red streaking spreading from the wound.  - Increased swelling, tenderness, or pain around the wound.   - Fever.    Please remember that yellow and clear drainage from a wound can be normal and related to normal wound healing.  Isolated drainage from a wound without a combination of the above features does not indicate infection.       *Once the bandages are removed, the scar will be red and firm (especially in the lip/chin area). This is normal and will fade in time. It might take 6-12 months for this to happen.     *Massaging the area will help the scar soften and fade quicker. Begin to massage the area one month after the bandages have been removed. To massage apply pressure directly and firmly over the scar with the fingertips and move in a circular motion. Massage the area for a few minutes several times a day. Continue to massage the site for several months.    *Approximately 6-8 weeks after surgery it is not uncommon to see the  formation of  tender pimple-like  bump along the scar. This is normal. As the scar continues to mature and the stitches underneath the skin begin to dissolve, this might occur. Do not pick or squeeze, this will resolve on it s own. Should one break open producing a small amount of drainage, apply Polysporin or Bacitracin ointment a few times a day until the wound is completely healed.    *Numbness in the surgical area is expected. It might take 12-18 months for the feeling to return to normal. During this time sensations of itchiness, tingling and occasional sharp pains might be noted. These feelings are normal and will subside once the nerves have completely healed.         IN CASE OF EMERGENCY: Dr Seo [phone]     If you were seen in [place] call: [phone]    If you were seen in [place] call: [phone]

## 2021-04-14 NOTE — LETTER
4/14/2021         RE: Richard You  [address] [address] [address]        Dear Colleague,    Thank you for referring your patient, Richard You, to the [facility]. Please see a copy of my visit note below.    Surgical Office Location :   [facility]  [address] [place] [address]      Richard You is an extremely pleasant 81 year old year old male patient here today for evaluation and managment of basal cell carcinoma on right jawline.  Patient has no other skin complaints today.  Remainder of the HPI, Meds, PMH, Allergies, FH, and SH was reviewed in chart.      Past Medical History:   Diagnosis Date     Abdominal aneurysm without mention of rupture 6/24/2005    Repaired with percutaneous stent 8/05   Has annual CT scan to monitor stent placement. Last done in sept 2012 and stable      Atrial fibrillation (H)      Basal cell carcinoma      CAD 4/5/2005 12/05/12 Patient denies any recent chest pain or NTG use.  Patient is taking meds regularly and denies significant side effects. Last stress test many years ago, but bikes 30-40 mi/day without symptoms       Colon polyp, hyperplastic 5/14/2013    Hyperplastic  Polyp on colonoscopy 5-2013. 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       Past Surgical History:   Procedure Laterality Date     BYPASS CARDIOPULMONARY  12/1996    CA bypass x2 LAD     CARDIAC SURGERY       CYSTECTOMY PILONIDAL       ESOPHAGOSCOPY, GASTROSCOPY, DUODENOSCOPY (EGD), COMBINED N/A 9/4/2015    Procedure: COMBINED ESOPHAGOSCOPY, GASTROSCOPY, DUODENOSCOPY  (EGD), BIOPSY SINGLE OR MULTIPLE;  Surgeon: Fuad Holder MD;  Location: [facility]     ESOPHAGOSCOPY, GASTROSCOPY, DUODENOSCOPY (EGD), COMBINED N/A 2016    Procedure: COMBINED ESOPHAGOSCOPY, GASTROSCOPY, DUODENOSCOPY (EGD), BIOPSY SINGLE OR MULTIPLE;  Surgeon: Lucas Dang MD;  Location: [facility]     ESOPHAGOSCOPY, GASTROSCOPY, DUODENOSCOPY (EGD), COMBINED N/A 2018    Procedure: COMBINED ESOPHAGOSCOPY, GASTROSCOPY, DUODENOSCOPY (EGD), BIOPSY SINGLE OR MULTIPLE;  gastroscopy;  Surgeon: Richard Crowder MD;  Location: [facility]     HERNIA REPAIR       VASECTOMY          Family History   Problem Relation Age of Onset     Cancer - colorectal Mother      Cardiovascular Father      Alcohol/Drug Father      Neurologic Disorder Father         tremor     Cardiovascular Brother      Coronary Artery Disease Brother      Breast Cancer Sister      Cancer Brother         bladder cancer       Social History     Socioeconomic History     Marital status:      Spouse name: Soledad You     Number of children: 2     Years of education: 12     Highest education level: Not on file   Occupational History     Employer: RETIRED   Social Needs     Financial resource strain: Not on file     Food insecurity     Worry: Not on file     Inability: Not on file     Transportation needs     Medical: Not on file     Non-medical: Not on file   Tobacco Use     Smoking status: Former Smoker     Packs/day: 0.50     Years: 10.00     Pack years: 5.00     Types: Cigarettes     Quit date: 1995     Years since quittin.3     Smokeless tobacco: Never Used   Substance and Sexual Activity     Alcohol use: No     Comment: beer occ, and wine     Drug use: No     Sexual activity: Not Currently     Partners: Female   Lifestyle     Physical activity     Days per week: Not on file     Minutes per session: Not on file     Stress: Not on file   Relationships     Social connections     Talks on phone: Not on file     Gets together:  Not on file     Attends Rastafari service: Not on file     Active member of club or organization: Not on file     Attends meetings of clubs or organizations: Not on file     Relationship status: Not on file     Intimate partner violence     Fear of current or ex partner: Not on file     Emotionally abused: Not on file     Physically abused: Not on file     Forced sexual activity: Not on file   Other Topics Concern     Parent/sibling w/ CABG, MI or angioplasty before 65F 55M? Yes   Social History Narrative        [place]    Nonsmoker as of 2  Yrs ago    Drinks wine daily    Lives with wife kelly in [place]       Outpatient Encounter Medications as of 4/14/2021   Medication Sig Dispense Refill     amLODIPine (NORVASC) 5 MG tablet Take 1 tablet (5 mg) by mouth daily 90 tablet 4     aspirin 81 MG chewable tablet Take 1 tablet (81 mg) by mouth daily 36 tablet 3     atorvastatin (LIPITOR) 80 MG tablet Take 1 tablet (80 mg) by mouth daily 90 tablet 4     gabapentin (NEURONTIN) 100 MG capsule Take 1 capsule (100 mg) by mouth 2 times daily 60 capsule 1     lisinopril-hydrochlorothiazide (ZESTORETIC) 20-12.5 MG tablet Take 2 tablets by mouth daily 180 tablet 4     Multiple Vitamin (MULTI-VITAMIN DAILY PO) Take 1 tablet by mouth daily       Multiple Vitamins-Minerals (ICAPS PO) Take 1 tablet by mouth 2 times daily        omeprazole (PRILOSEC) 40 MG DR capsule Take 1 capsule (40 mg) by mouth daily 90 capsule 4     rivaroxaban ANTICOAGULANT (XARELTO ANTICOAGULANT) 20 MG TABS tablet Take 1 tablet (20 mg) by mouth daily (with dinner) 90 tablet 4     No facility-administered encounter medications on file as of 4/14/2021.              Review Of Systems  Skin: As above  Eyes: negative  Ears/Nose/Throat: negative  Respiratory: No shortness of breath, dyspnea on exertion, cough, or hemoptysis  Cardiovascular: negative  Gastrointestinal: negative  Genitourinary: negative  Musculoskeletal: negative  Neurologic:  negative  Psychiatric: negative  Hematologic/Lymphatic/Immunologic: negative  Endocrine: negative      O:   NAD, WDWN, Alert & Oriented, Mood & Affect wnl, Vitals stable   Here today alone   Pulse 54   SpO2 98%    General appearance normal   Vitals stable   Alert, oriented and in no acute distress     R jawline 8mm pink pearly papule       Eyes: Conjunctivae/lids:Normal     ENT: Lips, buccal mucosa, tongue: normal    MSK:Normal    Cardiovascular: peripheral edema none    Pulm: Breathing Normal    Neuro/Psych: Orientation:Alert and Orientedx3 ; Mood/Affect:normal       A/P:  1. R jawline basal cell carcinoma   MOHS:   Location    The rationale for Mohs surgery was discussed with the patient and consent was obtained.  The risks and benefits as well as alternatives to therapy were discussed, in detail.  Specifically, the risks of infection, scarring, bleeding, prolonged wound healing, incomplete removal, allergy to anesthesia, nerve injury and recurrence were addressed.  Indication for Mohs was Location. Prior to the procedure, the treatment site was clearly identified and, if available, confirmed with previous photos and confirmed by the patient   All components of the Universal Protocol/PAUSE rule were completed.  The Mohs surgeon operated in two distinct and integrated capacities as the surgeon and pathologist.      The area was prepped with Betasept.  A rim of normal appearing skin was marked circumferentially around the lesion.  The area was infiltrated with local anesthesia.  The tumor was first debulked to remove all clinically apparent tumor.  An incision following the standard Mohs approach was done and the specimen was oriented,mapped and placed in 1 block(s).  Each specimen was then chromacoded and processed in the Mohs laboratory using standard Mohs technique and submitted for frozen section histology.  Frozen section analysis showed no residual tumor but CLEAR MARGINS.      The tumor was excised using  standard Mohs technique in 1 stages(s).  CLEAR MARGINS OBTAINED and Final defect size was 1.3 cm.     We discussed the options for wound management in full with the patient including risks/benefits/ possible outcomes.        REPAIR COMPLEX: Because of the tightness of the surrounding skin and Because of the size and full thickness nature of the defect, a complex closure was planned. After LEC anesthesia and prep, Burow's triangles were excised in the relaxed skin tension lines. The wound edges were widely undermined by dissection in the subcutaneous plane until adequate tissue mobility was obtained. Hemostasis was obtained. The wound edges were closed in a layered fashion using Vicryl and Fast Absorbing Plain Gut sutures. Postoperative length was 4.2 cm.   EBL minimal; complications none; wound care routine.  The patient was discharged in good condition and will return in one week for wound evaluation.  It was a pleasure speaking to Richard You today.  Nature and genetics of benign skin lesions dicussed with patient.  Signs and Symptoms of skin cancer discussed with patient.  Patient encouraged to perform monthly skin exams.  UV precautions reviewed with patient.  Risks of non-melanoma skin cancer discussed with patient   Return to clinic 6 months        Again, thank you for allowing me to participate in the care of your patient.        Sincerely,        Nikita Seo MD

## 2021-04-14 NOTE — PROGRESS NOTES
Richard You is an extremely pleasant 81 year old year old male patient here today for evaluation and managment of basal cell carcinoma on right jawline.  Patient has no other skin complaints today.  Remainder of the HPI, Meds, PMH, Allergies, FH, and SH was reviewed in chart.      Past Medical History:   Diagnosis Date     Abdominal aneurysm without mention of rupture 6/24/2005    Repaired with percutaneous stent 8/05   Has annual CT scan to monitor stent placement. Last done in sept 2012 and stable      Atrial fibrillation (H)      Basal cell carcinoma      CAD 4/5/2005 12/05/12 Patient denies any recent chest pain or NTG use.  Patient is taking meds regularly and denies significant side effects. Last stress test many years ago, but bikes 30-40 mi/day without symptoms       Colon polyp, hyperplastic 5/14/2013    Hyperplastic  Polyp on colonoscopy 5-2013. 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       Past Surgical History:   Procedure Laterality Date     BYPASS CARDIOPULMONARY  12/1996    CA bypass x2 LAD     CARDIAC SURGERY       CYSTECTOMY PILONIDAL       ESOPHAGOSCOPY, GASTROSCOPY, DUODENOSCOPY (EGD), COMBINED N/A 9/4/2015    Procedure: COMBINED ESOPHAGOSCOPY, GASTROSCOPY, DUODENOSCOPY (EGD), BIOPSY SINGLE OR MULTIPLE;  Surgeon: Fuad Holder MD;  Location: [facility]     ESOPHAGOSCOPY, GASTROSCOPY, DUODENOSCOPY (EGD), COMBINED N/A 9/20/2016    Procedure: COMBINED ESOPHAGOSCOPY, GASTROSCOPY, DUODENOSCOPY (EGD), BIOPSY SINGLE OR MULTIPLE;  Surgeon: Lucas Dang MD;  Location: [facility]     ESOPHAGOSCOPY, GASTROSCOPY,  DUODENOSCOPY (EGD), COMBINED N/A 2018    Procedure: COMBINED ESOPHAGOSCOPY, GASTROSCOPY, DUODENOSCOPY (EGD), BIOPSY SINGLE OR MULTIPLE;  gastroscopy;  Surgeon: Richard Crowder MD;  Location: [facility]     HERNIA REPAIR       VASECTOMY          Family History   Problem Relation Age of Onset     Cancer - colorectal Mother      Cardiovascular Father      Alcohol/Drug Father      Neurologic Disorder Father         tremor     Cardiovascular Brother      Coronary Artery Disease Brother      Breast Cancer Sister      Cancer Brother         bladder cancer       Social History     Socioeconomic History     Marital status:      Spouse name: Soledad You     Number of children: 2     Years of education: 12     Highest education level: Not on file   Occupational History     Employer: RETIRED   Social Needs     Financial resource strain: Not on file     Food insecurity     Worry: Not on file     Inability: Not on file     Transportation needs     Medical: Not on file     Non-medical: Not on file   Tobacco Use     Smoking status: Former Smoker     Packs/day: 0.50     Years: 10.00     Pack years: 5.00     Types: Cigarettes     Quit date: 1995     Years since quittin.3     Smokeless tobacco: Never Used   Substance and Sexual Activity     Alcohol use: No     Comment: beer occ, and wine     Drug use: No     Sexual activity: Not Currently     Partners: Female   Lifestyle     Physical activity     Days per week: Not on file     Minutes per session: Not on file     Stress: Not on file   Relationships     Social connections     Talks on phone: Not on file     Gets together: Not on file     Attends Orthodox service: Not on file     Active member of club or organization: Not on file     Attends meetings of clubs or organizations: Not on file     Relationship status: Not on file     Intimate partner violence     Fear of current or ex partner: Not on file     Emotionally abused: Not on file     Physically abused: Not  on file     Forced sexual activity: Not on file   Other Topics Concern     Parent/sibling w/ CABG, MI or angioplasty before 65F 55M? Yes   Social History Narrative        [place]    Nonsmoker as of 2  Yrs ago    Drinks wine daily    Lives with wife kelly in [place]       Outpatient Encounter Medications as of 4/14/2021   Medication Sig Dispense Refill     amLODIPine (NORVASC) 5 MG tablet Take 1 tablet (5 mg) by mouth daily 90 tablet 4     aspirin 81 MG chewable tablet Take 1 tablet (81 mg) by mouth daily 36 tablet 3     atorvastatin (LIPITOR) 80 MG tablet Take 1 tablet (80 mg) by mouth daily 90 tablet 4     gabapentin (NEURONTIN) 100 MG capsule Take 1 capsule (100 mg) by mouth 2 times daily 60 capsule 1     lisinopril-hydrochlorothiazide (ZESTORETIC) 20-12.5 MG tablet Take 2 tablets by mouth daily 180 tablet 4     Multiple Vitamin (MULTI-VITAMIN DAILY PO) Take 1 tablet by mouth daily       Multiple Vitamins-Minerals (ICAPS PO) Take 1 tablet by mouth 2 times daily        omeprazole (PRILOSEC) 40 MG DR capsule Take 1 capsule (40 mg) by mouth daily 90 capsule 4     rivaroxaban ANTICOAGULANT (XARELTO ANTICOAGULANT) 20 MG TABS tablet Take 1 tablet (20 mg) by mouth daily (with dinner) 90 tablet 4     No facility-administered encounter medications on file as of 4/14/2021.              Review Of Systems  Skin: As above  Eyes: negative  Ears/Nose/Throat: negative  Respiratory: No shortness of breath, dyspnea on exertion, cough, or hemoptysis  Cardiovascular: negative  Gastrointestinal: negative  Genitourinary: negative  Musculoskeletal: negative  Neurologic: negative  Psychiatric: negative  Hematologic/Lymphatic/Immunologic: negative  Endocrine: negative      O:   NAD, WDWN, Alert & Oriented, Mood & Affect wnl, Vitals stable   Here today alone   Pulse 54   SpO2 98%    General appearance normal   Vitals stable   Alert, oriented and in no acute distress     R jawline 8mm pink pearly papule       Eyes:  Conjunctivae/lids:Normal     ENT: Lips, buccal mucosa, tongue: normal    MSK:Normal    Cardiovascular: peripheral edema none    Pulm: Breathing Normal    Neuro/Psych: Orientation:Alert and Orientedx3 ; Mood/Affect:normal       A/P:  1. R jawline basal cell carcinoma   MOHS:   Location    The rationale for Mohs surgery was discussed with the patient and consent was obtained.  The risks and benefits as well as alternatives to therapy were discussed, in detail.  Specifically, the risks of infection, scarring, bleeding, prolonged wound healing, incomplete removal, allergy to anesthesia, nerve injury and recurrence were addressed.  Indication for Mohs was Location. Prior to the procedure, the treatment site was clearly identified and, if available, confirmed with previous photos and confirmed by the patient   All components of the Universal Protocol/PAUSE rule were completed.  The Mohs surgeon operated in two distinct and integrated capacities as the surgeon and pathologist.      The area was prepped with Betasept.  A rim of normal appearing skin was marked circumferentially around the lesion.  The area was infiltrated with local anesthesia.  The tumor was first debulked to remove all clinically apparent tumor.  An incision following the standard Mohs approach was done and the specimen was oriented,mapped and placed in 1 block(s).  Each specimen was then chromacoded and processed in the Mohs laboratory using standard Mohs technique and submitted for frozen section histology.  Frozen section analysis showed no residual tumor but CLEAR MARGINS.      The tumor was excised using standard Mohs technique in 1 stages(s).  CLEAR MARGINS OBTAINED and Final defect size was 1.3 cm.     We discussed the options for wound management in full with the patient including risks/benefits/ possible outcomes.        REPAIR COMPLEX: Because of the tightness of the surrounding skin and Because of the size and full thickness nature of the  defect, a complex closure was planned. After LEC anesthesia and prep, Burow's triangles were excised in the relaxed skin tension lines. The wound edges were widely undermined by dissection in the subcutaneous plane until adequate tissue mobility was obtained. Hemostasis was obtained. The wound edges were closed in a layered fashion using Vicryl and Fast Absorbing Plain Gut sutures. Postoperative length was 4.2 cm.   EBL minimal; complications none; wound care routine.  The patient was discharged in good condition and will return in one week for wound evaluation.  It was a pleasure speaking to Richard You today.  Nature and genetics of benign skin lesions dicussed with patient.  Signs and Symptoms of skin cancer discussed with patient.  Patient encouraged to perform monthly skin exams.  UV precautions reviewed with patient.  Risks of non-melanoma skin cancer discussed with patient   Return to clinic 6 months

## 2021-04-14 NOTE — PROGRESS NOTES
Surgical Office Location :   [facility]  [address] [place] [address]

## 2021-04-24 ENCOUNTER — MYC MEDICAL ADVICE (OUTPATIENT)
Dept: FAMILY MEDICINE | Facility: CLINIC | Age: 81
End: 2021-04-24

## 2021-04-27 NOTE — TELEPHONE ENCOUNTER
Ok. We started him back on a low dose so there is room to increase this if he would like to try that.  Otherwise ok to remove from medication list.

## 2021-06-07 DIAGNOSIS — I10 HTN, GOAL BELOW 130/80: ICD-10-CM

## 2021-06-07 DIAGNOSIS — E78.5 HYPERLIPIDEMIA LDL GOAL <100: ICD-10-CM

## 2021-06-07 DIAGNOSIS — I25.810 CORONARY ATHEROSCLEROSIS OF AUTOLOGOUS VEIN BYPASS GRAFT WITHOUT ANGINA: ICD-10-CM

## 2021-06-08 ENCOUNTER — HOSPITAL ENCOUNTER (OUTPATIENT)
Dept: CT IMAGING | Facility: CLINIC | Age: 81
Discharge: HOME | End: 2021-06-08
Attending: RADIOLOGY | Admitting: RADIOLOGY
Payer: COMMERCIAL

## 2021-06-08 DIAGNOSIS — I71.40 ABDOMINAL AORTIC ANEURYSM (H): ICD-10-CM

## 2021-06-08 LAB
CREAT BLD-MCNC: 1.1 MG/DL (ref 0.66–1.25)
GFR SERPL CREATININE-BSD FRML MDRD: 64 ML/MIN/{1.73_M2}

## 2021-06-08 PROCEDURE — 250N000009 HC RX 250: Performed by: RADIOLOGY

## 2021-06-08 PROCEDURE — 82565 ASSAY OF CREATININE: CPT

## 2021-06-08 PROCEDURE — 250N000011 HC RX IP 250 OP 636: Performed by: RADIOLOGY

## 2021-06-08 PROCEDURE — 74174 CTA ABD&PLVS W/CONTRAST: CPT

## 2021-06-08 RX ORDER — IOPAMIDOL 755 MG/ML
80 INJECTION, SOLUTION INTRAVASCULAR ONCE
Status: COMPLETED | OUTPATIENT
Start: 2021-06-08 | End: 2021-06-08

## 2021-06-08 RX ORDER — ATORVASTATIN CALCIUM 80 MG/1
TABLET, FILM COATED ORAL
Qty: 90 TABLET | Refills: 0 | OUTPATIENT
Start: 2021-06-08

## 2021-06-08 RX ORDER — AMLODIPINE BESYLATE 5 MG/1
TABLET ORAL
Qty: 90 TABLET | Refills: 0 | OUTPATIENT
Start: 2021-06-08

## 2021-06-08 RX ORDER — LISINOPRIL AND HYDROCHLOROTHIAZIDE 12.5; 2 MG/1; MG/1
TABLET ORAL
Qty: 180 TABLET | Refills: 0 | OUTPATIENT
Start: 2021-06-08

## 2021-06-08 RX ADMIN — IOPAMIDOL 80 ML: 755 INJECTION, SOLUTION INTRAVENOUS at 08:11

## 2021-06-08 RX ADMIN — SODIUM CHLORIDE 100 ML: 9 INJECTION, SOLUTION INTRAVENOUS at 08:11

## 2021-06-09 DIAGNOSIS — I71.40 ABDOMINAL AORTIC ANEURYSM (AAA) WITHOUT RUPTURE (H): Primary | ICD-10-CM

## 2021-06-09 NOTE — RESULT ENCOUNTER NOTE
Called patient with results.  Ultrasound of left kidney was done 6/2020 which showed benign cyst.  PCP recommended no further follow up of left kidney cyst.  Aneurysm sac is stable.  Recommend 1 year f/u CTA.  If stable, further monitoring of aneurysm can be done with ultrasound.  Discussed results and recommendations with patient and wife.  La Zacarias RN  IR nurse clinician  [phone]

## 2021-10-10 NOTE — MR AVS SNAPSHOT
After Visit Summary   7/25/2018    Richard You    MRN: [id number]           Patient Information     Date Of Birth          1940        Visit Information        Provider Department      7/25/2018 7:20 AM Kamari Rojas MD [facility]        Today's Diagnoses     Bradycardia    -  1    Coronary atherosclerosis of autologous vein bypass graft without angina          Care Instructions      Admitted to [facility] Rm#  [id number]    Appt reschedule for the [facility] Mikey Cooper July 30th @ 9:40 am    [address] [facility] [place] [phone]    Thank you for choosing [facility].  You may be receiving a survey in the mail from [employer] regarding your visit today.  Please take a few minutes to complete and return the survey to let us know how we are doing.      Our Clinic hours are:  Mondays    7:20 am - 7 pm  Tues -  Fri  7:20 am - 5 pm    Clinic Phone: [phone]    The clinic lab opens at 7:30 am Mon - Fri and appointments are required.    [facility]. [phone]  Monday  8 am - 7pm  Tues - Fri 8 am - 5:30 pm                 Follow-ups after your visit        Who to contact     If you have questions or need follow up information about today's clinic visit or your schedule please contact [facility] directly at [phone].  Normal or non-critical lab and imaging results will be communicated to you by MyChart, letter or phone within 4 business days after the clinic has received the results. If you do not hear from us within 7 days, please contact the clinic through MyChart or phone. If you have a critical or abnormal lab result, we will notify you by phone as soon as possible.  Submit refill requests through [employer] or call your pharmacy and they will forward the refill request to us. Please allow 3 business days for your refill to be completed.          Additional Information About Your Visit         [employer] Information     [employer] gives you secure access to your electronic health record. If you see a primary care provider, you can also send messages to your care team and make appointments. If you have questions, please call your primary care clinic.  If you do not have a primary care provider, please call [phone] and they will assist you.        Care EveryWhere ID     This is your Care EveryWhere ID. This could be used by other organizations to access your [place] medical records  [id number]        Your Vitals Were     Pulse Temperature Respirations Height Pulse Oximetry BMI (Body Mass Index)    44 99.6  F (37.6  C) (Tympanic) 20 6' (1.829 m) 96% 27.91 kg/m2       Blood Pressure from Last 3 Encounters:   07/25/18 148/80   07/12/18 145/56   05/03/18 137/70    Weight from Last 3 Encounters:   07/25/18 205 lb 12.8 oz (93.4 kg)   07/12/18 205 lb (93 kg)   05/03/18 209 lb 9.6 oz (95.1 kg)              We Performed the Following     EKG 12-lead complete w/read - Clinics        Primary Care Provider Office Phone # Fax #    Kamari Jennifer Rojas [phone] [address] [facility] [address]        Equal Access to Services     [facility] WARD : Hadii aad ku hadasho Soomaali, waaxda luqadaha, qaybta kaalmada adeegyada, waxay idiin hayaan adeyevgeniy kharash la'felipa . So [facility] [phone].    ATENCIÓN: Si habla español, tiene a lechuga disposición servicios gratuitos de asistencia lingüística. Llame al [phone].    We comply with applicable federal civil rights laws and [place] laws. We do not discriminate on the basis of race, color, national origin, age, disability, sex, sexual orientation, or gender identity.            Thank you!     Thank you for choosing [facility]  for your care. Our goal is always to provide you with excellent care. Hearing back from our patients is one way we can continue to improve our services. Please take a few minutes to complete the  written survey that you may receive in the mail after your visit with us. Thank you!             Your Updated Medication List - Protect others around you: Learn how to safely use, store and throw away your medicines at www.disposemymeds.org.          This list is accurate as of 7/25/18 11:27 AM.  Always use your most recent med list.                   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       fish oil-omega-3 fatty acids 1000 MG capsule      2 DAILY        ICAPS PO           lisinopril-hydrochlorothiazide 20-12.5 MG per tablet    PRINZIDE/ZESTORETIC    180 tablet    Take 2 tablets by mouth daily    HTN, goal below 130/80       metoprolol succinate 100 MG 24 hr tablet    TOPROL-XL    90 tablet    Take 1 tablet (100 mg) by mouth daily    HTN, goal below 130/80, Benign essential tremor       MULTI-VITAMIN DAILY PO      Take 1 tablet by mouth daily        omeprazole 40 MG capsule    priLOSEC    90 capsule    Take 1 capsule (40 mg) by mouth daily Take 30-60 minutes before a meal.    Esophagitis, Carey's esophagus without dysplasia       primidone 50 MG tablet    MYSOLINE    60 tablet    Take 2 tablets (100 mg) by mouth At Bedtime    Benign essential tremor          AML

## 2021-10-24 ENCOUNTER — HEALTH MAINTENANCE LETTER (OUTPATIENT)
Age: 81
End: 2021-10-24

## 2021-10-26 ENCOUNTER — IMMUNIZATION (OUTPATIENT)
Dept: FAMILY MEDICINE | Facility: CLINIC | Age: 81
End: 2021-10-26
Payer: COMMERCIAL

## 2021-10-26 DIAGNOSIS — Z23 NEED FOR PROPHYLACTIC VACCINATION AND INOCULATION AGAINST INFLUENZA: Primary | ICD-10-CM

## 2021-10-26 PROCEDURE — G0008 ADMIN INFLUENZA VIRUS VAC: HCPCS

## 2021-10-26 PROCEDURE — 90662 IIV NO PRSV INCREASED AG IM: CPT

## 2021-10-26 PROCEDURE — 99207 PR NO CHARGE NURSE ONLY: CPT

## 2022-02-13 ENCOUNTER — HEALTH MAINTENANCE LETTER (OUTPATIENT)
Age: 82
End: 2022-02-13

## 2022-03-14 ENCOUNTER — TRANSFERRED RECORDS (OUTPATIENT)
Dept: HEALTH INFORMATION MANAGEMENT | Facility: CLINIC | Age: 82
End: 2022-03-14
Payer: COMMERCIAL

## 2022-03-16 ENCOUNTER — OFFICE VISIT (OUTPATIENT)
Dept: FAMILY MEDICINE | Facility: CLINIC | Age: 82
End: 2022-03-16
Payer: COMMERCIAL

## 2022-03-16 VITALS
BODY MASS INDEX: 26.28 KG/M2 | HEART RATE: 62 BPM | OXYGEN SATURATION: 98 % | DIASTOLIC BLOOD PRESSURE: 75 MMHG | SYSTOLIC BLOOD PRESSURE: 125 MMHG | RESPIRATION RATE: 16 BRPM | TEMPERATURE: 97.8 F | WEIGHT: 194 LBS | HEIGHT: 72 IN

## 2022-03-16 DIAGNOSIS — I48.20 CHRONIC ATRIAL FIBRILLATION (H): ICD-10-CM

## 2022-03-16 DIAGNOSIS — E78.5 HYPERLIPIDEMIA LDL GOAL <100: ICD-10-CM

## 2022-03-16 DIAGNOSIS — I25.810 CORONARY ATHEROSCLEROSIS OF AUTOLOGOUS VEIN BYPASS GRAFT WITHOUT ANGINA: ICD-10-CM

## 2022-03-16 DIAGNOSIS — H35.3290 EXUDATIVE AGE-RELATED MACULAR DEGENERATION, UNSPECIFIED LATERALITY, UNSPECIFIED STAGE (H): ICD-10-CM

## 2022-03-16 DIAGNOSIS — Z00.00 WELL ADULT EXAM: Primary | ICD-10-CM

## 2022-03-16 DIAGNOSIS — I10 HTN, GOAL BELOW 130/80: ICD-10-CM

## 2022-03-16 DIAGNOSIS — I71.40 ABDOMINAL AORTIC ANEURYSM (AAA) WITHOUT RUPTURE (H): ICD-10-CM

## 2022-03-16 DIAGNOSIS — G25.0 BENIGN ESSENTIAL TREMOR: ICD-10-CM

## 2022-03-16 DIAGNOSIS — K22.70 BARRETT'S ESOPHAGUS WITHOUT DYSPLASIA: Chronic | ICD-10-CM

## 2022-03-16 LAB
ANION GAP SERPL CALCULATED.3IONS-SCNC: 4 MMOL/L (ref 3–14)
BUN SERPL-MCNC: 27 MG/DL (ref 7–30)
CALCIUM SERPL-MCNC: 9.6 MG/DL (ref 8.5–10.1)
CHLORIDE BLD-SCNC: 107 MMOL/L (ref 94–109)
CHOLEST SERPL-MCNC: 137 MG/DL
CO2 SERPL-SCNC: 30 MMOL/L (ref 20–32)
CREAT SERPL-MCNC: 1.13 MG/DL (ref 0.66–1.25)
FASTING STATUS PATIENT QL REPORTED: YES
GFR SERPL CREATININE-BSD FRML MDRD: 64 ML/MIN/1.73M2
GLUCOSE BLD-MCNC: 88 MG/DL (ref 70–99)
HDLC SERPL-MCNC: 50 MG/DL
LDLC SERPL CALC-MCNC: 59 MG/DL
NONHDLC SERPL-MCNC: 87 MG/DL
POTASSIUM BLD-SCNC: 4.1 MMOL/L (ref 3.4–5.3)
SODIUM SERPL-SCNC: 141 MMOL/L (ref 133–144)
TRIGL SERPL-MCNC: 139 MG/DL

## 2022-03-16 PROCEDURE — 99214 OFFICE O/P EST MOD 30 MIN: CPT | Mod: 25 | Performed by: FAMILY MEDICINE

## 2022-03-16 PROCEDURE — 36415 COLL VENOUS BLD VENIPUNCTURE: CPT | Performed by: FAMILY MEDICINE

## 2022-03-16 PROCEDURE — 80061 LIPID PANEL: CPT | Performed by: FAMILY MEDICINE

## 2022-03-16 PROCEDURE — 99397 PER PM REEVAL EST PAT 65+ YR: CPT | Performed by: FAMILY MEDICINE

## 2022-03-16 PROCEDURE — 80048 BASIC METABOLIC PNL TOTAL CA: CPT | Performed by: FAMILY MEDICINE

## 2022-03-16 RX ORDER — OMEPRAZOLE 40 MG/1
40 CAPSULE, DELAYED RELEASE ORAL DAILY
Qty: 90 CAPSULE | Refills: 4 | Status: SHIPPED | OUTPATIENT
Start: 2022-03-16 | End: 2023-03-23

## 2022-03-16 RX ORDER — AMLODIPINE BESYLATE 5 MG/1
5 TABLET ORAL DAILY
Qty: 90 TABLET | Refills: 4 | Status: SHIPPED | OUTPATIENT
Start: 2022-03-16 | End: 2023-03-23

## 2022-03-16 RX ORDER — ATORVASTATIN CALCIUM 80 MG/1
80 TABLET, FILM COATED ORAL DAILY
Qty: 90 TABLET | Refills: 4 | Status: SHIPPED | OUTPATIENT
Start: 2022-03-16 | End: 2023-03-23

## 2022-03-16 RX ORDER — LISINOPRIL AND HYDROCHLOROTHIAZIDE 12.5; 2 MG/1; MG/1
2 TABLET ORAL DAILY
Qty: 180 TABLET | Refills: 4 | Status: SHIPPED | OUTPATIENT
Start: 2022-03-16 | End: 2023-03-23

## 2022-03-16 ASSESSMENT — ENCOUNTER SYMPTOMS
FEVER: 0
PARESTHESIAS: 0
SHORTNESS OF BREATH: 0
JOINT SWELLING: 0
DIARRHEA: 0
DIZZINESS: 0
WEAKNESS: 0
MYALGIAS: 0
HEADACHES: 0
ARTHRALGIAS: 0
ABDOMINAL PAIN: 0
COUGH: 0
NERVOUS/ANXIOUS: 0
FREQUENCY: 0
CONSTIPATION: 0
HEMATOCHEZIA: 0
EYE PAIN: 0
PALPITATIONS: 0
HEMATURIA: 0
SORE THROAT: 0
HEARTBURN: 0
DYSURIA: 0
CHILLS: 0
NAUSEA: 0

## 2022-03-16 ASSESSMENT — ACTIVITIES OF DAILY LIVING (ADL): CURRENT_FUNCTION: NO ASSISTANCE NEEDED

## 2022-03-16 ASSESSMENT — PAIN SCALES - GENERAL: PAINLEVEL: NO PAIN (0)

## 2022-03-16 NOTE — PROGRESS NOTES
"SUBJECTIVE:   Richard You is a 81 year old male who presents for Preventive Visit.      Patient has been advised of split billing requirements and indicates understanding: Yes  Are you in the first 12 months of your Medicare coverage?  No    Healthy Habits:     In general, how would you rate your overall health?  Good    Frequency of exercise:  4-5 days/week    Duration of exercise:  45-60 minutes    Do you usually eat at least 4 servings of fruit and vegetables a day, include whole grains    & fiber and avoid regularly eating high fat or \"junk\" foods?  Yes    Taking medications regularly:  Yes    Medication side effects:  Not applicable    Ability to successfully perform activities of daily living:  No assistance needed    Home Safety:  No safety concerns identified    Hearing Impairment:  No hearing concerns    In the past 6 months, have you been bothered by leaking of urine?  No    In general, how would you rate your overall mental or emotional health?  Excellent      PHQ-2 Total Score: 0    Additional concerns today:  No    Do you feel safe in your environment? Yes    Have you ever done Advance Care Planning? (For example, a Health Directive, POLST, or a discussion with a medical provider or your loved ones about your wishes): Yes, advance care planning is on file.       Fall risk  Fallen 2 or more times in the past year?: No  Any fall with injury in the past year?: No    Cognitive Screening   1) Repeat 3 items (Leader, Season, Table)    2) Clock draw: NORMAL  3) 3 item recall: Recalls 3 objects  Results: 3 items recalled: COGNITIVE IMPAIRMENT LESS LIKELY    Mini-CogTM Copyright JOSE Morrissey. Licensed by the author for use in [facility]; reprinted with permission (chan@.[facility]). All rights reserved.      Do you have sleep apnea, excessive snoring or daytime drowsiness?: no    Reviewed and updated as needed this visit by clinical staff   Tobacco  Allergies  Meds  Problems  Med Hx  Surg Hx  Fam " Hx  Soc   Hx        Reviewed and updated as needed this visit by Provider   Tobacco  Allergies  Meds  Problems  Med Hx  Surg Hx  Fam Hx         Social History     Tobacco Use     Smoking status: Former Smoker     Packs/day: 0.50     Years: 10.00     Pack years: 5.00     Types: Cigarettes     Quit date: 1995     Years since quittin.3     Smokeless tobacco: Never Used   Substance Use Topics     Alcohol use: No     Comment: beer occ, and wine         Alcohol Use 3/16/2022   Prescreen: >3 drinks/day or >7 drinks/week? No   Prescreen: >3 drinks/day or >7 drinks/week? -   No flowsheet data found.            Current providers sharing in care for this patient include:   Patient Care Team:  Kamari Rojas MD as PCP - General (Family Practice)  Nikita Seo MD as Assigned Surgical Provider  Kamari Rojas MD as Assigned PCP    The following health maintenance items are reviewed in Epic and correct as of today:  There are no preventive care reminders to display for this patient.  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)     Esophagitis     Carey's esophagus     Arteriosclerotic vascular disease     Benign essential tremor     Bradycardia     Chronic atrial fibrillation (H)     Past Surgical History:   Procedure Laterality Date     BYPASS CARDIOPULMONARY  1996    CA bypass x2 LAD     CARDIAC SURGERY       CYSTECTOMY PILONIDAL       ESOPHAGOSCOPY, GASTROSCOPY, DUODENOSCOPY (EGD), COMBINED N/A 2015    Procedure: COMBINED ESOPHAGOSCOPY, GASTROSCOPY, DUODENOSCOPY (EGD), BIOPSY SINGLE OR MULTIPLE;  Surgeon: Fuad Holder  MD Trina;  Location: [facility]     ESOPHAGOSCOPY, GASTROSCOPY, DUODENOSCOPY (EGD), COMBINED N/A 2016    Procedure: COMBINED ESOPHAGOSCOPY, GASTROSCOPY, DUODENOSCOPY (EGD), BIOPSY SINGLE OR MULTIPLE;  Surgeon: Lucas Dang MD;  Location: [facility]     ESOPHAGOSCOPY, GASTROSCOPY, DUODENOSCOPY (EGD), COMBINED N/A 2018    Procedure: COMBINED ESOPHAGOSCOPY, GASTROSCOPY, DUODENOSCOPY (EGD), BIOPSY SINGLE OR MULTIPLE;  gastroscopy;  Surgeon: Richard Crowder MD;  Location: [facility]     HERNIA REPAIR       VASECTOMY         Social History     Tobacco Use     Smoking status: Former Smoker     Packs/day: 0.50     Years: 10.00     Pack years: 5.00     Types: Cigarettes     Quit date: 1995     Years since quittin.3     Smokeless tobacco: Never Used   Substance Use Topics     Alcohol use: No     Comment: beer occ, and wine     Family History   Problem Relation Age of Onset     Cancer - colorectal Mother      Cardiovascular Father      Alcohol/Drug Father      Neurologic Disorder Father         tremor     Cardiovascular Brother      Coronary Artery Disease Brother      Breast Cancer Sister      Cancer Brother         bladder cancer         Current Outpatient Medications   Medication Sig Dispense Refill     amLODIPine (NORVASC) 5 MG tablet Take 1 tablet (5 mg) by mouth daily 90 tablet 4     aspirin 81 MG chewable tablet Take 1 tablet (81 mg) by mouth daily 36 tablet 3     atorvastatin (LIPITOR) 80 MG tablet Take 1 tablet (80 mg) by mouth daily 90 tablet 4     lisinopril-hydrochlorothiazide (ZESTORETIC) 20-12.5 MG tablet Take 2 tablets by mouth daily 180 tablet 4     Multiple Vitamin (MULTI-VITAMIN DAILY PO) Take 1 tablet by mouth daily       Multiple Vitamins-Minerals (ICAPS PO) Take 1 tablet by mouth 2 times daily        omeprazole (PRILOSEC) 40 MG DR capsule Take 1 capsule (40 mg) by mouth daily 90 capsule 4     rivaroxaban ANTICOAGULANT (XARELTO ANTICOAGULANT) 20 MG TABS tablet Take 1 tablet (20 mg) by  mouth daily (with dinner) 90 tablet 4     No Known Allergies          Review of Systems   Constitutional: Negative for chills and fever.   HENT: Negative for congestion, ear pain, hearing loss and sore throat.    Eyes: Negative for pain and visual disturbance.   Respiratory: Negative for cough and shortness of breath.    Cardiovascular: Negative for chest pain, palpitations and peripheral edema.   Gastrointestinal: Negative for abdominal pain, constipation, diarrhea, heartburn, hematochezia and nausea.   Genitourinary: Negative for dysuria, frequency, genital sores, hematuria, impotence, penile discharge and urgency.   Musculoskeletal: Negative for arthralgias, joint swelling and myalgias.   Skin: Negative for rash.   Neurological: Negative for dizziness, weakness, headaches and paresthesias.   Psychiatric/Behavioral: Negative for mood changes. The patient is not nervous/anxious.          OBJECTIVE:   /75   Pulse 62   Temp 97.8  F (36.6  C) (Tympanic)   Resp 16   Ht 1.829 m (6')   Wt 88 kg (194 lb)   SpO2 98%   BMI 26.31 kg/m   Estimated body mass index is 26.31 kg/m  as calculated from the following:    Height as of this encounter: 1.829 m (6').    Weight as of this encounter: 88 kg (194 lb).  Physical Exam  GENERAL: healthy, alert and no distress  EYES: Eyes grossly normal to inspection, PERRL and conjunctivae and sclerae normal  HENT: normal cephalic/atraumatic and ear canals and TM's normal  NECK: no adenopathy, no asymmetry, masses, or scars and thyroid normal to palpation  RESP: lungs clear to auscultation - no rales, rhonchi or wheezes  CV: irr irr, normal S1 S2, no S3 or S4, no murmur, click or rub, no peripheral edema and peripheral pulses strong  ABDOMEN: soft, nontender, no hepatosplenomegaly, no masses and bowel sounds normal  MS: no gross musculoskeletal defects noted, no edema  SKIN: no suspicious lesions or rashes  NEURO: Normal strength and tone, mentation intact and speech  normal  PSYCH: mentation appears normal, affect normal/bright    Diagnostic Test Results:  Labs reviewed in Epic    ASSESSMENT / PLAN:   Well adult exam    Chronic atrial fibrillation (H)  Stable. Refilled medication.    - rivaroxaban ANTICOAGULANT (XARELTO ANTICOAGULANT) 20 MG TABS tablet; Take 1 tablet (20 mg) by mouth daily (with dinner)    Abdominal aortic aneurysm (AAA) without rupture (H)  Stable.  Follows with vascular surgery.  Head CT this year.  Plan by vascular is to repeat next year.    HTN, goal below 130/80  Well controlled. Refilled medication. Check labs.    - amLODIPine (NORVASC) 5 MG tablet; Take 1 tablet (5 mg) by mouth daily  - lisinopril-hydrochlorothiazide (ZESTORETIC) 20-12.5 MG tablet; Take 2 tablets by mouth daily  - Basic metabolic panel; Future  - Basic metabolic panel    Hyperlipidemia LDL goal <100  Well controlled. Refilled medication. Check labs.    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 Refilled medication.    - atorvastatin (LIPITOR) 80 MG tablet; Take 1 tablet (80 mg) by mouth daily    Carey's esophagus without dysplasia  Continue with Prilosec indefinitely.  It looks like he is due for follow-up EGD.  Last years was put on hold due to Covid restrictions for procedures.  We will get him scheduled for that this year.  - omeprazole (PRILOSEC) 40 MG DR capsule; Take 1 capsule (40 mg) by mouth daily  - EGD    Benign essential tremor  Has tried and failed several medications.  Is learning to live with it.  Has seen neurology.  Its been fairly stable over recent years.    [facility] chart reconciliation and known higher risk conditions that I take into account for medical decision making:   Exudative age-related macular degeneration, unspecified laterality, unspecified stage  (H)  Follows with ophthalmology         Patient has been advised of split billing requirements and indicates understanding: Yes    COUNSELING:  Reviewed preventive health counseling, as reflected in patient instructions    Estimated body mass index is 26.31 kg/m  as calculated from the following:    Height as of this encounter: 1.829 m (6').    Weight as of this encounter: 88 kg (194 lb).        He reports that he quit smoking about 26 years ago. His smoking use included cigarettes. He has a 5.00 pack-year smoking history. He has never used smokeless tobacco.      Appropriate preventive services were discussed with this patient, including applicable screening as appropriate for cardiovascular disease, diabetes, osteopenia/osteoporosis, and glaucoma.  As appropriate for age/gender, discussed screening for colorectal cancer, prostate cancer, breast cancer, and cervical cancer. Checklist reviewing preventive services available has been given to the patient.    Reviewed patients plan of care and provided an AVS. The Intermediate Care Plan ( asthma action plan, low back pain action plan, and migraine action plan) for Richard meets the Care Plan requirement. This Care Plan has been established and reviewed with the Patient and spouse.    Counseling Resources:  ATP IV Guidelines  Pooled Cohorts Equation Calculator  Breast Cancer Risk Calculator  Breast Cancer: Medication to Reduce Risk  FRAX Risk Assessment  ICSI Preventive Guidelines  Dietary Guidelines for Americans, 2010  Webs's MyPlate  ASA Prophylaxis  Lung CA Screening    Kamari Rojas MD  [facility]    Identified Health Risks:

## 2022-03-16 NOTE — NURSING NOTE
Initial BP (!) 152/82   Pulse 62   Temp 97.8  F (36.6  C) (Tympanic)   Resp 16   Ht 1.829 m (6')   Wt 88 kg (194 lb)   SpO2 98%   BMI 26.31 kg/m   Estimated body mass index is 26.31 kg/m  as calculated from the following:    Height as of this encounter: 1.829 m (6').    Weight as of this encounter: 88 kg (194 lb). .

## 2022-03-23 ENCOUNTER — TELEPHONE (OUTPATIENT)
Dept: FAMILY MEDICINE | Facility: CLINIC | Age: 82
End: 2022-03-23
Payer: COMMERCIAL

## 2022-03-23 DIAGNOSIS — K22.70 BARRETT'S ESOPHAGUS WITHOUT DYSPLASIA: Primary | ICD-10-CM

## 2022-03-23 NOTE — TELEPHONE ENCOUNTER
Please call: After his visit I was reviewing his chart to catch up on consults and results that had come in since I last saw him a year ago.  Came to my attention that he is due for follow-up EGD for his history of Carey's esophagus.  It looks like we held off on that last year due to Covid restrictions for procedures.  I placed an order for this.  Please help him get this scheduled at [place].

## 2022-03-25 NOTE — TELEPHONE ENCOUNTER
Patient Contact    Attempt # 2    Was call answered?  No.  Left message on voicemail with information to call back.    Demetra Givens RN on 3/25/2022 at 11:18 AM

## 2022-03-28 NOTE — TELEPHONE ENCOUNTER
Sent patient a [employer] message with the below information in it to call and get this scheduled. Previous RN called 2x with no return call back.  Carlita Salinas RN BSN      Diagnoses: Carey's esophagus without dysplasia   Order: Adult Gastro Ref - Procedure Only     Please be aware that coverage of these services is subject to the terms and limitations of your health insurance plan.  Call member services at your health plan with any benefit or coverage questions.           If you have not heard from the scheduling office within 2 business days, please call [phone].

## 2022-04-06 ENCOUNTER — TELEPHONE (OUTPATIENT)
Dept: FAMILY MEDICINE | Facility: CLINIC | Age: 82
End: 2022-04-06
Payer: COMMERCIAL

## 2022-04-12 NOTE — TELEPHONE ENCOUNTER
RN sent patient [employer] message with the information in it with the number to call and schedule. Patient did read it. Do you still want a letter sent?     Dr. Bob Ramos would like you to schedule a follow-up EGD due to your history of Carey's esophagus. There is an order already placed for you and you can call [phone] to get this scheduled. We did try calling you a couple times to give you this information, but hopefully now you will get the information since we are sending it here in this message.      Warm Regards,  Carlita MENDOZA BSN, [facility]    Last read by Richard Yuo at 9:44 AM on 3/28/2022.      Carlita Salinas RN BSN PHN

## 2022-05-20 ENCOUNTER — TELEPHONE (OUTPATIENT)
Dept: OTHER | Facility: CLINIC | Age: 82
End: 2022-05-20
Payer: COMMERCIAL

## 2022-05-20 NOTE — TELEPHONE ENCOUNTER
LM for patient to call and schedule imaging CTA Abdomen Pelvis with Contrast.    Results will be called to him.

## 2022-05-20 NOTE — TELEPHONE ENCOUNTER
Spoke with patient's wife Soledad.  They would like the imaging done at [place].  I transferred them to the [facility] Centralized Imaging phone # [phone].

## 2022-05-23 ENCOUNTER — HOSPITAL ENCOUNTER (OUTPATIENT)
Dept: CT IMAGING | Facility: CLINIC | Age: 82
Discharge: HOME | End: 2022-05-23
Attending: RADIOLOGY | Admitting: RADIOLOGY
Payer: COMMERCIAL

## 2022-05-23 ENCOUNTER — TELEPHONE (OUTPATIENT)
Dept: OTHER | Facility: CLINIC | Age: 82
End: 2022-05-23

## 2022-05-23 DIAGNOSIS — I71.40 ABDOMINAL AORTIC ANEURYSM (AAA) WITHOUT RUPTURE (H): ICD-10-CM

## 2022-05-23 LAB
CREAT BLD-MCNC: 1.1 MG/DL (ref 0.7–1.3)
GFR SERPL CREATININE-BSD FRML MDRD: >60 ML/MIN/1.73M2

## 2022-05-23 PROCEDURE — 250N000009 HC RX 250: Performed by: RADIOLOGY

## 2022-05-23 PROCEDURE — 82565 ASSAY OF CREATININE: CPT

## 2022-05-23 PROCEDURE — 74174 CTA ABD&PLVS W/CONTRAST: CPT

## 2022-05-23 PROCEDURE — 250N000011 HC RX IP 250 OP 636: Performed by: RADIOLOGY

## 2022-05-23 RX ORDER — IOPAMIDOL 755 MG/ML
80 INJECTION, SOLUTION INTRAVASCULAR ONCE
Status: COMPLETED | OUTPATIENT
Start: 2022-05-23 | End: 2022-05-23

## 2022-05-23 RX ADMIN — SODIUM CHLORIDE 100 ML: 9 INJECTION, SOLUTION INTRAVENOUS at 09:04

## 2022-05-23 RX ADMIN — IOPAMIDOL 80 ML: 755 INJECTION, SOLUTION INTRAVENOUS at 09:04

## 2022-05-23 NOTE — TELEPHONE ENCOUNTER
Called patient with results.  Will review with Dr. Lombardi as well when he returns.  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  Patient still has hypodensity in kidney.  Did recommend last year patient be evaluated by urology.  Instructed to discuss with PCP regarding recommendations.  La Zacarias RN  IR nurse clinician  [phone]

## 2022-06-02 DIAGNOSIS — I71.40 ABDOMINAL AORTIC ANEURYSM (AAA) WITHOUT RUPTURE (H): Primary | ICD-10-CM

## 2022-06-02 NOTE — RESULT ENCOUNTER NOTE
Left VM, aneurysm sac is stable.  Dr. Lombardi reviewed.  1 year follow up.  La Zacarias RN  IR nurse clinician  [phone]

## 2022-06-09 DIAGNOSIS — I25.810 CORONARY ATHEROSCLEROSIS OF AUTOLOGOUS VEIN BYPASS GRAFT WITHOUT ANGINA: ICD-10-CM

## 2022-06-09 DIAGNOSIS — I48.20 CHRONIC ATRIAL FIBRILLATION (H): ICD-10-CM

## 2022-06-09 DIAGNOSIS — E78.5 HYPERLIPIDEMIA LDL GOAL <100: ICD-10-CM

## 2022-06-10 RX ORDER — RIVAROXABAN 20 MG/1
TABLET, FILM COATED ORAL
Qty: 90 TABLET | Refills: 3 | OUTPATIENT
Start: 2022-06-10

## 2022-06-10 RX ORDER — ATORVASTATIN CALCIUM 80 MG/1
TABLET, FILM COATED ORAL
Qty: 90 TABLET | Refills: 2 | OUTPATIENT
Start: 2022-06-10

## 2022-09-08 NOTE — PROGRESS NOTES
HPI and Plan:     Mr. Richard You is 78 years old and has a history of coronary artery disease. More recently, he has been evaluated for bradycardia.  He typically follows with Dr. Reveles and today he was here a month after seeing Dr. Marques of .  His wife accompanied him.    He was hospitalized during July 2018 with asymptomatic bradycardia at [facility] after transfer from [facility].  Metoprolol was stopped.  He saw Dr. Marques who did not feel a pacemaker was currently indicated.  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  He can do everything he needs to do without exertional intolerance or light-headedness.     He has no anginal symptoms or dyspnea.  His last echo of July 2018 indicated a stable LV EF of 40-45%.  There was anteroseptal and apical hypokinesis.     Mr. You denies any chest, neck, arm or back discomfort.  He denies any palpitations, lightheadedness or syncope.  He is status post coronary artery bypass surgery in 1996 with a LIMA to the LAD, saphenous vein graft to the diagonal artery.  He has a mild to moderate ischemic cardiomyopathy.  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  Cardiac catheterization was performed and did not detail any obstructive disease requiring revascularization..      Exam:  This is a healthy man in no apparent distress.  The blood pressure was 130/78 mmHg, heart rate 71 beats per minute and respiratory rate 14-18 per minute.  The chest was clear to auscultation.  Breathing was unlabored.  On cardiac auscultation, there was an S1 and S2 without extra sounds.  There were no murmurs.  The rhythm was regular.  On examination of the extremities, there was no peripheral pedal edema.       Assessment/Plan:  Mr. You is currently doing well from the aspect of his history of CAD and prior bypass surgery.  Follow-up has been arranged with Dr. Reveles for June 2019, one year after the last visit.  Mr You will call if he has any adverse symptoms in the interim. Mr. You continues on lisinopril/hydrochlorothiazide, high-dose aspirin and statin therapy.  His lipids are under excellent control. Dr. Marques recommended against restarting AV mike blockade.     With regard then to his mild to moderate decrease in LV function, he is asymptomatic with vasodilator therapy.    With regard to his atrial fibrillation, he is on anticoagulation, asymptomatic and without tachycardia.  He has had asymptomatic bradycardia.  A repeat Holter monitor at 6 to 12 months has been recommended.  He will call if he experiences near-syncope, syncope or decreased exertional tolerance.            Medications Discontinued During This Encounter   Medication Reason     primidone (MYSOLINE) 50 MG tablet          Encounter Diagnosis   Name Primary?     Bradycardia        CURRENT MEDICATIONS:  Current Outpatient Prescriptions   Medication Sig Dispense Refill     amLODIPine (NORVASC) 5 MG tablet Take 1 tablet (5 mg) by mouth daily 90 tablet 3     aspirin 81 MG chewable tablet Take 1 tablet (81 mg) by mouth daily (Patient taking differently: Take 81 mg by mouth every evening ) 36 tablet 3     atorvastatin (LIPITOR) 80 MG tablet Take 1 tablet (80 mg) by mouth daily (Patient taking differently: Take 80 mg by mouth every evening ) 90 tablet 3     lisinopril-hydrochlorothiazide (PRINZIDE/ZESTORETIC) 20-12.5 MG per tablet Take 2 tablets by mouth daily 180 tablet 3     Multiple Vitamin (MULTI-VITAMIN DAILY PO) Take 1 tablet by mouth daily       Multiple Vitamins-Minerals (ICAPS PO) Take 1 tablet by mouth 2 times daily        omeprazole (PRILOSEC) 40 MG capsule Take 1 capsule (40 mg) by mouth daily Take 30-60 minutes before a meal. 90  capsule 3     rivaroxaban ANTICOAGULANT (XARELTO) 20 MG TABS tablet Take 1 tablet (20 mg) by mouth daily (with dinner) 90 tablet 3       ALLERGIES     Allergies   Allergen Reactions     Nka [No Known Allergies]        PAST MEDICAL HISTORY:  Past Medical History:   Diagnosis Date     Abdominal aneurysm without mention of rupture 6/24/2005    Repaired with percutaneous stent 8/05   Has annual CT scan to monitor stent placement. Last done in sept 2012 and stable      Atrial fibrillation (H)      CAD 4/5/2005 12/05/12 Patient denies any recent chest pain or NTG use.  Patient is taking meds regularly and denies significant side effects. Last stress test many years ago, but bikes 30-40 mi/day without symptoms       Colon polyp, hyperplastic 5/14/2013    Hyperplastic  Polyp on colonoscopy 5-2013. 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       PAST SURGICAL HISTORY:  Past Surgical History:   Procedure Laterality Date     BYPASS CARDIOPULMONARY  12/1996    CA bypass x2 LAD     CARDIAC SURGERY       CYSTECTOMY PILONIDAL       ESOPHAGOSCOPY, GASTROSCOPY, DUODENOSCOPY (EGD), COMBINED N/A 9/4/2015    Procedure: COMBINED ESOPHAGOSCOPY, GASTROSCOPY, DUODENOSCOPY (EGD), BIOPSY SINGLE OR MULTIPLE;  Surgeon: Fuad Holder MD;  Location: [facility]     ESOPHAGOSCOPY, GASTROSCOPY, DUODENOSCOPY (EGD), COMBINED N/A 9/20/2016    Procedure: COMBINED ESOPHAGOSCOPY, GASTROSCOPY, DUODENOSCOPY (EGD), BIOPSY SINGLE OR MULTIPLE;  Surgeon: Lucas Dang MD;  Location: [facility]     ESOPHAGOSCOPY, GASTROSCOPY, DUODENOSCOPY (EGD), COMBINED N/A 7/12/2018     Procedure: COMBINED ESOPHAGOSCOPY, GASTROSCOPY, DUODENOSCOPY (EGD), BIOPSY SINGLE OR MULTIPLE;  gastroscopy;  Surgeon: Richard Crowder MD;  Location: [facility]     HERNIA REPAIR       VASECTOMY         FAMILY HISTORY:  Family History   Problem Relation Age of Onset     Cancer - colorectal Mother      Cardiovascular Father      Alcohol/Drug Father      Neurologic Disorder Father      tremor     Cardiovascular Brother      Breast Cancer Sister      Cancer Brother      bladder cancer       SOCIAL HISTORY:  Social History     Social History     Marital status:      Spouse name: Soledad You     Number of children: 2     Years of education: 12     Occupational History      Retired     Social History Main Topics     Smoking status: Former Smoker     Packs/day: 0.50     Years: 10.00     Types: Cigarettes     Quit date: 11/29/1995     Smokeless tobacco: Never Used     Alcohol use No      Comment: beer occ,     Drug use: No     Sexual activity: Not Currently     Partners: Female     Other Topics Concern     Parent/Sibling W/ Cabg, Mi Or Angioplasty Before 65f 55m? Yes     Social History Narrative        Baker    Nonsmoker as of 2  Yrs ago    Drinks wine daily    Lives with wife soledad in [place]           Review of Systems:  Skin:  Negative       Eyes:  Positive for glasses    ENT:  Negative      Respiratory:  Negative for shortness of breath;cough     Cardiovascular:  Negative for;palpitations;chest pain;edema;syncope or near-syncope;fatigue;lightheadedness;dizziness      Gastroenterology: Negative for nausea;vomiting;heartburn    Genitourinary:  Negative      Musculoskeletal:  Negative      Neurologic:  Positive for tremors    Psychiatric:  Negative      Heme/Lymph/Imm:  Negative      Endocrine:  Negative        Physical Exam:  Vitals: /78  Pulse 71  Wt 95.3 kg (210 lb)  SpO2 98%  BMI 28.48 kg/m2    Constitutional:  cooperative, alert and oriented, well developed, well nourished, in no acute  distress        Skin:  warm and dry to the touch          Head:  normocephalic        Eyes:  sclera white        Lymph:      ENT:           Neck:           Respiratory:  normal breath sounds, clear to auscultation, normal A-P diameter, normal symmetry, normal respiratory excursion, no use of accessory muscles         Cardiac: regular rhythm;normal S1 and S2;no S3 or S4     no presence of murmur                                                   GI:           Extremities and Muscular Skeletal:  no deformities, clubbing, cyanosis, erythema observed;no edema              Neurological:  no gross motor deficits;affect appropriate        Psych:  Alert and Oriented x 3;affect appropriate, oriented to time, person and place          CC  Shauna Armstrong MD  [id number]  [place] [id number]                   [Negative] : Genitourinary

## 2022-09-16 ENCOUNTER — OFFICE VISIT (OUTPATIENT)
Dept: FAMILY MEDICINE | Facility: CLINIC | Age: 82
End: 2022-09-16
Payer: COMMERCIAL

## 2022-09-16 VITALS
HEART RATE: 58 BPM | OXYGEN SATURATION: 98 % | TEMPERATURE: 97.8 F | DIASTOLIC BLOOD PRESSURE: 76 MMHG | RESPIRATION RATE: 16 BRPM | HEIGHT: 72 IN | BODY MASS INDEX: 26.28 KG/M2 | SYSTOLIC BLOOD PRESSURE: 132 MMHG | WEIGHT: 194 LBS

## 2022-09-16 DIAGNOSIS — L98.9 SKIN LESION: Primary | ICD-10-CM

## 2022-09-16 PROCEDURE — 99213 OFFICE O/P EST LOW 20 MIN: CPT | Performed by: FAMILY MEDICINE

## 2022-09-16 ASSESSMENT — PAIN SCALES - GENERAL: PAINLEVEL: NO PAIN (0)

## 2022-09-16 NOTE — PROGRESS NOTES
Assessment & Plan     Skin lesion  Suspect BCC. Has been rapidly growing over the last few months.  He has a history of BCC.  Given size and rapid growth discussed I think he needs ASAP Derm evaluation.  - Adult Dermatology Referral; Future    See image files for pictures taken today in clinic.         BMI:   Estimated body mass index is 26.31 kg/m  as calculated from the following:    Height as of this encounter: 1.829 m (6').    Weight as of this encounter: 88 kg (194 lb).           No follow-ups on file.    Kamari Rojas MD  [facility]    See attached picture files for images.    Fercho Ramos is a 82 year old accompanied by his spouse, presenting for the following health issues:  Lesion      History of Present Illness       Reason for visit:  Lesion on hand    He eats 4 or more servings of fruits and vegetables daily.He consumes 1 sweetened beverage(s) daily.He exercises with enough effort to increase his heart rate 30 to 60 minutes per day.  He exercises with enough effort to increase his heart rate 5 days per week.   He is taking medications regularly.         Skin Lesion  Onset/Duration: couple months  Description  Location: left hand  Color: skin colored  Border description: raised, scaly  Character: flakey  Itching: no  Bleeding:  No  Intensity:  mild  Progression of Symptoms:  worsening  Accompanying signs and symptoms:   Bleeding: YES  Scaling: YES  Excessive sun exposure/tanning: YES  Sunscreen used: YES  History:           Any previous history of skin cancer: YES  Any family history of melanoma: YES  Previous episodes of similar lesion: YES  Precipitating or alleviating factors: irritatesd by clothing  Therapies tried and outcome: none      Review of Systems   Constitutional, neuro, ENT, endocrine, pulmonary, cardiac, gastrointestinal, genitourinary, musculoskeletal, integument and psychiatric systems are negative, except as otherwise noted.       Objective     /76   Pulse 58   Temp 97.8  F (36.6  C) (Tympanic)   Resp 16   Ht 1.829 m (6')   Wt 88 kg (194 lb)   SpO2 98%   BMI 26.31 kg/m    Body mass index is 26.31 kg/m .  Physical Exam   GENERAL: Pleasant, well appearing male.  SKIN: 18 x 20 mm raised dome-shaped lesion dorsum of left hand with central necrosis.  He also has a similar but much smaller lesion dorsum of his right hand and some scaly lesions right dorsal forearm.

## 2022-09-16 NOTE — NURSING NOTE
Initial /76   Pulse 58   Temp 97.8  F (36.6  C) (Tympanic)   Resp 16   Ht 1.829 m (6')   Wt 88 kg (194 lb)   SpO2 98%   BMI 26.31 kg/m   Estimated body mass index is 26.31 kg/m  as calculated from the following:    Height as of this encounter: 1.829 m (6').    Weight as of this encounter: 88 kg (194 lb). .

## 2022-09-19 ENCOUNTER — TELEPHONE (OUTPATIENT)
Dept: DERMATOLOGY | Facility: CLINIC | Age: 82
End: 2022-09-19

## 2022-09-19 NOTE — TELEPHONE ENCOUNTER
Health Call Center    Phone Message    May a detailed message be left on voicemail: yes     Reason for Call: Appointment Intake    Referring Provider Name: Dr Kamari Rojas  Diagnosis and/or Symptoms: Skin lesion- pt is currently scheduled for 1/17/23, however, referring provider states that pt needs to be seen within 3-5 days for this diagnosis. Please review and contact pt, thanks!    Action Taken: Message routed to:  Other: [facility]    Travel Screening: Not Applicable

## 2022-09-19 NOTE — TELEPHONE ENCOUNTER
Spoke to patient and made appt for pt to be seen on 9/20/22.  Jennie ARELLANO RN BSN PHN  Specialty Clinics

## 2022-09-20 ENCOUNTER — OFFICE VISIT (OUTPATIENT)
Dept: DERMATOLOGY | Facility: CLINIC | Age: 82
End: 2022-09-20
Payer: COMMERCIAL

## 2022-09-20 DIAGNOSIS — C44.622 SQUAMOUS CELL CANCER OF SKIN OF RIGHT HAND: ICD-10-CM

## 2022-09-20 DIAGNOSIS — D23.9 DERMAL NEVUS: ICD-10-CM

## 2022-09-20 DIAGNOSIS — Z85.828 HISTORY OF SKIN CANCER: ICD-10-CM

## 2022-09-20 DIAGNOSIS — C44.629 SQUAMOUS CELL CANCER OF SKIN OF LEFT HAND: ICD-10-CM

## 2022-09-20 DIAGNOSIS — L82.1 SEBORRHEIC KERATOSIS: ICD-10-CM

## 2022-09-20 DIAGNOSIS — C44.01 BASAL CELL CARCINOMA (BCC) OF SKIN OF RIGHT UPPER LIP: ICD-10-CM

## 2022-09-20 DIAGNOSIS — D18.01 ANGIOMA OF SKIN: ICD-10-CM

## 2022-09-20 DIAGNOSIS — L81.4 LENTIGO: Primary | ICD-10-CM

## 2022-09-20 PROCEDURE — 11102 TANGNTL BX SKIN SINGLE LES: CPT | Mod: 59 | Performed by: DERMATOLOGY

## 2022-09-20 PROCEDURE — 99213 OFFICE O/P EST LOW 20 MIN: CPT | Mod: 25 | Performed by: DERMATOLOGY

## 2022-09-20 PROCEDURE — 11103 TANGNTL BX SKIN EA SEP/ADDL: CPT | Mod: 59 | Performed by: DERMATOLOGY

## 2022-09-20 PROCEDURE — 17311 MOHS 1 STAGE H/N/HF/G: CPT | Performed by: DERMATOLOGY

## 2022-09-20 PROCEDURE — 12041 INTMD RPR N-HF/GENIT 2.5CM/<: CPT | Performed by: DERMATOLOGY

## 2022-09-20 PROCEDURE — 88331 PATH CONSLTJ SURG 1 BLK 1SPC: CPT | Mod: 59 | Performed by: DERMATOLOGY

## 2022-09-20 ASSESSMENT — PAIN SCALES - GENERAL: PAINLEVEL: NO PAIN (0)

## 2022-09-20 NOTE — PROGRESS NOTES
Richard You , a 82 year old year old male patient, I was asked to see by Dr. Rojas for spot on left hand and r hand and R lip.  Patient states this has been present for a while.  Patient reports the following symptoms:  growing .  Patient reports the following previous treatments none.  Patient reports the following modifying factors none.  Associated symptoms: none.  Patient has no other skin complaints today.  Remainder of the HPI, Meds, PMH, Allergies, FH, and SH was reviewed in chart.      Past Medical History:   Diagnosis Date     Abdominal aneurysm without mention of rupture 6/24/2005    Repaired with percutaneous stent 8/05   Has annual CT scan to monitor stent placement. Last done in sept 2012 and stable      Atrial fibrillation (H)      Basal cell carcinoma      CAD 4/5/2005 12/05/12 Patient denies any recent chest pain or NTG use.  Patient is taking meds regularly and denies significant side effects. Last stress test many years ago, but bikes 30-40 mi/day without symptoms       Colon polyp, hyperplastic 5/14/2013    Hyperplastic  Polyp on colonoscopy 5-2013. 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       Past Surgical History:   Procedure Laterality Date     BYPASS CARDIOPULMONARY  12/1996    CA bypass x2 LAD     CARDIAC SURGERY       CYSTECTOMY PILONIDAL       ESOPHAGOSCOPY, GASTROSCOPY, DUODENOSCOPY (EGD), COMBINED N/A 9/4/2015    Procedure: COMBINED ESOPHAGOSCOPY, GASTROSCOPY, DUODENOSCOPY (EGD), BIOPSY SINGLE OR MULTIPLE;  Surgeon: Fuad Holder MD;  Location: [facility]     ESOPHAGOSCOPY, GASTROSCOPY,  DUODENOSCOPY (EGD), COMBINED N/A 2016    Procedure: COMBINED ESOPHAGOSCOPY, GASTROSCOPY, DUODENOSCOPY (EGD), BIOPSY SINGLE OR MULTIPLE;  Surgeon: Lucas Dang MD;  Location: [facility]     ESOPHAGOSCOPY, GASTROSCOPY, DUODENOSCOPY (EGD), COMBINED N/A 2018    Procedure: COMBINED ESOPHAGOSCOPY, GASTROSCOPY, DUODENOSCOPY (EGD), BIOPSY SINGLE OR MULTIPLE;  gastroscopy;  Surgeon: Richard Crowder MD;  Location: [facility]     HERNIA REPAIR       VASECTOMY          Family History   Problem Relation Age of Onset     Cancer - colorectal Mother      Cardiovascular Father      Alcohol/Drug Father      Neurologic Disorder Father         tremor     Cardiovascular Brother      Coronary Artery Disease Brother      Breast Cancer Sister      Cancer Brother         bladder cancer       Social History     Socioeconomic History     Marital status:      Spouse name: Soledad You     Number of children: 2     Years of education: 12     Highest education level: Not on file   Occupational History     Employer: RETIRED   Tobacco Use     Smoking status: Former Smoker     Packs/day: 0.50     Years: 10.00     Pack years: 5.00     Types: Cigarettes     Quit date: 1995     Years since quittin.8     Smokeless tobacco: Never Used   Substance and Sexual Activity     Alcohol use: No     Comment: beer occ, and wine     Drug use: No     Sexual activity: Not Currently     Partners: Female   Other Topics Concern     Parent/sibling w/ CABG, MI or angioplasty before 65F 55M? Yes   Social History Narrative        [employer]    Nonsmoker as of 2  Yrs ago    Drinks wine daily    Lives with wife soledad in [place]     Social Determinants of Health     Financial Resource Strain: Not on file   Food Insecurity: Not on file   Transportation Needs: Not on file   Physical Activity: Not on file   Stress: Not on file   Social Connections: Not on file   Intimate Partner Violence: Not on file   Housing Stability: Not on file        Outpatient Encounter Medications as of 9/20/2022   Medication Sig Dispense Refill     amLODIPine (NORVASC) 5 MG tablet Take 1 tablet (5 mg) by mouth daily 90 tablet 4     aspirin 81 MG chewable tablet Take 1 tablet (81 mg) by mouth daily 36 tablet 3     atorvastatin (LIPITOR) 80 MG tablet Take 1 tablet (80 mg) by mouth daily 90 tablet 4     lisinopril-hydrochlorothiazide (ZESTORETIC) 20-12.5 MG tablet Take 2 tablets by mouth daily 180 tablet 4     Multiple Vitamin (MULTI-VITAMIN DAILY PO) Take 1 tablet by mouth daily       Multiple Vitamins-Minerals (ICAPS PO) Take 1 tablet by mouth 2 times daily        omeprazole (PRILOSEC) 40 MG DR capsule Take 1 capsule (40 mg) by mouth daily 90 capsule 4     rivaroxaban ANTICOAGULANT (XARELTO ANTICOAGULANT) 20 MG TABS tablet Take 1 tablet (20 mg) by mouth daily (with dinner) 90 tablet 4     No facility-administered encounter medications on file as of 9/20/2022.             Review Of Systems  Skin: As above  Eyes: negative  Ears/Nose/Throat: negative  Respiratory: No shortness of breath, dyspnea on exertion, cough, or hemoptysis  Cardiovascular: negative  Gastrointestinal: negative  Genitourinary: negative  Musculoskeletal: negative  Neurologic: negative  Psychiatric: negative  Hematologic/Lymphatic/Immunologic: negative  Endocrine: negative      O:   NAD, WDWN, Alert & Oriented, Mood & Affect wnl, Vitals stable   Here today with wife    General appearance shakila ii   Vitals stable   Alert, oriented and in no acute distress      Following lymph nodes palpated: antecubital no lad   R upper lip 1.2cm pink pearly papule   L dorsal hand ulcerated 1.4cm keratotic plaque  R dorsal hand 7mm keratotic scaly papule    Stuck on papules and brown macules on trunk and ext    Red papules on trunk   Flesh colored papules on trunk          Eyes: Conjunctivae/lids:Normal     ENT: Lips, buccal mucosa, tongue: normal    MSK:Normal    Cardiovascular: peripheral edema slight    Pulm: Breathing  Normal    Neuro/Psych: Orientation:Normal; Mood/Affect:Normal      MICRO:     R upper lip (red):Orthokeratosis of epidermis with a proliferation of nests of basaloid cells, with peripheral palisading and a haphazard arrangement in the center extending into the dermis, forming nodules.  The tumor cells have hyperchromatic nuclei. Poor cytoplasm and intercellular bridging.    L hand (blue):There is a proliferation of irregular nests of abnormal squamous cells arising from the epidermis and invading the dermis. These are well differentiated. The dermis shows a variable superficial perivascular inflammatory infiltrate.   R hand (green):There is a proliferation of irregular nests of abnormal squamous cells arising from the epidermis and invading the dermis. These are well differentiated. The dermis shows a variable superficial perivascular inflammatory infiltrate.   A/P:  1. Seborrheic keratosis, lentigo, angioma, dermal nevus  2. R/o squamous cell carcinoma and basal cell carcinoma   TANGENTIAL BIOPSY IN HOUSE:  After consent, anesthesia with LEC and prep, tangential excision performed and dx above confirmed with frozen section histology.  No complications and routine wound care.  Patient is on  anticoagulants and risk of bleeding discussed with patient.       I have personally reviewed all specimens and/or slides and used them with my medical judgement to determine or confirm the final diagnosis.     Patient told result   R upper lip basal cell carcinoma schedule  L dorsal hand squamous cell carcinoma treated today   R dorsal hand squamous cell carcinoma schedule .    It was a pleasure speaking to Richard You today.  Previous clinic  notes and pertinent laboratory tests were reviewed prior to Richard You's visit.  Signs and Symptoms of skin cancer discussed with patient.  Patient encouraged to perform monthly skin exams.  UV precautions reviewed with patient.  Risks of non-melanoma skin cancer discussed with  patient   Return to clinic next appt    PROCEDURE NOTE  L dorsal hand squamous cell carcinoma   MOHS:   Location    The rationale for Mohs surgery was discussed with the patient and consent was obtained.  The risks and benefits as well as alternatives to therapy were discussed, in detail.  Specifically, the risks of infection, scarring, bleeding, prolonged wound healing, incomplete removal, allergy to anesthesia, nerve injury and recurrence were addressed.  Indication for Mohs was Location. Prior to the procedure, the treatment site was clearly identified and, if available, confirmed with previous photos and confirmed by the patient   All components of the Universal Protocol/PAUSE rule were completed.  The Mohs surgeon operated in two distinct and integrated capacities as the surgeon and pathologist.      The area was prepped with Betasept.  A rim of normal appearing skin was marked circumferentially around the lesion.  The area was infiltrated with local anesthesia.  The tumor was first debulked to remove all clinically apparent tumor.  An incision following the standard Mohs approach was done and the specimen was oriented,mapped and placed in 1 block(s).  Each specimen was then chromacoded and processed in the Mohs laboratory using standard Mohs technique and submitted for frozen section histology.  Frozen section analysis showed no residual tumor but CLEAR MARGINS.      The tumor was excised using standard Mohs technique in 1 stages(s).  CLEAR MARGINS OBTAINED and Final defect size was 2 cm.     We discussed the options for wound management in full with the patient including risks/benefits/ possible outcomes.        Because of the relatively superficial nature of the wound and patient s preference, an intermediate repair was planned.  Guiding 4 vicryl sutures were carefully placed across the wound to control the direction of wound closure, to prevent distortion from wound contraction, and to minimize wound size  to facilitate wound care and healing.  No complications; EBL minimal.  Routine wound care discussed with patient.

## 2022-09-20 NOTE — LETTER
9/20/2022         RE: Richard You  [address] [facility] [address]        Dear Colleague,    Thank you for referring your patient, Richard You, to the [facility]. Please see a copy of my visit note below.    Richard You , a 82 year old year old male patient, I was asked to see by Dr. Rojas for spot on left hand and r hand and R lip.  Patient states this has been present for a while.  Patient reports the following symptoms:  growing .  Patient reports the following previous treatments none.  Patient reports the following modifying factors none.  Associated symptoms: none.  Patient has no other skin complaints today.  Remainder of the HPI, Meds, PMH, Allergies, FH, and SH was reviewed in chart.      Past Medical History:   Diagnosis Date     Abdominal aneurysm without mention of rupture 6/24/2005    Repaired with percutaneous stent 8/05   Has annual CT scan to monitor stent placement. Last done in sept 2012 and stable      Atrial fibrillation (H)      Basal cell carcinoma      CAD 4/5/2005 12/05/12 Patient denies any recent chest pain or NTG use.  Patient is taking meds regularly and denies significant side effects. Last stress test many years ago, but bikes 30-40 mi/day without symptoms       Colon polyp, hyperplastic 5/14/2013    Hyperplastic  Polyp on colonoscopy 5-2013. 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       Past Surgical History:   Procedure Laterality Date     BYPASS CARDIOPULMONARY  12/1996    CA bypass x2 LAD     CARDIAC SURGERY       CYSTECTOMY PILONIDAL       ESOPHAGOSCOPY,  GASTROSCOPY, DUODENOSCOPY (EGD), COMBINED N/A 2015    Procedure: COMBINED ESOPHAGOSCOPY, GASTROSCOPY, DUODENOSCOPY (EGD), BIOPSY SINGLE OR MULTIPLE;  Surgeon: Fuad Holder MD;  Location: [facility]     ESOPHAGOSCOPY, GASTROSCOPY, DUODENOSCOPY (EGD), COMBINED N/A 2016    Procedure: COMBINED ESOPHAGOSCOPY, GASTROSCOPY, DUODENOSCOPY (EGD), BIOPSY SINGLE OR MULTIPLE;  Surgeon: Lucas Dang MD;  Location: [facility]     ESOPHAGOSCOPY, GASTROSCOPY, DUODENOSCOPY (EGD), COMBINED N/A 2018    Procedure: COMBINED ESOPHAGOSCOPY, GASTROSCOPY, DUODENOSCOPY (EGD), BIOPSY SINGLE OR MULTIPLE;  gastroscopy;  Surgeon: Richard Crowder MD;  Location: [facility]     HERNIA REPAIR       VASECTOMY          Family History   Problem Relation Age of Onset     Cancer - colorectal Mother      Cardiovascular Father      Alcohol/Drug Father      Neurologic Disorder Father         tremor     Cardiovascular Brother      Coronary Artery Disease Brother      Breast Cancer Sister      Cancer Brother         bladder cancer       Social History     Socioeconomic History     Marital status:      Spouse name: Kelly You     Number of children: 2     Years of education: 12     Highest education level: Not on file   Occupational History     Employer: RETIRED   Tobacco Use     Smoking status: Former Smoker     Packs/day: 0.50     Years: 10.00     Pack years: 5.00     Types: Cigarettes     Quit date: 1995     Years since quittin.8     Smokeless tobacco: Never Used   Substance and Sexual Activity     Alcohol use: No     Comment: beer occ, and wine     Drug use: No     Sexual activity: Not Currently     Partners: Female   Other Topics Concern     Parent/sibling w/ CABG, MI or angioplasty before 65F 55M? Yes   Social History Narrative        [employer]    Nonsmoker as of 2  Yrs ago    Drinks wine daily    Lives with wife kelly in [place]     Social Determinants of Health     Financial Resource Strain: Not on  file   Food Insecurity: Not on file   Transportation Needs: Not on file   Physical Activity: Not on file   Stress: Not on file   Social Connections: Not on file   Intimate Partner Violence: Not on file   Housing Stability: Not on file       Outpatient Encounter Medications as of 9/20/2022   Medication Sig Dispense Refill     amLODIPine (NORVASC) 5 MG tablet Take 1 tablet (5 mg) by mouth daily 90 tablet 4     aspirin 81 MG chewable tablet Take 1 tablet (81 mg) by mouth daily 36 tablet 3     atorvastatin (LIPITOR) 80 MG tablet Take 1 tablet (80 mg) by mouth daily 90 tablet 4     lisinopril-hydrochlorothiazide (ZESTORETIC) 20-12.5 MG tablet Take 2 tablets by mouth daily 180 tablet 4     Multiple Vitamin (MULTI-VITAMIN DAILY PO) Take 1 tablet by mouth daily       Multiple Vitamins-Minerals (ICAPS PO) Take 1 tablet by mouth 2 times daily        omeprazole (PRILOSEC) 40 MG DR capsule Take 1 capsule (40 mg) by mouth daily 90 capsule 4     rivaroxaban ANTICOAGULANT (XARELTO ANTICOAGULANT) 20 MG TABS tablet Take 1 tablet (20 mg) by mouth daily (with dinner) 90 tablet 4     No facility-administered encounter medications on file as of 9/20/2022.             Review Of Systems  Skin: As above  Eyes: negative  Ears/Nose/Throat: negative  Respiratory: No shortness of breath, dyspnea on exertion, cough, or hemoptysis  Cardiovascular: negative  Gastrointestinal: negative  Genitourinary: negative  Musculoskeletal: negative  Neurologic: negative  Psychiatric: negative  Hematologic/Lymphatic/Immunologic: negative  Endocrine: negative      O:   NAD, WDWN, Alert & Oriented, Mood & Affect wnl, Vitals stable   Here today with wife    General appearance shakila ii   Vitals stable   Alert, oriented and in no acute distress      Following lymph nodes palpated: antecubital no lad   R upper lip 1.2cm pink pearly papule   L dorsal hand ulcerated 1.4cm keratotic plaque  R dorsal hand 7mm keratotic scaly papule    Stuck on papules and brown macules  on trunk and ext    Red papules on trunk   Flesh colored papules on trunk          Eyes: Conjunctivae/lids:Normal     ENT: Lips, buccal mucosa, tongue: normal    MSK:Normal    Cardiovascular: peripheral edema slight    Pulm: Breathing Normal    Neuro/Psych: Orientation:Normal; Mood/Affect:Normal      MICRO:     R upper lip (red):Orthokeratosis of epidermis with a proliferation of nests of basaloid cells, with peripheral palisading and a haphazard arrangement in the center extending into the dermis, forming nodules.  The tumor cells have hyperchromatic nuclei. Poor cytoplasm and intercellular bridging.    L hand (blue):There is a proliferation of irregular nests of abnormal squamous cells arising from the epidermis and invading the dermis. These are well differentiated. The dermis shows a variable superficial perivascular inflammatory infiltrate.   R hand (green):There is a proliferation of irregular nests of abnormal squamous cells arising from the epidermis and invading the dermis. These are well differentiated. The dermis shows a variable superficial perivascular inflammatory infiltrate.   A/P:  1. Seborrheic keratosis, lentigo, angioma, dermal nevus  2. R/o squamous cell carcinoma and basal cell carcinoma   TANGENTIAL BIOPSY IN HOUSE:  After consent, anesthesia with LEC and prep, tangential excision performed and dx above confirmed with frozen section histology.  No complications and routine wound care.  Patient is on  anticoagulants and risk of bleeding discussed with patient.       I have personally reviewed all specimens and/or slides and used them with my medical judgement to determine or confirm the final diagnosis.     Patient told result   R upper lip basal cell carcinoma schedule  L dorsal hand squamous cell carcinoma treated today   R dorsal hand squamous cell carcinoma schedule .    It was a pleasure speaking to Richard You today.  Previous clinic  notes and pertinent laboratory tests were reviewed  prior to Richard You's visit.  Signs and Symptoms of skin cancer discussed with patient.  Patient encouraged to perform monthly skin exams.  UV precautions reviewed with patient.  Risks of non-melanoma skin cancer discussed with patient   Return to clinic next appt    PROCEDURE NOTE  L dorsal hand squamous cell carcinoma   MOHS:   Location    The rationale for Mohs surgery was discussed with the patient and consent was obtained.  The risks and benefits as well as alternatives to therapy were discussed, in detail.  Specifically, the risks of infection, scarring, bleeding, prolonged wound healing, incomplete removal, allergy to anesthesia, nerve injury and recurrence were addressed.  Indication for Mohs was Location. Prior to the procedure, the treatment site was clearly identified and, if available, confirmed with previous photos and confirmed by the patient   All components of the Universal Protocol/PAUSE rule were completed.  The Mohs surgeon operated in two distinct and integrated capacities as the surgeon and pathologist.      The area was prepped with Betasept.  A rim of normal appearing skin was marked circumferentially around the lesion.  The area was infiltrated with local anesthesia.  The tumor was first debulked to remove all clinically apparent tumor.  An incision following the standard Mohs approach was done and the specimen was oriented,mapped and placed in 1 block(s).  Each specimen was then chromacoded and processed in the Mohs laboratory using standard Mohs technique and submitted for frozen section histology.  Frozen section analysis showed no residual tumor but CLEAR MARGINS.      The tumor was excised using standard Mohs technique in 1 stages(s).  CLEAR MARGINS OBTAINED and Final defect size was 2 cm.     We discussed the options for wound management in full with the patient including risks/benefits/ possible outcomes.        Because of the relatively superficial nature of the wound and  patient s preference, an intermediate repair was planned.  Guiding 4 vicryl sutures were carefully placed across the wound to control the direction of wound closure, to prevent distortion from wound contraction, and to minimize wound size to facilitate wound care and healing.  No complications; EBL minimal.  Routine wound care discussed with patient.          Again, thank you for allowing me to participate in the care of your patient.        Sincerely,        Nikita Seo MD

## 2022-09-20 NOTE — PATIENT INSTRUCTIONS
Open Wound Care     No strenuous activity for 48 hours    Take Tylenol as needed for discomfort.                                                .         Do not drink alcoholic beverages for 48 hours.    Keep the pressure bandage in place for 24 hours. If the bandage becomes blood tinged or loose, reinforce it with gauze and tape.        (Refer to the reverse side of this page for management of bleeding).    Remove bandage in 24 hours and begin wound care as follows:     Clean area with tap water using a Q tip or gauze pad, (shower / bathe normally)  Dry wound with Q tip or gauze pad  Apply Aquaphor, Vaseline, Polysporin or Bacitracin Ointment with a Q tip  Do NOT use Neosporin Ointment *  Cover the wound with a band-aid or nonstick gauze pad and paper tape.  Repeat wound care once a day until wound is completely healed.    It is an old wives tale that a wound heals better when it is exposed to air and allowed to dry out. The wound will heal faster with a better cosmetic result if it is kept moist with ointment and covered with a bandage.  Do not let the wound dry out.      Supplies Needed:                Qtips or gauze pads                Polysporin or Bacitracin Ointment                Bandaids or nonstick gauze pads and paper tape    Wound care kits and brown paper tape are available for purchase at   the pharmacy.    BLEEDING:    Use tightly rolled up gauze or cloth to apply direct pressure over the bandage for 20   minutes.  Reapply pressure for an additional 20 minutes if necessary  Call the office or go to the nearest emergency room if pressure fails to stop the bleeding.  Use additional gauze and tape to maintain pressure once the bleeding has stopped.  Begin wound care 24 hours after surgery as directed.                  WOUND HEALING    One week after surgery a pink / red halo will form around the outside of the wound.   This is new skin.  The center of the wound will appear yellowish white and produce  some drainage.  The pink halo will slowly migrate in toward the center of the wound until the wound is covered with new shiny pink skin.  There will be no more drainage when the wound is completely healed.  It will take six months to one year for the redness to fade.  The scar may be itchy, tight and sensitive to extreme temperatures for a year after the surgery.  Massaging the area several times a day for several minutes after the wound is completely healed will help the scar soften and normalize faster. Begin massage only after healing is complete.      In case of emergency call: Dr Seo: [phone]    [facility]: [phone]    [facility]:[phone]

## 2022-09-22 ENCOUNTER — IMMUNIZATION (OUTPATIENT)
Dept: FAMILY MEDICINE | Facility: CLINIC | Age: 82
End: 2022-09-22
Payer: COMMERCIAL

## 2022-09-22 DIAGNOSIS — Z23 HIGH PRIORITY FOR 2019-NCOV VACCINE: Primary | ICD-10-CM

## 2022-09-22 PROCEDURE — 0124A COVID-19,PF,PFIZER BOOSTER BIVALENT: CPT

## 2022-09-22 PROCEDURE — 99207 PR NO CHARGE LOS: CPT

## 2022-09-22 PROCEDURE — 91312 COVID-19,PF,PFIZER BOOSTER BIVALENT: CPT

## 2022-10-05 ENCOUNTER — OFFICE VISIT (OUTPATIENT)
Dept: DERMATOLOGY | Facility: CLINIC | Age: 82
End: 2022-10-05
Payer: COMMERCIAL

## 2022-10-05 VITALS — DIASTOLIC BLOOD PRESSURE: 81 MMHG | SYSTOLIC BLOOD PRESSURE: 133 MMHG | OXYGEN SATURATION: 98 % | HEART RATE: 46 BPM

## 2022-10-05 DIAGNOSIS — C44.622 SQUAMOUS CELL CANCER OF SKIN OF RIGHT HAND: Primary | ICD-10-CM

## 2022-10-05 PROCEDURE — 99207 PR NO CHARGE LOS: CPT | Performed by: DERMATOLOGY

## 2022-10-05 PROCEDURE — 12041 INTMD RPR N-HF/GENIT 2.5CM/<: CPT | Performed by: DERMATOLOGY

## 2022-10-05 PROCEDURE — 17311 MOHS 1 STAGE H/N/HF/G: CPT | Performed by: DERMATOLOGY

## 2022-10-05 ASSESSMENT — PAIN SCALES - GENERAL: PAINLEVEL: NO PAIN (0)

## 2022-10-05 NOTE — PATIENT INSTRUCTIONS
Open Wound Care     for ___ Right Dorsal Hand ________    No strenuous activity for 48 hours    Take Tylenol as needed for discomfort.                                                .         Do not drink alcoholic beverages for 48 hours.    Keep the pressure bandage in place for 24 hours. If the bandage becomes blood tinged or loose, reinforce it with gauze and tape.        (Refer to the reverse side of this page for management of bleeding).    Remove bandage in 24 hours and begin wound care as follows:     Clean area with tap water using a Q tip or gauze pad, (shower / bathe normally)  Dry wound with Q tip or gauze pad  Apply Aquaphor, Vaseline, Polysporin or Bacitracin Ointment with a Q tip  Do NOT use Neosporin Ointment *  Cover the wound with a band-aid or nonstick gauze pad and paper tape.  Repeat wound care once a day until wound is completely healed.    It is an old wives tale that a wound heals better when it is exposed to air and allowed to dry out. The wound will heal faster with a better cosmetic result if it is kept moist with ointment and covered with a bandage.  Do not let the wound dry out.      Supplies Needed:                Qtips or gauze pads                Polysporin or Bacitracin Ointment                Bandaids or nonstick gauze pads and paper tape    Wound care kits and brown paper tape are available for purchase at   the pharmacy.    BLEEDING:    Use tightly rolled up gauze or cloth to apply direct pressure over the bandage for 20   minutes.  Reapply pressure for an additional 20 minutes if necessary  Call the office or go to the nearest emergency room if pressure fails to stop the bleeding.  Use additional gauze and tape to maintain pressure once the bleeding has stopped.  Begin wound care 24 hours after surgery as directed.                  WOUND HEALING    One week after surgery a pink / red halo will form around the outside of the wound.   This is new skin.  The center of the wound will  appear yellowish white and produce some drainage.  The pink halo will slowly migrate in toward the center of the wound until the wound is covered with new shiny pink skin.  There will be no more drainage when the wound is completely healed.  It will take six months to one year for the redness to fade.  The scar may be itchy, tight and sensitive to extreme temperatures for a year after the surgery.  Massaging the area several times a day for several minutes after the wound is completely healed will help the scar soften and normalize faster. Begin massage only after healing is complete.    In case of emergency call: Dr Seo: [phone]  [facility]: [phone]  [facility]:[phone]

## 2022-10-05 NOTE — NURSING NOTE
Chief Complaint   Patient presents with     Derm Problem     Mohs- R Dorsal Hand       Vitals:    10/05/22 0748   BP: 133/81   BP Location: Left arm   Patient Position: Sitting   Cuff Size: Adult Large   Pulse: (!) 46   SpO2: 98%     Wt Readings from Last 1 Encounters:   09/16/22 88 kg (194 lb)       Nidhi House LPN .................10/5/2022

## 2022-10-05 NOTE — LETTER
10/5/2022         RE: Richard You  [address] [address] [address]        Dear Colleague,    Thank you for referring your patient, Richard You, to the [facility]. Please see a copy of my visit note below.    Surgical Office Location :   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  Patient has no other skin complaints today.  Remainder of the HPI, Meds, PMH, Allergies, FH, and SH was reviewed in chart.      Past Medical History:   Diagnosis Date     Abdominal aneurysm without mention of rupture 6/24/2005    Repaired with percutaneous stent 8/05   Has annual CT scan to monitor stent placement. Last done in sept 2012 and stable      Atrial fibrillation (H)      Basal cell carcinoma      CAD 4/5/2005 12/05/12 Patient denies any recent chest pain or NTG use.  Patient is taking meds regularly and denies significant side effects. Last stress test many years ago, but bikes 30-40 mi/day without symptoms       Colon polyp, hyperplastic 5/14/2013    Hyperplastic  Polyp on colonoscopy 5-2013. 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       Past Surgical History:   Procedure Laterality Date     BYPASS CARDIOPULMONARY  12/1996    CA bypass x2 LAD     CARDIAC SURGERY       CYSTECTOMY PILONIDAL       ESOPHAGOSCOPY, GASTROSCOPY, DUODENOSCOPY (EGD), COMBINED N/A 9/4/2015    Procedure: COMBINED ESOPHAGOSCOPY, GASTROSCOPY, DUODENOSCOPY  (EGD), BIOPSY SINGLE OR MULTIPLE;  Surgeon: Fuad Hodler MD;  Location: [facility]     ESOPHAGOSCOPY, GASTROSCOPY, DUODENOSCOPY (EGD), COMBINED N/A 2016    Procedure: COMBINED ESOPHAGOSCOPY, GASTROSCOPY, DUODENOSCOPY (EGD), BIOPSY SINGLE OR MULTIPLE;  Surgeon: Lucas Dang MD;  Location: [facility]     ESOPHAGOSCOPY, GASTROSCOPY, DUODENOSCOPY (EGD), COMBINED N/A 2018    Procedure: COMBINED ESOPHAGOSCOPY, GASTROSCOPY, DUODENOSCOPY (EGD), BIOPSY SINGLE OR MULTIPLE;  gastroscopy;  Surgeon: Richard Crowder MD;  Location: [facility]     HERNIA REPAIR       VASECTOMY          Family History   Problem Relation Age of Onset     Cancer - colorectal Mother      Cardiovascular Father      Alcohol/Drug Father      Neurologic Disorder Father         tremor     Cardiovascular Brother      Coronary Artery Disease Brother      Breast Cancer Sister      Cancer Brother         bladder cancer       Social History     Socioeconomic History     Marital status:      Spouse name: Kelly You     Number of children: 2     Years of education: 12     Highest education level: Not on file   Occupational History     Employer: RETIRED   Tobacco Use     Smoking status: Former Smoker     Packs/day: 0.50     Years: 10.00     Pack years: 5.00     Types: Cigarettes     Quit date: 1995     Years since quittin.8     Smokeless tobacco: Never Used   Substance and Sexual Activity     Alcohol use: No     Comment: beer occ, and wine     Drug use: No     Sexual activity: Not Currently     Partners: Female   Other Topics Concern     Parent/sibling w/ CABG, MI or angioplasty before 65F 55M? Yes   Social History Narrative        [employer]    Nonsmoker as of 2  Yrs ago    Drinks wine daily    Lives with wife kelly in [place]     Social Determinants of Health     Financial Resource Strain: Not on file   Food Insecurity: Not on file   Transportation Needs: Not on file   Physical Activity: Not on file   Stress: Not on  file   Social Connections: Not on file   Intimate Partner Violence: Not on file   Housing Stability: Not on file       Outpatient Encounter Medications as of 10/5/2022   Medication Sig Dispense Refill     amLODIPine (NORVASC) 5 MG tablet Take 1 tablet (5 mg) by mouth daily 90 tablet 4     aspirin 81 MG chewable tablet Take 1 tablet (81 mg) by mouth daily 36 tablet 3     atorvastatin (LIPITOR) 80 MG tablet Take 1 tablet (80 mg) by mouth daily 90 tablet 4     lisinopril-hydrochlorothiazide (ZESTORETIC) 20-12.5 MG tablet Take 2 tablets by mouth daily 180 tablet 4     Multiple Vitamin (MULTI-VITAMIN DAILY PO) Take 1 tablet by mouth daily       Multiple Vitamins-Minerals (ICAPS PO) Take 1 tablet by mouth 2 times daily        omeprazole (PRILOSEC) 40 MG DR capsule Take 1 capsule (40 mg) by mouth daily 90 capsule 4     rivaroxaban ANTICOAGULANT (XARELTO ANTICOAGULANT) 20 MG TABS tablet Take 1 tablet (20 mg) by mouth daily (with dinner) 90 tablet 4     No facility-administered encounter medications on file as of 10/5/2022.             O:   NAD, WDWN, Alert & Oriented, Mood & Affect wnl, Vitals stable   Here today alone   /81 (BP Location: Left arm, Patient Position: Sitting, Cuff Size: Adult Large)   Pulse (!) 46   SpO2 98%    General appearance normal   Vitals stable   Alert, oriented and in no acute distress      Following lymph nodes palpated: antecubital no lad  R dorsal h and 7mm keratotic scaly papule   L hand healing well      Eyes: Conjunctivae/lids:Normal     ENT: Lips, buccal mucosa, tongue: normal    MSK:Normal    Cardiovascular: peripheral edema none    Pulm: Breathing Normal    Neuro/Psych: Orientation:Alert and Orientedx3 ; Mood/Affect:normal       A/P:  1. R hand squamous cell carcinoma   MOHS:   Location    The rationale for Mohs surgery was discussed with the patient and consent was obtained.  The risks and benefits as well as alternatives to therapy were discussed, in detail.  Specifically, the  risks of infection, scarring, bleeding, prolonged wound healing, incomplete removal, allergy to anesthesia, nerve injury and recurrence were addressed.  Indication for Mohs was Location. Prior to the procedure, the treatment site was clearly identified and, if available, confirmed with previous photos and confirmed by the patient   All components of the Universal Protocol/PAUSE rule were completed.  The Mohs surgeon operated in two distinct and integrated capacities as the surgeon and pathologist.      The area was prepped with Betasept.  A rim of normal appearing skin was marked circumferentially around the lesion.  The area was infiltrated with local anesthesia.  The tumor was first debulked to remove all clinically apparent tumor.  An incision following the standard Mohs approach was done and the specimen was oriented,mapped and placed in 1 block(s).  Each specimen was then chromacoded and processed in the Mohs laboratory using standard Mohs technique and submitted for frozen section histology.  Frozen section analysis showed no residual tumor but CLEAR MARGINS.      The tumor was excised using standard Mohs technique in 1 stages(s).  CLEAR MARGINS OBTAINED and Final defect size was 1.2 cm.     We discussed the options for wound management in full with the patient including risks/benefits/ possible outcomes.        Because of the relatively superficial nature of the wound and patient s preference, an intermediate repair was planned.  Guiding sutures were carefully placed across the wound to control the direction of wound closure, to prevent distortion from wound contraction, and to minimize wound size to facilitate wound care and healing.  No complications; EBL minimal.  Routine wound care discussed with patient.    It was a pleasure speaking to Richard You today.  Previous clinic notes and pertinent laboratory tests were reviewed prior to Richard You's visit.  Return to clinic next appt        Again,  thank you for allowing me to participate in the care of your patient.        Sincerely,        Nikita Seo MD

## 2022-10-05 NOTE — PROGRESS NOTES
Surgical Office Location :   [facility]  [address] [place] [address]

## 2022-10-05 NOTE — PROGRESS NOTES
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  Patient has no other skin complaints today.  Remainder of the HPI, Meds, PMH, Allergies, FH, and SH was reviewed in chart.      Past Medical History:   Diagnosis Date     Abdominal aneurysm without mention of rupture 6/24/2005    Repaired with percutaneous stent 8/05   Has annual CT scan to monitor stent placement. Last done in sept 2012 and stable      Atrial fibrillation (H)      Basal cell carcinoma      CAD 4/5/2005 12/05/12 Patient denies any recent chest pain or NTG use.  Patient is taking meds regularly and denies significant side effects. Last stress test many years ago, but bikes 30-40 mi/day without symptoms       Colon polyp, hyperplastic 5/14/2013    Hyperplastic  Polyp on colonoscopy 5-2013. 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       Past Surgical History:   Procedure Laterality Date     BYPASS CARDIOPULMONARY  12/1996    CA bypass x2 LAD     CARDIAC SURGERY       CYSTECTOMY PILONIDAL       ESOPHAGOSCOPY, GASTROSCOPY, DUODENOSCOPY (EGD), COMBINED N/A 9/4/2015    Procedure: COMBINED ESOPHAGOSCOPY, GASTROSCOPY, DUODENOSCOPY (EGD), BIOPSY SINGLE OR MULTIPLE;  Surgeon: Fuad Holder MD;  Location: [facility]     ESOPHAGOSCOPY, GASTROSCOPY, DUODENOSCOPY (EGD), COMBINED N/A 9/20/2016    Procedure: COMBINED ESOPHAGOSCOPY, GASTROSCOPY, DUODENOSCOPY (EGD), BIOPSY SINGLE OR MULTIPLE;  Surgeon: Lucas Dang MD;  Location: [facility]     ESOPHAGOSCOPY, GASTROSCOPY,  DUODENOSCOPY (EGD), COMBINED N/A 2018    Procedure: COMBINED ESOPHAGOSCOPY, GASTROSCOPY, DUODENOSCOPY (EGD), BIOPSY SINGLE OR MULTIPLE;  gastroscopy;  Surgeon: Richard Crowder MD;  Location: [facility]     HERNIA REPAIR       VASECTOMY          Family History   Problem Relation Age of Onset     Cancer - colorectal Mother      Cardiovascular Father      Alcohol/Drug Father      Neurologic Disorder Father         tremor     Cardiovascular Brother      Coronary Artery Disease Brother      Breast Cancer Sister      Cancer Brother         bladder cancer       Social History     Socioeconomic History     Marital status:      Spouse name: Soledad You     Number of children: 2     Years of education: 12     Highest education level: Not on file   Occupational History     Employer: RETIRED   Tobacco Use     Smoking status: Former Smoker     Packs/day: 0.50     Years: 10.00     Pack years: 5.00     Types: Cigarettes     Quit date: 1995     Years since quittin.8     Smokeless tobacco: Never Used   Substance and Sexual Activity     Alcohol use: No     Comment: beer occ, and wine     Drug use: No     Sexual activity: Not Currently     Partners: Female   Other Topics Concern     Parent/sibling w/ CABG, MI or angioplasty before 65F 55M? Yes   Social History Narrative        [employer]    Nonsmoker as of 2  Yrs ago    Drinks wine daily    Lives with wife soledad in [place]     Social Determinants of Health     Financial Resource Strain: Not on file   Food Insecurity: Not on file   Transportation Needs: Not on file   Physical Activity: Not on file   Stress: Not on file   Social Connections: Not on file   Intimate Partner Violence: Not on file   Housing Stability: Not on file       Outpatient Encounter Medications as of 10/5/2022   Medication Sig Dispense Refill     amLODIPine (NORVASC) 5 MG tablet Take 1 tablet (5 mg) by mouth daily 90 tablet 4     aspirin 81 MG chewable tablet Take 1 tablet (81 mg) by  mouth daily 36 tablet 3     atorvastatin (LIPITOR) 80 MG tablet Take 1 tablet (80 mg) by mouth daily 90 tablet 4     lisinopril-hydrochlorothiazide (ZESTORETIC) 20-12.5 MG tablet Take 2 tablets by mouth daily 180 tablet 4     Multiple Vitamin (MULTI-VITAMIN DAILY PO) Take 1 tablet by mouth daily       Multiple Vitamins-Minerals (ICAPS PO) Take 1 tablet by mouth 2 times daily        omeprazole (PRILOSEC) 40 MG DR capsule Take 1 capsule (40 mg) by mouth daily 90 capsule 4     rivaroxaban ANTICOAGULANT (XARELTO ANTICOAGULANT) 20 MG TABS tablet Take 1 tablet (20 mg) by mouth daily (with dinner) 90 tablet 4     No facility-administered encounter medications on file as of 10/5/2022.             O:   NAD, WDWN, Alert & Oriented, Mood & Affect wnl, Vitals stable   Here today alone   /81 (BP Location: Left arm, Patient Position: Sitting, Cuff Size: Adult Large)   Pulse (!) 46   SpO2 98%    General appearance normal   Vitals stable   Alert, oriented and in no acute distress      Following lymph nodes palpated: antecubital no lad  R dorsal h and 7mm keratotic scaly papule   L hand healing well      Eyes: Conjunctivae/lids:Normal     ENT: Lips, buccal mucosa, tongue: normal    MSK:Normal    Cardiovascular: peripheral edema none    Pulm: Breathing Normal    Neuro/Psych: Orientation:Alert and Orientedx3 ; Mood/Affect:normal       A/P:  1. R hand squamous cell carcinoma   MOHS:   Location    The rationale for Mohs surgery was discussed with the patient and consent was obtained.  The risks and benefits as well as alternatives to therapy were discussed, in detail.  Specifically, the risks of infection, scarring, bleeding, prolonged wound healing, incomplete removal, allergy to anesthesia, nerve injury and recurrence were addressed.  Indication for Mohs was Location. Prior to the procedure, the treatment site was clearly identified and, if available, confirmed with previous photos and confirmed by the patient   All components  of the Universal Protocol/PAUSE rule were completed.  The Mohs surgeon operated in two distinct and integrated capacities as the surgeon and pathologist.      The area was prepped with Betasept.  A rim of normal appearing skin was marked circumferentially around the lesion.  The area was infiltrated with local anesthesia.  The tumor was first debulked to remove all clinically apparent tumor.  An incision following the standard Mohs approach was done and the specimen was oriented,mapped and placed in 1 block(s).  Each specimen was then chromacoded and processed in the Mohs laboratory using standard Mohs technique and submitted for frozen section histology.  Frozen section analysis showed no residual tumor but CLEAR MARGINS.      The tumor was excised using standard Mohs technique in 1 stages(s).  CLEAR MARGINS OBTAINED and Final defect size was 1.2 cm.     We discussed the options for wound management in full with the patient including risks/benefits/ possible outcomes.        Because of the relatively superficial nature of the wound and patient s preference, an intermediate repair was planned.  Guiding sutures were carefully placed across the wound to control the direction of wound closure, to prevent distortion from wound contraction, and to minimize wound size to facilitate wound care and healing.  No complications; EBL minimal.  Routine wound care discussed with patient.    It was a pleasure speaking to Richard You today.  Previous clinic notes and pertinent laboratory tests were reviewed prior to Richard You's visit.  Return to clinic next appt

## 2022-10-16 ENCOUNTER — HEALTH MAINTENANCE LETTER (OUTPATIENT)
Age: 82
End: 2022-10-16

## 2022-10-24 ENCOUNTER — IMMUNIZATION (OUTPATIENT)
Dept: FAMILY MEDICINE | Facility: CLINIC | Age: 82
End: 2022-10-24
Payer: COMMERCIAL

## 2022-10-24 PROCEDURE — 90662 IIV NO PRSV INCREASED AG IM: CPT

## 2022-10-24 PROCEDURE — G0008 ADMIN INFLUENZA VIRUS VAC: HCPCS

## 2022-11-07 ENCOUNTER — OFFICE VISIT (OUTPATIENT)
Dept: DERMATOLOGY | Facility: CLINIC | Age: 82
End: 2022-11-07
Payer: COMMERCIAL

## 2022-11-07 VITALS — DIASTOLIC BLOOD PRESSURE: 76 MMHG | HEART RATE: 47 BPM | OXYGEN SATURATION: 97 % | SYSTOLIC BLOOD PRESSURE: 152 MMHG

## 2022-11-07 DIAGNOSIS — C44.01 BASAL CELL CARCINOMA OF LIP: Primary | ICD-10-CM

## 2022-11-07 PROCEDURE — 17312 MOHS ADDL STAGE: CPT | Performed by: DERMATOLOGY

## 2022-11-07 PROCEDURE — 14061 TIS TRNFR E/N/E/L10.1-30SQCM: CPT | Performed by: DERMATOLOGY

## 2022-11-07 PROCEDURE — 17311 MOHS 1 STAGE H/N/HF/G: CPT | Performed by: DERMATOLOGY

## 2022-11-07 ASSESSMENT — PAIN SCALES - GENERAL: PAINLEVEL: NO PAIN (0)

## 2022-11-07 NOTE — NURSING NOTE
Initial BP (!) 152/76   Pulse (!) 47   SpO2 97%  Estimated body mass index is 26.31 kg/m  as calculated from the following:    Height as of 9/16/22: 1.829 m (6').    Weight as of 9/16/22: 88 kg (194 lb). .

## 2022-11-07 NOTE — LETTER
11/7/2022         RE: Richard You  [address] [facility] [address]        Dear Colleague,    Thank you for referring your patient, Richard You, to the [facility]. Please see a copy of my visit note below.    Surgical Office Location :   [facility]  [address] [place] [address]      Richard You is an extremely pleasant 82 year old year old male patient here today for evaluation and managment of basal cell carcinoma on right upper lip.  Hand is healing well.   Patient has no other skin complaints today.  Remainder of the HPI, Meds, PMH, Allergies, FH, and SH was reviewed in chart.      Past Medical History:   Diagnosis Date     Abdominal aneurysm without mention of rupture 6/24/2005    Repaired with percutaneous stent 8/05   Has annual CT scan to monitor stent placement. Last done in sept 2012 and stable      Atrial fibrillation (H)      Basal cell carcinoma      CAD 4/5/2005 12/05/12 Patient denies any recent chest pain or NTG use.  Patient is taking meds regularly and denies significant side effects. Last stress test many years ago, but bikes 30-40 mi/day without symptoms       Colon polyp, hyperplastic 5/14/2013    Hyperplastic  Polyp on colonoscopy 5-2013. 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       Past Surgical History:   Procedure Laterality Date     BYPASS CARDIOPULMONARY  12/1996    CA bypass x2 LAD     CARDIAC SURGERY       CYSTECTOMY PILONIDAL       ESOPHAGOSCOPY, GASTROSCOPY, DUODENOSCOPY (EGD), COMBINED N/A 9/4/2015    Procedure: COMBINED ESOPHAGOSCOPY,  GASTROSCOPY, DUODENOSCOPY (EGD), BIOPSY SINGLE OR MULTIPLE;  Surgeon: Fuad Holder MD;  Location: [facility]     ESOPHAGOSCOPY, GASTROSCOPY, DUODENOSCOPY (EGD), COMBINED N/A 2016    Procedure: COMBINED ESOPHAGOSCOPY, GASTROSCOPY, DUODENOSCOPY (EGD), BIOPSY SINGLE OR MULTIPLE;  Surgeon: Lucas Dang MD;  Location: [facility]     ESOPHAGOSCOPY, GASTROSCOPY, DUODENOSCOPY (EGD), COMBINED N/A 2018    Procedure: COMBINED ESOPHAGOSCOPY, GASTROSCOPY, DUODENOSCOPY (EGD), BIOPSY SINGLE OR MULTIPLE;  gastroscopy;  Surgeon: Richard Crowder MD;  Location: [facility]     HERNIA REPAIR       VASECTOMY          Family History   Problem Relation Age of Onset     Cancer - colorectal Mother      Cardiovascular Father      Alcohol/Drug Father      Neurologic Disorder Father         tremor     Cardiovascular Brother      Coronary Artery Disease Brother      Breast Cancer Sister      Cancer Brother         bladder cancer       Social History     Socioeconomic History     Marital status:      Spouse name: Kelly You     Number of children: 2     Years of education: 12     Highest education level: Not on file   Occupational History     Employer: RETIRED   Tobacco Use     Smoking status: Former     Packs/day: 0.50     Years: 10.00     Pack years: 5.00     Types: Cigarettes     Quit date: 1995     Years since quittin.9     Smokeless tobacco: Never   Substance and Sexual Activity     Alcohol use: No     Comment: beer occ, and wine     Drug use: No     Sexual activity: Not Currently     Partners: Female   Other Topics Concern     Parent/sibling w/ CABG, MI or angioplasty before 65F 55M? Yes   Social History Narrative        [employer]    Nonsmoker as of 2  Yrs ago    Drinks wine daily    Lives with wife kelly in [place]     Social Determinants of Health     Financial Resource Strain: Not on file   Food Insecurity: Not on file   Transportation Needs: Not on file   Physical Activity: Not on file    Stress: Not on file   Social Connections: Not on file   Intimate Partner Violence: Not on file   Housing Stability: Not on file       Outpatient Encounter Medications as of 11/7/2022   Medication Sig Dispense Refill     amLODIPine (NORVASC) 5 MG tablet Take 1 tablet (5 mg) by mouth daily 90 tablet 4     aspirin 81 MG chewable tablet Take 1 tablet (81 mg) by mouth daily 36 tablet 3     atorvastatin (LIPITOR) 80 MG tablet Take 1 tablet (80 mg) by mouth daily 90 tablet 4     lisinopril-hydrochlorothiazide (ZESTORETIC) 20-12.5 MG tablet Take 2 tablets by mouth daily 180 tablet 4     Multiple Vitamin (MULTI-VITAMIN DAILY PO) Take 1 tablet by mouth daily       Multiple Vitamins-Minerals (ICAPS PO) Take 1 tablet by mouth 2 times daily        omeprazole (PRILOSEC) 40 MG DR capsule Take 1 capsule (40 mg) by mouth daily 90 capsule 4     rivaroxaban ANTICOAGULANT (XARELTO ANTICOAGULANT) 20 MG TABS tablet Take 1 tablet (20 mg) by mouth daily (with dinner) 90 tablet 4     No facility-administered encounter medications on file as of 11/7/2022.             O:   NAD, WDWN, Alert & Oriented, Mood & Affect wnl, Vitals stable   Here today with wife    General appearance normal   Vitals stable   Alert, oriented and in no acute distress  R upper lip 1.2cm pink pearly papule       Eyes: Conjunctivae/lids:Normal     ENT: Lips, buccal mucosa, tongue: normal    MSK:Normal    Cardiovascular: peripheral edema none    Pulm: Breathing Normal    Neuro/Psych: Orientation:Alert and Orientedx3 ; Mood/Affect:normal       A/P:  1. R upper lip basal cell carcinoma   MOHS:   Location    The rationale for Mohs surgery was discussed with the patient and consent was obtained.  The risks and benefits as well as alternatives to therapy were discussed, in detail.  Specifically, the risks of infection, scarring, bleeding, prolonged wound healing, incomplete removal, allergy to anesthesia, nerve injury and recurrence were addressed.  Indication for Mohs was  Location. Prior to the procedure, the treatment site was clearly identified and, if available, confirmed with previous photos and confirmed by the patient   All components of the Universal Protocol/PAUSE rule were completed.  The Mohs surgeon operated in two distinct and integrated capacities as the surgeon and pathologist.      The area was prepped with Betasept.  A rim of normal appearing skin was marked circumferentially around the lesion.  The area was infiltrated with local anesthesia.  The tumor was first debulked to remove all clinically apparent tumor.  An incision following the standard Mohs approach was done and the specimen was oriented,mapped and placed in 2 block(s).  Each specimen was then chromacoded and processed in the Mohs laboratory using standard Mohs technique and submitted for frozen section histology.  Frozen section analysis showed  residual tumor but CLEAR MARGINS.    First stage:Orthokeratosis of epidermis with a proliferation of nests of basaloid cells, with peripheral palisading and a haphazard arrangement in the center extending into the dermis, forming nodules.  The tumor cells have hyperchromatic nuclei. Poor cytoplasm and intercellular bridging.      The tumor was excised using standard Mohs technique in 2 stages(s).  CLEAR MARGINS OBTAINED and Final defect size was 2 cm.     We discussed the options for wound management in full with the patient including risks/benefits/ possible outcomes.      REPAIR IPF: Because of the Because of the size and full thickness nature of the defect, Because of the tightness of the surrounding skin, To maintain form and function, In order to avoid distortion and Because of the proximity to the vermilion, a inferiorly -based island pedicle flap was carefully planned. After LEC anesthesia and prep, a triangular flap was incised and a vascularized pedicle was developed deep to the flap by careful undermining and dissection. The surrounding skin was widely  undermined and hemostasis was obtained. The flap was then advanced into the defect and sutured into place in a a layered fashion using Vicryl and Fast Absorbing Plain Gut sutures. Postoperative size was 4 x 3 cm.  EBL minimal; complications none; wound care routine.  The patient was discharged in good condition and will return in one week for wound evaluation.     It was a pleasure speaking to Richard You today.  Previous clinic notes and pertinent laboratory tests were reviewed prior to Richard You's visit.  Patient encouraged to perform monthly skin exams.  UV precautions reviewed with patient.  Risks of non-melanoma skin cancer discussed with patient   Return to clinic 3 months        Again, thank you for allowing me to participate in the care of your patient.        Sincerely,        Nikita Seo MD

## 2022-11-07 NOTE — PROGRESS NOTES
Richard You is an extremely pleasant 82 year old year old male patient here today for evaluation and managment of basal cell carcinoma on right upper lip.  Hand is healing well.   Patient has no other skin complaints today.  Remainder of the HPI, Meds, PMH, Allergies, FH, and SH was reviewed in chart.      Past Medical History:   Diagnosis Date     Abdominal aneurysm without mention of rupture 6/24/2005    Repaired with percutaneous stent 8/05   Has annual CT scan to monitor stent placement. Last done in sept 2012 and stable      Atrial fibrillation (H)      Basal cell carcinoma      CAD 4/5/2005 12/05/12 Patient denies any recent chest pain or NTG use.  Patient is taking meds regularly and denies significant side effects. Last stress test many years ago, but bikes 30-40 mi/day without symptoms       Colon polyp, hyperplastic 5/14/2013    Hyperplastic  Polyp on colonoscopy 5-2013. 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       Past Surgical History:   Procedure Laterality Date     BYPASS CARDIOPULMONARY  12/1996    CA bypass x2 LAD     CARDIAC SURGERY       CYSTECTOMY PILONIDAL       ESOPHAGOSCOPY, GASTROSCOPY, DUODENOSCOPY (EGD), COMBINED N/A 9/4/2015    Procedure: COMBINED ESOPHAGOSCOPY, GASTROSCOPY, DUODENOSCOPY (EGD), BIOPSY SINGLE OR MULTIPLE;  Surgeon: Fuad Holder MD;  Location: [facility]     ESOPHAGOSCOPY, GASTROSCOPY, DUODENOSCOPY (EGD), COMBINED N/A 9/20/2016    Procedure: COMBINED ESOPHAGOSCOPY, GASTROSCOPY, DUODENOSCOPY (EGD), BIOPSY SINGLE OR MULTIPLE;  Surgeon: Lucas Dang MD;  Location: [facility]      ESOPHAGOSCOPY, GASTROSCOPY, DUODENOSCOPY (EGD), COMBINED N/A 2018    Procedure: COMBINED ESOPHAGOSCOPY, GASTROSCOPY, DUODENOSCOPY (EGD), BIOPSY SINGLE OR MULTIPLE;  gastroscopy;  Surgeon: Richard Crowder MD;  Location: [facility]     HERNIA REPAIR       VASECTOMY          Family History   Problem Relation Age of Onset     Cancer - colorectal Mother      Cardiovascular Father      Alcohol/Drug Father      Neurologic Disorder Father         tremor     Cardiovascular Brother      Coronary Artery Disease Brother      Breast Cancer Sister      Cancer Brother         bladder cancer       Social History     Socioeconomic History     Marital status:      Spouse name: Soledad You     Number of children: 2     Years of education: 12     Highest education level: Not on file   Occupational History     Employer: RETIRED   Tobacco Use     Smoking status: Former     Packs/day: 0.50     Years: 10.00     Pack years: 5.00     Types: Cigarettes     Quit date: 1995     Years since quittin.9     Smokeless tobacco: Never   Substance and Sexual Activity     Alcohol use: No     Comment: beer occ, and wine     Drug use: No     Sexual activity: Not Currently     Partners: Female   Other Topics Concern     Parent/sibling w/ CABG, MI or angioplasty before 65F 55M? Yes   Social History Narrative        [employer]    Nonsmoker as of 2  Yrs ago    Drinks wine daily    Lives with wife soledad in [place]     Social Determinants of Health     Financial Resource Strain: Not on file   Food Insecurity: Not on file   Transportation Needs: Not on file   Physical Activity: Not on file   Stress: Not on file   Social Connections: Not on file   Intimate Partner Violence: Not on file   Housing Stability: Not on file       Outpatient Encounter Medications as of 2022   Medication Sig Dispense Refill     amLODIPine (NORVASC) 5 MG tablet Take 1 tablet (5 mg) by mouth daily 90 tablet 4     aspirin 81 MG chewable tablet Take 1 tablet  (81 mg) by mouth daily 36 tablet 3     atorvastatin (LIPITOR) 80 MG tablet Take 1 tablet (80 mg) by mouth daily 90 tablet 4     lisinopril-hydrochlorothiazide (ZESTORETIC) 20-12.5 MG tablet Take 2 tablets by mouth daily 180 tablet 4     Multiple Vitamin (MULTI-VITAMIN DAILY PO) Take 1 tablet by mouth daily       Multiple Vitamins-Minerals (ICAPS PO) Take 1 tablet by mouth 2 times daily        omeprazole (PRILOSEC) 40 MG DR capsule Take 1 capsule (40 mg) by mouth daily 90 capsule 4     rivaroxaban ANTICOAGULANT (XARELTO ANTICOAGULANT) 20 MG TABS tablet Take 1 tablet (20 mg) by mouth daily (with dinner) 90 tablet 4     No facility-administered encounter medications on file as of 11/7/2022.             O:   NAD, WDWN, Alert & Oriented, Mood & Affect wnl, Vitals stable   Here today with wife    General appearance normal   Vitals stable   Alert, oriented and in no acute distress  R upper lip 1.2cm pink pearly papule       Eyes: Conjunctivae/lids:Normal     ENT: Lips, buccal mucosa, tongue: normal    MSK:Normal    Cardiovascular: peripheral edema none    Pulm: Breathing Normal    Neuro/Psych: Orientation:Alert and Orientedx3 ; Mood/Affect:normal       A/P:  1. R upper lip basal cell carcinoma   MOHS:   Location    The rationale for Mohs surgery was discussed with the patient and consent was obtained.  The risks and benefits as well as alternatives to therapy were discussed, in detail.  Specifically, the risks of infection, scarring, bleeding, prolonged wound healing, incomplete removal, allergy to anesthesia, nerve injury and recurrence were addressed.  Indication for Mohs was Location. Prior to the procedure, the treatment site was clearly identified and, if available, confirmed with previous photos and confirmed by the patient   All components of the Universal Protocol/PAUSE rule were completed.  The Mohs surgeon operated in two distinct and integrated capacities as the surgeon and pathologist.      The area was prepped  with Betasept.  A rim of normal appearing skin was marked circumferentially around the lesion.  The area was infiltrated with local anesthesia.  The tumor was first debulked to remove all clinically apparent tumor.  An incision following the standard Mohs approach was done and the specimen was oriented,mapped and placed in 2 block(s).  Each specimen was then chromacoded and processed in the Mohs laboratory using standard Mohs technique and submitted for frozen section histology.  Frozen section analysis showed  residual tumor but CLEAR MARGINS.    First stage:Orthokeratosis of epidermis with a proliferation of nests of basaloid cells, with peripheral palisading and a haphazard arrangement in the center extending into the dermis, forming nodules.  The tumor cells have hyperchromatic nuclei. Poor cytoplasm and intercellular bridging.      The tumor was excised using standard Mohs technique in 2 stages(s).  CLEAR MARGINS OBTAINED and Final defect size was 2 cm.     We discussed the options for wound management in full with the patient including risks/benefits/ possible outcomes.      REPAIR IPF: Because of the Because of the size and full thickness nature of the defect, Because of the tightness of the surrounding skin, To maintain form and function, In order to avoid distortion and Because of the proximity to the vermilion, a inferiorly -based island pedicle flap was carefully planned. After LEC anesthesia and prep, a triangular flap was incised and a vascularized pedicle was developed deep to the flap by careful undermining and dissection. The surrounding skin was widely undermined and hemostasis was obtained. The flap was then advanced into the defect and sutured into place in a a layered fashion using Vicryl and Fast Absorbing Plain Gut sutures. Postoperative size was 4 x 3 cm.  EBL minimal; complications none; wound care routine.  The patient was discharged in good condition and will return in one week for wound  evaluation.     It was a pleasure speaking to Richard You today.  Previous clinic notes and pertinent laboratory tests were reviewed prior to Richard You's visit.  Patient encouraged to perform monthly skin exams.  UV precautions reviewed with patient.  Risks of non-melanoma skin cancer discussed with patient   Return to clinic 3 months

## 2022-11-07 NOTE — PATIENT INSTRUCTIONS
Sutured Wound Care Instructions  For Lips or Chin       No strenuous activity for 48 hours. Resume moderate activity in 48 hours. No heavy exercising until you are seen for follow up in one week.     Take Tylenol as needed for discomfort.                         Do not drink alcoholic beverages for 48 hours.     Keep the pressure bandage in place for 24 hours. If the bandage becomes blood tinged or loose, reinforce it with gauze and tape.       (Refer to the reverse side of this page for management of bleeding).    Remove bandage in 24 hours     NO STRAWS or puckering motion  for 2 weeks    Leave the flat bandage in place until your follow up appointment.    Keep the bandage dry. Wash around it carefully.    If the tape becomes soiled or starts to come off, reinforce it with additional paper tape.    Do not smoke for 3 weeks; smoking is detrimental to wound healing.    It is normal to have swelling and bruising around the surgical site. The bruising will fade in approximately 10-14 days. Elevate the area to reduce swelling.    Try to keep your lips / chin as immobile as possible. Refrain from laughing, smiling and yawning for 3 weeks.    Eat soft foods for the first 24 hours and take small bites of food for the entire three weeks.    When brushing your teeth, you should use a child s toothbrush or use mouth wash to prevent stretching of surgery site.    Numbness, itchiness and sensitivity to temperature changes can occur after surgery and may take up to 18 months to normalize.          POSSIBLE COMPLICATIONS      BLEEDING:    Leave the bandage in place.  Use tightly rolled up gauze or a cloth to apply direct pressure over the bandage for 20   minutes.  Reapply pressure for an additional 20 minutes if necessary  Call the office or go to the nearest emergency room if pressure fails to stop the bleeding.  Use additional gauze and tape to maintain pressure once the bleeding has stopped.        PAIN:    Post  operative pain should slowly get better, never worse.  A severe increase in pain may indicate a problem. Call the office if this occurs.            In case of emergency phone:  [phone]  Doctor Rayray:[phone]       Bandage change instructions    In one week:  -Remove all bandages on this day : 11/14  -Clean area with water  -Pat dry  -Apply steri strips to the sutures  -Apply piece of paper tape over steri strips  -Keep bandages on for one more week.  Keep dry.      FOLLOW INSTRUCTIONS BELOW    WOUND CARE INSTRUCTIONS  for  ONE WEEK AFTER SURGERY          On 11/21 remove bandage for good. You may resume your regular skin care routine, including washing with mild soap and water, applying moisturizer, make-up and sunscreen.    If there are any open or bleeding areas at the incision/graft site you should begin to cover the area with a bandage daily as follows:    Clean and dry the area with plain tap water using a Q-tip or sterile gauze pad.  Apply Polysporin or Bacitracin ointment to the open area.  Cover the wound with a band-aid or a sterile non-stick gauze pad and micropore paper tape.         SIGNS OF INFECTION  - If you notice any of these signs of infection, call your doctor right away: expanding redness around the wound.  - Yellow or greenish-colored pus or cloudy wound drainage.    - Red streaking spreading from the wound.  - Increased swelling, tenderness, or pain around the wound.   - Fever.    Please remember that yellow and clear drainage from a wound can be normal and related to normal wound healing.  Isolated drainage from a wound without a combination of the above features does not indicate infection.       *Once the bandages are removed, the scar will be red and firm (especially in the lip/chin area). This is normal and will fade in time. It might take 6-12 months for this to happen.     *Massaging the area will help the scar soften and fade quicker. Begin to massage the area one month after  the bandages have been removed. To massage apply pressure directly and firmly over the scar with the fingertips and move in a circular motion. Massage the area for a few minutes several times a day. Continue to massage the site for several months.    *Approximately 6-8 weeks after surgery it is not uncommon to see the formation of  tender pimple-like  bump along the scar. This is normal. As the scar continues to mature and the stitches underneath the skin begin to dissolve, this might occur. Do not pick or squeeze, this will resolve on it s own. Should one break open producing a small amount of drainage, apply Polysporin or Bacitracin ointment a few times a day until the wound is completely healed.    *Numbness in the surgical area is expected. It might take 12-18 months for the feeling to return to normal. During this time sensations of itchiness, tingling and occasional sharp pains might be noted. These feelings are normal and will subside once the nerves have completely healed.         IN CASE OF EMERGENCY: Dr Seo [phone]     If you were seen in [place] call: [phone]    If you were seen in [place] call: [phone]

## 2023-03-23 ENCOUNTER — OFFICE VISIT (OUTPATIENT)
Dept: FAMILY MEDICINE | Facility: CLINIC | Age: 83
End: 2023-03-23
Payer: COMMERCIAL

## 2023-03-23 VITALS
WEIGHT: 204 LBS | BODY MASS INDEX: 27.63 KG/M2 | HEART RATE: 66 BPM | DIASTOLIC BLOOD PRESSURE: 68 MMHG | OXYGEN SATURATION: 97 % | RESPIRATION RATE: 16 BRPM | SYSTOLIC BLOOD PRESSURE: 148 MMHG | TEMPERATURE: 45.3 F | HEIGHT: 72 IN

## 2023-03-23 DIAGNOSIS — I71.40 ABDOMINAL AORTIC ANEURYSM (AAA) WITHOUT RUPTURE, UNSPECIFIED PART (H): ICD-10-CM

## 2023-03-23 DIAGNOSIS — E78.5 HYPERLIPIDEMIA LDL GOAL <100: ICD-10-CM

## 2023-03-23 DIAGNOSIS — K22.70 BARRETT'S ESOPHAGUS WITHOUT DYSPLASIA: Chronic | ICD-10-CM

## 2023-03-23 DIAGNOSIS — I10 HTN, GOAL BELOW 130/80: ICD-10-CM

## 2023-03-23 DIAGNOSIS — I25.10 ATHEROSCLEROSIS OF CORONARY ARTERY OF NATIVE HEART WITHOUT ANGINA PECTORIS, UNSPECIFIED VESSEL OR LESION TYPE: ICD-10-CM

## 2023-03-23 DIAGNOSIS — Z00.00 ENCOUNTER FOR MEDICARE ANNUAL WELLNESS EXAM: Primary | ICD-10-CM

## 2023-03-23 DIAGNOSIS — H35.3290 EXUDATIVE AGE-RELATED MACULAR DEGENERATION, UNSPECIFIED LATERALITY, UNSPECIFIED STAGE (H): ICD-10-CM

## 2023-03-23 DIAGNOSIS — I48.20 CHRONIC ATRIAL FIBRILLATION (H): ICD-10-CM

## 2023-03-23 DIAGNOSIS — I25.810 CORONARY ATHEROSCLEROSIS OF AUTOLOGOUS VEIN BYPASS GRAFT WITHOUT ANGINA: ICD-10-CM

## 2023-03-23 LAB
ANION GAP SERPL CALCULATED.3IONS-SCNC: 9 MMOL/L (ref 7–15)
BUN SERPL-MCNC: 30.7 MG/DL (ref 8–23)
CALCIUM SERPL-MCNC: 9.8 MG/DL (ref 8.8–10.2)
CHLORIDE SERPL-SCNC: 105 MMOL/L (ref 98–107)
CHOLEST SERPL-MCNC: 155 MG/DL
CREAT SERPL-MCNC: 1.18 MG/DL (ref 0.67–1.17)
DEPRECATED HCO3 PLAS-SCNC: 28 MMOL/L (ref 22–29)
GFR SERPL CREATININE-BSD FRML MDRD: 61 ML/MIN/1.73M2
GLUCOSE SERPL-MCNC: 97 MG/DL (ref 70–99)
HDLC SERPL-MCNC: 55 MG/DL
LDLC SERPL CALC-MCNC: 76 MG/DL
NONHDLC SERPL-MCNC: 100 MG/DL
POTASSIUM SERPL-SCNC: 4.6 MMOL/L (ref 3.4–5.3)
SODIUM SERPL-SCNC: 142 MMOL/L (ref 136–145)
TRIGL SERPL-MCNC: 119 MG/DL

## 2023-03-23 PROCEDURE — 80048 BASIC METABOLIC PNL TOTAL CA: CPT | Performed by: FAMILY MEDICINE

## 2023-03-23 PROCEDURE — 99214 OFFICE O/P EST MOD 30 MIN: CPT | Mod: 25 | Performed by: FAMILY MEDICINE

## 2023-03-23 PROCEDURE — 36415 COLL VENOUS BLD VENIPUNCTURE: CPT | Performed by: FAMILY MEDICINE

## 2023-03-23 PROCEDURE — 80061 LIPID PANEL: CPT | Performed by: FAMILY MEDICINE

## 2023-03-23 PROCEDURE — G0439 PPPS, SUBSEQ VISIT: HCPCS | Performed by: FAMILY MEDICINE

## 2023-03-23 RX ORDER — OMEPRAZOLE 40 MG/1
40 CAPSULE, DELAYED RELEASE ORAL DAILY
Qty: 90 CAPSULE | Refills: 4 | Status: SHIPPED | OUTPATIENT
Start: 2023-03-23 | End: 2024-03-27

## 2023-03-23 RX ORDER — LISINOPRIL AND HYDROCHLOROTHIAZIDE 12.5; 2 MG/1; MG/1
2 TABLET ORAL DAILY
Qty: 180 TABLET | Refills: 4 | Status: SHIPPED | OUTPATIENT
Start: 2023-03-23 | End: 2024-03-28

## 2023-03-23 RX ORDER — AMLODIPINE BESYLATE 5 MG/1
5 TABLET ORAL DAILY
Qty: 90 TABLET | Refills: 4 | Status: SHIPPED | OUTPATIENT
Start: 2023-03-23 | End: 2024-03-28

## 2023-03-23 RX ORDER — ATORVASTATIN CALCIUM 80 MG/1
80 TABLET, FILM COATED ORAL DAILY
Qty: 90 TABLET | Refills: 4 | Status: SHIPPED | OUTPATIENT
Start: 2023-03-23 | End: 2024-03-28

## 2023-03-23 ASSESSMENT — ENCOUNTER SYMPTOMS
MYALGIAS: 0
CONSTIPATION: 0
HEADACHES: 0
DYSURIA: 0
WEAKNESS: 0
NAUSEA: 0
COUGH: 0
ARTHRALGIAS: 1
HEMATOCHEZIA: 0
HEARTBURN: 0
SORE THROAT: 0
SHORTNESS OF BREATH: 0
FEVER: 0
PARESTHESIAS: 0
HEMATURIA: 0
PALPITATIONS: 0
ABDOMINAL PAIN: 0
NERVOUS/ANXIOUS: 0
DIZZINESS: 0
DIARRHEA: 0
FREQUENCY: 0
EYE PAIN: 0
CHILLS: 0

## 2023-03-23 ASSESSMENT — PAIN SCALES - GENERAL: PAINLEVEL: NO PAIN (0)

## 2023-03-23 ASSESSMENT — ACTIVITIES OF DAILY LIVING (ADL): CURRENT_FUNCTION: NO ASSISTANCE NEEDED

## 2023-03-23 NOTE — PROGRESS NOTES
"    The patient was provided with written information regarding signs of hearing loss.  Answers for HPI/ROS submitted by the patient on 3/23/2023  In general, how would you rate your overall physical health?: good  Frequency of exercise:: 4-5 days/week  Do you usually eat at least 4 servings of fruit and vegetables a day, include whole grains & fiber, and avoid regularly eating high fat or \"junk\" foods? : Yes  Taking medications regularly:: Yes  Medication side effects:: Not applicable  Activities of Daily Living: no assistance needed  Home safety: no safety concerns identified  Hearing Impairment:: need to ask people to speak up or repeat themselves  In the past 6 months, have you been bothered by leaking of urine?: No  abdominal pain: No  Blood in stool: No  Blood in urine: No  chest pain: No  chills: No  congestion: No  constipation: No  cough: No  diarrhea: No  dizziness: No  ear pain: No  eye pain: No  nervous/anxious: No  fever: No  frequency: No  genital sores: No  headaches: No  hearing loss: No  heartburn: No  arthralgias: Yes  peripheral edema: No  mood changes: No  myalgias: No  nausea: No  dysuria: No  palpitations: No  Skin sensation changes: No  sore throat: No  urgency: No  rash: No  shortness of breath: No  visual disturbance: No  weakness: No  impotence: Yes  penile discharge: No  In general, how would you rate your overall mental or emotional health?: excellent  Additional concerns today:: No  Duration of exercise:: 45-60 minutes        "

## 2023-03-23 NOTE — PATIENT INSTRUCTIONS
You are due for follow-up with vascular surgery for your abdominal aortic aneurysm 5/2023.      [facility]   [address]   [facility] [address]   Phone: [phone]   Fax: [phone]       Patient Education   Personalized Prevention Plan  You are due for the preventive services outlined below.  Your care team is available to assist you in scheduling these services.  If you have already completed any of these items, please share that information with your care team to update in your medical record.  Health Maintenance Due   Topic Date Due    EGD  07/12/2020    ANNUAL REVIEW OF HM ORDERS  02/10/2023    Basic Metabolic Panel  03/16/2023    Cholesterol Lab  03/16/2023    Annual Wellness Visit  03/16/2023       Signs of Hearing Loss  Hearing loss is a problem shared by many people. In fact, it's one of the most common health problems, particularly as people age. Most people aged 65 and older have some hearing loss. By age 80, almost everyone does. Hearing loss often occurs slowly over the years. So, you may not realize your hearing has gotten worse.   When sudden hearing loss occurs, it's important to contact your healthcare provider right away. Your provider will do a medical exam and a hearing exam as soon as possible. This is to help find the cause and type of your sudden hearing loss. Based on your diagnosis, your healthcare provider will discuss possible treatments.      Hearing much better with one ear can be a sign of hearing loss.     Have your hearing checked  Call your healthcare provider if you:   Have to strain to hear normal conversation  Have to watch other people s faces very carefully to follow what they re saying  Need to ask people to repeat what they ve said  Often misunderstand what people are saying  Turn the volume of the television or radio up so high that others complain  Feel that people are mumbling when they re talking to you  Find that the effort to hear leaves you  feeling tired and irritated  Notice, when using the phone, that you hear better with one ear than the other  [employer] last reviewed this educational content on 6/1/2022 2000-2022 [employer]. All rights reserved. This information is not intended as a substitute for professional medical care. Always follow your healthcare professional's instructions.

## 2023-04-05 ENCOUNTER — TELEPHONE (OUTPATIENT)
Dept: FAMILY MEDICINE | Facility: CLINIC | Age: 83
End: 2023-04-05
Payer: COMMERCIAL

## 2023-04-19 ENCOUNTER — ANCILLARY PROCEDURE (OUTPATIENT)
Dept: GENERAL RADIOLOGY | Facility: CLINIC | Age: 83
End: 2023-04-19
Attending: FAMILY MEDICINE
Payer: COMMERCIAL

## 2023-04-19 ENCOUNTER — OFFICE VISIT (OUTPATIENT)
Dept: FAMILY MEDICINE | Facility: CLINIC | Age: 83
End: 2023-04-19
Payer: COMMERCIAL

## 2023-04-19 VITALS
OXYGEN SATURATION: 99 % | HEART RATE: 52 BPM | BODY MASS INDEX: 27.68 KG/M2 | RESPIRATION RATE: 16 BRPM | TEMPERATURE: 97.8 F | SYSTOLIC BLOOD PRESSURE: 136 MMHG | DIASTOLIC BLOOD PRESSURE: 70 MMHG | HEIGHT: 72 IN | WEIGHT: 204.4 LBS

## 2023-04-19 DIAGNOSIS — M25.532 LEFT WRIST PAIN: ICD-10-CM

## 2023-04-19 DIAGNOSIS — G25.0 BENIGN ESSENTIAL TREMOR: ICD-10-CM

## 2023-04-19 DIAGNOSIS — M25.532 LEFT WRIST PAIN: Primary | ICD-10-CM

## 2023-04-19 PROCEDURE — 99214 OFFICE O/P EST MOD 30 MIN: CPT | Performed by: FAMILY MEDICINE

## 2023-04-19 PROCEDURE — 73110 X-RAY EXAM OF WRIST: CPT | Mod: TC | Performed by: RADIOLOGY

## 2023-04-19 ASSESSMENT — PAIN SCALES - GENERAL: PAINLEVEL: MODERATE PAIN (4)

## 2023-04-19 NOTE — PROGRESS NOTES
Assessment & Plan     Left wrist pain  Overlying the site of recent large basal cell carcinoma removal.  Suspect this is related to scar tissue.  He does have some snapping of the extensor tendon over that area.  The stab itself he reports is nonpainful but has a general ache there.  Discussed also possible that he is at some underlying arthritis.  Given persistence will x-ray at this point since we had previously evaluated and treated conservatively.  Further work-up and management as dictated by results.     Benign essential tremor  He recalls that primidone had worked well for him.  However, because of his atrial fibrillation he was placed on Xarelto.  Due to drug interactions he is not able to take Xarelto and primidone or any other DOAC and primidone.  We had discussed having him switch to Coumadin as a potential at which point he could go back on primidone.  Discussed the logistics of this.  He will think about this and contact me if he desires to switch.         BMI:   Estimated body mass index is 27.72 kg/m  as calculated from the following:    Height as of this encounter: 1.829 m (6').    Weight as of this encounter: 92.7 kg (204 lb 6.4 oz).           Kamari Rojas MD  [facility]    Fercho Ramos is a 83 year old, presenting for the following health issues:  Recheck Medication and Musculoskeletal Problem         View : No data to display.              History of Present Illness       Reason for visit:  Discussion regarding meds    He eats 2-3 servings of fruits and vegetables daily.He consumes 1 sweetened beverage(s) daily.He exercises with enough effort to increase his heart rate 20 to 29 minutes per day.  He exercises with enough effort to increase his heart rate 6 days per week.   He is taking medications regularly.       Concern - wrist pain  Onset: 2-3 months  Description: left wrist  Intensity: moderate  Progression of Symptoms:  same  Accompanying Signs &  Symptoms: none  Previous history of similar problem: none  Precipitating factors:        Worsened by: unknown  Alleviating factors:        Improved by: unknown  Therapies tried and outcome:  none           Review of Systems   Constitutional, neuro, ENT, endocrine, pulmonary, cardiac, gastrointestinal, genitourinary, musculoskeletal, integument and psychiatric systems are negative, except as otherwise noted.       Objective    /70   Pulse 52   Temp 97.8  F (36.6  C) (Tympanic)   Resp 16   Ht 1.829 m (6')   Wt 92.7 kg (204 lb 6.4 oz)   SpO2 99%   BMI 27.72 kg/m    Body mass index is 27.72 kg/m .  Physical Exam   GENERAL: Pleasant, well appearing male.  MUSCULOSKELETAL: Scar from BCC removal noted dorsum of left wrist.  Over that area there is tendon snapping.  No pain to bony palpation.

## 2023-04-19 NOTE — NURSING NOTE
Initial /70   Pulse 52   Temp 97.8  F (36.6  C) (Tympanic)   Resp 16   Ht 1.829 m (6')   Wt 92.7 kg (204 lb 6.4 oz)   SpO2 99%   BMI 27.72 kg/m   Estimated body mass index is 27.72 kg/m  as calculated from the following:    Height as of this encounter: 1.829 m (6').    Weight as of this encounter: 92.7 kg (204 lb 6.4 oz). .

## 2023-04-28 ENCOUNTER — TELEPHONE (OUTPATIENT)
Dept: OTHER | Facility: CLINIC | Age: 83
End: 2023-04-28
Payer: COMMERCIAL

## 2023-05-18 ENCOUNTER — MYC MEDICAL ADVICE (OUTPATIENT)
Dept: FAMILY MEDICINE | Facility: CLINIC | Age: 83
End: 2023-05-18
Payer: COMMERCIAL

## 2023-05-19 ENCOUNTER — OFFICE VISIT (OUTPATIENT)
Dept: FAMILY MEDICINE | Facility: CLINIC | Age: 83
End: 2023-05-19
Payer: COMMERCIAL

## 2023-05-19 ENCOUNTER — ANCILLARY PROCEDURE (OUTPATIENT)
Dept: GENERAL RADIOLOGY | Facility: CLINIC | Age: 83
End: 2023-05-19
Attending: FAMILY MEDICINE
Payer: COMMERCIAL

## 2023-05-19 VITALS
HEIGHT: 72 IN | DIASTOLIC BLOOD PRESSURE: 68 MMHG | WEIGHT: 205 LBS | HEART RATE: 67 BPM | RESPIRATION RATE: 16 BRPM | BODY MASS INDEX: 27.77 KG/M2 | TEMPERATURE: 97.4 F | OXYGEN SATURATION: 98 % | SYSTOLIC BLOOD PRESSURE: 132 MMHG

## 2023-05-19 DIAGNOSIS — S89.91XA INJURY OF RIGHT KNEE, INITIAL ENCOUNTER: Primary | ICD-10-CM

## 2023-05-19 DIAGNOSIS — S89.91XA INJURY OF RIGHT KNEE, INITIAL ENCOUNTER: ICD-10-CM

## 2023-05-19 PROCEDURE — 73560 X-RAY EXAM OF KNEE 1 OR 2: CPT | Mod: TC | Performed by: STUDENT IN AN ORGANIZED HEALTH CARE EDUCATION/TRAINING PROGRAM

## 2023-05-19 PROCEDURE — 99213 OFFICE O/P EST LOW 20 MIN: CPT | Performed by: FAMILY MEDICINE

## 2023-05-19 ASSESSMENT — PAIN SCALES - GENERAL: PAINLEVEL: EXTREME PAIN (8)

## 2023-05-19 NOTE — PROGRESS NOTES
Assessment & Plan     Injury of right knee, initial encounter  No obvious fracture.  Conservative management.  Follow-up as needed if not improved or worse.  - XR Knee Right 1/2 Views; Future       BMI:   Estimated body mass index is 27.8 kg/m  as calculated from the following:    Height as of this encounter: 1.829 m (6').    Weight as of this encounter: 93 kg (205 lb).           Kamari Rojas MD  [facility]    Fercho Ramos is a 83 year old, presenting for the following health issues:  Fall        4/19/2023     7:25 AM   Additional Questions   Roomed by MB   Accompanied by Spouse     Fall    History of Present Illness       Reason for visit:  Fell, right kn ee pain  Symptom onset:  1-2 weeks ago  Symptoms include:  Pain, loss of movement  Symptom intensity:  Severe  Symptom progression:  Worsening  Had these symptoms before:  No  What makes it worse:  No  What makes it better:  No    He eats 2-3 servings of fruits and vegetables daily.He consumes 1 sweetened beverage(s) daily.He exercises with enough effort to increase his heart rate 30 to 60 minutes per day.  He exercises with enough effort to increase his heart rate 5 days per week.   He is taking medications regularly.               Review of Systems   Constitutional, neuro, ENT, endocrine, pulmonary, cardiac, gastrointestinal, genitourinary, musculoskeletal, integument and psychiatric systems are negative, except as otherwise noted.       Objective    /68   Pulse 67   Temp 97.4  F (36.3  C) (Tympanic)   Resp 16   Ht 1.829 m (6')   Wt 93 kg (205 lb)   SpO2 98%   BMI 27.80 kg/m    Body mass index is 27.8 kg/m .  Physical Exam   GENERAL: Pleasant, well appearing male.  MUSCULOSKELETAL: Small knee effusion.  Mild medial and anterior joint line tenderness to palpation.  Mild crepitus. No ligamentous laxity with anterior/posterior drawer, Lachman's. No pain or laxity with varus/valgus stress, no click with [place]

## 2023-05-19 NOTE — NURSING NOTE
Initial /68   Pulse 67   Temp 97.4  F (36.3  C) (Tympanic)   Resp 16   Ht 1.829 m (6')   Wt 93 kg (205 lb)   SpO2 98%   BMI 27.80 kg/m   Estimated body mass index is 27.8 kg/m  as calculated from the following:    Height as of this encounter: 1.829 m (6').    Weight as of this encounter: 93 kg (205 lb). .

## 2023-06-01 ENCOUNTER — TELEPHONE (OUTPATIENT)
Dept: FAMILY MEDICINE | Facility: CLINIC | Age: 83
End: 2023-06-01
Payer: COMMERCIAL

## 2023-06-01 DIAGNOSIS — K22.70 BARRETT'S ESOPHAGUS WITHOUT DYSPLASIA: Primary | ICD-10-CM

## 2023-06-01 NOTE — TELEPHONE ENCOUNTER
Dr. Rojas:    Called patient and his wife Soledad were both on speaker phone (patient ok'd to speak to Soledad) , did let them know your message below, Soledad said that patient had conversation with you in the past to forgo the EGD as it was more detrimental to have the procedure, patient says he would like to decline to have the follow up EGD. Please advise.      REJI Comer

## 2023-06-02 ENCOUNTER — HOSPITAL ENCOUNTER (OUTPATIENT)
Dept: CT IMAGING | Facility: CLINIC | Age: 83
Discharge: HOME | End: 2023-06-02
Attending: RADIOLOGY | Admitting: RADIOLOGY
Payer: COMMERCIAL

## 2023-06-02 DIAGNOSIS — I71.40 ABDOMINAL AORTIC ANEURYSM (AAA) WITHOUT RUPTURE (H): ICD-10-CM

## 2023-06-02 LAB
CREAT BLD-MCNC: 1.1 MG/DL (ref 0.7–1.3)
GFR SERPL CREATININE-BSD FRML MDRD: >60 ML/MIN/1.73M2

## 2023-06-02 PROCEDURE — 74174 CTA ABD&PLVS W/CONTRAST: CPT

## 2023-06-02 PROCEDURE — 250N000009 HC RX 250: Performed by: RADIOLOGY

## 2023-06-02 PROCEDURE — 82565 ASSAY OF CREATININE: CPT

## 2023-06-02 PROCEDURE — 250N000011 HC RX IP 250 OP 636: Performed by: RADIOLOGY

## 2023-06-02 RX ORDER — IOPAMIDOL 755 MG/ML
72 INJECTION, SOLUTION INTRAVASCULAR ONCE
Status: COMPLETED | OUTPATIENT
Start: 2023-06-02 | End: 2023-06-02

## 2023-06-02 RX ADMIN — IOPAMIDOL 72 ML: 755 INJECTION, SOLUTION INTRAVENOUS at 08:39

## 2023-06-02 RX ADMIN — SODIUM CHLORIDE 100 ML: 9 INJECTION, SOLUTION INTRAVENOUS at 08:40

## 2023-06-02 NOTE — TELEPHONE ENCOUNTER
So sorry.  Yes, I have forgotten that discussion.  The reminder that popped up was the one that we had set from last year. There shouldn't be anymore auto-triggered alerts about this now.

## 2023-06-04 ENCOUNTER — MYC MEDICAL ADVICE (OUTPATIENT)
Dept: FAMILY MEDICINE | Facility: CLINIC | Age: 83
End: 2023-06-04
Payer: COMMERCIAL

## 2023-06-04 DIAGNOSIS — S82.401G CLOSED FRACTURE OF RIGHT TIBIA AND FIBULA WITH DELAYED HEALING, SUBSEQUENT ENCOUNTER: ICD-10-CM

## 2023-06-04 DIAGNOSIS — S89.91XD INJURY OF RIGHT KNEE, SUBSEQUENT ENCOUNTER: Primary | ICD-10-CM

## 2023-06-04 DIAGNOSIS — S82.201G CLOSED FRACTURE OF RIGHT TIBIA AND FIBULA WITH DELAYED HEALING, SUBSEQUENT ENCOUNTER: ICD-10-CM

## 2023-06-07 ENCOUNTER — MYC MEDICAL ADVICE (OUTPATIENT)
Dept: FAMILY MEDICINE | Facility: CLINIC | Age: 83
End: 2023-06-07
Payer: COMMERCIAL

## 2023-06-07 NOTE — TELEPHONE ENCOUNTER
Please see my chart message.     Patient continues to have right knee pain.     Advise to make appointment? Last visit for this 5/19.     Demetra Givens RN on 6/7/2023 at 2:55 PM

## 2023-06-08 ENCOUNTER — TELEPHONE (OUTPATIENT)
Dept: OTHER | Facility: CLINIC | Age: 83
End: 2023-06-08
Payer: COMMERCIAL

## 2023-06-08 DIAGNOSIS — I71.40 ABDOMINAL AORTIC ANEURYSM (AAA) WITHOUT RUPTURE (H): Primary | ICD-10-CM

## 2023-06-08 NOTE — RESULT ENCOUNTER NOTE
Results discussed directly with patient.  Any further details documented in the note.   Xochitl Zacarias RN

## 2023-06-08 NOTE — TELEPHONE ENCOUNTER
Reviewed recent CTA with Dr. Lombardi.  Aneurysm sac size is stable and no endoleak detected.  Recommend follow up with ultrasound.  Discussed with patient and wife.  La Zacarias RN  IR nurse clinician  [phone]

## 2023-06-08 NOTE — TELEPHONE ENCOUNTER
Hold off on appointment. MRI ordered (see other phone note about this).  Will decide what to do next based on MRI.

## 2023-06-08 NOTE — TELEPHONE ENCOUNTER
See other my chart encounter 6/5    Patient notified of MRI order    Demetra Givens RN on 6/8/2023 at 9:07 AM

## 2023-06-12 ENCOUNTER — HOSPITAL ENCOUNTER (OUTPATIENT)
Dept: MRI IMAGING | Facility: CLINIC | Age: 83
Discharge: HOME | End: 2023-06-12
Attending: FAMILY MEDICINE | Admitting: FAMILY MEDICINE
Payer: COMMERCIAL

## 2023-06-12 DIAGNOSIS — S89.91XD INJURY OF RIGHT KNEE, SUBSEQUENT ENCOUNTER: ICD-10-CM

## 2023-06-12 PROCEDURE — 73721 MRI JNT OF LWR EXTRE W/O DYE: CPT | Mod: RT

## 2023-06-14 ENCOUNTER — MYC MEDICAL ADVICE (OUTPATIENT)
Dept: FAMILY MEDICINE | Facility: CLINIC | Age: 83
End: 2023-06-14
Payer: COMMERCIAL

## 2023-08-17 ENCOUNTER — ALLIED HEALTH/NURSE VISIT (OUTPATIENT)
Dept: RESEARCH | Facility: CLINIC | Age: 83
End: 2023-08-17
Payer: COMMERCIAL

## 2023-08-17 VITALS
RESPIRATION RATE: 18 BRPM | SYSTOLIC BLOOD PRESSURE: 146 MMHG | BODY MASS INDEX: 26.82 KG/M2 | OXYGEN SATURATION: 97 % | HEART RATE: 55 BPM | HEIGHT: 72 IN | DIASTOLIC BLOOD PRESSURE: 83 MMHG | WEIGHT: 198 LBS

## 2023-08-17 DIAGNOSIS — Z00.6 EXAMINATION OF PARTICIPANT OR CONTROL IN CLINICAL RESEARCH: Primary | ICD-10-CM

## 2023-08-17 PROCEDURE — 99207 PR NO CHARGE NURSE ONLY: CPT

## 2023-08-17 NOTE — PROGRESS NOTES
underdome Study    Study Description: The objective of this study is to demonstrate that the Device Under Testing (DUT) produced an ECG input signal of sufficient quality for the [employer] ECG Verna to produce a waveform with clinically-equivalent information the the Clinical Reference Device.       CONSENT     Richard You a 83 year old male, was on-site today to discuss [facility].       The consent form was reviewed with the patient.     The review of the study included:  Study Purpose    Participant Responsibilities    Study Data and Devices    Benefits and Risks of Participation    Compensation and Costs of Participation    Voluntary Participation    Confidentiality    Injury and Legal Rights      Protocol Version: 1.0     Subject Number:           SCREENING       Demographic Info  Richard You   1940          83 year old  male    Race: White  Ethnicity: Non-/     Woman of Child Bearing Potential?  N/A (Male)    Past Medical History:   Diagnosis Date Start/End    Abdominal aneurysm without mention of rupture 6/24/2005, 09/UN/2012    Repaired with percutaneous stent 8/05   Has annual CT scan to monitor stent placement. Last done in sept 2012 and stable     Atrial fibrillation (H) 5/10/2015, Ongoing    CAD 4/5/2005, Ongoing    12/05/12 Patient denies any recent chest pain or NTG use.  Patient is taking meds regularly and denies significant side effects. Last stress test many years ago, but bikes 30-40 mi/day without symptoms      Colon polyp, hyperplastic 5/14/2013,Ongoing    Hyperplastic  Polyp on colonoscopy 5-2013. Recommend that the next colonoscopy be in 5 years because of family history     Family history of tremor 7/14/2014, Ongoing    father     Former smoker 7/14/2014, Ongoing    HTN, goal below 130/80 5/10/2012, Ongoing    History of AAA     Hyperlipidemia LDL goal <100 10/31/2010, Ongoing    Macular degeneration, wet (H) 4/22/2013, Ongoing     Myocardial infarction (H) UN/UN/1996 Ended UN/UN/1996    S/P CABG (coronary artery bypass graft) 7/14/2014 End Date 7/14/2014       Specify Type of Atrial Fibrillation: Long-standing persistent atrial fibrillation       Current Outpatient Medications:     amLODIPine (NORVASC) 5 MG tablet, Take 1 tablet (5 mg) by mouth daily, Disp: 90 tablet, Rfl: 4    aspirin 81 MG chewable tablet, Take 1 tablet (81 mg) by mouth daily, Disp: 36 tablet, Rfl: 3    atorvastatin (LIPITOR) 80 MG tablet, Take 1 tablet (80 mg) by mouth daily, Disp: 90 tablet, Rfl: 4    lisinopril-hydrochlorothiazide (ZESTORETIC) 20-12.5 MG tablet, Take 2 tablets by mouth daily, Disp: 180 tablet, Rfl: 4    rivaroxaban ANTICOAGULANT (XARELTO ANTICOAGULANT) 20 MG TABS tablet, Take 1 tablet (20 mg) by mouth daily (with dinner), Disp: 90 tablet, Rfl: 4    Medications were reviewed with the subject. Subject is not taking any exclusionary medications.     Rhythm Control (EXCLUSIONARY): disopyramide (Norpace), quinidine (cardioquin), flecainide (Tambocor), propafenone, amiodarone (Pacerone), dofetilide (Tokisyn), sotalol (Betapace), ibutilide (Corvert), moricizine (Ethmozine), or procainamide (Pronestyl)    Rate Control/Anticoagulant (PERMITTED): metoprolol (Lopressor), atenolol (Tenormin), diltiazem (Cardizem), coumadin (Warfarin), clopidogrel (Plavix), or aspirin    No Known Allergies     Time Seated: 1300   BP (!) 146/83   Pulse 55   Resp 18   Ht 1.829 m (6')   Wt 89.8 kg (198 lb)   SpO2 97%   BMI 26.85 kg/m      Lifestyle Habits  Alcohol: Consume 0-1 beverages/day  Recreational Drug Use: Have not used recreational drug(s) in the last year   Will the recreational drug use impact the ECG result? N/A    Please see Physical Examination note for Screening ECG interpretation.      ENROLLMENT     Subject has met all requirements and is Enrolled in the Study?: Yes    Subject Cohort Assignment: Atrial Fibrillation     DATA COLLECTION       Wrist  Measurements  Wrist the DUT will be Worn on: Left  Left Wrist Circumference: 180 mm    DUT wearing Wrist Skin Thickness: 5 mm   Wrist Hairiness: Sparse hair on wrist      Device Information  Equipment Bundle/Device Identifier: A:  (L)  Number of Practice Trials: 3   Date ECG Obtained: 18-AUG-2023    12-Lead Placement Confirmed By: April LAM    30-second samples must be taken simultaneously. 1 minute rest between samples.     Samples: 1, 2, & 3  Taken Simultaneously? No  30-Sec ECG recording from DUT? No  30-Sec ECG recording from 12-Lead ECG? No    All samples (FIT Files and XML/PDF) were provided to [facility].      Any Adverse Events? No  Any Protocol Deviations? No    The subject has now completed their participation in the [employer] Study.         END OF STUDY     Did the subject complete all screening and study procedures per the protocol? No   If no, Why: Other: Writer did 3 practice trials with participant. During these trials participant hand was not stable enough during the ECG process. Participant's tremor was minor on exam but ECG was not able to take successfully during trial process. Therefore, participant was screen failed post practice trials.     Date Subject Exited the Study: 18-AUG-2023    Reason Subject Exited Study: Other: Writer did 3 practice trials with participant. During these trials participant hand was not stable enough during the ECG process. Participant's tremor was minor on exam but ECG was not able to take successfully during trial process. Therefore, participant was screen failed post practice trials.    Reason for Withdrawal: Other: See above.       17-AUG-2023    Katelyn Amaya RN

## 2023-08-17 NOTE — PROGRESS NOTES
eTssa Inclusion/Exclusion Criteria:    Study Name: Tessa   : Kirill Vee MD and Amor Grissom MD    Study Description: The objective of this study is to demonstrate that the Device Under Testing (DUT) produced an ECG input signal of sufficient quality for the [facility] ECG Verna to produce a waveform with clinically-equivalent information the Clinical Reference Device.      Protocol Version: 1.0 (6-FEB-2023)  Consent Version: 1.0 (28- Jun-2023)    Criteria #  Inclusion Criteria (ALL MUST BE YES)  YES/NO/N/A   1  Able to read, understand, and provide written informed consent Yes   2  Willing and able to participate in the study procedures as described in the consent form   Yes   3  Individuals who are 22 years of age and older  Yes   4  Able to communicate effectively with and follow instructions from the study staff    Yes   5    Subjects must be able to wear the DUT on their wrist such that  DUT band can be fastened  DUT fits snuggly on the wrist without any gapping or rotation  Yes   6    For subjects enrolled into the AF population, subjects must have a known diagnosis of AF and be in AF at the time of screening. Subjects may have any type of AF including paroxysmal, persistent, or permanent AF. Yes             Criteria # Exclusion Criteria (ALL MUST BE NO) YES/NO/N/A   1  Physical disability that precludes safe and adequate testing  No   2 Mental impairment resulting in limited ability to cooperate   No   3 Subjects with a pacemaker or implantable cardioverter-defibrillator (ICD)   No   4 Acute myocardial infarction within 90 days of screening or other cardiovascular disease that, in the opinion of the Investigator, increases the risk to the subject or renders data uninterpretable *  Date of MI: UN/UN/1996 Age 56 No   5  Acute pulmonary embolism, pulmonary infarction, or deep vein thrombosis within 90 days of screening       Date of N/A: N/A   No   6  Stroke or transient  ischemic attack within 90 days of screening                        Date of Stroke/TIA: N/A  No   7 Subjects taking rhythm control drugs. For clarity and in contrast, rate control, anticoagulants, and anti-platelet medications are permitted.             EXCEPTION: Subjects who are undergoing scheduled cardioversion for               known AF within 30 days after study participation are allowed to participate             in the study even when on rhythm control drugs                         Date of Cardioversion: N/A  No   8  Symptomatic (or active) allergic skin reactions such as eczema, rosacea, impetigo, dermatomyositis, or allergic contact dermatitis on both wrists or over electrode attachment sites, including known allergy or sensitivity  No   9  Known sensitivity to medical adhesives, isopropyl alcohol, watch bands, or ECG electrodes No   10  A history of abnormal life-threatening rhythms as determined by the Investigator **    No   11  Significant tremor that prevents subject from being able to hold still  No   12  Women who are pregnant at the time of study participation  No   13   Subjects enrolled into the SR population must not have any diagnosis of AF. For example, subjects cannot be enrolled into the SR population if they have a diagnosis of paroxysmal AF but during screening are in SR.  N/A: AF subject    *Ex. - Recent or ongoing unstable angina, significant valvular heart disease or heart failure, myocarditis, or pericarditis   **Ex. - Ventricular tachycardia, ventricular fibrillation, 3rd-degree heart block, resting heart rate <50 bpm, or resting heart rate >120 bpm      Patient does fulfill study inclusion criteria and no exclusion criteria are found.     Kirill Vee MD and Amor Grissom MD    17-AUG-2023    Katelyn Amaya RN

## 2023-08-17 NOTE — PROGRESS NOTES
underdome Study    Study Description: The objective of this study is to demonstrate that the Device Under Testing (DUT) produced an ECG input signal of sufficient quality for the arGEN-X ECG Verna to produce a waveform with clinically-equivalent information the the Clinical Reference Device.       CONSENT     Richard You a 83 year old male, was on-site today to discuss participation in the underdome.       The consent form was reviewed with the patient.     The review of the study included:  Study Purpose    Participant Responsibilities    Study Data and Devices    Benefits and Risks of Participation    Compensation and Costs of Participation    Voluntary Participation    Confidentiality    Injury and Legal Rights      Protocol Version: 1.0     Subject Number:       The subject was queried in regards to his willingness to continue and his questions were answered to his satisfaction.   The patient has given his agreement to volunteer and participate in the above noted study.   The eConsent and HIPAA form version 1.0 (Version Date 28-Jun-2023) was signed  on  17-AUG-2023 with the Clinical Research Unit of [facility].     [facility] consent will be placed in subject's medical record. A copy of the consent form was given to the subject today.    Study data is directly entered into [employer] and [employer] and services  per protocol.     No study procedures were done prior to Richard You providing informed consent.         17-AUG-2023    Reddy Cartagena

## 2023-08-17 NOTE — PROGRESS NOTES
[facility] Physical Exam      Medical History Reviewed? Yes; needs to see cardiology; last seen 6/21/19 by Dr. Shauna Armstrong before she retired. I sent a message to patient's primary MD, Dr. Rojas, for recommendations.    Physical Examination  For abnormal findings, please evaluate if the finding is Clinically Significant (by 'CS') or Not Clinically Significant (by 'NCS')  General Appearance   Abnormal; NCS elderly  Head and Neck   Normal  Eyes     Abnormal; NCS wears glasses  Ears, Nose Mouth and Throat  Normal  Cardiovascular   Abnormal; NCS irregular rhythm  Respiratory    Normal  Skin and Hair of Wrists  Abnormal; NCS well healed chest and abdomen scars    Tremor (If present document)  Present NCS: moderate bilateral hand tremors; better with distraction and not focusing on it.    Vitals:    08/17/23 1301   BP: 146/83   Pulse: 55   Resp: 18   SpO2: 97%   Weight: 89.8 kg (198 lb)   Height: 1.829 m (6')             Electrocardiogram Interpretation:   12 Lead Interpretation: Atrial Fibrillation  with 1 PVC      17-AUG-2023    Irina Infante PA-C

## 2023-10-11 ENCOUNTER — IMMUNIZATION (OUTPATIENT)
Dept: FAMILY MEDICINE | Facility: CLINIC | Age: 83
End: 2023-10-11
Payer: COMMERCIAL

## 2023-10-11 PROCEDURE — 91320 SARSCV2 VAC 30MCG TRS-SUC IM: CPT

## 2023-10-11 PROCEDURE — G0008 ADMIN INFLUENZA VIRUS VAC: HCPCS | Mod: 59

## 2023-10-11 PROCEDURE — 90480 ADMN SARSCOV2 VAC 1/ONLY CMP: CPT

## 2023-10-11 PROCEDURE — 90662 IIV NO PRSV INCREASED AG IM: CPT

## 2023-10-25 ENCOUNTER — OFFICE VISIT (OUTPATIENT)
Dept: DERMATOLOGY | Facility: CLINIC | Age: 83
End: 2023-10-25
Payer: COMMERCIAL

## 2023-10-25 DIAGNOSIS — L57.0 ACTINIC KERATOSIS: Primary | ICD-10-CM

## 2023-10-25 DIAGNOSIS — D48.5 NEOPLASM OF UNCERTAIN BEHAVIOR OF SKIN: ICD-10-CM

## 2023-10-25 PROCEDURE — 17000 DESTRUCT PREMALG LESION: CPT | Mod: 59 | Performed by: PHYSICIAN ASSISTANT

## 2023-10-25 PROCEDURE — 11102 TANGNTL BX SKIN SINGLE LES: CPT | Performed by: PHYSICIAN ASSISTANT

## 2023-10-25 PROCEDURE — 88305 TISSUE EXAM BY PATHOLOGIST: CPT | Performed by: DERMATOLOGY

## 2023-10-25 PROCEDURE — 17003 DESTRUCT PREMALG LES 2-14: CPT | Performed by: PHYSICIAN ASSISTANT

## 2023-10-25 NOTE — LETTER
10/25/2023         RE: Richard You  [address] [facility] [address]        Dear Colleague,    Thank you for referring your patient, Richard You, to the [facility]. Please see a copy of my visit note below.    Richard You is an extremely pleasant 83 year old year old male patient here today for spot on right forearm. Present for a few weeks. Sensitive when bumped. No bleeding. Patient has no other skin complaints today.  Remainder of the HPI, Meds, PMH, Allergies, FH, and SH was reviewed in chart.    Pertinent Hx:  History of NMSC  Past Medical History:   Diagnosis Date     Abdominal aneurysm without mention of rupture 06/24/2005    Repaired with percutaneous stent 8/05   Has annual CT scan to monitor stent placement. Last done in sept 2012 and stable      Atrial fibrillation (H)      Basal cell carcinoma      CAD 04/05/2005 12/05/12 Patient denies any recent chest pain or NTG use.  Patient is taking meds regularly and denies significant side effects. Last stress test many years ago, but bikes 30-40 mi/day without symptoms       Colon polyp, hyperplastic 05/14/2013    Hyperplastic  Polyp on colonoscopy 5-2013. 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/14/2014     Squamous cell carcinoma of skin, unspecified        Past Surgical History:   Procedure Laterality Date     BYPASS CARDIOPULMONARY  12/1996    CA bypass x2 LAD     CARDIAC SURGERY       CYSTECTOMY PILONIDAL       ESOPHAGOSCOPY, GASTROSCOPY, DUODENOSCOPY (EGD), COMBINED N/A 9/4/2015    Procedure: COMBINED ESOPHAGOSCOPY, GASTROSCOPY,  DUODENOSCOPY (EGD), BIOPSY SINGLE OR MULTIPLE;  Surgeon: Fuad Holder MD;  Location: [facility]     ESOPHAGOSCOPY, GASTROSCOPY, DUODENOSCOPY (EGD), COMBINED N/A 2016    Procedure: COMBINED ESOPHAGOSCOPY, GASTROSCOPY, DUODENOSCOPY (EGD), BIOPSY SINGLE OR MULTIPLE;  Surgeon: Lucas Dang MD;  Location: [facility]     ESOPHAGOSCOPY, GASTROSCOPY, DUODENOSCOPY (EGD), COMBINED N/A 2018    Procedure: COMBINED ESOPHAGOSCOPY, GASTROSCOPY, DUODENOSCOPY (EGD), BIOPSY SINGLE OR MULTIPLE;  gastroscopy;  Surgeon: Richard Crowder MD;  Location: [facility]     HERNIA REPAIR       VASECTOMY          Family History   Problem Relation Age of Onset     Cancer - colorectal Mother      Cardiovascular Father      Alcohol/Drug Father      Neurologic Disorder Father         tremor     Cardiovascular Brother      Coronary Artery Disease Brother      Breast Cancer Sister      Cancer Brother         bladder cancer       Social History     Socioeconomic History     Marital status:      Spouse name: Kelly You     Number of children: 2     Years of education: 12     Highest education level: Not on file   Occupational History     Employer: RETIRED   Tobacco Use     Smoking status: Former     Packs/day: 0.50     Years: 10.00     Additional pack years: 0.00     Total pack years: 5.00     Types: Cigarettes     Quit date: 1995     Years since quittin.9     Smokeless tobacco: Never   Substance and Sexual Activity     Alcohol use: No     Comment: beer occ, and wine     Drug use: No     Sexual activity: Not Currently     Partners: Female   Other Topics Concern     Parent/sibling w/ CABG, MI or angioplasty before 65F 55M? Yes   Social History Narrative        [employer]    Nonsmoker as of 2  Yrs ago    Drinks wine daily    Lives with wife kelly in [place]     Social Determinants of Health     Financial Resource Strain: Not on file   Food Insecurity: Not on file   Transportation Needs: Not on file   Physical  Activity: Not on file   Stress: Not on file   Social Connections: Not on file   Interpersonal Safety: Not on file   Housing Stability: Not on file       Outpatient Encounter Medications as of 10/25/2023   Medication Sig Dispense Refill     amLODIPine (NORVASC) 5 MG tablet Take 1 tablet (5 mg) by mouth daily 90 tablet 4     aspirin 81 MG chewable tablet Take 1 tablet (81 mg) by mouth daily 36 tablet 3     atorvastatin (LIPITOR) 80 MG tablet Take 1 tablet (80 mg) by mouth daily 90 tablet 4     lisinopril-hydrochlorothiazide (ZESTORETIC) 20-12.5 MG tablet Take 2 tablets by mouth daily 180 tablet 4     Multiple Vitamin (MULTI-VITAMIN DAILY PO) Take 1 tablet by mouth daily       Multiple Vitamins-Minerals (ICAPS PO) Take 1 tablet by mouth 2 times daily        omeprazole (PRILOSEC) 40 MG DR capsule Take 1 capsule (40 mg) by mouth daily 90 capsule 4     rivaroxaban ANTICOAGULANT (XARELTO ANTICOAGULANT) 20 MG TABS tablet Take 1 tablet (20 mg) by mouth daily (with dinner) 90 tablet 4     No facility-administered encounter medications on file as of 10/25/2023.             O:   NAD, WDWN, Alert & Oriented, Mood & Affect wnl, Vitals stable   Here today alone   There were no vitals taken for this visit.   General appearance normal   Vitals stable   Alert, oriented and in no acute distress      0.6 cm pink scaly papule on right forearm   Gritty papules on dorsal hands, forearms       Eyes: Conjunctivae/lids:Normal     ENT: Lips: normal    MSK:Normal    Pulm: Breathing Normal    Neuro/Psych: Orientation:Alert and Orientedx3 ; Mood/Affect:normal     A/P:  R/O SCC on right forearm  TANGENTIAL BIOPSY SENT OUT:  After consent, anesthesia with LEC and prep, tangential excision performed and specimen sent out for permanent section histology.  No complications and routine wound care. Patient told to call our office in 1-2 weeks for result.      2. Actinic keratoses on forearms, hands x 10   LN2:  Treated with LN2 for 5s for 1-2 cycles.  Warned risks of blistering, pain, pigment change, scarring, and incomplete resolution.  Advised patient to return if lesions do not completely resolve.  Wound care sheet given.      Again, thank you for allowing me to participate in the care of your patient.        Sincerely,        Vaishnavi Vidal PA-C

## 2023-10-25 NOTE — PROGRESS NOTES
Richard You is an extremely pleasant 83 year old year old male patient here today for spot on right forearm. Present for a few weeks. Sensitive when bumped. No bleeding. Patient has no other skin complaints today.  Remainder of the HPI, Meds, PMH, Allergies, FH, and SH was reviewed in chart.    Pertinent Hx:  History of NMSC  Past Medical History:   Diagnosis Date    Abdominal aneurysm without mention of rupture 06/24/2005    Repaired with percutaneous stent 8/05   Has annual CT scan to monitor stent placement. Last done in sept 2012 and stable     Atrial fibrillation (H)     Basal cell carcinoma     CAD 04/05/2005 12/05/12 Patient denies any recent chest pain or NTG use.  Patient is taking meds regularly and denies significant side effects. Last stress test many years ago, but bikes 30-40 mi/day without symptoms      Colon polyp, hyperplastic 05/14/2013    Hyperplastic  Polyp on colonoscopy 5-2013. 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    Squamous cell carcinoma of skin, unspecified        Past Surgical History:   Procedure Laterality Date    BYPASS CARDIOPULMONARY  12/1996    CA bypass x2 LAD    CARDIAC SURGERY      CYSTECTOMY PILONIDAL      ESOPHAGOSCOPY, GASTROSCOPY, DUODENOSCOPY (EGD), COMBINED N/A 9/4/2015    Procedure: COMBINED ESOPHAGOSCOPY, GASTROSCOPY, DUODENOSCOPY (EGD), BIOPSY SINGLE OR MULTIPLE;  Surgeon: Fuad Holder MD;  Location: [facility]    ESOPHAGOSCOPY, GASTROSCOPY, DUODENOSCOPY (EGD), COMBINED N/A 9/20/2016    Procedure: COMBINED ESOPHAGOSCOPY, GASTROSCOPY, DUODENOSCOPY (EGD), BIOPSY SINGLE OR MULTIPLE;   Surgeon: Lucas Dang MD;  Location: [facility]    ESOPHAGOSCOPY, GASTROSCOPY, DUODENOSCOPY (EGD), COMBINED N/A 2018    Procedure: COMBINED ESOPHAGOSCOPY, GASTROSCOPY, DUODENOSCOPY (EGD), BIOPSY SINGLE OR MULTIPLE;  gastroscopy;  Surgeon: Richard Crowder MD;  Location: [facility]    HERNIA REPAIR      VASECTOMY          Family History   Problem Relation Age of Onset    Cancer - colorectal Mother     Cardiovascular Father     Alcohol/Drug Father     Neurologic Disorder Father         tremor    Cardiovascular Brother     Coronary Artery Disease Brother     Breast Cancer Sister     Cancer Brother         bladder cancer       Social History     Socioeconomic History    Marital status:      Spouse name: Soledad You    Number of children: 2    Years of education: 12    Highest education level: Not on file   Occupational History     Employer: RETIRED   Tobacco Use    Smoking status: Former     Packs/day: 0.50     Years: 10.00     Additional pack years: 0.00     Total pack years: 5.00     Types: Cigarettes     Quit date: 1995     Years since quittin.9    Smokeless tobacco: Never   Substance and Sexual Activity    Alcohol use: No     Comment: beer occ, and wine    Drug use: No    Sexual activity: Not Currently     Partners: Female   Other Topics Concern    Parent/sibling w/ CABG, MI or angioplasty before 65F 55M? Yes   Social History Narrative        [employer]    Nonsmoker as of 2  Yrs ago    Drinks wine daily    Lives with wife soledad in [place]     Social Determinants of Health     Financial Resource Strain: Not on file   Food Insecurity: Not on file   Transportation Needs: Not on file   Physical Activity: Not on file   Stress: Not on file   Social Connections: Not on file   Interpersonal Safety: Not on file   Housing Stability: Not on file       Outpatient Encounter Medications as of 10/25/2023   Medication Sig Dispense Refill    amLODIPine (NORVASC) 5 MG tablet Take 1 tablet (5 mg) by  mouth daily 90 tablet 4    aspirin 81 MG chewable tablet Take 1 tablet (81 mg) by mouth daily 36 tablet 3    atorvastatin (LIPITOR) 80 MG tablet Take 1 tablet (80 mg) by mouth daily 90 tablet 4    lisinopril-hydrochlorothiazide (ZESTORETIC) 20-12.5 MG tablet Take 2 tablets by mouth daily 180 tablet 4    Multiple Vitamin (MULTI-VITAMIN DAILY PO) Take 1 tablet by mouth daily      Multiple Vitamins-Minerals (ICAPS PO) Take 1 tablet by mouth 2 times daily       omeprazole (PRILOSEC) 40 MG DR capsule Take 1 capsule (40 mg) by mouth daily 90 capsule 4    rivaroxaban ANTICOAGULANT (XARELTO ANTICOAGULANT) 20 MG TABS tablet Take 1 tablet (20 mg) by mouth daily (with dinner) 90 tablet 4     No facility-administered encounter medications on file as of 10/25/2023.             O:   NAD, WDWN, Alert & Oriented, Mood & Affect wnl, Vitals stable   Here today alone   There were no vitals taken for this visit.   General appearance normal   Vitals stable   Alert, oriented and in no acute distress      0.6 cm pink scaly papule on right forearm   Gritty papules on dorsal hands, forearms       Eyes: Conjunctivae/lids:Normal     ENT: Lips: normal    MSK:Normal    Pulm: Breathing Normal    Neuro/Psych: Orientation:Alert and Orientedx3 ; Mood/Affect:normal     A/P:  R/O SCC on right forearm  TANGENTIAL BIOPSY SENT OUT:  After consent, anesthesia with LEC and prep, tangential excision performed and specimen sent out for permanent section histology.  No complications and routine wound care. Patient told to call our office in 1-2 weeks for result.      2. Actinic keratoses on forearms, hands x 10   LN2:  Treated with LN2 for 5s for 1-2 cycles. Warned risks of blistering, pain, pigment change, scarring, and incomplete resolution.  Advised patient to return if lesions do not completely resolve.  Wound care sheet given.

## 2023-10-27 ENCOUNTER — TELEPHONE (OUTPATIENT)
Dept: DERMATOLOGY | Facility: CLINIC | Age: 83
End: 2023-10-27
Payer: COMMERCIAL

## 2023-10-27 DIAGNOSIS — C44.92 SCC (SQUAMOUS CELL CARCINOMA): Primary | ICD-10-CM

## 2023-10-27 LAB
PATH REPORT.COMMENTS IMP SPEC: ABNORMAL
PATH REPORT.COMMENTS IMP SPEC: ABNORMAL
PATH REPORT.COMMENTS IMP SPEC: YES
PATH REPORT.FINAL DX SPEC: ABNORMAL
PATH REPORT.GROSS SPEC: ABNORMAL
PATH REPORT.MICROSCOPIC SPEC OTHER STN: ABNORMAL
PATH REPORT.RELEVANT HX SPEC: ABNORMAL

## 2023-10-27 NOTE — LETTER
[facility]  [address] [place] [address]  [phone]      October 27, 2023    Richard You  [address] [facility] [address]      Dear Richard      You are scheduled for Mohs Surgery on   Monday November 13th, 2023 at 7:45 AM     Please check in at 2nd floor [facility]atology [facility].     Be sure to eat a good breakfast and bathe and wash your hair prior to Surgery. Please bring  with you if this is above your neck    If you are taking any anti-coagulants that are prescribed by your Doctor (such as Coumadin/warfarin, Plavix, Aspirin, Ibuprofen), please continue taking them.     However, If you are taking anti-coagulants over the counter without  a Doctor's order for a Medical condition, please discontinue them 10 days prior to Surgery.      Please wear loose comfortable clothing as it could possibly be 4-6 hours until your surgery is completed depending upon how many layers of tissue need to be removed.     Thank you,    Niktia Seo MD

## 2023-10-27 NOTE — TELEPHONE ENCOUNTER
----- Message from Vaishnavi Ross PA-C sent at 10/27/2023  2:41 PM CDT -----  Hi Richard,  Your right forearm returned as a squamous cell carcinoma, please schedule mohs with Dr. Seo.

## 2023-10-27 NOTE — TELEPHONE ENCOUNTER
Spoke w patient. Gave results below. He is familiar w diagnosis and MOHS, had no questions.    Appt scheduled at [facility]   Monday November 13th, 2023 at 7:45 AM    Mohs letter mailed, referral placed.   Patient expressed understanding.       Kim COOLEY RN BSN  [facility]  [phone]

## 2023-11-09 NOTE — PROGRESS NOTES
Surgical Office Location :   [facility]  [address] [place] [address]

## 2023-11-13 ENCOUNTER — OFFICE VISIT (OUTPATIENT)
Dept: DERMATOLOGY | Facility: CLINIC | Age: 83
End: 2023-11-13
Payer: COMMERCIAL

## 2023-11-13 DIAGNOSIS — C44.92 SCC (SQUAMOUS CELL CARCINOMA): ICD-10-CM

## 2023-11-13 PROCEDURE — 17313 MOHS 1 STAGE T/A/L: CPT | Performed by: DERMATOLOGY

## 2023-11-13 NOTE — LETTER
11/13/2023         RE: Richard You  [address] [facility] [address]        Dear Colleague,    Thank you for referring your patient, Richard You, to the [facility]. Please see a copy of my visit note below.    Surgical Office Location :   [facility]  [address] [place] [address]      Richard You is an extremely pleasant 83 year old year old male patient here today for evaluation and managment of squamous cell carcinoma on right forearm.  Patient has no other skin complaints today.  Remainder of the HPI, Meds, PMH, Allergies, FH, and SH was reviewed in chart.      Past Medical History:   Diagnosis Date     Abdominal aneurysm without mention of rupture 06/24/2005    Repaired with percutaneous stent 8/05   Has annual CT scan to monitor stent placement. Last done in sept 2012 and stable      Atrial fibrillation (H)      Basal cell carcinoma      CAD 04/05/2005 12/05/12 Patient denies any recent chest pain or NTG use.  Patient is taking meds regularly and denies significant side effects. Last stress test many years ago, but bikes 30-40 mi/day without symptoms       Colon polyp, hyperplastic 05/14/2013    Hyperplastic  Polyp on colonoscopy 5-2013. 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/14/2014     Squamous cell carcinoma of skin, unspecified        Past Surgical History:   Procedure Laterality Date     BYPASS CARDIOPULMONARY  12/1996    CA bypass x2 LAD     CARDIAC SURGERY       CYSTECTOMY PILONIDAL       ESOPHAGOSCOPY, GASTROSCOPY, DUODENOSCOPY (EGD), COMBINED N/A 9/4/2015     Procedure: COMBINED ESOPHAGOSCOPY, GASTROSCOPY, DUODENOSCOPY (EGD), BIOPSY SINGLE OR MULTIPLE;  Surgeon: Fuad Holder MD;  Location: [facility]     ESOPHAGOSCOPY, GASTROSCOPY, DUODENOSCOPY (EGD), COMBINED N/A 2016    Procedure: COMBINED ESOPHAGOSCOPY, GASTROSCOPY, DUODENOSCOPY (EGD), BIOPSY SINGLE OR MULTIPLE;  Surgeon: Lucas Dang MD;  Location: [facility]     ESOPHAGOSCOPY, GASTROSCOPY, DUODENOSCOPY (EGD), COMBINED N/A 2018    Procedure: COMBINED ESOPHAGOSCOPY, GASTROSCOPY, DUODENOSCOPY (EGD), BIOPSY SINGLE OR MULTIPLE;  gastroscopy;  Surgeon: Richard Crowder MD;  Location: [facility]     HERNIA REPAIR       VASECTOMY          Family History   Problem Relation Age of Onset     Cancer - colorectal Mother      Cardiovascular Father      Alcohol/Drug Father      Neurologic Disorder Father         tremor     Cardiovascular Brother      Coronary Artery Disease Brother      Breast Cancer Sister      Cancer Brother         bladder cancer       Social History     Socioeconomic History     Marital status:      Spouse name: Kelly You     Number of children: 2     Years of education: 12     Highest education level: Not on file   Occupational History     Employer: RETIRED   Tobacco Use     Smoking status: Former     Packs/day: 0.50     Years: 10.00     Additional pack years: 0.00     Total pack years: 5.00     Types: Cigarettes     Quit date: 1995     Years since quittin.9     Smokeless tobacco: Never   Substance and Sexual Activity     Alcohol use: No     Comment: beer occ, and wine     Drug use: No     Sexual activity: Not Currently     Partners: Female   Other Topics Concern     Parent/sibling w/ CABG, MI or angioplasty before 65F 55M? Yes   Social History Narrative        [employer]    Nonsmoker as of 2  Yrs ago    Drinks wine daily    Lives with wife kelly in [place]     Social Determinants of Health     Financial Resource Strain: Not on file   Food Insecurity: Not on  file   Transportation Needs: Not on file   Physical Activity: Not on file   Stress: Not on file   Social Connections: Not on file   Interpersonal Safety: Not on file   Housing Stability: Not on file       Outpatient Encounter Medications as of 11/13/2023   Medication Sig Dispense Refill     amLODIPine (NORVASC) 5 MG tablet Take 1 tablet (5 mg) by mouth daily 90 tablet 4     aspirin 81 MG chewable tablet Take 1 tablet (81 mg) by mouth daily 36 tablet 3     atorvastatin (LIPITOR) 80 MG tablet Take 1 tablet (80 mg) by mouth daily 90 tablet 4     lisinopril-hydrochlorothiazide (ZESTORETIC) 20-12.5 MG tablet Take 2 tablets by mouth daily 180 tablet 4     Multiple Vitamin (MULTI-VITAMIN DAILY PO) Take 1 tablet by mouth daily       Multiple Vitamins-Minerals (ICAPS PO) Take 1 tablet by mouth 2 times daily        omeprazole (PRILOSEC) 40 MG DR capsule Take 1 capsule (40 mg) by mouth daily 90 capsule 4     rivaroxaban ANTICOAGULANT (XARELTO ANTICOAGULANT) 20 MG TABS tablet Take 1 tablet (20 mg) by mouth daily (with dinner) 90 tablet 4     No facility-administered encounter medications on file as of 11/13/2023.             O:   NAD, WDWN, Alert & Oriented, Mood & Affect wnl, Vitals stable   Here today alone   Vitals stable   Alert, oriented and in no acute distress      Following lymph nodes palpated: antecubital no lad  R forearm 6mm scaly papule       Eyes: Conjunctivae/lids:Normal     ENT: Lips, buccal mucosa, tongue: normal    MSK:Normal    Cardiovascular: peripheral edema none    Pulm: Breathing Normal    Neuro/Psych: Orientation:Alert and Orientedx3 ; Mood/Affect:normal       A/P:  R forearm squamous cell carcinoma   MOHS:   Size    The rationale for Mohs surgery was discussed with the patient and consent was obtained.  The risks and benefits as well as alternatives to therapy were discussed, in detail.  Specifically, the risks of infection, scarring, bleeding, prolonged wound healing, incomplete removal, allergy to  anesthesia, nerve injury and recurrence were addressed.  Indication for Mohs was Size. Prior to the procedure, the treatment site was clearly identified and, if available, confirmed with previous photos and confirmed by the patient   All components of the Universal Protocol/PAUSE rule were completed.  The Mohs surgeon operated in two distinct and integrated capacities as the surgeon and pathologist.      The area was prepped with Betasept.  A rim of normal appearing skin was marked circumferentially around the lesion.  The area was infiltrated with local anesthesia.  The tumor was first debulked to remove all clinically apparent tumor.  An incision following the standard Mohs approach was done and the specimen was oriented,mapped and placed in 1 block(s).  Each specimen was then chromacoded and processed in the Mohs laboratory using standard Mohs technique and submitted for frozen section histology.  Frozen section analysis showed no residual tumor but CLEAR MARGINS.      The tumor was excised using standard Mohs technique in 1 stages(s).  CLEAR MARGINS OBTAINED and Final defect size was 1 x 1.2 cm.     We discussed the options for wound management in full with the patient including risks/benefits/ possible outcomes.      REPAIR SECOND INTENT: We discussed the options for wound management in full with the patient including risks/benefits/possible outcomes. Decision made to allow the wound to heal by second intention. Cautery was used for for hemostasis. EBL minimal; complications none; wound care routine.  The patient was discharged in good condition and will return in one month or prn for wound evaluation.   It was a pleasure speaking to Richard You today.  Previous clinic notes and pertinent laboratory tests were reviewed prior to Richard You's visit.  Signs and Symptoms of skin cancer discussed with patient.  Patient encouraged to perform monthly skin exams.  UV precautions reviewed with patient.  Risks  of non-melanoma skin cancer discussed with patient   Return to clinic 6 months        Again, thank you for allowing me to participate in the care of your patient.        Sincerely,        Nikita Seo MD

## 2023-11-13 NOTE — PROGRESS NOTES
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  Patient has no other skin complaints today.  Remainder of the HPI, Meds, PMH, Allergies, FH, and SH was reviewed in chart.      Past Medical History:   Diagnosis Date    Abdominal aneurysm without mention of rupture 06/24/2005    Repaired with percutaneous stent 8/05   Has annual CT scan to monitor stent placement. Last done in sept 2012 and stable     Atrial fibrillation (H)     Basal cell carcinoma     CAD 04/05/2005 12/05/12 Patient denies any recent chest pain or NTG use.  Patient is taking meds regularly and denies significant side effects. Last stress test many years ago, but bikes 30-40 mi/day without symptoms      Colon polyp, hyperplastic 05/14/2013    Hyperplastic  Polyp on colonoscopy 5-2013. 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    Squamous cell carcinoma of skin, unspecified        Past Surgical History:   Procedure Laterality Date    BYPASS CARDIOPULMONARY  12/1996    CA bypass x2 LAD    CARDIAC SURGERY      CYSTECTOMY PILONIDAL      ESOPHAGOSCOPY, GASTROSCOPY, DUODENOSCOPY (EGD), COMBINED N/A 9/4/2015    Procedure: COMBINED ESOPHAGOSCOPY, GASTROSCOPY, DUODENOSCOPY (EGD), BIOPSY SINGLE OR MULTIPLE;  Surgeon: Fuad Holder MD;  Location: [facility]    ESOPHAGOSCOPY, GASTROSCOPY, DUODENOSCOPY (EGD), COMBINED N/A 9/20/2016    Procedure: COMBINED ESOPHAGOSCOPY, GASTROSCOPY, DUODENOSCOPY (EGD), BIOPSY SINGLE OR MULTIPLE;  Surgeon: Lucas Dang MD;   Location: [facility]    ESOPHAGOSCOPY, GASTROSCOPY, DUODENOSCOPY (EGD), COMBINED N/A 2018    Procedure: COMBINED ESOPHAGOSCOPY, GASTROSCOPY, DUODENOSCOPY (EGD), BIOPSY SINGLE OR MULTIPLE;  gastroscopy;  Surgeon: Richard Crowder MD;  Location: [facility]    HERNIA REPAIR      VASECTOMY          Family History   Problem Relation Age of Onset    Cancer - colorectal Mother     Cardiovascular Father     Alcohol/Drug Father     Neurologic Disorder Father         tremor    Cardiovascular Brother     Coronary Artery Disease Brother     Breast Cancer Sister     Cancer Brother         bladder cancer       Social History     Socioeconomic History    Marital status:      Spouse name: Soledad You    Number of children: 2    Years of education: 12    Highest education level: Not on file   Occupational History     Employer: RETIRED   Tobacco Use    Smoking status: Former     Packs/day: 0.50     Years: 10.00     Additional pack years: 0.00     Total pack years: 5.00     Types: Cigarettes     Quit date: 1995     Years since quittin.9    Smokeless tobacco: Never   Substance and Sexual Activity    Alcohol use: No     Comment: beer occ, and wine    Drug use: No    Sexual activity: Not Currently     Partners: Female   Other Topics Concern    Parent/sibling w/ CABG, MI or angioplasty before 65F 55M? Yes   Social History Narrative        [employer]    Nonsmoker as of 2  Yrs ago    Drinks wine daily    Lives with wife soledad in [place]     Social Determinants of Health     Financial Resource Strain: Not on file   Food Insecurity: Not on file   Transportation Needs: Not on file   Physical Activity: Not on file   Stress: Not on file   Social Connections: Not on file   Interpersonal Safety: Not on file   Housing Stability: Not on file       Outpatient Encounter Medications as of 2023   Medication Sig Dispense Refill    amLODIPine (NORVASC) 5 MG tablet Take 1 tablet (5 mg) by mouth daily 90 tablet 4    aspirin 81  MG chewable tablet Take 1 tablet (81 mg) by mouth daily 36 tablet 3    atorvastatin (LIPITOR) 80 MG tablet Take 1 tablet (80 mg) by mouth daily 90 tablet 4    lisinopril-hydrochlorothiazide (ZESTORETIC) 20-12.5 MG tablet Take 2 tablets by mouth daily 180 tablet 4    Multiple Vitamin (MULTI-VITAMIN DAILY PO) Take 1 tablet by mouth daily      Multiple Vitamins-Minerals (ICAPS PO) Take 1 tablet by mouth 2 times daily       omeprazole (PRILOSEC) 40 MG DR capsule Take 1 capsule (40 mg) by mouth daily 90 capsule 4    rivaroxaban ANTICOAGULANT (XARELTO ANTICOAGULANT) 20 MG TABS tablet Take 1 tablet (20 mg) by mouth daily (with dinner) 90 tablet 4     No facility-administered encounter medications on file as of 11/13/2023.             O:   NAD, WDWN, Alert & Oriented, Mood & Affect wnl, Vitals stable   Here today alone   Vitals stable   Alert, oriented and in no acute distress      Following lymph nodes palpated: antecubital no lad  R forearm 6mm scaly papule       Eyes: Conjunctivae/lids:Normal     ENT: Lips, buccal mucosa, tongue: normal    MSK:Normal    Cardiovascular: peripheral edema none    Pulm: Breathing Normal    Neuro/Psych: Orientation:Alert and Orientedx3 ; Mood/Affect:normal       A/P:  R forearm squamous cell carcinoma   MOHS:   Size    The rationale for Mohs surgery was discussed with the patient and consent was obtained.  The risks and benefits as well as alternatives to therapy were discussed, in detail.  Specifically, the risks of infection, scarring, bleeding, prolonged wound healing, incomplete removal, allergy to anesthesia, nerve injury and recurrence were addressed.  Indication for Mohs was Size. Prior to the procedure, the treatment site was clearly identified and, if available, confirmed with previous photos and confirmed by the patient   All components of the Universal Protocol/PAUSE rule were completed.  The Mohs surgeon operated in two distinct and integrated capacities as the surgeon and  pathologist.      The area was prepped with Betasept.  A rim of normal appearing skin was marked circumferentially around the lesion.  The area was infiltrated with local anesthesia.  The tumor was first debulked to remove all clinically apparent tumor.  An incision following the standard Mohs approach was done and the specimen was oriented,mapped and placed in 1 block(s).  Each specimen was then chromacoded and processed in the Mohs laboratory using standard Mohs technique and submitted for frozen section histology.  Frozen section analysis showed no residual tumor but CLEAR MARGINS.      The tumor was excised using standard Mohs technique in 1 stages(s).  CLEAR MARGINS OBTAINED and Final defect size was 1 x 1.2 cm.     We discussed the options for wound management in full with the patient including risks/benefits/ possible outcomes.      REPAIR SECOND INTENT: We discussed the options for wound management in full with the patient including risks/benefits/possible outcomes. Decision made to allow the wound to heal by second intention. Cautery was used for for hemostasis. EBL minimal; complications none; wound care routine.  The patient was discharged in good condition and will return in one month or prn for wound evaluation.   It was a pleasure speaking to Richard You today.  Previous clinic notes and pertinent laboratory tests were reviewed prior to Richard You's visit.  Signs and Symptoms of skin cancer discussed with patient.  Patient encouraged to perform monthly skin exams.  UV precautions reviewed with patient.  Risks of non-melanoma skin cancer discussed with patient   Return to clinic 6 months

## 2023-11-13 NOTE — PATIENT INSTRUCTIONS
Open Wound Care     for ___ARM___________        No strenuous activity for 48 hours    Take Tylenol as needed for discomfort.                                                .         Do not drink alcoholic beverages for 48 hours.    Keep the pressure bandage in place for 24 hours. If the bandage becomes blood tinged or loose, reinforce it with gauze and tape.        (Refer to the reverse side of this page for management of bleeding).    Remove bandage in 24 hours and begin wound care as follows:     Clean area with tap water using a Q tip or gauze pad, (shower / bathe normally)  Dry wound with Q tip or gauze pad  Apply Aquaphor, Vaseline, Polysporin or Bacitracin Ointment with a Q tip  Do NOT use Neosporin Ointment *  Cover the wound with a band-aid or nonstick gauze pad and paper tape.  Repeat wound care once a day until wound is completely healed.    It is an old wives tale that a wound heals better when it is exposed to air and allowed to dry out. The wound will heal faster with a better cosmetic result if it is kept moist with ointment and covered with a bandage.  Do not let the wound dry out.      Supplies Needed:                Qtips or gauze pads                Polysporin or Bacitracin Ointment                Bandaids or nonstick gauze pads and paper tape    Wound care kits and brown paper tape are available for purchase at   the pharmacy.    BLEEDING:    Use tightly rolled up gauze or cloth to apply direct pressure over the bandage for 20   minutes.  Reapply pressure for an additional 20 minutes if necessary  Call the office or go to the nearest emergency room if pressure fails to stop the bleeding.  Use additional gauze and tape to maintain pressure once the bleeding has stopped.  Begin wound care 24 hours after surgery as directed.                  WOUND HEALING    One week after surgery a pink / red halo will form around the outside of the wound.   This is new skin.  The center of the wound will appear  yellowish white and produce some drainage.  The pink halo will slowly migrate in toward the center of the wound until the wound is covered with new shiny pink skin.  There will be no more drainage when the wound is completely healed.  It will take six months to one year for the redness to fade.  The scar may be itchy, tight and sensitive to extreme temperatures for a year after the surgery.  Massaging the area several times a day for several minutes after the wound is completely healed will help the scar soften and normalize faster. Begin massage only after healing is complete.      In case of emergency call: Dr Seo: [phone]    [facility]: [phone]    [facility]:[phone]

## 2024-03-27 ENCOUNTER — MYC MEDICAL ADVICE (OUTPATIENT)
Dept: FAMILY MEDICINE | Facility: CLINIC | Age: 84
End: 2024-03-27
Payer: COMMERCIAL

## 2024-03-27 DIAGNOSIS — E78.5 HYPERLIPIDEMIA LDL GOAL <100: ICD-10-CM

## 2024-03-27 DIAGNOSIS — I10 HTN, GOAL BELOW 130/80: ICD-10-CM

## 2024-03-27 DIAGNOSIS — I48.20 CHRONIC ATRIAL FIBRILLATION (H): ICD-10-CM

## 2024-03-27 DIAGNOSIS — I25.810 CORONARY ATHEROSCLEROSIS OF AUTOLOGOUS VEIN BYPASS GRAFT WITHOUT ANGINA: ICD-10-CM

## 2024-03-27 DIAGNOSIS — K22.70 BARRETT'S ESOPHAGUS WITHOUT DYSPLASIA: Chronic | ICD-10-CM

## 2024-03-27 RX ORDER — OMEPRAZOLE 40 MG/1
40 CAPSULE, DELAYED RELEASE ORAL DAILY
Qty: 90 CAPSULE | Refills: 0 | Status: SHIPPED | OUTPATIENT
Start: 2024-03-27 | End: 2024-04-12

## 2024-03-27 NOTE — TELEPHONE ENCOUNTER
Prescription approved per [facility] Refill Protocol. Omeprazole    Pending Prescriptions:                       Disp   Refills    lisinopril-hydrochlorothiazide (ZESTORETI*180 ta*4            Sig: TAKE 2 TABLETS BY MOUTH DAILY    atorvastatin (LIPITOR) 80 MG tablet [Phar*90 tab*4            Sig: TAKE 1 TABLET (80 MG) BY MOUTH DAILY    amLODIPine (NORVASC) 5 MG tablet [Pharmac*90 tab*4            Sig: TAKE 1 TABLET (5 MG) BY MOUTH DAILY    XARELTO ANTICOAGULANT 20 MG TABS tablet [*90 tab*4            Sig: TAKE 1 TABLET (20 MG) BY MOUTH DAILY (WITH           DINNER)    omeprazole (PRILOSEC) 40 MG DR capsule [P*90 cap*4            Sig: TAKE 1 CAPSULE (40 MG) BY MOUTH DAILY      Requested Prescriptions   Pending Prescriptions Disp Refills    lisinopril-hydrochlorothiazide (ZESTORETIC) 20-12.5 MG tablet [Pharmacy Med Name: LISINOPRIL-HYDROCHLORO 20-12.5 TABS] 180 tablet 4     Sig: TAKE 2 TABLETS BY MOUTH DAILY       Diuretics (Including Combos) Protocol Failed - 3/27/2024 10:21 AM        Failed - Blood pressure under 140/90 in past 12 months     BP Readings from Last 3 Encounters:   08/17/23 (!) 146/83   05/19/23 132/68   04/19/23 136/70       No data recorded            Failed - Has GFR on file in past 12 months and most recent value is normal        Passed - Medication is active on med list        Passed - Medication indicated for associated diagnosis     Medication is associated with one or more of the following diagnoses:     Edema   Hypertension   Heart Failure   Meniere's Disease          Passed - Recent (12 mo) or future (90 days) visit within the authorizing provider's specialty     The patient must have completed an in-person or virtual visit within the past 12 months or has a future visit scheduled within the next 90 days with the authorizing provider s specialty.  Urgent care and e-visits do not quality as an office visit for this protocol.          Passed - Patient is age 18 or older       ACE Inhibitors  (Including Combos) Protocol Failed - 3/27/2024 10:21 AM        Failed - Blood pressure under 140/90 in past 12 months- Clinicial or Patient Reported     BP Readings from Last 3 Encounters:   08/17/23 (!) 146/83   05/19/23 132/68   04/19/23 136/70       No data recorded            Failed - Has GFR on file in past 12 months and most recent value is normal        Failed - Normal serum creatinine on file in past 12 months     Recent Labs   Lab Test 06/02/23  0844 03/16/22  0909 06/08/21  0817   CR 1.1   < >  --    CREAT  --   --  1.1    < > = values in this interval not displayed.                 Failed - Normal serum potassium on file in past 12 months     Recent Labs   Lab Test 03/23/23  0855   POTASSIUM 4.6             Passed - Medication is active on med list        Passed - Medication indicated for associated diagnosis     Medication is associated with one or more of the following diagnoses:     Chronic Kidney Disease (CKD)   Coronary Artery Disease (CAD)   Diabetes   Heart Failure (HF)   Hypertension (HTN)   Nephropathy            Passed - Recent (12 mo) or future (90 days) visit within the authorizing provider's specialty     The patient must have completed an in-person or virtual visit within the past 12 months or has a future visit scheduled within the next 90 days with the authorizing provider s specialty.  Urgent care and e-visits do not quality as an office visit for this protocol.          Passed - Patient is age 18 or older          atorvastatin (LIPITOR) 80 MG tablet [Pharmacy Med Name: ATORVASTATIN CALCIUM 80MG TABS] 90 tablet 4     Sig: TAKE 1 TABLET (80 MG) BY MOUTH DAILY       Antihyperlipidemic agents Failed - 3/27/2024 10:21 AM        Failed - LDL on file in the past 12 months        Passed - Medication is active on med list        Passed - Recent (12 mo) or future (90 days) visit within the authorizing provider's specialty     The patient must have completed an in-person or virtual visit within the  past 12 months or has a future visit scheduled within the next 90 days with the authorizing provider s specialty.  Urgent care and e-visits do not quality as an office visit for this protocol.          Passed - Patient is age 18 years or older          amLODIPine (NORVASC) 5 MG tablet [Pharmacy Med Name: AMLODIPINE BESYLATE 5MG TABS] 90 tablet 4     Sig: TAKE 1 TABLET (5 MG) BY MOUTH DAILY       Calcium Channel Blockers Protocol  Failed - 3/27/2024 10:21 AM        Failed - Blood pressure under 140/90 in past 12 months     BP Readings from Last 3 Encounters:   08/17/23 (!) 146/83   05/19/23 132/68   04/19/23 136/70       No data recorded            Failed - Normal serum creatinine on file in past 12 months     Recent Labs   Lab Test 06/02/23  0844 03/16/22  0909 06/08/21  0817   CR 1.1   < >  --    CREAT  --   --  1.1    < > = values in this interval not displayed.                 Passed - Recent (12 mo) or future (30 days) visit within the authorizing provider's specialty     The patient must have completed an in-person or virtual visit within the past 12 months or has a future visit scheduled within the next 90 days with the authorizing provider s specialty.  Urgent care and e-visits do not quality as an office visit for this protocol.          Passed - Medication is active on med list        Passed - Patient is age 18 or older          XARELTO ANTICOAGULANT 20 MG TABS tablet [Pharmacy Med Name: XARELTO 20MG TABS] 90 tablet 4     Sig: TAKE 1 TABLET (20 MG) BY MOUTH DAILY (WITH DINNER)       Anticoagulant Agents Failed - 3/27/2024 10:21 AM        Failed - Normal Platelets on file in past 12 months     Recent Labs   Lab Test 07/25/18  1320                  Failed - Creatinine Clearance greater than 50 ml/min on file in past 3 mos     No lab results found.          Failed - Serum creatinine less than or equal to 1.4 on file in past 3 mos     Recent Labs   Lab Test 06/02/23  0844 03/16/22  0909 06/08/21  0817    CR 1.1   < >  --    CREAT  --   --  1.1    < > = values in this interval not displayed.                 Failed - GFR on file in the past 12 months and most recent GFR is normal        Passed - Medication is active on med list        Passed - Recent (6 mo) or future (90 days) visit within the authorizing provider's specialty        Passed - Medication indicated for associated diagnosis     Medication is associated with one or more of the following diagnoses:  Atrial fibrillation  Deep venous thrombosis  Heparin-induced thrombocytopenia  Pulmonary embolism  Venous thromboembolism          Passed - Patient is 18 years of age or older          omeprazole (PRILOSEC) 40 MG DR capsule [Pharmacy Med Name: OMEPRAZOLE 40MG CPDR] 90 capsule 4     Sig: TAKE 1 CAPSULE (40 MG) BY MOUTH DAILY       PPI Protocol Passed - 3/27/2024 10:21 AM        Passed - Medication is active on med list        Passed - Medication indicated for associated diagnosis     The medication is prescribed for one or more of the following conditions:     Erosive esophagitis    Gastritis   Gastric hypersecretion   Gastric ulcer   Gastroesophageal reflux disease   Helicobacter pylori gastrointestinal tract infection   Ulcer of duodenum   Drug-induced peptic ulcer   Esophageal stricture   Gastrointestinal hemorrhage   Indigestion   Stress ulcer   Zollinger-Najera syndrome   Carey s esophagus   Laryngopharyngeal reflux          Passed - Recent (12 mo) or future (90 days) visit within the authorizing provider's specialty     The patient must have completed an in-person or virtual visit within the past 12 months or has a future visit scheduled within the next 90 days with the authorizing provider s specialty.  Urgent care and e-visits do not quality as an office visit for this protocol.          Passed - Patient is age 18 or older           Demetra Givens RN on 3/27/2024 at 10:59 AM

## 2024-03-27 NOTE — TELEPHONE ENCOUNTER
Routing refill request to provider for review/approval because:  Labs not current:  GFR, K+, creatinine  Blood pressure    Pending Prescriptions:                       Disp   Refills    lisinopril-hydrochlorothiazide (ZESTORETI*180 ta*0            Sig: TAKE 2 TABLETS BY MOUTH DAILY    atorvastatin (LIPITOR) 80 MG tablet [Phar*90 tab*0            Sig: TAKE 1 TABLET (80 MG) BY MOUTH DAILY    amLODIPine (NORVASC) 5 MG tablet [Pharmac*90 tab*0            Sig: TAKE 1 TABLET (5 MG) BY MOUTH DAILY    XARELTO ANTICOAGULANT 20 MG TABS tablet [*90 tab*0            Sig: TAKE 1 TABLET (20 MG) BY MOUTH DAILY (WITH           DINNER)    Signed Prescriptions:                        Disp   Refills    omeprazole (PRILOSEC) 40 MG DR capsule     90 cap*0        Sig: TAKE 1 CAPSULE (40 MG) BY MOUTH DAILY  Authorizing Provider: JM RODRIGUEZ  Ordering User: SHELDON DELEON      Requested Prescriptions   Pending Prescriptions Disp Refills    lisinopril-hydrochlorothiazide (ZESTORETIC) 20-12.5 MG tablet [Pharmacy Med Name: LISINOPRIL-HYDROCHLORO 20-12.5 TABS] 180 tablet 0     Sig: TAKE 2 TABLETS BY MOUTH DAILY       Diuretics (Including Combos) Protocol Failed - 3/27/2024 10:21 AM        Failed - Blood pressure under 140/90 in past 12 months     BP Readings from Last 3 Encounters:   08/17/23 (!) 146/83   05/19/23 132/68   04/19/23 136/70       No data recorded            Failed - Has GFR on file in past 12 months and most recent value is normal        Passed - Medication is active on med list        Passed - Medication indicated for associated diagnosis     Medication is associated with one or more of the following diagnoses:     Edema   Hypertension   Heart Failure   Meniere's Disease          Passed - Recent (12 mo) or future (90 days) visit within the authorizing provider's specialty     The patient must have completed an in-person or virtual visit within the past 12 months or has a future visit scheduled within the next 90  days with the authorizing provider s specialty.  Urgent care and e-visits do not quality as an office visit for this protocol.          Passed - Patient is age 18 or older       ACE Inhibitors (Including Combos) Protocol Failed - 3/27/2024 10:21 AM        Failed - Blood pressure under 140/90 in past 12 months- Clinicial or Patient Reported     BP Readings from Last 3 Encounters:   08/17/23 (!) 146/83   05/19/23 132/68   04/19/23 136/70       No data recorded            Failed - Has GFR on file in past 12 months and most recent value is normal        Failed - Normal serum creatinine on file in past 12 months     Recent Labs   Lab Test 06/02/23  0844 03/16/22  0909 06/08/21  0817   CR 1.1   < >  --    CREAT  --   --  1.1    < > = values in this interval not displayed.                 Failed - Normal serum potassium on file in past 12 months     Recent Labs   Lab Test 03/23/23  0855   POTASSIUM 4.6             Passed - Medication is active on med list        Passed - Medication indicated for associated diagnosis     Medication is associated with one or more of the following diagnoses:     Chronic Kidney Disease (CKD)   Coronary Artery Disease (CAD)   Diabetes   Heart Failure (HF)   Hypertension (HTN)   Nephropathy            Passed - Recent (12 mo) or future (90 days) visit within the authorizing provider's specialty     The patient must have completed an in-person or virtual visit within the past 12 months or has a future visit scheduled within the next 90 days with the authorizing provider s specialty.  Urgent care and e-visits do not quality as an office visit for this protocol.          Passed - Patient is age 18 or older          atorvastatin (LIPITOR) 80 MG tablet [Pharmacy Med Name: ATORVASTATIN CALCIUM 80MG TABS] 90 tablet 0     Sig: TAKE 1 TABLET (80 MG) BY MOUTH DAILY       Antihyperlipidemic agents Failed - 3/27/2024 10:21 AM        Failed - LDL on file in the past 12 months        Passed - Medication is  active on med list        Passed - Recent (12 mo) or future (90 days) visit within the authorizing provider's specialty     The patient must have completed an in-person or virtual visit within the past 12 months or has a future visit scheduled within the next 90 days with the authorizing provider s specialty.  Urgent care and e-visits do not quality as an office visit for this protocol.          Passed - Patient is age 18 years or older          amLODIPine (NORVASC) 5 MG tablet [Pharmacy Med Name: AMLODIPINE BESYLATE 5MG TABS] 90 tablet 0     Sig: TAKE 1 TABLET (5 MG) BY MOUTH DAILY       Calcium Channel Blockers Protocol  Failed - 3/27/2024 10:21 AM        Failed - Blood pressure under 140/90 in past 12 months     BP Readings from Last 3 Encounters:   08/17/23 (!) 146/83   05/19/23 132/68   04/19/23 136/70       No data recorded            Failed - Normal serum creatinine on file in past 12 months     Recent Labs   Lab Test 06/02/23  0844 03/16/22  0909 06/08/21  0817   CR 1.1   < >  --    CREAT  --   --  1.1    < > = values in this interval not displayed.                 Passed - Recent (12 mo) or future (30 days) visit within the authorizing provider's specialty     The patient must have completed an in-person or virtual visit within the past 12 months or has a future visit scheduled within the next 90 days with the authorizing provider s specialty.  Urgent care and e-visits do not quality as an office visit for this protocol.          Passed - Medication is active on med list        Passed - Patient is age 18 or older          XARELTO ANTICOAGULANT 20 MG TABS tablet [Pharmacy Med Name: XARELTO 20MG TABS] 90 tablet 0     Sig: TAKE 1 TABLET (20 MG) BY MOUTH DAILY (WITH DINNER)       Anticoagulant Agents Failed - 3/27/2024 10:21 AM        Failed - Normal Platelets on file in past 12 months     Recent Labs   Lab Test 07/25/18  1320                  Failed - Creatinine Clearance greater than 50 ml/min on file  in past 3 mos     No lab results found.          Failed - Serum creatinine less than or equal to 1.4 on file in past 3 mos     Recent Labs   Lab Test 06/02/23  0844 03/16/22  0909 06/08/21  0817   CR 1.1   < >  --    CREAT  --   --  1.1    < > = values in this interval not displayed.                 Failed - GFR on file in the past 12 months and most recent GFR is normal        Passed - Medication is active on med list        Passed - Recent (6 mo) or future (90 days) visit within the authorizing provider's specialty        Passed - Medication indicated for associated diagnosis     Medication is associated with one or more of the following diagnoses:  Atrial fibrillation  Deep venous thrombosis  Heparin-induced thrombocytopenia  Pulmonary embolism  Venous thromboembolism          Passed - Patient is 18 years of age or older         Signed Prescriptions Disp Refills    omeprazole (PRILOSEC) 40 MG DR capsule 90 capsule 0     Sig: TAKE 1 CAPSULE (40 MG) BY MOUTH DAILY       PPI Protocol Passed - 3/27/2024 10:21 AM        Passed - Medication is active on med list        Passed - Medication indicated for associated diagnosis     The medication is prescribed for one or more of the following conditions:     Erosive esophagitis    Gastritis   Gastric hypersecretion   Gastric ulcer   Gastroesophageal reflux disease   Helicobacter pylori gastrointestinal tract infection   Ulcer of duodenum   Drug-induced peptic ulcer   Esophageal stricture   Gastrointestinal hemorrhage   Indigestion   Stress ulcer   Zollinger-Najera syndrome   Carey s esophagus   Laryngopharyngeal reflux          Passed - Recent (12 mo) or future (90 days) visit within the authorizing provider's specialty     The patient must have completed an in-person or virtual visit within the past 12 months or has a future visit scheduled within the next 90 days with the authorizing provider s specialty.  Urgent care and e-visits do not quality as an office visit for  this protocol.          Passed - Patient is age 18 or older           Demetra Givens RN on 3/27/2024 at 11:01 AM

## 2024-03-28 ENCOUNTER — MYC MEDICAL ADVICE (OUTPATIENT)
Dept: FAMILY MEDICINE | Facility: CLINIC | Age: 84
End: 2024-03-28
Payer: COMMERCIAL

## 2024-03-28 RX ORDER — LISINOPRIL AND HYDROCHLOROTHIAZIDE 12.5; 2 MG/1; MG/1
2 TABLET ORAL DAILY
Qty: 180 TABLET | Refills: 0 | Status: SHIPPED | OUTPATIENT
Start: 2024-03-28 | End: 2024-04-12

## 2024-03-28 RX ORDER — ATORVASTATIN CALCIUM 80 MG/1
80 TABLET, FILM COATED ORAL DAILY
Qty: 90 TABLET | Refills: 0 | Status: SHIPPED | OUTPATIENT
Start: 2024-03-28 | End: 2024-04-12

## 2024-03-28 RX ORDER — RIVAROXABAN 20 MG/1
20 TABLET, FILM COATED ORAL
Qty: 90 TABLET | Refills: 0 | Status: SHIPPED | OUTPATIENT
Start: 2024-03-28 | End: 2024-04-12

## 2024-03-28 RX ORDER — AMLODIPINE BESYLATE 5 MG/1
5 TABLET ORAL DAILY
Qty: 90 TABLET | Refills: 0 | Status: SHIPPED | OUTPATIENT
Start: 2024-03-28 | End: 2024-04-12

## 2024-03-28 ASSESSMENT — PATIENT HEALTH QUESTIONNAIRE - PHQ9
SUM OF ALL RESPONSES TO PHQ QUESTIONS 1-9: 0
SUM OF ALL RESPONSES TO PHQ QUESTIONS 1-9: 0

## 2024-03-28 ASSESSMENT — ANXIETY QUESTIONNAIRES
3. WORRYING TOO MUCH ABOUT DIFFERENT THINGS: NOT AT ALL
GAD7 TOTAL SCORE: 0
1. FEELING NERVOUS, ANXIOUS, OR ON EDGE: NOT AT ALL
6. BECOMING EASILY ANNOYED OR IRRITABLE: NOT AT ALL
4. TROUBLE RELAXING: NOT AT ALL
GAD7 TOTAL SCORE: 0
7. FEELING AFRAID AS IF SOMETHING AWFUL MIGHT HAPPEN: NOT AT ALL
7. FEELING AFRAID AS IF SOMETHING AWFUL MIGHT HAPPEN: NOT AT ALL
5. BEING SO RESTLESS THAT IT IS HARD TO SIT STILL: NOT AT ALL
2. NOT BEING ABLE TO STOP OR CONTROL WORRYING: NOT AT ALL
GAD7 TOTAL SCORE: 0

## 2024-03-28 NOTE — TELEPHONE ENCOUNTER
Patient Quality Outreach    Patient is due for the following:   Depression  -  PHQ-9 needed    Next Steps:   Patient was assigned appropriate questionnaire to complete    Type of outreach:    Sent [email or web address] message.      Questions for provider review:    None           Irina Rascon

## 2024-04-12 ENCOUNTER — OFFICE VISIT (OUTPATIENT)
Dept: FAMILY MEDICINE | Facility: CLINIC | Age: 84
End: 2024-04-12
Attending: FAMILY MEDICINE
Payer: COMMERCIAL

## 2024-04-12 VITALS
DIASTOLIC BLOOD PRESSURE: 80 MMHG | HEART RATE: 56 BPM | RESPIRATION RATE: 18 BRPM | TEMPERATURE: 97.7 F | HEIGHT: 72 IN | OXYGEN SATURATION: 93 % | SYSTOLIC BLOOD PRESSURE: 130 MMHG | WEIGHT: 208.8 LBS | BODY MASS INDEX: 28.28 KG/M2

## 2024-04-12 DIAGNOSIS — E78.5 HYPERLIPIDEMIA LDL GOAL <100: ICD-10-CM

## 2024-04-12 DIAGNOSIS — I25.810 CORONARY ATHEROSCLEROSIS OF AUTOLOGOUS VEIN BYPASS GRAFT WITHOUT ANGINA: ICD-10-CM

## 2024-04-12 DIAGNOSIS — I25.10 ATHEROSCLEROSIS OF CORONARY ARTERY WITHOUT ANGINA PECTORIS, UNSPECIFIED VESSEL OR LESION TYPE, UNSPECIFIED WHETHER NATIVE OR TRANSPLANTED HEART: ICD-10-CM

## 2024-04-12 DIAGNOSIS — I71.40 ABDOMINAL AORTIC ANEURYSM (AAA) WITHOUT RUPTURE, UNSPECIFIED PART (H): ICD-10-CM

## 2024-04-12 DIAGNOSIS — H35.3290 EXUDATIVE AGE-RELATED MACULAR DEGENERATION, UNSPECIFIED LATERALITY, UNSPECIFIED STAGE (H): ICD-10-CM

## 2024-04-12 DIAGNOSIS — I48.20 CHRONIC ATRIAL FIBRILLATION (H): ICD-10-CM

## 2024-04-12 DIAGNOSIS — K22.70 BARRETT'S ESOPHAGUS WITHOUT DYSPLASIA: Chronic | ICD-10-CM

## 2024-04-12 DIAGNOSIS — Z00.00 ENCOUNTER FOR MEDICARE ANNUAL WELLNESS EXAM: Primary | ICD-10-CM

## 2024-04-12 DIAGNOSIS — I10 HTN, GOAL BELOW 130/80: ICD-10-CM

## 2024-04-12 LAB
ANION GAP SERPL CALCULATED.3IONS-SCNC: 8 MMOL/L (ref 7–15)
BASOPHILS # BLD AUTO: 0.1 10E3/UL (ref 0–0.2)
BASOPHILS NFR BLD AUTO: 1 %
BUN SERPL-MCNC: 29.4 MG/DL (ref 8–23)
CALCIUM SERPL-MCNC: 9.2 MG/DL (ref 8.8–10.2)
CHLORIDE SERPL-SCNC: 105 MMOL/L (ref 98–107)
CHOLEST SERPL-MCNC: 118 MG/DL
CREAT SERPL-MCNC: 1.22 MG/DL (ref 0.67–1.17)
DEPRECATED HCO3 PLAS-SCNC: 30 MMOL/L (ref 22–29)
EGFRCR SERPLBLD CKD-EPI 2021: 58 ML/MIN/1.73M2
EOSINOPHIL # BLD AUTO: 0.2 10E3/UL (ref 0–0.7)
EOSINOPHIL NFR BLD AUTO: 3 %
ERYTHROCYTE [DISTWIDTH] IN BLOOD BY AUTOMATED COUNT: 14.6 % (ref 10–15)
FASTING STATUS PATIENT QL REPORTED: NORMAL
GLUCOSE SERPL-MCNC: 101 MG/DL (ref 70–99)
HCT VFR BLD AUTO: 37.1 % (ref 40–53)
HDLC SERPL-MCNC: 43 MG/DL
HGB BLD-MCNC: 11.6 G/DL (ref 13.3–17.7)
IMM GRANULOCYTES # BLD: 0 10E3/UL
IMM GRANULOCYTES NFR BLD: 0 %
LDLC SERPL CALC-MCNC: 59 MG/DL
LYMPHOCYTES # BLD AUTO: 0.9 10E3/UL (ref 0.8–5.3)
LYMPHOCYTES NFR BLD AUTO: 14 %
MCH RBC QN AUTO: 29.1 PG (ref 26.5–33)
MCHC RBC AUTO-ENTMCNC: 31.3 G/DL (ref 31.5–36.5)
MCV RBC AUTO: 93 FL (ref 78–100)
MONOCYTES # BLD AUTO: 0.6 10E3/UL (ref 0–1.3)
MONOCYTES NFR BLD AUTO: 10 %
NEUTROPHILS # BLD AUTO: 4.4 10E3/UL (ref 1.6–8.3)
NEUTROPHILS NFR BLD AUTO: 71 %
NONHDLC SERPL-MCNC: 75 MG/DL
PLATELET # BLD AUTO: 258 10E3/UL (ref 150–450)
POTASSIUM SERPL-SCNC: 4.7 MMOL/L (ref 3.4–5.3)
RBC # BLD AUTO: 3.99 10E6/UL (ref 4.4–5.9)
SODIUM SERPL-SCNC: 143 MMOL/L (ref 135–145)
TRIGL SERPL-MCNC: 82 MG/DL
WBC # BLD AUTO: 6.3 10E3/UL (ref 4–11)

## 2024-04-12 PROCEDURE — 85025 COMPLETE CBC W/AUTO DIFF WBC: CPT | Performed by: FAMILY MEDICINE

## 2024-04-12 PROCEDURE — 80048 BASIC METABOLIC PNL TOTAL CA: CPT | Performed by: FAMILY MEDICINE

## 2024-04-12 PROCEDURE — G0439 PPPS, SUBSEQ VISIT: HCPCS | Performed by: FAMILY MEDICINE

## 2024-04-12 PROCEDURE — 99214 OFFICE O/P EST MOD 30 MIN: CPT | Mod: 25 | Performed by: FAMILY MEDICINE

## 2024-04-12 PROCEDURE — 36415 COLL VENOUS BLD VENIPUNCTURE: CPT | Performed by: FAMILY MEDICINE

## 2024-04-12 PROCEDURE — 80061 LIPID PANEL: CPT | Performed by: FAMILY MEDICINE

## 2024-04-12 RX ORDER — OMEPRAZOLE 40 MG/1
40 CAPSULE, DELAYED RELEASE ORAL DAILY
Qty: 90 CAPSULE | Refills: 4 | Status: SHIPPED | OUTPATIENT
Start: 2024-04-12

## 2024-04-12 RX ORDER — ATORVASTATIN CALCIUM 80 MG/1
80 TABLET, FILM COATED ORAL DAILY
Qty: 90 TABLET | Refills: 4 | Status: SHIPPED | OUTPATIENT
Start: 2024-04-12

## 2024-04-12 RX ORDER — AMLODIPINE BESYLATE 5 MG/1
5 TABLET ORAL DAILY
Qty: 90 TABLET | Refills: 4 | Status: ON HOLD | OUTPATIENT
Start: 2024-04-12 | End: 2024-09-28

## 2024-04-12 RX ORDER — LISINOPRIL AND HYDROCHLOROTHIAZIDE 12.5; 2 MG/1; MG/1
2 TABLET ORAL DAILY
Qty: 180 TABLET | Refills: 4 | Status: ON HOLD | OUTPATIENT
Start: 2024-04-12 | End: 2024-09-28

## 2024-04-12 SDOH — HEALTH STABILITY: PHYSICAL HEALTH: ON AVERAGE, HOW MANY DAYS PER WEEK DO YOU ENGAGE IN MODERATE TO STRENUOUS EXERCISE (LIKE A BRISK WALK)?: 7 DAYS

## 2024-04-12 SDOH — HEALTH STABILITY: PHYSICAL HEALTH: ON AVERAGE, HOW MANY MINUTES DO YOU ENGAGE IN EXERCISE AT THIS LEVEL?: 40 MIN

## 2024-04-12 ASSESSMENT — SOCIAL DETERMINANTS OF HEALTH (SDOH): HOW OFTEN DO YOU GET TOGETHER WITH FRIENDS OR RELATIVES?: THREE TIMES A WEEK

## 2024-04-12 NOTE — PATIENT INSTRUCTIONS
"[address] [place] [address]  Neurosurgeon:  Alberto Saini MD PhD  Neurosurgeon:  Richmond Hendricks MD PhD  Neuroradiologist:  Joel Ferguson MD  Neurologist:  Maury Wu MD    [phone]      * *    Per federal transparency in medicine laws, lab and imaging results are released \"real time\" into My Chart.  This may mean that you see the results before I have a chance to review them. My Chart will alert you again when I review the lab and enter comments.  Sometimes with imaging or labs there may be serious or unexpected results. Critical results are paged to me or the after hours on-call provider so that they can be reviewed immediately.  This is not true of non-critical abnormal results. Unfortunately, this means that it's possible you may be alerted of a serious finding before I have a chance to review it.  If you ever receive a result that you are concerned about and I have not already contacted you, please feel free to reach out to me or the care team so that you get the answers you need.    Additionally, it is my goal that you understand the care plan discussed at your visit and that any questions you have are answered.  Please feel free to reach out if you need clarification or explanation of any information addressed at your office visit.    "

## 2024-04-12 NOTE — PROGRESS NOTES
Preventive Care Visit  [facility]  Kamari Rojas MD, Family Medicine  Apr 12, 2024      Assessment & Plan     Encounter for Medicare annual wellness exam  - PRIMARY CARE FOLLOW-UP SCHEDULING    Chronic atrial fibrillation (H)  Well controlled. Refilled medication.     - rivaroxaban ANTICOAGULANT (XARELTO ANTICOAGULANT) 20 MG TABS tablet; Take 1 tablet (20 mg) by mouth daily (with dinner)    Abdominal aortic aneurysm (AAA) without rupture, unspecified part (H24)  Stable follows with vascular surgery.    Exudative age-related macular degeneration, unspecified laterality, unspecified stage (H)  Stable follows with ophthalmology.    HTN, goal below 130/80  [facility]   - BASIC METABOLIC PANEL; Future  - amLODIPine (NORVASC) 5 MG tablet; Take 1 tablet (5 mg) by mouth daily  - lisinopril-hydrochlorothiazide (ZESTORETIC) 20-12.5 MG tablet; Take 2 tablets by mouth daily  - CBC with Platelets & Differential; Future  - BASIC METABOLIC PANEL  - CBC with Platelets & Differential    Atherosclerosis of coronary artery without angina pectoris, unspecified vessel or lesion type, unspecified whether native or transplanted heart  Well controlled. Refilled medication.   Check labs.   - Lipid panel reflex to direct LDL Non-fasting; Future  - Lipid panel reflex to direct LDL Non-fasting    Carey's esophagus without dysplasia  Well controlled. Refilled medication.     - omeprazole (PRILOSEC) 40 MG DR capsule; Take 1 capsule (40 mg) by mouth daily    Hyperlipidemia LDL goal <100  Well controlled. Refilled medication.     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 Refilled medication.     - atorvastatin (LIPITOR) 80 MG tablet; Take 1 tablet (80 mg) by mouth daily    Patient has been advised of split billing requirements and indicates understanding: Yes          BMI  Estimated body mass  index is 28.32 kg/m  as calculated from the following:    Height as of this encounter: 1.829 m (6').    Weight as of this encounter: 94.7 kg (208 lb 12.8 oz).       Counseling  Appropriate preventive services were discussed with this patient, including applicable screening as appropriate for fall prevention, nutrition, physical activity, Tobacco-use cessation, weight loss and cognition.  Checklist reviewing preventive services available has been given to the patient.          Fercho Ramos is a 84 year old, presenting for the following:  Medicare Visit        4/12/2024     6:58 AM   Additional Questions   Roomed by Ellen SAINI         Health Care Directive  Patient has a Health Care Directive on file  Advance care planning document is on file and is current.    HPI      Hyperlipidemia Follow-Up    Are you regularly taking any medication or supplement to lower your cholesterol?   Yes- atorvastatin  Are you having muscle aches or other side effects that you think could be caused by your cholesterol lowering medication?  No    Hypertension Follow-up    Do you check your blood pressure regularly outside of the clinic? Yes   Are you following a low salt diet? No  Are your blood pressures ever more than 140 on the top number (systolic) OR more   than 90 on the bottom number (diastolic), for example 140/90? Yes, occasionally         4/12/2024   General Health   How would you rate your overall physical health? (!) DECLINE   Feel stress (tense, anxious, or unable to sleep) Not at all         4/12/2024   Nutrition   Diet: Regular (no restrictions)         4/12/2024   Exercise   Days per week of moderate/strenous exercise 7 days   Average minutes spent exercising at this level 40 min         4/12/2024   Social Factors   Frequency of gathering with friends or relatives Three times a week   Worry food won't last until get money to buy more No   Food not last or not have enough money for food? No   Do you have housing?  Yes    Are you worried about losing your housing? No   Lack of transportation? No   Unable to get utilities (heat,electricity)? No         2024   Fall Risk   Fallen 2 or more times in the past year? No   Trouble with walking or balance? No          2024   Activities of Daily Living- Home Safety   Needs help with the following daily activites None of the above   Safety concerns in the home None of the above         2024   Dental   Dentist two times every year? (!) NO         2024   Hearing Screening   Hearing concerns? (!) IT'S HARD TO FOLLOW A CONVERSATION IN A NOISY RESTAURANT OR CROWDED ROOM.         2024   Driving Risk Screening   Patient/family members have concerns about driving No         2024   General Alertness/Fatigue Screening   Have you been more tired than usual lately? No         2024   Urinary Incontinence Screening   Bothered by leaking urine in past 6 months No         2024   TB Screening   Were you born outside of the [place]? No         Today's PHQ-2 Score:       2024     6:51 AM   PHQ-2 ( 1999 [employer])   Q1: Little interest or pleasure in doing things 0   Q2: Feeling down, depressed or hopeless 0   PHQ-2 Score 0   Q1: Little interest or pleasure in doing things Not at all   Q2: Feeling down, depressed or hopeless Not at all   PHQ-2 Score 0           2024   Substance Use   Alcohol more than 3/day or more than 7/wk No   Do you have a current opioid prescription? No   How severe/bad is pain from 1 to 10? 1/10   Do you use any other substances recreationally? No     Social History     Tobacco Use    Smoking status: Former     Current packs/day: 0.00     Average packs/day: 0.5 packs/day for 10.0 years (5.0 ttl pk-yrs)     Types: Cigarettes     Start date: 1985     Quit date: 1995     Years since quittin.4    Smokeless tobacco: Never   Vaping Use    Vaping status: Never Used   Substance Use Topics    Alcohol use: No     Comment: beer occ, and wine     Drug use: No             Reviewed and updated as needed this visit by Provider   Tobacco  Allergies  Meds  Problems  Med Hx  Surg Hx  Fam Hx            Past Medical History:   Diagnosis Date    Abdominal aneurysm without mention of rupture 06/24/2005    Repaired with percutaneous stent 8/05   Has annual CT scan to monitor stent placement. Last done in sept 2012 and stable     Atrial fibrillation (H)     Basal cell carcinoma     CAD 04/05/2005 12/05/12 Patient denies any recent chest pain or NTG use.  Patient is taking meds regularly and denies significant side effects. Last stress test many years ago, but bikes 30-40 mi/day without symptoms      Colon polyp, hyperplastic 05/14/2013    Hyperplastic  Polyp on colonoscopy 5-2013. 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    Squamous cell carcinoma of skin, unspecified      Past Surgical History:   Procedure Laterality Date    BYPASS CARDIOPULMONARY  12/1996    CA bypass x2 LAD    CARDIAC SURGERY      CYSTECTOMY PILONIDAL      ESOPHAGOSCOPY, GASTROSCOPY, DUODENOSCOPY (EGD), COMBINED N/A 9/4/2015    Procedure: COMBINED ESOPHAGOSCOPY, GASTROSCOPY, DUODENOSCOPY (EGD), BIOPSY SINGLE OR MULTIPLE;  Surgeon: Fuad Holder MD;  Location: [facility]    ESOPHAGOSCOPY, GASTROSCOPY, DUODENOSCOPY (EGD), COMBINED N/A 9/20/2016    Procedure: COMBINED ESOPHAGOSCOPY, GASTROSCOPY, DUODENOSCOPY (EGD), BIOPSY SINGLE OR MULTIPLE;  Surgeon: Lucas Dang MD;  Location: [facility]    ESOPHAGOSCOPY, GASTROSCOPY, DUODENOSCOPY (EGD), COMBINED N/A 7/12/2018    Procedure: COMBINED ESOPHAGOSCOPY,  GASTROSCOPY, DUODENOSCOPY (EGD), BIOPSY SINGLE OR MULTIPLE;  gastroscopy;  Surgeon: Richard Crowder MD;  Location: [facility]    HERNIA REPAIR      VASECTOMY       Labs reviewed in EPIC  Patient Active Problem List   Diagnosis    Coronary atherosclerosis    Abdominal aortic aneurysm (H24)    Benign shuddering attack    Hyperlipidemia LDL goal <100    HTN, goal below 130/80    [facility]    Macular degeneration, wet (H)    Colon polyp, hyperplastic    Heart murmur    Former smoker    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)    Esophagitis    Carey's esophagus    Arteriosclerotic vascular disease    Benign essential tremor    Bradycardia    Chronic atrial fibrillation (H)     Past Surgical History:   Procedure Laterality Date    BYPASS CARDIOPULMONARY  1996    CA bypass x2 LAD    CARDIAC SURGERY      CYSTECTOMY PILONIDAL      ESOPHAGOSCOPY, GASTROSCOPY, DUODENOSCOPY (EGD), COMBINED N/A 2015    Procedure: COMBINED ESOPHAGOSCOPY, GASTROSCOPY, DUODENOSCOPY (EGD), BIOPSY SINGLE OR MULTIPLE;  Surgeon: Fuad Holder MD;  Location: [facility]    ESOPHAGOSCOPY, GASTROSCOPY, DUODENOSCOPY (EGD), COMBINED N/A 2016    Procedure: COMBINED ESOPHAGOSCOPY, GASTROSCOPY, DUODENOSCOPY (EGD), BIOPSY SINGLE OR MULTIPLE;  Surgeon: Lucas Dang MD;  Location: [facility]    ESOPHAGOSCOPY, GASTROSCOPY, DUODENOSCOPY (EGD), COMBINED N/A 2018    Procedure: COMBINED ESOPHAGOSCOPY, GASTROSCOPY, DUODENOSCOPY (EGD), BIOPSY SINGLE OR MULTIPLE;  gastroscopy;  Surgeon: Richard Crowder MD;  Location: [facility]    HERNIA REPAIR      VASECTOMY         Social History     Tobacco Use    Smoking status: Former     Current packs/day: 0.00     Average packs/day: 0.5 packs/day for 10.0 years (5.0 ttl pk-yrs)     Types: Cigarettes     Start date: 1985     Quit date: 1995     Years since quittin.4    Smokeless tobacco: Never   Substance Use Topics    Alcohol use: No     Comment: beer occ, and wine     Family  History   Problem Relation Age of Onset    Cancer - colorectal Mother     Cardiovascular Father     Alcohol/Drug Father     Neurologic Disorder Father         tremor    Cardiovascular Brother     Coronary Artery Disease Brother     Breast Cancer Sister     Cancer Brother         bladder cancer         Current Outpatient Medications   Medication Sig Dispense Refill    amLODIPine (NORVASC) 5 MG tablet Take 1 tablet (5 mg) by mouth daily 90 tablet 4    aspirin 81 MG chewable tablet Take 1 tablet (81 mg) by mouth daily 36 tablet 3    atorvastatin (LIPITOR) 80 MG tablet Take 1 tablet (80 mg) by mouth daily 90 tablet 4    lisinopril-hydrochlorothiazide (ZESTORETIC) 20-12.5 MG tablet Take 2 tablets by mouth daily 180 tablet 4    Multiple Vitamin (MULTI-VITAMIN DAILY PO) Take 1 tablet by mouth daily      Multiple Vitamins-Minerals (ICAPS PO) Take 1 tablet by mouth 2 times daily       omeprazole (PRILOSEC) 40 MG DR capsule Take 1 capsule (40 mg) by mouth daily 90 capsule 4    rivaroxaban ANTICOAGULANT (XARELTO ANTICOAGULANT) 20 MG TABS tablet Take 1 tablet (20 mg) by mouth daily (with dinner) 90 tablet 4     No Known Allergies  Current providers sharing in care for this patient include:  Patient Care Team:  Kamari Rojas MD as PCP - General (Family Practice)  Nikita Seo MD as Assigned Surgical Provider  Kamari Rojas MD as Assigned PCP    The following health maintenance items are reviewed in Epic and correct as of today:  Health Maintenance   Topic Date Due    RSV VACCINE (Pregnancy & 60+) (1 - 1-dose 60+ series) Never done    EGD  07/12/2020    COVID-19 Vaccine (7 - 2023-24 season) 02/11/2024    MEDICARE ANNUAL WELLNESS VISIT  03/23/2024    BMP  04/12/2025    LIPID  04/12/2025    ANNUAL REVIEW OF HM ORDERS  04/12/2025    FALL RISK ASSESSMENT  04/12/2025    DTAP/TDAP/TD IMMUNIZATION (2 - Td or Tdap) 12/07/2028    ADVANCE CARE PLANNING  04/12/2029    PHQ-2 (once per calendar year)  Completed     INFLUENZA VACCINE  Completed    Pneumococcal Vaccine: 65+ Years  Completed    ZOSTER IMMUNIZATION  Completed    IPV IMMUNIZATION  Aged Out    HPV IMMUNIZATION  Aged Out    MENINGITIS IMMUNIZATION  Aged Out    RSV MONOCLONAL ANTIBODY  Aged Out         Review of Systems  Constitutional, neuro, ENT, endocrine, pulmonary, cardiac, gastrointestinal, genitourinary, musculoskeletal, integument and psychiatric systems are negative, except as otherwise noted.     Objective    Exam  /80   Pulse 56   Temp 97.7  F (36.5  C) (Tympanic)   Resp 18   Ht 1.829 m (6')   Wt 94.7 kg (208 lb 12.8 oz)   SpO2 93%   BMI 28.32 kg/m     Estimated body mass index is 28.32 kg/m  as calculated from the following:    Height as of this encounter: 1.829 m (6').    Weight as of this encounter: 94.7 kg (208 lb 12.8 oz).    Physical Exam  GENERAL: alert and no distress  EYES: Eyes grossly normal to inspection, PERRL and conjunctivae and sclerae normal  HENT: normal cephalic/atraumatic and ear canals and TM's normal  NECK: no adenopathy, no asymmetry, masses, or scars  RESP: lungs clear to auscultation - no rales, rhonchi or wheezes  CV: regular rate and rhythm, normal S1 S2, no S3 or S4, no murmur, click or rub, no peripheral edema  ABDOMEN: soft, nontender, no hepatosplenomegaly, no masses and bowel sounds normal  MS: no gross musculoskeletal defects noted, no edema  SKIN: no suspicious lesions or rashes  NEURO: Normal strength and tone, mentation intact and speech normal  PSYCH: mentation appears normal, affect normal/bright        4/12/2024   Mini Cog   Clock Draw Score 2 Normal   3 Item Recall 3 objects recalled   Mini Cog Total Score 5              Signed Electronically by: Kamari Rojas MD

## 2024-05-14 ENCOUNTER — TELEPHONE (OUTPATIENT)
Dept: OTHER | Facility: CLINIC | Age: 84
End: 2024-05-14
Payer: COMMERCIAL

## 2024-05-14 NOTE — TELEPHONE ENCOUNTER
LVM for patient to return call to schedule US Aorta/Ivc/Iliac Duplex Complete  per Dr Lombardi.     RN will call with results.

## 2024-06-05 ENCOUNTER — HOSPITAL ENCOUNTER (OUTPATIENT)
Dept: ULTRASOUND IMAGING | Facility: CLINIC | Age: 84
Discharge: HOME | End: 2024-06-05
Attending: RADIOLOGY | Admitting: RADIOLOGY
Payer: COMMERCIAL

## 2024-06-05 DIAGNOSIS — I71.40 ABDOMINAL AORTIC ANEURYSM (AAA) WITHOUT RUPTURE (H): Primary | ICD-10-CM

## 2024-06-05 DIAGNOSIS — I71.40 ABDOMINAL AORTIC ANEURYSM (AAA) WITHOUT RUPTURE (H): ICD-10-CM

## 2024-06-05 PROCEDURE — 93978 VASCULAR STUDY: CPT

## 2024-06-05 NOTE — RESULT ENCOUNTER NOTE
Called patient with results. Discussed the discrepancy with ultrasound versus CT.  Aneurysm sac is stable despite today's measurements.  Recommend continue annual follow up.  CT abdomen without contrast.    La Zacarias RN  IR nurse clinician  [phone]

## 2024-07-31 ENCOUNTER — OFFICE VISIT (OUTPATIENT)
Dept: FAMILY MEDICINE | Facility: CLINIC | Age: 84
End: 2024-07-31
Payer: COMMERCIAL

## 2024-07-31 VITALS
DIASTOLIC BLOOD PRESSURE: 74 MMHG | RESPIRATION RATE: 16 BRPM | HEIGHT: 72 IN | WEIGHT: 214.4 LBS | OXYGEN SATURATION: 92 % | TEMPERATURE: 97.6 F | HEART RATE: 56 BPM | BODY MASS INDEX: 29.04 KG/M2 | SYSTOLIC BLOOD PRESSURE: 148 MMHG

## 2024-07-31 DIAGNOSIS — R32 URINARY INCONTINENCE, UNSPECIFIED TYPE: Primary | ICD-10-CM

## 2024-07-31 DIAGNOSIS — D64.9 ANEMIA, UNSPECIFIED TYPE: ICD-10-CM

## 2024-07-31 LAB
ALBUMIN UR-MCNC: ABNORMAL MG/DL
APPEARANCE UR: CLEAR
BACTERIA #/AREA URNS HPF: ABNORMAL /HPF
BASOPHILS # BLD AUTO: 0.1 10E3/UL (ref 0–0.2)
BASOPHILS NFR BLD AUTO: 2 %
BILIRUB UR QL STRIP: NEGATIVE
COLOR UR AUTO: YELLOW
EOSINOPHIL # BLD AUTO: 0.2 10E3/UL (ref 0–0.7)
EOSINOPHIL NFR BLD AUTO: 4 %
ERYTHROCYTE [DISTWIDTH] IN BLOOD BY AUTOMATED COUNT: 16.2 % (ref 10–15)
GLUCOSE UR STRIP-MCNC: NEGATIVE MG/DL
HCT VFR BLD AUTO: 33.8 % (ref 40–53)
HGB BLD-MCNC: 9.8 G/DL (ref 13.3–17.7)
HGB UR QL STRIP: NEGATIVE
IMM GRANULOCYTES # BLD: 0 10E3/UL
IMM GRANULOCYTES NFR BLD: 0 %
KETONES UR STRIP-MCNC: NEGATIVE MG/DL
LEUKOCYTE ESTERASE UR QL STRIP: NEGATIVE
LYMPHOCYTES # BLD AUTO: 0.9 10E3/UL (ref 0.8–5.3)
LYMPHOCYTES NFR BLD AUTO: 16 %
MCH RBC QN AUTO: 25.5 PG (ref 26.5–33)
MCHC RBC AUTO-ENTMCNC: 29 G/DL (ref 31.5–36.5)
MCV RBC AUTO: 88 FL (ref 78–100)
MONOCYTES # BLD AUTO: 0.6 10E3/UL (ref 0–1.3)
MONOCYTES NFR BLD AUTO: 12 %
NEUTROPHILS # BLD AUTO: 3.6 10E3/UL (ref 1.6–8.3)
NEUTROPHILS NFR BLD AUTO: 67 %
NITRATE UR QL: NEGATIVE
PH UR STRIP: 7 [PH] (ref 5–7)
PLATELET # BLD AUTO: 256 10E3/UL (ref 150–450)
RBC # BLD AUTO: 3.85 10E6/UL (ref 4.4–5.9)
RBC #/AREA URNS AUTO: ABNORMAL /HPF
SP GR UR STRIP: 1.02 (ref 1–1.03)
SQUAMOUS #/AREA URNS AUTO: ABNORMAL /LPF
UROBILINOGEN UR STRIP-ACNC: 0.2 E.U./DL
WBC # BLD AUTO: 5.3 10E3/UL (ref 4–11)
WBC #/AREA URNS AUTO: ABNORMAL /HPF

## 2024-07-31 PROCEDURE — 85025 COMPLETE CBC W/AUTO DIFF WBC: CPT | Performed by: FAMILY MEDICINE

## 2024-07-31 PROCEDURE — 36415 COLL VENOUS BLD VENIPUNCTURE: CPT | Performed by: FAMILY MEDICINE

## 2024-07-31 PROCEDURE — 81001 URINALYSIS AUTO W/SCOPE: CPT | Performed by: FAMILY MEDICINE

## 2024-07-31 PROCEDURE — 99214 OFFICE O/P EST MOD 30 MIN: CPT | Performed by: FAMILY MEDICINE

## 2024-07-31 ASSESSMENT — PAIN SCALES - GENERAL: PAINLEVEL: NO PAIN (0)

## 2024-07-31 NOTE — NURSING NOTE
Initial BP (!) 148/64   Pulse 56   Temp 97.6  F (36.4  C) (Tympanic)   Resp 16   Ht 1.829 m (6')   Wt 97.3 kg (214 lb 6.4 oz)   SpO2 92%   BMI 29.08 kg/m   Estimated body mass index is 29.08 kg/m  as calculated from the following:    Height as of this encounter: 1.829 m (6').    Weight as of this encounter: 97.3 kg (214 lb 6.4 oz). .

## 2024-07-31 NOTE — PROGRESS NOTES
Assessment & Plan     Urinary incontinence, unspecified type  If UA normal could try flomax. Discussed PSA testing and implications. He would prefer not to test.  - UA with Microscopic reflex to Culture - lab collect; Future  - UA with Microscopic reflex to Culture - lab collect  - UA Microscopic with Reflex to Culture    Anemia, unspecified type  Re-check labs. ?related to CKD vs GI loss (history of barrets - declines any scoping at this time)  - CBC with Platelets & Differential; Future  - CBC with Platelets & Differential          BMI  Estimated body mass index is 29.08 kg/m  as calculated from the following:    Height as of this encounter: 1.829 m (6').    Weight as of this encounter: 97.3 kg (214 lb 6.4 oz).             Fercho Ramos is a 84 year old, presenting for the following health issues:  Urinary Problem    History of Present Illness       Reason for visit:  Urinary issues  Symptom onset:  More than a month  Symptoms include:  Mild incontience  Symptom intensity:  Mild    He eats 2-3 servings of fruits and vegetables daily.He consumes 1 sweetened beverage(s) daily.He exercises with enough effort to increase his heart rate 30 to 60 minutes per day.  He exercises with enough effort to increase his heart rate 6 days per week.   He is taking medications regularly.               ROS:   Constitutional, neuro, ENT, endocrine, pulmonary, cardiac, gastrointestinal, genitourinary, musculoskeletal, integument and psychiatric systems are negative, except as otherwise noted.       Objective    BP (!) 148/74   Pulse 56   Temp 97.6  F (36.4  C) (Tympanic)   Resp 16   Ht 1.829 m (6')   Wt 97.3 kg (214 lb 6.4 oz)   SpO2 92%   BMI 29.08 kg/m    Body mass index is 29.08 kg/m .  Physical Exam   GENERAL: Pleasant, well appearing male.              Signed Electronically by: Kamari Rojas MD

## 2024-08-14 ENCOUNTER — MYC MEDICAL ADVICE (OUTPATIENT)
Dept: FAMILY MEDICINE | Facility: CLINIC | Age: 84
End: 2024-08-14
Payer: COMMERCIAL

## 2024-08-14 DIAGNOSIS — R32 URINARY INCONTINENCE, UNSPECIFIED TYPE: Primary | ICD-10-CM

## 2024-08-14 NOTE — TELEPHONE ENCOUNTER
Please see my chart message.     Request for Flomax.     Pended    Demetra Givens RN on 8/14/2024 at 2:41 PM

## 2024-08-15 RX ORDER — TAMSULOSIN HYDROCHLORIDE 0.4 MG/1
0.4 CAPSULE ORAL DAILY
Qty: 30 CAPSULE | Refills: 1 | Status: SHIPPED | OUTPATIENT
Start: 2024-08-15 | End: 2024-09-05

## 2024-08-15 NOTE — TELEPHONE ENCOUNTER
Faxed a 30-day prescription to his pharmacy.  If this is working well he can let me know and we can do 90-day quantities.

## 2024-08-22 ENCOUNTER — HOSPITAL ENCOUNTER (OUTPATIENT)
Dept: CT IMAGING | Facility: CLINIC | Age: 84
Discharge: HOME | End: 2024-08-22
Attending: FAMILY MEDICINE | Admitting: FAMILY MEDICINE
Payer: COMMERCIAL

## 2024-08-22 DIAGNOSIS — D64.9 ANEMIA, UNSPECIFIED TYPE: ICD-10-CM

## 2024-08-22 LAB
CREAT BLD-MCNC: 1.1 MG/DL (ref 0.7–1.3)
EGFRCR SERPLBLD CKD-EPI 2021: >60 ML/MIN/1.73M2

## 2024-08-22 PROCEDURE — 74177 CT ABD & PELVIS W/CONTRAST: CPT

## 2024-08-22 PROCEDURE — 82565 ASSAY OF CREATININE: CPT

## 2024-08-22 PROCEDURE — 250N000011 HC RX IP 250 OP 636: Performed by: RADIOLOGY

## 2024-08-22 PROCEDURE — 250N000009 HC RX 250: Performed by: RADIOLOGY

## 2024-08-22 RX ORDER — IOPAMIDOL 755 MG/ML
100 INJECTION, SOLUTION INTRAVASCULAR ONCE
Status: COMPLETED | OUTPATIENT
Start: 2024-08-22 | End: 2024-08-22

## 2024-08-22 RX ADMIN — SODIUM CHLORIDE 100 ML: 9 INJECTION, SOLUTION INTRAVENOUS at 14:39

## 2024-08-22 RX ADMIN — IOPAMIDOL 100 ML: 755 INJECTION, SOLUTION INTRAVENOUS at 14:38

## 2024-08-23 ENCOUNTER — TELEPHONE (OUTPATIENT)
Dept: FAMILY MEDICINE | Facility: CLINIC | Age: 84
End: 2024-08-23
Payer: COMMERCIAL

## 2024-08-23 LAB
RADIOLOGIST FLAGS: ABNORMAL
RADIOLOGIST FLAGS: ABNORMAL

## 2024-08-23 NOTE — TELEPHONE ENCOUNTER
Resulted via [employer].  Please call patient to make sure he is aware of the information and the result note and find out if he has been having respiratory symptoms.

## 2024-08-23 NOTE — TELEPHONE ENCOUNTER
[facility] called for Urgent finding on CT:     CT Impression: Bibasilar opacities, concerning for pneumonitis or aspiration.     Destiney Caballero RN on 8/23/2024 at 10:43 AM

## 2024-09-04 ENCOUNTER — MYC MEDICAL ADVICE (OUTPATIENT)
Dept: FAMILY MEDICINE | Facility: CLINIC | Age: 84
End: 2024-09-04
Payer: COMMERCIAL

## 2024-09-04 DIAGNOSIS — R32 URINARY INCONTINENCE, UNSPECIFIED TYPE: ICD-10-CM

## 2024-09-05 NOTE — TELEPHONE ENCOUNTER
Routing refill request to provider for review/approval because:  Blood pressure  See my chart message  Question on cough, pneumonitis/appointment 9/20    Pending Prescriptions:                       Disp   Refills    tamsulosin (FLOMAX) 0.4 MG capsule        30 cap*1            Sig: Take 1 capsule (0.4 mg) by mouth daily.      Requested Prescriptions   Pending Prescriptions Disp Refills    tamsulosin (FLOMAX) 0.4 MG capsule 30 capsule 1     Sig: Take 1 capsule (0.4 mg) by mouth daily.       Alpha Blockers Failed - 9/5/2024 10:12 AM        Failed - Blood pressure under 140/90 in past 12 months     BP Readings from Last 3 Encounters:   07/31/24 (!) 148/74   04/12/24 130/80   08/17/23 (!) 146/83       No data recorded            Failed - Medication indicated for associated diagnosis     Medication is associated with one or more of the following diagnoses:  Benign prostatic hyperplasia  Disorder of the urinary system  Erectile dysfunction  Neurogenic bladder  Overactive bladder  Raynaud s  Ureteral stent-related symptoms  Urinary retention  Posttraumatic Stress Disorder          Passed - Patient does not have Tadalafil, Vardenafil, or Sildenafil on their medication list        Passed - Medication is active on med list        Passed - Recent (12 mo) or future (90 days) visit within the authorizing provider's specialty     The patient must have completed an in-person or virtual visit within the past 12 months or has a future visit scheduled within the next 90 days with the authorizing provider s specialty.  Urgent care and e-visits do not quality as an office visit for this protocol.          Passed - Patient is 18 years of age or older           Demetra Givens RN on 9/5/2024 at 10:12 AM

## 2024-09-05 NOTE — TELEPHONE ENCOUNTER
Please see my chart message regarding Flomax refill and cough/pneumonitis.     Home blood pressure added to chart.     Demetra Givens RN on 9/5/2024 at 1:35 PM

## 2024-09-06 NOTE — TELEPHONE ENCOUNTER
Patient Contact     Attempt # 1     Was call answered?  No.  Left message on voicemail with information to call back.     Sent [employer] message for clarification.    Joelle Whittaker RN on 9/6/2024 at 3:38 PM

## 2024-09-10 RX ORDER — TAMSULOSIN HYDROCHLORIDE 0.4 MG/1
0.4 CAPSULE ORAL DAILY
Qty: 90 CAPSULE | Refills: 4 | Status: SHIPPED | OUTPATIENT
Start: 2024-09-10

## 2024-09-10 NOTE — TELEPHONE ENCOUNTER
Please call to advise pt ok for 9/13 to call back if symptoms worsen    Aleena Hendricks MSN, RN

## 2024-09-13 ENCOUNTER — OFFICE VISIT (OUTPATIENT)
Dept: FAMILY MEDICINE | Facility: CLINIC | Age: 84
End: 2024-09-13
Payer: COMMERCIAL

## 2024-09-13 VITALS
BODY MASS INDEX: 29.8 KG/M2 | HEIGHT: 72 IN | DIASTOLIC BLOOD PRESSURE: 66 MMHG | OXYGEN SATURATION: 90 % | HEART RATE: 50 BPM | RESPIRATION RATE: 16 BRPM | WEIGHT: 220 LBS | TEMPERATURE: 97.3 F | SYSTOLIC BLOOD PRESSURE: 130 MMHG

## 2024-09-13 DIAGNOSIS — J69.0 ASPIRATION PNEUMONITIS (H): Primary | ICD-10-CM

## 2024-09-13 DIAGNOSIS — D64.9 ANEMIA, UNSPECIFIED TYPE: ICD-10-CM

## 2024-09-13 LAB
BASOPHILS # BLD AUTO: 0.1 10E3/UL (ref 0–0.2)
BASOPHILS NFR BLD AUTO: 2 %
EOSINOPHIL # BLD AUTO: 0.1 10E3/UL (ref 0–0.7)
EOSINOPHIL NFR BLD AUTO: 2 %
ERYTHROCYTE [DISTWIDTH] IN BLOOD BY AUTOMATED COUNT: 18 % (ref 10–15)
FERRITIN SERPL-MCNC: 44 NG/ML (ref 31–409)
HCT VFR BLD AUTO: 32.7 % (ref 40–53)
HGB BLD-MCNC: 9.4 G/DL (ref 13.3–17.7)
IMM GRANULOCYTES # BLD: 0 10E3/UL
IMM GRANULOCYTES NFR BLD: 0 %
IRON BINDING CAPACITY (ROCHE): 403 UG/DL (ref 240–430)
IRON SATN MFR SERPL: 7 % (ref 15–46)
IRON SERPL-MCNC: 27 UG/DL (ref 61–157)
LYMPHOCYTES # BLD AUTO: 0.6 10E3/UL (ref 0.8–5.3)
LYMPHOCYTES NFR BLD AUTO: 14 %
MCH RBC QN AUTO: 24 PG (ref 26.5–33)
MCHC RBC AUTO-ENTMCNC: 28.7 G/DL (ref 31.5–36.5)
MCV RBC AUTO: 84 FL (ref 78–100)
MONOCYTES # BLD AUTO: 0.7 10E3/UL (ref 0–1.3)
MONOCYTES NFR BLD AUTO: 15 %
NEUTROPHILS # BLD AUTO: 3 10E3/UL (ref 1.6–8.3)
NEUTROPHILS NFR BLD AUTO: 67 %
PLATELET # BLD AUTO: 198 10E3/UL (ref 150–450)
RBC # BLD AUTO: 3.91 10E6/UL (ref 4.4–5.9)
WBC # BLD AUTO: 4.5 10E3/UL (ref 4–11)

## 2024-09-13 PROCEDURE — 85025 COMPLETE CBC W/AUTO DIFF WBC: CPT | Performed by: FAMILY MEDICINE

## 2024-09-13 PROCEDURE — 83550 IRON BINDING TEST: CPT | Performed by: FAMILY MEDICINE

## 2024-09-13 PROCEDURE — 99214 OFFICE O/P EST MOD 30 MIN: CPT | Performed by: FAMILY MEDICINE

## 2024-09-13 PROCEDURE — 83540 ASSAY OF IRON: CPT | Performed by: FAMILY MEDICINE

## 2024-09-13 PROCEDURE — 82728 ASSAY OF FERRITIN: CPT | Performed by: FAMILY MEDICINE

## 2024-09-13 PROCEDURE — 36415 COLL VENOUS BLD VENIPUNCTURE: CPT | Performed by: FAMILY MEDICINE

## 2024-09-13 ASSESSMENT — PAIN SCALES - GENERAL: PAINLEVEL: NO PAIN (0)

## 2024-09-13 ASSESSMENT — ENCOUNTER SYMPTOMS: COUGH: 1

## 2024-09-13 NOTE — PROGRESS NOTES
Assessment & Plan     Aspiration pneumonitis (H)  Discussed potential etiologies for this. He is reluctant to do EGD despite history of César's due to concerns about potential risk given his age. Will obtain speech evaluation for swallow study. CT chest for full chest/lung evaluation given that this was only partially imaged on abdominal CT.  - Speech Therapy  Referral; Future  - CT Chest w/o Contrast; Future    Anemia, unspecified type  Unclear etiology. He has been reluctant to pursue further work-up for this. Will re-check labs to monitor. May need to reconsider colonoscopy and EGD if progressively worse.  Abdominal CT showed no obvious gross pathology/mass.  - CBC with Platelets & Differential; Future  - Ferritin; Future  - Iron and iron binding capacity; Future  - CBC with Platelets & Differential  - Ferritin  - Iron and iron binding capacity    Overall he has been reluctant to work-up any abnormalities we have found due to his desire to likely not treat any sinister pathology such as malignancy and his concerns about risks/repercussions of work-up.  He understands this limits determining definitive diagnosis.      BMI  Estimated body mass index is 29.84 kg/m  as calculated from the following:    Height as of this encounter: 1.829 m (6').    Weight as of this encounter: 99.8 kg (220 lb).             Fercho Ramos is a 84 year old, presenting for the following health issues:  Cough        9/13/2024     9:56 AM   Additional Questions   Roomed by Irina WELLS CMA     History of Present Illness       Reason for visit:  Non productive coughing    He eats 2-3 servings of fruits and vegetables daily.He consumes 1 sweetened beverage(s) daily.He exercises with enough effort to increase his heart rate 30 to 60 minutes per day.  He exercises with enough effort to increase his heart rate 6 days per week.   He is taking medications regularly.       ROS:   Constitutional, neuro, ENT, endocrine, pulmonary,  cardiac, gastrointestinal, genitourinary, musculoskeletal, integument and psychiatric systems are negative, except as otherwise noted.       Objective    /66   Pulse 50   Temp 97.3  F (36.3  C) (Tympanic)   Resp 16   Ht 1.829 m (6')   Wt 99.8 kg (220 lb)   SpO2 90%   BMI 29.84 kg/m    Body mass index is 29.84 kg/m .  Physical Exam   GENERAL: Pleasant, well appearing male.  CV: RRR, no murmurs, rubs or gallops.  LUNGS: coarse bi basilar rales, normal effort.           Signed Electronically by: Kamari Rojas MD

## 2024-09-13 NOTE — NURSING NOTE
Initial /66   Pulse 50   Temp 97.3  F (36.3  C) (Tympanic)   Resp 16   Ht 1.829 m (6')   Wt 99.8 kg (220 lb)   SpO2 90%   BMI 29.84 kg/m   Estimated body mass index is 29.84 kg/m  as calculated from the following:    Height as of this encounter: 1.829 m (6').    Weight as of this encounter: 99.8 kg (220 lb). .

## 2024-09-13 NOTE — PATIENT INSTRUCTIONS
"Cardiology consult to address slow heart rate and fatigue (falling asleep easily) as well as review heart history and current status.    To address pneumonitis: swallow evaluation through speech therapy and chest CT.    * * *  If you have a MyChart account results will appear in your MyChart.  If you do not have a MyChart account results will be mailed or called to you.    Lab and imaging results are released \"real time\" into My Chart.  This may mean that you see the results before I have a chance to review them. My Chart will alert you again when I review the results and enter comments.  Sometimes with imaging or labs there may be serious or unexpected results. Critical results are paged to me or the after hours on-call provider so that they can be reviewed immediately.  This is not true of non-critical abnormal results. Unfortunately, this means that it's possible you may be alerted of a serious finding before I have a chance to review it.  If you ever receive a result that you are concerned about and I have not already contacted you, please feel free to reach out to me or the care team so that you get the answers you need. You can call the care team at [phone] and say \"Care Team\".    Additionally, it is my goal that you understand the care plan discussed at your visit and that any questions you have are answered.  Please feel free to reach out if you need clarification or explanation of any information addressed at your office visit.      "

## 2024-09-24 ENCOUNTER — OFFICE VISIT (OUTPATIENT)
Dept: CARDIOLOGY | Facility: CLINIC | Age: 84
End: 2024-09-24
Attending: FAMILY MEDICINE
Payer: COMMERCIAL

## 2024-09-24 ENCOUNTER — APPOINTMENT (OUTPATIENT)
Dept: CT IMAGING | Facility: CLINIC | Age: 84
DRG: 291 | End: 2024-09-24
Attending: EMERGENCY MEDICINE
Payer: COMMERCIAL

## 2024-09-24 ENCOUNTER — HOSPITAL ENCOUNTER (INPATIENT)
Facility: CLINIC | Age: 84
LOS: 4 days | Discharge: HOME | DRG: 291 | End: 2024-09-28
Attending: EMERGENCY MEDICINE | Admitting: INTERNAL MEDICINE
Payer: COMMERCIAL

## 2024-09-24 VITALS
SYSTOLIC BLOOD PRESSURE: 132 MMHG | WEIGHT: 224 LBS | OXYGEN SATURATION: 88 % | DIASTOLIC BLOOD PRESSURE: 71 MMHG | HEART RATE: 55 BPM | BODY MASS INDEX: 30.38 KG/M2 | RESPIRATION RATE: 24 BRPM

## 2024-09-24 DIAGNOSIS — J96.02 ACUTE RESPIRATORY FAILURE WITH HYPOXIA AND HYPERCAPNIA (H): Primary | ICD-10-CM

## 2024-09-24 DIAGNOSIS — J18.9 PNEUMONIA OF BOTH LOWER LOBES DUE TO INFECTIOUS ORGANISM: ICD-10-CM

## 2024-09-24 DIAGNOSIS — J96.01 ACUTE RESPIRATORY FAILURE WITH HYPOXIA AND HYPERCAPNIA (H): Primary | ICD-10-CM

## 2024-09-24 DIAGNOSIS — I50.9 CONGESTIVE HEART FAILURE, UNSPECIFIED HF CHRONICITY, UNSPECIFIED HEART FAILURE TYPE (H): ICD-10-CM

## 2024-09-24 DIAGNOSIS — I25.5 ISCHEMIC CARDIOMYOPATHY: ICD-10-CM

## 2024-09-24 DIAGNOSIS — I25.810 CORONARY ATHEROSCLEROSIS OF AUTOLOGOUS VEIN BYPASS GRAFT WITHOUT ANGINA: ICD-10-CM

## 2024-09-24 DIAGNOSIS — R06.00 DYSPNEA, UNSPECIFIED TYPE: ICD-10-CM

## 2024-09-24 LAB
ALBUMIN SERPL BCG-MCNC: 3.9 G/DL (ref 3.5–5.2)
ALP SERPL-CCNC: 76 U/L (ref 40–150)
ALT SERPL W P-5'-P-CCNC: 18 U/L (ref 0–70)
ANION GAP SERPL CALCULATED.3IONS-SCNC: 6 MMOL/L (ref 7–15)
AST SERPL W P-5'-P-CCNC: 25 U/L (ref 0–45)
BASE EXCESS BLDV CALC-SCNC: 4.9 MMOL/L (ref -3–3)
BASOPHILS # BLD AUTO: 0.1 10E3/UL (ref 0–0.2)
BASOPHILS NFR BLD AUTO: 2 %
BILIRUB SERPL-MCNC: 0.6 MG/DL
BUN SERPL-MCNC: 40.7 MG/DL (ref 8–23)
CALCIUM SERPL-MCNC: 9.2 MG/DL (ref 8.8–10.4)
CHLORIDE SERPL-SCNC: 105 MMOL/L (ref 98–107)
CREAT SERPL-MCNC: 1.27 MG/DL (ref 0.67–1.17)
CRP SERPL-MCNC: 5.65 MG/L
D DIMER PPP FEU-MCNC: 1.1 UG/ML FEU (ref 0–0.5)
EGFRCR SERPLBLD CKD-EPI 2021: 55 ML/MIN/1.73M2
EOSINOPHIL # BLD AUTO: 0.1 10E3/UL (ref 0–0.7)
EOSINOPHIL NFR BLD AUTO: 2 %
ERYTHROCYTE [DISTWIDTH] IN BLOOD BY AUTOMATED COUNT: 18.7 % (ref 10–15)
GLUCOSE SERPL-MCNC: 101 MG/DL (ref 70–99)
HCO3 BLDV-SCNC: 32 MMOL/L (ref 21–28)
HCO3 SERPL-SCNC: 33 MMOL/L (ref 22–29)
HCT VFR BLD AUTO: 32.7 % (ref 40–53)
HGB BLD-MCNC: 9.4 G/DL (ref 13.3–17.7)
HOLD SPECIMEN: NORMAL
HOLD SPECIMEN: NORMAL
IMM GRANULOCYTES # BLD: 0 10E3/UL
IMM GRANULOCYTES NFR BLD: 0 %
LYMPHOCYTES # BLD AUTO: 0.5 10E3/UL (ref 0.8–5.3)
LYMPHOCYTES NFR BLD AUTO: 10 %
MCH RBC QN AUTO: 23.9 PG (ref 26.5–33)
MCHC RBC AUTO-ENTMCNC: 28.7 G/DL (ref 31.5–36.5)
MCV RBC AUTO: 83 FL (ref 78–100)
MONOCYTES # BLD AUTO: 0.8 10E3/UL (ref 0–1.3)
MONOCYTES NFR BLD AUTO: 15 %
NEUTROPHILS # BLD AUTO: 3.8 10E3/UL (ref 1.6–8.3)
NEUTROPHILS NFR BLD AUTO: 71 %
NRBC # BLD AUTO: 0 10E3/UL
NRBC BLD AUTO-RTO: 0 /100
NT-PROBNP SERPL-MCNC: 2444 PG/ML (ref 0–1800)
O2/TOTAL GAS SETTING VFR VENT: 24 %
OXYHGB MFR BLDV: 14 % (ref 70–75)
PCO2 BLDV: 62 MM HG (ref 40–50)
PH BLDV: 7.32 [PH] (ref 7.32–7.43)
PLATELET # BLD AUTO: 209 10E3/UL (ref 150–450)
PO2 BLDV: 15 MM HG (ref 25–47)
POTASSIUM SERPL-SCNC: 4.8 MMOL/L (ref 3.4–5.3)
PROCALCITONIN SERPL IA-MCNC: 0.05 NG/ML
PROT SERPL-MCNC: 7.7 G/DL (ref 6.4–8.3)
RBC # BLD AUTO: 3.93 10E6/UL (ref 4.4–5.9)
SAO2 % BLDV: 14.6 % (ref 70–75)
SARS-COV-2 RNA RESP QL NAA+PROBE: NEGATIVE
SODIUM SERPL-SCNC: 144 MMOL/L (ref 135–145)
TROPONIN T SERPL HS-MCNC: 39 NG/L
TROPONIN T SERPL HS-MCNC: 45 NG/L
TSH SERPL DL<=0.005 MIU/L-ACNC: 3.83 UIU/ML (ref 0.3–4.2)
WBC # BLD AUTO: 5.3 10E3/UL (ref 4–11)

## 2024-09-24 PROCEDURE — 93000 ELECTROCARDIOGRAM COMPLETE: CPT | Performed by: INTERNAL MEDICINE

## 2024-09-24 PROCEDURE — 258N000003 HC RX IP 258 OP 636: Performed by: EMERGENCY MEDICINE

## 2024-09-24 PROCEDURE — 96375 TX/PRO/DX INJ NEW DRUG ADDON: CPT

## 2024-09-24 PROCEDURE — 93005 ELECTROCARDIOGRAM TRACING: CPT

## 2024-09-24 PROCEDURE — 99291 CRITICAL CARE FIRST HOUR: CPT | Mod: 25

## 2024-09-24 PROCEDURE — 36415 COLL VENOUS BLD VENIPUNCTURE: CPT | Performed by: EMERGENCY MEDICINE

## 2024-09-24 PROCEDURE — 87635 SARS-COV-2 COVID-19 AMP PRB: CPT | Performed by: EMERGENCY MEDICINE

## 2024-09-24 PROCEDURE — 250N000009 HC RX 250: Performed by: EMERGENCY MEDICINE

## 2024-09-24 PROCEDURE — 93010 ELECTROCARDIOGRAM REPORT: CPT | Mod: 77 | Performed by: EMERGENCY MEDICINE

## 2024-09-24 PROCEDURE — 82947 ASSAY GLUCOSE BLOOD QUANT: CPT | Performed by: EMERGENCY MEDICINE

## 2024-09-24 PROCEDURE — 85025 COMPLETE CBC W/AUTO DIFF WBC: CPT | Performed by: EMERGENCY MEDICINE

## 2024-09-24 PROCEDURE — 84145 PROCALCITONIN (PCT): CPT

## 2024-09-24 PROCEDURE — 120N000001 HC R&B MED SURG/OB

## 2024-09-24 PROCEDURE — 84155 ASSAY OF PROTEIN SERUM: CPT | Performed by: EMERGENCY MEDICINE

## 2024-09-24 PROCEDURE — 250N000013 HC RX MED GY IP 250 OP 250 PS 637

## 2024-09-24 PROCEDURE — 84484 ASSAY OF TROPONIN QUANT: CPT | Performed by: EMERGENCY MEDICINE

## 2024-09-24 PROCEDURE — 85379 FIBRIN DEGRADATION QUANT: CPT | Performed by: EMERGENCY MEDICINE

## 2024-09-24 PROCEDURE — 84443 ASSAY THYROID STIM HORMONE: CPT | Performed by: EMERGENCY MEDICINE

## 2024-09-24 PROCEDURE — 250N000011 HC RX IP 250 OP 636: Performed by: EMERGENCY MEDICINE

## 2024-09-24 PROCEDURE — 83880 ASSAY OF NATRIURETIC PEPTIDE: CPT | Performed by: EMERGENCY MEDICINE

## 2024-09-24 PROCEDURE — 82805 BLOOD GASES W/O2 SATURATION: CPT | Performed by: FAMILY MEDICINE

## 2024-09-24 PROCEDURE — 71275 CT ANGIOGRAPHY CHEST: CPT

## 2024-09-24 PROCEDURE — 99291 CRITICAL CARE FIRST HOUR: CPT | Mod: 25 | Performed by: EMERGENCY MEDICINE

## 2024-09-24 PROCEDURE — 99205 OFFICE O/P NEW HI 60 MIN: CPT | Performed by: INTERNAL MEDICINE

## 2024-09-24 PROCEDURE — 82805 BLOOD GASES W/O2 SATURATION: CPT | Performed by: EMERGENCY MEDICINE

## 2024-09-24 PROCEDURE — 36415 COLL VENOUS BLD VENIPUNCTURE: CPT | Performed by: FAMILY MEDICINE

## 2024-09-24 PROCEDURE — 99223 1ST HOSP IP/OBS HIGH 75: CPT

## 2024-09-24 PROCEDURE — 250N000011 HC RX IP 250 OP 636

## 2024-09-24 PROCEDURE — 85025 COMPLETE CBC W/AUTO DIFF WBC: CPT | Performed by: FAMILY MEDICINE

## 2024-09-24 PROCEDURE — 86140 C-REACTIVE PROTEIN: CPT

## 2024-09-24 PROCEDURE — 96367 TX/PROPH/DG ADDL SEQ IV INF: CPT

## 2024-09-24 PROCEDURE — 96365 THER/PROPH/DIAG IV INF INIT: CPT

## 2024-09-24 RX ORDER — PANTOPRAZOLE SODIUM 40 MG/1
40 TABLET, DELAYED RELEASE ORAL 2 TIMES DAILY
Status: DISCONTINUED | OUTPATIENT
Start: 2024-09-25 | End: 2024-09-28 | Stop reason: HOSPADM

## 2024-09-24 RX ORDER — ONDANSETRON 4 MG/1
4 TABLET, ORALLY DISINTEGRATING ORAL EVERY 6 HOURS PRN
Status: DISCONTINUED | OUTPATIENT
Start: 2024-09-24 | End: 2024-09-24

## 2024-09-24 RX ORDER — ONDANSETRON 2 MG/ML
4 INJECTION INTRAMUSCULAR; INTRAVENOUS EVERY 6 HOURS PRN
Status: DISCONTINUED | OUTPATIENT
Start: 2024-09-24 | End: 2024-09-24

## 2024-09-24 RX ORDER — LIDOCAINE 40 MG/G
CREAM TOPICAL
Status: DISCONTINUED | OUTPATIENT
Start: 2024-09-24 | End: 2024-09-28 | Stop reason: HOSPADM

## 2024-09-24 RX ORDER — LISINOPRIL AND HYDROCHLOROTHIAZIDE 12.5; 2 MG/1; MG/1
2 TABLET ORAL DAILY
Status: DISCONTINUED | OUTPATIENT
Start: 2024-09-25 | End: 2024-09-28

## 2024-09-24 RX ORDER — FUROSEMIDE 10 MG/ML
40 INJECTION INTRAMUSCULAR; INTRAVENOUS ONCE
Status: COMPLETED | OUTPATIENT
Start: 2024-09-24 | End: 2024-09-24

## 2024-09-24 RX ORDER — ONDANSETRON 2 MG/ML
4 INJECTION INTRAMUSCULAR; INTRAVENOUS EVERY 6 HOURS PRN
Status: DISCONTINUED | OUTPATIENT
Start: 2024-09-24 | End: 2024-09-28 | Stop reason: HOSPADM

## 2024-09-24 RX ORDER — SODIUM CHLORIDE 9 MG/ML
INJECTION, SOLUTION INTRAVENOUS CONTINUOUS
Status: DISCONTINUED | OUTPATIENT
Start: 2024-09-24 | End: 2024-09-24

## 2024-09-24 RX ORDER — CEFTRIAXONE 1 G/1
1 INJECTION, POWDER, FOR SOLUTION INTRAMUSCULAR; INTRAVENOUS ONCE
Status: COMPLETED | OUTPATIENT
Start: 2024-09-24 | End: 2024-09-24

## 2024-09-24 RX ORDER — ONDANSETRON 4 MG/1
4 TABLET, ORALLY DISINTEGRATING ORAL EVERY 6 HOURS PRN
Status: DISCONTINUED | OUTPATIENT
Start: 2024-09-24 | End: 2024-09-28 | Stop reason: HOSPADM

## 2024-09-24 RX ORDER — AMOXICILLIN 250 MG
1 CAPSULE ORAL 2 TIMES DAILY PRN
Status: DISCONTINUED | OUTPATIENT
Start: 2024-09-24 | End: 2024-09-28 | Stop reason: HOSPADM

## 2024-09-24 RX ORDER — TAMSULOSIN HYDROCHLORIDE 0.4 MG/1
0.4 CAPSULE ORAL DAILY
Status: DISCONTINUED | OUTPATIENT
Start: 2024-09-25 | End: 2024-09-28 | Stop reason: HOSPADM

## 2024-09-24 RX ORDER — AMLODIPINE BESYLATE 5 MG/1
5 TABLET ORAL DAILY
Status: DISCONTINUED | OUTPATIENT
Start: 2024-09-25 | End: 2024-09-28 | Stop reason: HOSPADM

## 2024-09-24 RX ORDER — SENNOSIDES 8.6 MG
1300 CAPSULE ORAL DAILY PRN
COMMUNITY

## 2024-09-24 RX ORDER — ASPIRIN 81 MG/1
81 TABLET, CHEWABLE ORAL EVERY EVENING
Status: DISCONTINUED | OUTPATIENT
Start: 2024-09-25 | End: 2024-09-28 | Stop reason: HOSPADM

## 2024-09-24 RX ORDER — POLYETHYLENE GLYCOL 3350 17 G/17G
17 POWDER, FOR SOLUTION ORAL 2 TIMES DAILY PRN
Status: DISCONTINUED | OUTPATIENT
Start: 2024-09-24 | End: 2024-09-28 | Stop reason: HOSPADM

## 2024-09-24 RX ORDER — AMOXICILLIN 250 MG
2 CAPSULE ORAL 2 TIMES DAILY PRN
Status: DISCONTINUED | OUTPATIENT
Start: 2024-09-24 | End: 2024-09-28 | Stop reason: HOSPADM

## 2024-09-24 RX ORDER — ACETAMINOPHEN 325 MG/1
650 TABLET ORAL EVERY 4 HOURS PRN
Status: DISCONTINUED | OUTPATIENT
Start: 2024-09-24 | End: 2024-09-28 | Stop reason: HOSPADM

## 2024-09-24 RX ORDER — CALCIUM CARBONATE 500 MG/1
1000 TABLET, CHEWABLE ORAL 4 TIMES DAILY PRN
Status: DISCONTINUED | OUTPATIENT
Start: 2024-09-24 | End: 2024-09-24

## 2024-09-24 RX ORDER — FUROSEMIDE 10 MG/ML
20 INJECTION INTRAMUSCULAR; INTRAVENOUS EVERY 8 HOURS
Status: COMPLETED | OUTPATIENT
Start: 2024-09-24 | End: 2024-09-25

## 2024-09-24 RX ORDER — ATORVASTATIN CALCIUM 80 MG/1
80 TABLET, FILM COATED ORAL EVERY EVENING
Status: DISCONTINUED | OUTPATIENT
Start: 2024-09-24 | End: 2024-09-28 | Stop reason: HOSPADM

## 2024-09-24 RX ORDER — IOPAMIDOL 755 MG/ML
90 INJECTION, SOLUTION INTRAVASCULAR ONCE
Status: COMPLETED | OUTPATIENT
Start: 2024-09-24 | End: 2024-09-24

## 2024-09-24 RX ORDER — CALCIUM CARBONATE 500 MG/1
1000 TABLET, CHEWABLE ORAL 4 TIMES DAILY PRN
Status: DISCONTINUED | OUTPATIENT
Start: 2024-09-24 | End: 2024-09-28 | Stop reason: HOSPADM

## 2024-09-24 RX ADMIN — FUROSEMIDE 40 MG: 10 INJECTION, SOLUTION INTRAMUSCULAR; INTRAVENOUS at 16:36

## 2024-09-24 RX ADMIN — IOPAMIDOL 90 ML: 755 INJECTION, SOLUTION INTRAVENOUS at 15:31

## 2024-09-24 RX ADMIN — CEFTRIAXONE SODIUM 1 G: 1 INJECTION, POWDER, FOR SOLUTION INTRAMUSCULAR; INTRAVENOUS at 16:36

## 2024-09-24 RX ADMIN — SODIUM CHLORIDE 100 ML: 9 INJECTION, SOLUTION INTRAVENOUS at 15:31

## 2024-09-24 RX ADMIN — AZITHROMYCIN MONOHYDRATE 500 MG: 500 INJECTION, POWDER, LYOPHILIZED, FOR SOLUTION INTRAVENOUS at 17:11

## 2024-09-24 RX ADMIN — FUROSEMIDE 20 MG: 10 INJECTION, SOLUTION INTRAMUSCULAR; INTRAVENOUS at 22:52

## 2024-09-24 RX ADMIN — ATORVASTATIN CALCIUM 80 MG: 80 TABLET, FILM COATED ORAL at 22:53

## 2024-09-24 RX ADMIN — RIVAROXABAN 20 MG: 10 TABLET, FILM COATED ORAL at 22:53

## 2024-09-24 ASSESSMENT — ACTIVITIES OF DAILY LIVING (ADL)
ADLS_ACUITY_SCORE: 35
ADLS_ACUITY_SCORE: 24
ADLS_ACUITY_SCORE: 35
ADLS_ACUITY_SCORE: 24
ADLS_ACUITY_SCORE: 35

## 2024-09-24 ASSESSMENT — COLUMBIA-SUICIDE SEVERITY RATING SCALE - C-SSRS
1. IN THE PAST MONTH, HAVE YOU WISHED YOU WERE DEAD OR WISHED YOU COULD GO TO SLEEP AND NOT WAKE UP?: NO
2. HAVE YOU ACTUALLY HAD ANY THOUGHTS OF KILLING YOURSELF IN THE PAST MONTH?: NO
6. HAVE YOU EVER DONE ANYTHING, STARTED TO DO ANYTHING, OR PREPARED TO DO ANYTHING TO END YOUR LIFE?: NO

## 2024-09-24 NOTE — ED NOTES
Pt saw cardiologist today, concern for CHF. Pt on 2L via NC in room sats 98%, writer turned Pt's O2 down to 1L.

## 2024-09-24 NOTE — LETTER
9/24/2024    Kamari Rojas MD  [address]  [facility] [address]    RE: Richard You       Dear Colleague,     I had the pleasure of seeing Richard You in the [facility].  CARDIOLOGY CLINIC CONSULTATION    PRIMARY CARE PHYSICIAN:  Kamari Rojas    HISTORY OF PRESENT ILLNESS:  This is a very pleasant 84-year-old gentleman who is here with his significant other.  The patient used to follow-up with Dr. Armstrong many years ago.  The patient has a history of bypass surgery in the 1990s with LIMA to LAD and vein graft to the obtuse marginal.  Subsequent angiography has shown patent stents in 2018.  He has ischemic cardiomyopathy with an EF of 40 to 45% in the past.  Also has permanent atrial fibrillation with underlying bradycardia without symptoms.  He is on anticoagulation.  Lastly he has hypertension hyperlipidemia and former history of smoking.    The patient was last seen in the cardiology clinic in 2019.  He has been referred to cardiology for reestablishing care and for worsening shortness of breath and dyspnea on exertion.  Patient denies anginal symptoms but has NYHA class IV heart failure symptoms.  He was seen by his PCP on 9/13/2024.  The note is incomplete and I cannot see what decision was made at that time.  In any case it appears based on nursing notes that her saturations were 90%.  Today he has been referred to cardiology clinic and I am seeing him for the first time.    In clinic the patient is rate controlled with heart rate of 55 and A-fib.  Blood pressure is normal.  His saturations are 85% on room air.  We put 3 L nasal cannula oxygen and his sats improved over 92%.  Patient has been having a dry cough.  He has significant amount of weight gain with abdominal distention and 2+ bilateral edema.  In March April of this year his weight was 208 pounds and today it is 224 pounds.    In regards to medications, the patient is on 5 mg of amlodipine, 81 of  aspirin, 80 mg of Lipitor, lisinopril hydrochlorothiazide, and Xarelto.  He says in the past he was told to be on aspirin and Xarelto both.  Lastly he has been complaining of anemia iron deficiency and possible occult GI blood loss.    PAST MEDICAL HISTORY:  Past Medical History:   Diagnosis Date     Abdominal aneurysm without mention of rupture 06/24/2005    Repaired with percutaneous stent 8/05   Has annual CT scan to monitor stent placement. Last done in sept 2012 and stable      Atrial fibrillation (H)      Basal cell carcinoma      CAD 04/05/2005 12/05/12 Patient denies any recent chest pain or NTG use.  Patient is taking meds regularly and denies significant side effects. Last stress test many years ago, but bikes 30-40 mi/day without symptoms       Colon polyp, hyperplastic 05/14/2013    Hyperplastic  Polyp on colonoscopy 5-2013. 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/14/2014     Squamous cell carcinoma of skin, unspecified        MEDICATIONS:  Current Outpatient Medications   Medication Sig Dispense Refill     amLODIPine (NORVASC) 5 MG tablet Take 1 tablet (5 mg) by mouth daily 90 tablet 4     aspirin 81 MG chewable tablet Take 1 tablet (81 mg) by mouth daily 36 tablet 3     atorvastatin (LIPITOR) 80 MG tablet Take 1 tablet (80 mg) by mouth daily 90 tablet 4     lisinopril-hydrochlorothiazide (ZESTORETIC) 20-12.5 MG tablet Take 2 tablets by mouth daily 180 tablet 4     Multiple Vitamin (MULTI-VITAMIN DAILY PO) Take 1 tablet by mouth daily       Multiple Vitamins-Minerals (ICAPS PO) Take 1 tablet by mouth 2 times daily         omeprazole (PRILOSEC) 40 MG DR capsule Take 1 capsule (40 mg) by mouth daily 90 capsule 4     rivaroxaban ANTICOAGULANT (XARELTO ANTICOAGULANT) 20 MG TABS tablet Take 1 tablet (20 mg) by mouth daily (with dinner) 90 tablet 4     tamsulosin (FLOMAX) 0.4 MG capsule Take 1 capsule (0.4 mg) by mouth daily. 90 capsule 4     No current facility-administered medications for this visit.       SOCIAL HISTORY:  I have reviewed this patient's social history and updated it with pertinent information if needed. Richard You  reports that he quit smoking about 28 years ago. His smoking use included cigarettes. He started smoking about 38 years ago. He has a 5 pack-year smoking history. He has never used smokeless tobacco. He reports that he does not drink alcohol and does not use drugs.    PHYSICAL EXAM:  Pulse:  [55] 55  Resp:  [24] 24  BP: (132)/(71) 132/71  SpO2:  [88 %] 88 %  224 lbs 0 oz    Constitutional: alert, no distress  Respiratory: Reduced bilateral breath entry worse on the left side with some crackles and rales  Cardiovascular: Bradycardic irregular soft systolic murmur significantly elevated JVD with positive hepatojugular reflux estimated JVP is over 15 2+ edema  GI: Distended with possible ascites  Neuropsychiatric: appropriate affact    ASSESSMENT: Pertinent issues addressed/ reviewed during this cardiology visit  Acute respiratory failure with hypoxia in clinic  Likely acute decompensated heart failure with reduced ejection fraction with moderate volume overload  Possible pneumonia  History of coronary disease and ischemic cardiomyopathy    RECOMMENDATIONS:  The patient is clearly volume overloaded on exam.  He has 2+ edema and significantly elevated JVD with hepatojugular reflux positive.  He needs IV diuresis.  He needs echocardiography and labs.  I recommend the patient go to the emergency department obtain labs including CBC with differential, troponins, NT proBNP, TSH, CMP.  He needs an echocardiogram  chest x-ray and possibly a CT scan of his chest.  Recommend IV diuresis.  Admit to medicine and then depending on clinical course echocardiography results, can determine potential need to transfer to an institution with cardiology service if severe LV dysfunction is noted.  Lastly the patient has anemia and potential blood loss.  Recommend stopping aspirin and continuing Xarelto for now.  Patient should get evaluated for left atrial appendage occlusion as an outpatient however this is not a acute urgent issue at this time.    For now this patient needs hemodynamic stabilization.  His saturations of come up to 95% on 3 to 4 L nasal cannula oxygen.  My nursing team will take him to the ER    It was a pleasure seeing this patient in clinic today. Please do not hesitate to contact me with any future questions.     CONSTANTINE Arnold, [facility]  Cardiology - [facility]  September 24, 2024    Review of the result(s) of each unique test - Last CBC BMP lipids. Last ECG shows A-fib.     The level of medical decision making during this visit was of high complexity. Condition for which patient was treated that is considered severe and would require intensive monitoring or urgent treatment to facilitate care -acute decompensated heart failure    This note was completed in part using dictation via the Dragon voice recognition software. Some word and grammatical errors may occur and must be interpreted in the appropriate clinical context.  If there are any questions pertaining to this issue, please contact me for further clarification.      Thank you for allowing me to participate in the care of your patient.      Sincerely,     Dat Chavez MD     [facility]  cc:   Kamari Rojas MD  [address] [place] [address]

## 2024-09-24 NOTE — ED TRIAGE NOTES
Pt seen by cardiologist after pmd referred pt to be seen by cardiology again. Cardiologist discovered pt's oxygen level on room air was just 85%. Pt states prior to cardiology appointment he had been feeling good, going to the gym daily to use the row machine but noticed he would fall to sleep much easier. Pt is A&Ox4 without history of falls.

## 2024-09-24 NOTE — H&P
[facility]    History and Physical  Hospital Medicine       Date of Admission:  9/24/2024  Date of Service: 9/24/2024     Assessment & Plan   Richard You is a 84 year old male with past medical history significant for CAD s/p CABG (1990s), ischemic cardiomyopathy HFrEF, permanent atrial fibrillation on xarelto, HTN, HLD, abdominal aortic aneurysm, Carey's esophagus, recent diagnosis of aspiration pneumonitis (09/13/24) at outpatient clinic,  who presents on 9/24/2024 with shortness of breath, hypoxia, weight gain with abdominal distension and bilateral lower extremity edema.      Acute respiratory failure with hypoxia and mild hypercapnia   Noted to be hypoxic at cardiology clinic on day of admission with SpO2 85% on room air. Endorses   No leukocytosis (5.3). Anemia (Hgb 9.4) similar to previous. Renal function appears at baseline. CRP mildly elevated (5.65). Procalcitonin wnl (0.05), Covid PCR negative. Serial troponin flat (45 ? 39). VBG revealed pH 7.32 / pCO2 62 / bicarb 32, no prior comparison. Alert and oriented x4 at time of admission, no hx of sleep apnea. EKG with rate controlled atrial fibrillation with nonspecific ST-T changes. CT PE study with trace pleural fluid bilaterally and bibasilar opacities, negative for PE. Patient appears nontoxic, hypervolemic on exam, develops mild dyspnea with minimal exertion such as moving in bed. Hemodynamically stable on 2L via NC. Likely secondary to acute heart failure exacerbation, low suspicion for pneumonia.  - Supplemental O2 to maintain SpO2 >90%  - Respiratory PCR panel pending     HFrEF   Ischemic cardiomyopathy   Presents with weight gain, with abdominal distension and 2+ bilateral lower extremity edema, progressive over 1-2 months. Echocardiogram 07/25/2018 with EF 40-45%. BNP on admission 2444, no prior to compare. CT PE study with trace pleural fluid bilaterally and bibasilar opacities. Bibasilar crackles, faint wheezing in  upper lobes on auscultation. Appears hypervolemic. Received 40mg furosemide in the ED with -600 urine output.   - IV Furosemide 20mg x 2 doses  - Echocardiogram ordered     Low suspicion Pneumonia of both lower lobes due to infectious organism  Pt has had nonproductive cough x 3 months, gradual onset. Denies fevers, chills, sick contacts, acute URI sx.  No leukocytosis (5.3), procalcitonin wnl (0.05), covid PCR negative. Aside from hypoxia, VS reassuring, patient is afebrile. Started on ceftriaxone and azithromycin in the ED.   -No further abx at this time.   -respiratory panel pending.     Anemia, acute on chronic   On admission Hgb 9.4. MCV 83. On 09/13/24 Iron 27, , Iron sat index 7, Ferratin 44.   Thought to be secondary to CKD vs GI losses, has a hx of Carey's disease.   BUN elevated (40.7). No signs/sx of acute bleeding.   Most recent endoscopy 07/12/2018 revealed Normal nasopharynx and oropharynx. Esophageal mucosal changes consistent with short-segment Carey's esophagus. Biopsied. Gastritis. Normal examined duodenum.   - AM CBC   - Hold pta aspirin    Chronic atrial fibrillation (H) with slow ventricular response / asymptomatic bradycardia  Bradycardia noted during EGD 07/2018, and EKG at that time revealed atrial fibrillation with no clear P waves. Atrial fibrillation confirmed with 30-day event monitor which showed HR as low as 30 bpm when sleeping and 50-60 bpm while awake. HR 50-70bpm in the ED. Denies syncope or presyncope, palpitations.   - Continue pta xarelto     Chronic kidney disease   Creatinine 1.27 on admission, GFR 55. Similar to recent baseline creatinine 1.22 and GFR 58 on 04/12/24. Appears stable.   - Holding pta lisinopril, hctz as below      Hypertension   Managed prior to admission with amlodipine 5mg daily, lisinopril 40mg daily, hydrochlorothiazide 25mg daily. BP stable, well controlled on admission.     - Hold amlodipine, lisinopril and hydrochlorothiazide while initiating  "diuresis - reassess in AM.     Carey's esophagus  Managed prior to admission with omeprazole 40mg daily. Declined scoping at clinic visit 07/31/24, was suggested as part of workup for anemia. States he has been asymptomatic for many years. No new or changing symptoms.   - Continue pta omeprazole    Coronary atherosclerosis S/P CABG x2   Hyperlipidemia LDL goal <100  Managed prior to admission with atorvastatin 80mg daily; continue     Hx of Abdominal aortic aneurysm s/p endovascular repair (2005)  Most recently visualized on US aorta/Ivc/iliac 06/05/24 which revealed Postoperative changes EVAR with widely patent aortobiiliac endograft. The excluded infrarenal abdominal aortic aneurysm sac measures 6.3 x 6.3 cm sonographically on today's exam, similar to prior ultrasound 5/26/2020. The AAA sac measured 4.6 x 5.2 cm on more recent CTA 6/2/2023. This discrepancy in measurements is favored to represent differences in technique. Otherwise, no evidence of endoleak on Doppler exam.    Former smoker  Quit smoking cigarettes 28 years ago. 5 pack-year history.       Clinically Significant Risk Factors Present on Admission               # Drug Induced Coagulation Defect: home medication list includes an anticoagulant medication  # Drug Induced Platelet Defect: home medication list includes an antiplatelet medication   # Hypertension: Noted on problem list    # Anemia: based on hgb <11       # Overweight: Estimated body mass index is 28.74 kg/m  as calculated from the following:    Height as of this encounter: 1.854 m (6' 1\").    Weight as of this encounter: 98.8 kg (217 lb 13 oz).                   Diet: Combination Diet 2 gm NA Diet; No Caffeine Diet (and additional linked orders)  Fluid restriction 2000 ML FLUID (and additional linked orders)    DVT Prophylaxis: DOAC  Pruitt Catheter: Not present  Code Status: No CPR- Do NOT Intubate      Disposition Plan     Medically Ready for Discharge: Anticipated in 2-4 Days       " "    The patient's care was discussed with the Attending Physician, Dr. Landon Golden and Patient.  I have discussed patient and formulated plan with attending hospitalist physician, Dr. Landon Quiroz PA-C        Primary Care Physician   Kamari Rojas [phone]    History is obtained from the patient and review of old records via the EMR.    History of Present Illness   Richard You is a 84 year old male with past medical history significant for CAD s/p CABG (1990s), ischemic cardiomyopathy HFrEF, permanent atrial fibrillation on xarelto, HTN, HLD, abdominal aortic aneurysm, Carey's esophagus, recent diagnosis of aspiration pneumonitis (09/13/24) at outpatient clinic,  who presents on 9/24/2024 with shortness of breath, hypoxia, weight gain with abdominal distension and bilateral lower extremity edema.     Richard endorses nonproductive cough with gradual onset over 3 months. His wife Soledad notes that the cough sounds \"wet\". He does not recall having any specific illness and denies sick contacts.   He endorses generalized fatigue and excessive sleepiness over the past 3 months as well.     He developed shortness of breath and wheezing over 10 days prior to admission. He describes progressive orthopnea and dyspnea on exertion, states he has taken to sleeping in the recliner.   Endorses weight gain with abdominal distension and bilateral lower extremity edema, progressing over 10 days pta.     Denies fevers, chills, chest pain, palpitations, abdominal pain, nausea, vomiting, constipation, diarrhea, melena, hematochezia, falls, injuries, skin changes.     Lives in a private home with his wife of 60 years, Soledad. Manages his own medications, reports good compliance. Drinks one 8 oz glass of red wine daily around 12 pm, states that this helps with his essential tremor. Quit smoking tobacco 28 years ago, denies illicit drug use.     Review of Systems   The 10 point Review of Systems is negative " other than noted in the HPI or here.     Past Medical History    Past Medical History:   Diagnosis Date    Abdominal aneurysm without mention of rupture 06/24/2005    Repaired with percutaneous stent 8/05   Has annual CT scan to monitor stent placement. Last done in sept 2012 and stable     Atrial fibrillation (H)     Basal cell carcinoma     CAD 04/05/2005 12/05/12 Patient denies any recent chest pain or NTG use.  Patient is taking meds regularly and denies significant side effects. Last stress test many years ago, but bikes 30-40 mi/day without symptoms      Colon polyp, hyperplastic 05/14/2013    Hyperplastic  Polyp on colonoscopy 5-2013. 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    Squamous cell carcinoma of skin, unspecified      Past Surgical History   Past Surgical History:   Procedure Laterality Date    BYPASS CARDIOPULMONARY  12/1996    CA bypass x2 LAD    CARDIAC SURGERY      CYSTECTOMY PILONIDAL      ESOPHAGOSCOPY, GASTROSCOPY, DUODENOSCOPY (EGD), COMBINED N/A 9/4/2015    Procedure: COMBINED ESOPHAGOSCOPY, GASTROSCOPY, DUODENOSCOPY (EGD), BIOPSY SINGLE OR MULTIPLE;  Surgeon: Fuad Holder MD;  Location: [facility]    ESOPHAGOSCOPY, GASTROSCOPY, DUODENOSCOPY (EGD), COMBINED N/A 9/20/2016    Procedure: COMBINED ESOPHAGOSCOPY, GASTROSCOPY, DUODENOSCOPY (EGD), BIOPSY SINGLE OR MULTIPLE;  Surgeon: Lucas Dang MD;  Location: [facility]    ESOPHAGOSCOPY, GASTROSCOPY, DUODENOSCOPY (EGD), COMBINED N/A 7/12/2018    Procedure: COMBINED ESOPHAGOSCOPY, GASTROSCOPY, DUODENOSCOPY (EGD), BIOPSY SINGLE OR MULTIPLE;  gastroscopy;   Surgeon: Richard Crowder MD;  Location: [facility]    HERNIA REPAIR      VASECTOMY          Prior to Admission Medications   Prior to Admission Medications   Prescriptions Last Dose Informant Patient Reported? Taking?   Multiple Vitamin (MULTI-VITAMIN DAILY PO) 9/24/2024 at am Self Yes Yes   Sig: Take 1 tablet by mouth daily   Multiple Vitamins-Minerals (ICAPS PO) 9/24/2024 at am Self Yes Yes   Sig: Take 1 tablet by mouth 2 times daily    acetaminophen (TYLENOL 8 HOUR) 650 MG CR tablet 9/23/2024 at prn Self Yes Yes   Sig: Take 1,300 mg by mouth daily as needed for mild pain or fever (hip pain).   amLODIPine (NORVASC) 5 MG tablet 9/24/2024 at am Self No Yes   Sig: Take 1 tablet (5 mg) by mouth daily   aspirin 81 MG chewable tablet 9/23/2024 at pm Self No Yes   Sig: Take 1 tablet (81 mg) by mouth daily   atorvastatin (LIPITOR) 80 MG tablet 9/23/2024 at pm Self No Yes   Sig: Take 1 tablet (80 mg) by mouth daily   lisinopril-hydrochlorothiazide (ZESTORETIC) 20-12.5 MG tablet 9/24/2024 at am Self No Yes   Sig: Take 2 tablets by mouth daily   omeprazole (PRILOSEC) 40 MG DR capsule 9/24/2024 at am Self No Yes   Sig: Take 1 capsule (40 mg) by mouth daily   rivaroxaban ANTICOAGULANT (XARELTO ANTICOAGULANT) 20 MG TABS tablet 9/23/2024 at supper Self No Yes   Sig: Take 1 tablet (20 mg) by mouth daily (with dinner)   tamsulosin (FLOMAX) 0.4 MG capsule 9/24/2024 at am Self No Yes   Sig: Take 1 capsule (0.4 mg) by mouth daily.      Facility-Administered Medications: None     Allergies   No Known Allergies    Family History    Family History   Problem Relation Age of Onset    Cancer - colorectal Mother     Cardiovascular Father     Alcohol/Drug Father     Neurologic Disorder Father         tremor    Cardiovascular Brother     Coronary Artery Disease Brother     Breast Cancer Sister     Cancer Brother         bladder cancer       Social History   Social History     Socioeconomic History    Marital status:      Spouse name: Soledad  JEFF You    Number of children: 2    Years of education: 12    Highest education level: Not on file   Occupational History     Employer: RETIRED   Tobacco Use    Smoking status: Former     Current packs/day: 0.00     Average packs/day: 0.5 packs/day for 10.0 years (5.0 ttl pk-yrs)     Types: Cigarettes     Start date: 1985     Quit date: 1995     Years since quittin.8    Smokeless tobacco: Never   Vaping Use    Vaping status: Never Used   Substance and Sexual Activity    Alcohol use: No     Comment: beer occ, and wine    Drug use: No    Sexual activity: Not Currently     Partners: Female   Other Topics Concern    Parent/sibling w/ CABG, MI or angioplasty before 65F 55M? Yes   Social History Narrative        [employer]    Nonsmoker as of 2  Yrs ago    Drinks wine daily    Lives with wife kelly in [place]     Social Determinants of Health     Financial Resource Strain: Low Risk  (2024)    Financial Resource Strain     Within the past 12 months, have you or your family members you live with been unable to get utilities (heat, electricity) when it was really needed?: No   Food Insecurity: Low Risk  (2024)    Food Insecurity     Within the past 12 months, did you worry that your food would run out before you got money to buy more?: No     Within the past 12 months, did the food you bought just not last and you didn t have money to get more?: No   Transportation Needs: Low Risk  (2024)    Transportation Needs     Within the past 12 months, has lack of transportation kept you from medical appointments, getting your medicines, non-medical meetings or appointments, work, or from getting things that you need?: No   Physical Activity: Sufficiently Active (2024)    Exercise Vital Sign     Days of Exercise per Week: 7 days     Minutes of Exercise per Session: 40 min   Stress: No Stress Concern Present (2024)    [employer] - Occupational Stress  "Questionnaire     Feeling of Stress : Not at all   Social Connections: Unknown (4/12/2024)    Social Connection and Isolation Panel [NHANES]     Frequency of Communication with Friends and Family: Not on file     Frequency of Social Gatherings with Friends and Family: Three times a week     Attends Anglican Services: Not on file     Active Member of Clubs or Organizations: Not on file     Attends Club or Organization Meetings: Not on file     Marital Status: Not on file   Interpersonal Safety: Low Risk  (9/24/2024)    Interpersonal Safety     Do you feel physically and emotionally safe where you currently live?: Yes     Within the past 12 months, have you been hit, slapped, kicked or otherwise physically hurt by someone?: No     Within the past 12 months, have you been humiliated or emotionally abused in other ways by your partner or ex-partner?: No   Housing Stability: Low Risk  (9/24/2024)    Housing Stability     Do you have housing? : Yes     Are you worried about losing your housing?: No       Physical Exam   /59 (BP Location: Right arm, Patient Position: Supine, Cuff Size: Adult Regular)   Pulse 58   Temp 98  F (36.7  C) (Oral)   Resp 20   Ht 1.854 m (6' 1\")   Wt 98.8 kg (217 lb 13 oz)   SpO2 99%   BMI 28.74 kg/m       Weight: 217 lbs 13.03 oz Body mass index is 28.74 kg/m .     Constitutional: Alert and oriented x4, appears comfortable, nontoxic, no acute distress.  Eyes: Eyes are clear, no scleral icterus, pupils are reactive, EOM intact bilaterally.  HENT: Oropharynx is clear and moist. No evidence of cranial trauma.   Lymph/Hematologic: No epitrochlear, axillary, anterior or posterior cervical, or supraclavicular lymphadenopathy is appreciated.  Cardiovascular: Regular rate and rhythm, normal S1 and S2, and no murmur, rub, or gallops noted. JVP is normal. Radial & dorsalis pedis pulses 2+ bilaterally. No lower extremity edema.  Respiratory: Increased work of breathing with conversation or " minimal exertion such as shifting in bed. On 2L via NC. Bibasilar crackles, faint wheezing in bilateral upper lobes. No cyanosis.  GI: Active bowel sounds throughout. Slightly distended, tight, non-tender to palpation, without rebound or guarding. No palpable masses, no hepatomegaly.   Genitourinary: Deferred  Musculoskeletal: Normal muscle bulk and tone. Moves all extremities spontaneously. No gross deformity.  Skin: Warm and dry, no rashes.   Neurologic: Neck supple.  is symmetric. No notable tremor.       Data   Data reviewed today:     I have personally reviewed the following data over the past 24 hrs:    5.3  \   9.4 (L)   / 209     144 105 40.7 (H) /  101 (H)   4.8 33 (H) 1.27 (H) \     ALT: 18 AST: 25 AP: 76 TBILI: 0.6   ALB: 3.9 TOT PROTEIN: 7.7 LIPASE: N/A     Trop: 39 (H) BNP: 2,444 (H)     TSH: 3.83 T4: N/A A1C: N/A     Procal: 0.05 CRP: 5.65 (H) Lactic Acid: N/A       INR:  N/A PTT:  N/A   D-dimer:  1.10 (H) Fibrinogen:  N/A         Recent Results (from the past 24 hour(s))   CT Chest Pulmonary Embolism w Contrast    Narrative    CT CHEST PULMONARY EMBOLISM WITH CONTRAST  9/24/2024 3:39 PM    CLINICAL HISTORY: Chest pain. Low saturations, leg swelling.    TECHNIQUE: CT angiogram chest during arterial phase injection IV  contrast. 2D and 3D MIP reconstructions were performed by the CT  technologist. Dose reduction techniques were used.     CONTRAST: 90 mL isovue 370    COMPARISON: None.    FINDINGS:  ANGIOGRAM CHEST: Pulmonary arteries are normal caliber and negative  for pulmonary emboli. Thoracic aorta is negative for dissection. No CT  evidence of right heart strain.    LUNGS AND PLEURA: Trace pleural fluid bilaterally. Bibasilar opacities  could represent infiltrate.    MEDIASTINUM/AXILLAE: Cardiomegaly. No aneurysm. No adenopathy. Tiny  hiatal hernia.    CORONARY ARTERY CALCIFICATIONS: Coronary artery bypass change.    UPPER ABDOMEN: No acute findings.    MUSCULOSKELETAL: No frankly destructive  bony lesions.      Impression    IMPRESSION:  1.  No pulmonary embolism demonstrated.  2.  Question bibasilar infiltrates.    NOREEN WEISS MD         SYSTEM ID:  [id number]       I personally reviewed no images or EKG's today

## 2024-09-24 NOTE — PROGRESS NOTES
CARDIOLOGY CLINIC CONSULTATION    PRIMARY CARE PHYSICIAN:  Kamari Rojas    HISTORY OF PRESENT ILLNESS:  This is a very pleasant 84-year-old gentleman who is here with his significant other.  The patient used to follow-up with Dr. Armstrong many years ago.  The patient has a history of bypass surgery in the 1990s with LIMA to LAD and vein graft to the obtuse marginal.  Subsequent angiography has shown patent stents in 2018.  He has ischemic cardiomyopathy with an EF of 40 to 45% in the past.  Also has permanent atrial fibrillation with underlying bradycardia without symptoms.  He is on anticoagulation.  Lastly he has hypertension hyperlipidemia and former history of smoking.    The patient was last seen in the cardiology clinic in 2019.  He has been referred to cardiology for reestablishing care and for worsening shortness of breath and dyspnea on exertion.  Patient denies anginal symptoms but has NYHA class IV heart failure symptoms.  He was seen by his PCP on 9/13/2024.  The note is incomplete and I cannot see what decision was made at that time.  In any case it appears based on nursing notes that her saturations were 90%.  Today he has been referred to cardiology clinic and I am seeing him for the first time.    In clinic the patient is rate controlled with heart rate of 55 and A-fib.  Blood pressure is normal.  His saturations are 85% on room air.  We put 3 L nasal cannula oxygen and his sats improved over 92%.  Patient has been having a dry cough.  He has significant amount of weight gain with abdominal distention and 2+ bilateral edema.  In March April of this year his weight was 208 pounds and today it is 224 pounds.    In regards to medications, the patient is on 5 mg of amlodipine, 81 of aspirin, 80 mg of Lipitor, lisinopril hydrochlorothiazide, and Xarelto.  He says in the past he was told to be on aspirin and Xarelto both.  Lastly he has been complaining of anemia iron deficiency and possible occult GI  blood loss.    PAST MEDICAL HISTORY:  Past Medical History:   Diagnosis Date    Abdominal aneurysm without mention of rupture 06/24/2005    Repaired with percutaneous stent 8/05   Has annual CT scan to monitor stent placement. Last done in sept 2012 and stable     Atrial fibrillation (H)     Basal cell carcinoma     CAD 04/05/2005 12/05/12 Patient denies any recent chest pain or NTG use.  Patient is taking meds regularly and denies significant side effects. Last stress test many years ago, but bikes 30-40 mi/day without symptoms      Colon polyp, hyperplastic 05/14/2013    Hyperplastic  Polyp on colonoscopy 5-2013. 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    Squamous cell carcinoma of skin, unspecified        MEDICATIONS:  Current Outpatient Medications   Medication Sig Dispense Refill    amLODIPine (NORVASC) 5 MG tablet Take 1 tablet (5 mg) by mouth daily 90 tablet 4    aspirin 81 MG chewable tablet Take 1 tablet (81 mg) by mouth daily 36 tablet 3    atorvastatin (LIPITOR) 80 MG tablet Take 1 tablet (80 mg) by mouth daily 90 tablet 4    lisinopril-hydrochlorothiazide (ZESTORETIC) 20-12.5 MG tablet Take 2 tablets by mouth daily 180 tablet 4    Multiple Vitamin (MULTI-VITAMIN DAILY PO) Take 1 tablet by mouth daily      Multiple Vitamins-Minerals (ICAPS PO) Take 1 tablet by mouth 2 times daily       omeprazole (PRILOSEC) 40 MG DR capsule Take 1 capsule (40 mg) by mouth daily 90 capsule 4    rivaroxaban ANTICOAGULANT (XARELTO ANTICOAGULANT) 20 MG TABS tablet Take 1 tablet (20 mg) by mouth daily (with dinner) 90 tablet 4    tamsulosin (FLOMAX)  0.4 MG capsule Take 1 capsule (0.4 mg) by mouth daily. 90 capsule 4     No current facility-administered medications for this visit.       SOCIAL HISTORY:  I have reviewed this patient's social history and updated it with pertinent information if needed. Richard You  reports that he quit smoking about 28 years ago. His smoking use included cigarettes. He started smoking about 38 years ago. He has a 5 pack-year smoking history. He has never used smokeless tobacco. He reports that he does not drink alcohol and does not use drugs.    PHYSICAL EXAM:  Pulse:  [55] 55  Resp:  [24] 24  BP: (132)/(71) 132/71  SpO2:  [88 %] 88 %  224 lbs 0 oz    Constitutional: alert, no distress  Respiratory: Reduced bilateral breath entry worse on the left side with some crackles and rales  Cardiovascular: Bradycardic irregular soft systolic murmur significantly elevated JVD with positive hepatojugular reflux estimated JVP is over 15 2+ edema  GI: Distended with possible ascites  Neuropsychiatric: appropriate affact    ASSESSMENT: Pertinent issues addressed/ reviewed during this cardiology visit  Acute respiratory failure with hypoxia in clinic  Likely acute decompensated heart failure with reduced ejection fraction with moderate volume overload  Possible pneumonia  History of coronary disease and ischemic cardiomyopathy    RECOMMENDATIONS:  The patient is clearly volume overloaded on exam.  He has 2+ edema and significantly elevated JVD with hepatojugular reflux positive.  He needs IV diuresis.  He needs echocardiography and labs.  I recommend the patient go to the emergency department obtain labs including CBC with differential, troponins, NT proBNP, TSH, CMP.  He needs an echocardiogram chest x-ray and possibly a CT scan of his chest.  Recommend IV diuresis.  Admit to medicine and then depending on clinical course echocardiography results, can determine potential need to transfer to an institution with cardiology service if severe  LV dysfunction is noted.  Lastly the patient has anemia and potential blood loss.  Recommend stopping aspirin and continuing Xarelto for now.  Patient should get evaluated for left atrial appendage occlusion as an outpatient however this is not a acute urgent issue at this time.    For now this patient needs hemodynamic stabilization.  His saturations of come up to 95% on 3 to 4 L nasal cannula oxygen.  My nursing team will take him to the ER    It was a pleasure seeing this patient in clinic today. Please do not hesitate to contact me with any future questions.     CONSTANTINE Arnold, [facility]  Cardiology - [facility]  September 24, 2024    Review of the result(s) of each unique test - Last CBC BMP lipids. Last ECG shows A-fib.     The level of medical decision making during this visit was of high complexity. Condition for which patient was treated that is considered severe and would require intensive monitoring or urgent treatment to facilitate care -acute decompensated heart failure    This note was completed in part using dictation via the Dragon voice recognition software. Some word and grammatical errors may occur and must be interpreted in the appropriate clinical context.  If there are any questions pertaining to this issue, please contact me for further clarification.

## 2024-09-24 NOTE — ED PROVIDER NOTES
History     Chief Complaint   Patient presents with    Shortness of Breath     Coming from cardiology clinic, with FVE and low sats 85%.  Placed on O2.      HPI  Richard You is a 84 year old male with past medical history significant for pretension coronary artery atherosclerosis abdominal aortic aneurysm hyperlipidemia status post CABG chronic atrial fibrillation Xarelto who presents emergency department with shortness of breath cough and hypoxia.  Another states patient's symptoms have been going on for about 10 days with a cough.  Patient was seen back in the end of August by his primary care and diagnosed with possible aspiration pneumonitis.  He was not placed on antibiotics at that time.  Seen by cardiology clinic today and was noted to have saturations at 85% he is having increased weight with abdominal distention and +2 bilateral edema.  He has gained 16 pounds in the last several months.    Allergies:  No Known Allergies    Problem List:    Patient Active Problem List    Diagnosis Date Noted    HTN, goal below 130/80 05/10/2012     Priority: High     History of AAA      Coronary atherosclerosis 04/05/2005     Priority: High     12/05/12 Patient denies any recent chest pain or NTG use.  Patient is taking meds regularly and denies significant side effects. Last stress test many years ago, but bikes 30-40 mi/day without symptoms   Problem list name updated by automated process. Provider to review      Chronic atrial fibrillation (H) 08/10/2018     Priority: Medium    Bradycardia 07/25/2018     Priority: Medium    Benign essential tremor 05/03/2018     Priority: Medium    Arteriosclerotic vascular disease 11/30/2016     Priority: Medium    Carey's esophagus 09/10/2015     Priority: Medium     Needs yearly EGD. Needs chronic PPI.        Esophagitis 09/04/2015     Priority: Medium     Noted on EGD 9/4/2015. Needs chronic PPI.      Former smoker 07/14/2014     Priority: Medium    Heart murmur 05/09/2014      Priority: Medium     [place] by cardiology April 2014 and consistent with possible aortic sclerosis. Plan for ECHO in one year prior to follow up with cardiology April 2015.       Colon polyp, hyperplastic 05/14/2013     Priority: Medium     Hyperplastic  Polyp on colonoscopy 5-2013. Recommend that the next colonoscopy be in 5 years because of family history      Macular degeneration, wet (H) 04/22/2013     Priority: Medium    Hyperlipidemia LDL goal <100 10/31/2010     Priority: Medium    Benign shuddering attack 05/10/2006     Priority: Medium     Father also had this.  Problem list name updated by automated process. Provider to review      Abdominal aortic aneurysm (H24) 06/24/2005     Priority: Medium     Repaired with percutaneous stent 8/05     Has annual CT scan to monitor stent placement. Last done in sept 2012 and stable  Problem list name updated by automated process. Provider to review      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) 12/28/1996     Priority: Medium     done on 12/28/1996: coronary artery bypass x2 utilizing saphenous vein graft from the aorta to the first diagonal branch of the LAD and left internal mammary artery to the LAD.             Past Medical History:    Past Medical History:   Diagnosis Date    Abdominal aneurysm without mention of rupture 06/24/2005    Atrial fibrillation (H)     Basal cell carcinoma     CAD 04/05/2005    Colon polyp, hyperplastic 05/14/2013    Family history of tremor 07/14/2014    Former smoker 07/14/2014    HTN, goal below 130/80 05/10/2012    Hyperlipidaemia     Hyperlipidemia LDL goal <100 10/31/2010    Hypertension     Macular degeneration, wet (H) 04/22/2013    Myocardial infarction (H)     S/P CABG (coronary artery bypass graft) 07/14/2014    Squamous cell carcinoma of skin, unspecified        Past Surgical History:    Past Surgical History:   Procedure Laterality Date    BYPASS CARDIOPULMONARY  12/1996    CA bypass x2 LAD    CARDIAC SURGERY       CYSTECTOMY PILONIDAL      ESOPHAGOSCOPY, GASTROSCOPY, DUODENOSCOPY (EGD), COMBINED N/A 2015    Procedure: COMBINED ESOPHAGOSCOPY, GASTROSCOPY, DUODENOSCOPY (EGD), BIOPSY SINGLE OR MULTIPLE;  Surgeon: Fuad Holder MD;  Location: [facility]    ESOPHAGOSCOPY, GASTROSCOPY, DUODENOSCOPY (EGD), COMBINED N/A 2016    Procedure: COMBINED ESOPHAGOSCOPY, GASTROSCOPY, DUODENOSCOPY (EGD), BIOPSY SINGLE OR MULTIPLE;  Surgeon: Lucas Dang MD;  Location: [facility]    ESOPHAGOSCOPY, GASTROSCOPY, DUODENOSCOPY (EGD), COMBINED N/A 2018    Procedure: COMBINED ESOPHAGOSCOPY, GASTROSCOPY, DUODENOSCOPY (EGD), BIOPSY SINGLE OR MULTIPLE;  gastroscopy;  Surgeon: Richard Crowder MD;  Location: [facility]    HERNIA REPAIR      VASECTOMY         Family History:    Family History   Problem Relation Age of Onset    Cancer - colorectal Mother     Cardiovascular Father     Alcohol/Drug Father     Neurologic Disorder Father         tremor    Cardiovascular Brother     Coronary Artery Disease Brother     Breast Cancer Sister     Cancer Brother         bladder cancer       Social History:  Marital Status:   [2]  Social History     Tobacco Use    Smoking status: Former     Current packs/day: 0.00     Average packs/day: 0.5 packs/day for 10.0 years (5.0 ttl pk-yrs)     Types: Cigarettes     Start date: 1985     Quit date: 1995     Years since quittin.8    Smokeless tobacco: Never   Vaping Use    Vaping status: Never Used   Substance Use Topics    Alcohol use: No     Comment: beer occ, and wine    Drug use: No        Medications:    amLODIPine (NORVASC) 5 MG tablet  aspirin 81 MG chewable tablet  atorvastatin (LIPITOR) 80 MG tablet  lisinopril-hydrochlorothiazide (ZESTORETIC) 20-12.5 MG tablet  Multiple Vitamin (MULTI-VITAMIN DAILY PO)  Multiple Vitamins-Minerals (ICAPS PO)  omeprazole (PRILOSEC) 40 MG DR capsule  rivaroxaban ANTICOAGULANT (XARELTO ANTICOAGULANT) 20 MG TABS tablet  tamsulosin (FLOMAX) 0.4 MG  capsule          Review of Systems  As per HPI.  Physical Exam   BP: 129/74  Pulse: 61  Temp: 97.7  F (36.5  C)  Resp: 18  SpO2: 100 %      Physical Exam  Vitals and nursing note reviewed.   Constitutional:       General: He is not in acute distress.     Appearance: He is well-developed. He is not ill-appearing, toxic-appearing or diaphoretic.   HENT:      Head: Normocephalic and atraumatic.      Mouth/Throat:      Mouth: Mucous membranes are moist.      Pharynx: Oropharynx is clear.   Cardiovascular:      Rate and Rhythm: Normal rate. Rhythm irregular.      Pulses: Normal pulses.      Heart sounds: Normal heart sounds. No murmur heard.  Pulmonary:      Comments: With normal effort with decreased breath sounds at bases faint crackles and faint wheeze present.  Abdominal:      General: Abdomen is flat. Bowel sounds are normal. There is no distension.      Palpations: Abdomen is soft.      Tenderness: There is no right CVA tenderness or left CVA tenderness.   Musculoskeletal:         General: No tenderness. Normal range of motion.      Cervical back: Normal range of motion and neck supple. No tenderness.      Comments: All extremities well there is +2 pitting edema in bilateral lower extremities present.   Skin:     General: Skin is warm and dry.      Findings: No rash.   Neurological:      General: No focal deficit present.      Mental Status: He is alert and oriented to person, place, and time.      Sensory: No sensory deficit.      Motor: No weakness.      Coordination: Coordination normal.   Psychiatric:         Mood and Affect: Mood normal.         ED Course        Procedures              EKG Interpretation:      Interpreted by Kirill aWlker MD  Rhythm: atrial fibrillation - controlled  Rate: 50-60  Axis: Normal  Ectopy: none  Conduction: normal  ST Segments/ T Waves: Non-specific ST-T wave changes  Q Waves: none  Comparison to prior: Unchanged from 7/25/18    Clinical Impression: Atrial fibrillation rate  controlled with nonspecific ST-T wave changes.            Critical Care time:  was 40minutes for this patient excluding procedures.  For acute chronic respiratory failure with hypoxia               Results for orders placed or performed during the hospital encounter of 09/24/24 (from the past 24 hour(s))   CBC with platelets, differential    Narrative    The following orders were created for panel order CBC with platelets, differential.  Procedure                               Abnormality         Status                     ---------                               -----------         ------                     CBC with platelets and d...[[id number]]  Abnormal            Final result                 Please view results for these tests on the individual orders.   Comprehensive metabolic panel   Result Value Ref Range    Sodium 144 135 - 145 mmol/L    Potassium 4.8 3.4 - 5.3 mmol/L    Carbon Dioxide (CO2) 33 (H) 22 - 29 mmol/L    Anion Gap 6 (L) 7 - 15 mmol/L    Urea Nitrogen 40.7 (H) 8.0 - 23.0 mg/dL    Creatinine 1.27 (H) 0.67 - 1.17 mg/dL    GFR Estimate 55 (L) >60 mL/min/1.73m2    Calcium 9.2 8.8 - 10.4 mg/dL    Chloride 105 98 - 107 mmol/L    Glucose 101 (H) 70 - 99 mg/dL    Alkaline Phosphatase 76 40 - 150 U/L    AST 25 0 - 45 U/L    ALT 18 0 - 70 U/L    Protein Total 7.7 6.4 - 8.3 g/dL    Albumin 3.9 3.5 - 5.2 g/dL    Bilirubin Total 0.6 <=1.2 mg/dL   Blood gas venous   Result Value Ref Range    pH Venous 7.32 7.32 - 7.43    pCO2 Venous 62 (H) 40 - 50 mm Hg    pO2 Venous 15 (L) 25 - 47 mm Hg    Bicarbonate Venous 32 (H) 21 - 28 mmol/L    Base Excess/Deficit Venous 4.9 (H) -3.0 - 3.0 mmol/L    FIO2 24     Oxyhemoglobin Venous 14 (L) 70 - 75 %    O2 Sat, Venous 14.6 (L) 70.0 - 75.0 %    Narrative    In healthy individuals, oxyhemoglobin (O2Hb) and oxygen saturation (SO2) are approximately equal. In the presence of dyshemoglobins, oxyhemoglobin can be considerably lower than oxygen saturation.   [place] Draw     Narrative    The following orders were created for panel order [place] Draw.  Procedure                               Abnormality         Status                     ---------                               -----------         ------                     Extra Blue Top Tube[[id number]]                              In process                 Extra Red Top Tube[[id number]]                               In process                   Please view results for these tests on the individual orders.   CBC with platelets and differential   Result Value Ref Range    WBC Count 5.3 4.0 - 11.0 10e3/uL    RBC Count 3.93 (L) 4.40 - 5.90 10e6/uL    Hemoglobin 9.4 (L) 13.3 - 17.7 g/dL    Hematocrit 32.7 (L) 40.0 - 53.0 %    MCV 83 78 - 100 fL    MCH 23.9 (L) 26.5 - 33.0 pg    MCHC 28.7 (L) 31.5 - 36.5 g/dL    RDW 18.7 (H) 10.0 - 15.0 %    Platelet Count 209 150 - 450 10e3/uL    % Neutrophils 71 %    % Lymphocytes 10 %    % Monocytes 15 %    % Eosinophils 2 %    % Basophils 2 %    % Immature Granulocytes 0 %    NRBCs per 100 WBC 0 <1 /100    Absolute Neutrophils 3.8 1.6 - 8.3 10e3/uL    Absolute Lymphocytes 0.5 (L) 0.8 - 5.3 10e3/uL    Absolute Monocytes 0.8 0.0 - 1.3 10e3/uL    Absolute Eosinophils 0.1 0.0 - 0.7 10e3/uL    Absolute Basophils 0.1 0.0 - 0.2 10e3/uL    Absolute Immature Granulocytes 0.0 <=0.4 10e3/uL    Absolute NRBCs 0.0 10e3/uL       Medications   iopamidol (ISOVUE-370) solution 90 mL (90 mLs Intravenous $Given 9/24/24 1531)   sodium chloride 0.9 % bag 500mL for CT scan flush use (100 mLs As instructed $Given 9/24/24 1531)         Assessment and decision making process.  ssessments & Plan (with Medical Decision Making) reviewed including past medical history medications and allergies.  Office visit with cardiology earlier today was reviewed.  Office visit for follow-up aspiration pneumonitis was reviewed.  9/13/2024.  Office visit for urinary incontinence on 7/31/2024 was also reviewed.  The cardiology clinic earlier in the  day was reviewed.  Revealing a rate controlled atrial fibrillation with nonspecific ST-T wave changes.  Labs were obtained.  I independently reviewed reviewed and interpreted all labs.  White count was 5.3 hemoglobin 9.4 platelet count was 209.  Globin is near her recent baseline for patient.  Metabolic panel with a creatinine of 1.27 which is near baseline BUN is elevated at 40.  Liver function tests.  To be within normal limits.  D-dimer is elevated 1.1 troponin high at 45 venous blood gas with a pH is 7.32 CO2 was 62 O2 15 bicarb 32.  pH was within normal limits.  COVID test was negative proBNP elevated at 2444.  Due to patient's elevated D-dimer felt a CT chest PE protocol to assess for PE and other causes of his hypoxia were warranted.  I independently reviewed this and agree with radiologist findings of no PE with bilateral small pleural effusions and possible bibasilar infiltrates.  Due to patient's elevated proBNP and pleural effusions I did give patient a dose of Lasix due to the possible bilateral infiltrates although patient is afebrile with a normal white count I am going to cover the patient with ceftriaxone and Zithromax.   Case was discussed with PEPE Vega with hospitalist service and he is agree with admitting the patient for further evaluation and care.      New Prescriptions    No medications on file       Final diagnoses:   Dyspnea, unspecified type   Congestive heart failure, unspecified HF chronicity, unspecified heart failure type (H)   Pneumonia of both lower lobes due to infectious organism - Possible       9/24/2024   [facility] EMERGENCY DEPT       Denise, Kirill Berry MD  09/26/24 0948

## 2024-09-24 NOTE — MEDICATION SCRIBE - ADMISSION MEDICATION HISTORY
Medication Scribe Admission Medication History    Admission medication history is complete. The information provided in this note is only as accurate as the sources available at the time of the update.    Information Source(s): Patient and CareEverywhere/SureScripts via with patient in room and finished at desk.    Pertinent Information: None.    Changes made to PTA medication list:  Added: Tylenol 650 mg  Deleted: None  Changed: None    Allergies reviewed with patient and updates made in EHR: yes, no allergies.    Medication History Completed By: Ama Tran 9/24/2024 3:44 PM    PTA Med List   Medication Sig Last Dose    acetaminophen (TYLENOL 8 HOUR) 650 MG CR tablet Take 1,300 mg by mouth daily as needed for mild pain or fever (hip pain). 9/23/2024 at prn    amLODIPine (NORVASC) 5 MG tablet Take 1 tablet (5 mg) by mouth daily 9/24/2024 at am    aspirin 81 MG chewable tablet Take 1 tablet (81 mg) by mouth daily 9/23/2024 at pm    atorvastatin (LIPITOR) 80 MG tablet Take 1 tablet (80 mg) by mouth daily 9/23/2024 at pm    lisinopril-hydrochlorothiazide (ZESTORETIC) 20-12.5 MG tablet Take 2 tablets by mouth daily 9/24/2024 at am    Multiple Vitamin (MULTI-VITAMIN DAILY PO) Take 1 tablet by mouth daily 9/24/2024 at am    Multiple Vitamins-Minerals (ICAPS PO) Take 1 tablet by mouth 2 times daily  9/24/2024 at am    omeprazole (PRILOSEC) 40 MG DR capsule Take 1 capsule (40 mg) by mouth daily 9/24/2024 at am    rivaroxaban ANTICOAGULANT (XARELTO ANTICOAGULANT) 20 MG TABS tablet Take 1 tablet (20 mg) by mouth daily (with dinner) 9/23/2024 at supper    tamsulosin (FLOMAX) 0.4 MG capsule Take 1 capsule (0.4 mg) by mouth daily. 9/24/2024 at am

## 2024-09-24 NOTE — ED NOTES
[facility]   Admission Handoff    The patient is Richard You, 84 year old who arrived in the ED by CAR from home with a complaint of Shortness of Breath (Coming from cardiology clinic, with FVE and low sats 85%.  Placed on O2. )  . The patient's current symptoms are new and during this time the symptoms have remained the same. In the ED, patient was diagnosed with   Final diagnoses:   Dyspnea, unspecified type   Congestive heart failure, unspecified HF chronicity, unspecified heart failure type (H)   Pneumonia of both lower lobes due to infectious organism - Possible         Needed?: No    Allergies:  No Known Allergies    Past Medical Hx:   Past Medical History:   Diagnosis Date    Abdominal aneurysm without mention of rupture 06/24/2005    Repaired with percutaneous stent 8/05   Has annual CT scan to monitor stent placement. Last done in sept 2012 and stable     Atrial fibrillation (H)     Basal cell carcinoma     CAD 04/05/2005 12/05/12 Patient denies any recent chest pain or NTG use.  Patient is taking meds regularly and denies significant side effects. Last stress test many years ago, but bikes 30-40 mi/day without symptoms      Colon polyp, hyperplastic 05/14/2013    Hyperplastic  Polyp on colonoscopy 5-2013. 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    Squamous cell carcinoma of skin, unspecified        Initial vitals were: BP: 129/74  Pulse: 61  Temp: 97.7  F (36.5  C)  Resp: 18  SpO2: 100 %   Recent vital Signs: /48   Pulse 61   Temp 97.7  F (36.5  C) (Tympanic)   Resp 18   SpO2 96%      Elimination Status: Continent: bowel and bladder program     Activity Level: SBA    Fall Status: Reason for falls risk: Elimination  bed/chair alarm on, nonskid shoes/slippers when out of bed, arm band in place, patient and family education, assistive device/personal items within reach, activity supervised, and mobility aid in reach    Baseline Mental status: WDL  Current Mental Status changes: at basesline    Infection present or suspected this encounter: yes  Sepsis suspected: No    Isolation type: none    Bariatric equipment needed?: No    In the ED these meds were given:   Medications   azithromycin (ZITHROMAX) 500 mg in sodium chloride 0.9 % 250 mL intermittent infusion (500 mg Intravenous $New Bag 9/24/24 1711)   iopamidol (ISOVUE-370) solution 90 mL (90 mLs Intravenous $Given 9/24/24 1531)   sodium chloride 0.9 % bag 500mL for CT scan flush use (100 mLs As instructed $Given 9/24/24 1531)   furosemide (LASIX) injection 40 mg (40 mg Intravenous $Given 9/24/24 1636)   cefTRIAXone (ROCEPHIN) 1 g vial to attach to  mL bag for ADULTS or NS 50 mL bag for PEDS (0 g Intravenous Stopped 9/24/24 1721)       Drips running?  No    Home pump  No    Current LDAs: Peripheral IV: Site Left; Gauge 18  none     Results:   Labs/Imaging  Ordered and Resulted from Time of ED Arrival Up to the Time of Departure from the ED  Results for orders placed or performed during the hospital encounter of 09/24/24 (from the past 24 hour(s))   CBC with platelets, differential    Narrative    The following orders were created for panel order CBC with platelets, differential.  Procedure                               Abnormality         Status                     ---------                               -----------         ------                     CBC with platelets and d...[[id number]]  Abnormal            Final result                 Please view results for these tests on the individual orders.   Comprehensive metabolic panel   Result  Value Ref Range    Sodium 144 135 - 145 mmol/L    Potassium 4.8 3.4 - 5.3 mmol/L    Carbon Dioxide (CO2) 33 (H) 22 - 29 mmol/L    Anion Gap 6 (L) 7 - 15 mmol/L    Urea Nitrogen 40.7 (H) 8.0 - 23.0 mg/dL    Creatinine 1.27 (H) 0.67 - 1.17 mg/dL    GFR Estimate 55 (L) >60 mL/min/1.73m2    Calcium 9.2 8.8 - 10.4 mg/dL    Chloride 105 98 - 107 mmol/L    Glucose 101 (H) 70 - 99 mg/dL    Alkaline Phosphatase 76 40 - 150 U/L    AST 25 0 - 45 U/L    ALT 18 0 - 70 U/L    Protein Total 7.7 6.4 - 8.3 g/dL    Albumin 3.9 3.5 - 5.2 g/dL    Bilirubin Total 0.6 <=1.2 mg/dL   Blood gas venous   Result Value Ref Range    pH Venous 7.32 7.32 - 7.43    pCO2 Venous 62 (H) 40 - 50 mm Hg    pO2 Venous 15 (L) 25 - 47 mm Hg    Bicarbonate Venous 32 (H) 21 - 28 mmol/L    Base Excess/Deficit Venous 4.9 (H) -3.0 - 3.0 mmol/L    FIO2 24     Oxyhemoglobin Venous 14 (L) 70 - 75 %    O2 Sat, Venous 14.6 (L) 70.0 - 75.0 %    Narrative    In healthy individuals, oxyhemoglobin (O2Hb) and oxygen saturation (SO2) are approximately equal. In the presence of dyshemoglobins, oxyhemoglobin can be considerably lower than oxygen saturation.   [place] Draw    Narrative    The following orders were created for panel order [place] Draw.  Procedure                               Abnormality         Status                     ---------                               -----------         ------                     Extra Blue Top Tube[[id number]]                              Final result               Extra Red Top Tube[[id number]]                               Final result                 Please view results for these tests on the individual orders.   CBC with platelets and differential   Result Value Ref Range    WBC Count 5.3 4.0 - 11.0 10e3/uL    RBC Count 3.93 (L) 4.40 - 5.90 10e6/uL    Hemoglobin 9.4 (L) 13.3 - 17.7 g/dL    Hematocrit 32.7 (L) 40.0 - 53.0 %    MCV 83 78 - 100 fL    MCH 23.9 (L) 26.5 - 33.0 pg    MCHC 28.7 (L) 31.5 - 36.5 g/dL    RDW 18.7 (H) 10.0  - 15.0 %    Platelet Count 209 150 - 450 10e3/uL    % Neutrophils 71 %    % Lymphocytes 10 %    % Monocytes 15 %    % Eosinophils 2 %    % Basophils 2 %    % Immature Granulocytes 0 %    NRBCs per 100 WBC 0 <1 /100    Absolute Neutrophils 3.8 1.6 - 8.3 10e3/uL    Absolute Lymphocytes 0.5 (L) 0.8 - 5.3 10e3/uL    Absolute Monocytes 0.8 0.0 - 1.3 10e3/uL    Absolute Eosinophils 0.1 0.0 - 0.7 10e3/uL    Absolute Basophils 0.1 0.0 - 0.2 10e3/uL    Absolute Immature Granulocytes 0.0 <=0.4 10e3/uL    Absolute NRBCs 0.0 10e3/uL   Extra Blue Top Tube   Result Value Ref Range    Hold Specimen JIC    Extra Red Top Tube   Result Value Ref Range    Hold Specimen JIC    D dimer quantitative   Result Value Ref Range    D-Dimer Quantitative 1.10 (H) 0.00 - 0.50 ug/mL FEU    Narrative    This D-dimer assay is intended for use in conjunction with a clinical pretest probability assessment model to exclude pulmonary embolism (PE) and deep venous thrombosis (DVT) in outpatients suspected of PE or DVT. The cut-off value is 0.50 ug/mL FEU.    For patients 50 years of age or older, the application of age-adjusted cut-off values for D-Dimer may increase the specificity without significant effect on sensitivity. The literature suggested calculation age adjusted cut-off in ug/L = age in years x 10 ug/L. The results in this laboratory are reported as ug/mL rather than ug/L. The calculation for age adjusted cut off in ug/mL= age in years x 0.01 ug/mL. For example, the cut off for a 76 year old male is 74 x 0.01 ug/mL = 0.76 ug/mL (760 ug/L).    M Aniket et al. Age adjusted D-dimer cut-off levels to rule out pulmonary embolism: The ADJUST-PE Study. LANDEN 2014;311:7430-3739.; TED Lyles et al. Diagnostic accuracy of conventional or age adjusted D-dimer cutoff values in older patients with suspected venous thromboembolism. Systemic review and meta-analysis. BMJ 2013:346:f2492.   Troponin T, High Sensitivity   Result Value Ref Range    Troponin  T, High Sensitivity 45 (H) <=22 ng/L   Nt probnp inpatient (BNP)   Result Value Ref Range    N terminal Pro BNP Inpatient 2,444 (H) 0 - 1,800 pg/mL   TSH with free T4 reflex   Result Value Ref Range    TSH 3.83 0.30 - 4.20 uIU/mL   Symptomatic COVID-19 Virus (Coronavirus) by PCR Nasopharyngeal    Specimen: Nasopharyngeal; Swab   Result Value Ref Range    SARS CoV2 PCR Negative Negative    Narrative    Testing was performed using the Xpert Xpress SARS-CoV-2 Assay on the Cepheid Gene-Xpert Instrument Systems. Additional information about this assay can be found via the Test Directory. This [place] FDA cleared test should be ordered for the detection of SARS-CoV-2 in individuals with signs and symptoms of respiratory tract infection. This test is for in vitro diagnostic use under the [place] FDA for laboratories certified under CLIA to perform high complexity testing. A negative result does not rule out the presence of PCR inhibitors in the specimen or target RNA concentration below the limit of detection for the assay. The possibility of a false negative should be considered if the patient's recent exposure or clinical presentation suggests COVID-19. This test was validated by [facility]. These Laboratories are certified under the  Clinical Laboratory Improvement Amendments (CLIA) as qualified to perform high complexity testing.   CT Chest Pulmonary Embolism w Contrast    Narrative    CT CHEST PULMONARY EMBOLISM WITH CONTRAST  9/24/2024 3:39 PM    CLINICAL HISTORY: Chest pain. Low saturations, leg swelling.    TECHNIQUE: CT angiogram chest during arterial phase injection IV  contrast. 2D and 3D MIP reconstructions were performed by the CT  technologist. Dose reduction techniques were used.     CONTRAST: 90 mL isovue 370    COMPARISON: None.    FINDINGS:  ANGIOGRAM CHEST: Pulmonary arteries are normal caliber and negative  for pulmonary emboli. Thoracic aorta is negative for dissection. No CT  evidence of  right heart strain.    LUNGS AND PLEURA: Trace pleural fluid bilaterally. Bibasilar opacities  could represent infiltrate.    MEDIASTINUM/AXILLAE: Cardiomegaly. No aneurysm. No adenopathy. Tiny  hiatal hernia.    CORONARY ARTERY CALCIFICATIONS: Coronary artery bypass change.    UPPER ABDOMEN: No acute findings.    MUSCULOSKELETAL: No frankly destructive bony lesions.      Impression    IMPRESSION:  1.  No pulmonary embolism demonstrated.  2.  Question bibasilar infiltrates.    NOREEN WEISS MD         SYSTEM ID:  [id number]   Troponin T, High Sensitivity   Result Value Ref Range    Troponin T, High Sensitivity 39 (H) <=22 ng/L   CRP inflammation   Result Value Ref Range    CRP Inflammation 5.65 (H) <5.00 mg/L       For the majority of the shift this patient's behavior was Green     Cardiac Rhythm: Other  Pt needs tele? No  Skin/wound Issues: None    Code Status: Assess on admission    Pain control: pt had none    Nausea control: pt had none    Abnormal labs/tests/findings requiring intervention: none    Patient tested for COVID 19 prior to admission: YES     OBS brochure/video discussed/provided to patient/family: N/A     Family present during ED course? Yes     Family Comments/Social Situation comments: None    Tasks needing completion: None    Bria Cisneros RN

## 2024-09-24 NOTE — ED TRIAGE NOTES
Pt seen by cardiologist after pmd referred pt to be seen by cardiology again. Cardiologist discovered pt's oxygen level on room air was just 85%. Pt states prior to cardiology appointment he had been feeling good, going to the gym daily to use the row machine but noticed he would fall to sleep much easier. Pt is A&Ox4 without history of falls      Triage Assessment (Adult)       Row Name 09/24/24 1320          Triage Assessment    Airway WDL WDL        Respiratory WDL    Respiratory WDL X;expansion/retractions        Skin Circulation/Temperature WDL    Skin Circulation/Temperature WDL WDL        Cardiac WDL    Cardiac WDL WDL        Peripheral/Neurovascular WDL    Peripheral Neurovascular WDL WDL        Cognitive/Neuro/Behavioral WDL    Cognitive/Neuro/Behavioral WDL WDL

## 2024-09-25 ENCOUNTER — APPOINTMENT (OUTPATIENT)
Dept: OCCUPATIONAL THERAPY | Facility: CLINIC | Age: 84
DRG: 291 | End: 2024-09-25
Payer: COMMERCIAL

## 2024-09-25 ENCOUNTER — APPOINTMENT (OUTPATIENT)
Dept: CARDIOLOGY | Facility: CLINIC | Age: 84
DRG: 291 | End: 2024-09-25
Payer: COMMERCIAL

## 2024-09-25 LAB
ANION GAP SERPL CALCULATED.3IONS-SCNC: 10 MMOL/L (ref 7–15)
ANION GAP SERPL CALCULATED.3IONS-SCNC: 6 MMOL/L (ref 7–15)
BUN SERPL-MCNC: 37.6 MG/DL (ref 8–23)
BUN SERPL-MCNC: 37.7 MG/DL (ref 8–23)
C PNEUM DNA SPEC QL NAA+PROBE: NOT DETECTED
CALCIUM SERPL-MCNC: 8.8 MG/DL (ref 8.8–10.4)
CALCIUM SERPL-MCNC: 8.9 MG/DL (ref 8.8–10.4)
CHLORIDE SERPL-SCNC: 101 MMOL/L (ref 98–107)
CHLORIDE SERPL-SCNC: 104 MMOL/L (ref 98–107)
CREAT SERPL-MCNC: 1.27 MG/DL (ref 0.67–1.17)
CREAT SERPL-MCNC: 1.31 MG/DL (ref 0.67–1.17)
EGFRCR SERPLBLD CKD-EPI 2021: 53 ML/MIN/1.73M2
EGFRCR SERPLBLD CKD-EPI 2021: 55 ML/MIN/1.73M2
ERYTHROCYTE [DISTWIDTH] IN BLOOD BY AUTOMATED COUNT: 19 % (ref 10–15)
FLUAV H1 2009 PAND RNA SPEC QL NAA+PROBE: NOT DETECTED
FLUAV H1 RNA SPEC QL NAA+PROBE: NOT DETECTED
FLUAV H3 RNA SPEC QL NAA+PROBE: NOT DETECTED
FLUAV RNA SPEC QL NAA+PROBE: NOT DETECTED
FLUBV RNA SPEC QL NAA+PROBE: NOT DETECTED
GLUCOSE SERPL-MCNC: 113 MG/DL (ref 70–99)
GLUCOSE SERPL-MCNC: 95 MG/DL (ref 70–99)
HADV DNA SPEC QL NAA+PROBE: NOT DETECTED
HCO3 SERPL-SCNC: 29 MMOL/L (ref 22–29)
HCO3 SERPL-SCNC: 35 MMOL/L (ref 22–29)
HCOV PNL SPEC NAA+PROBE: NOT DETECTED
HCT VFR BLD AUTO: 30 % (ref 40–53)
HGB BLD-MCNC: 8.4 G/DL (ref 13.3–17.7)
HMPV RNA SPEC QL NAA+PROBE: NOT DETECTED
HOLD SPECIMEN: NORMAL
HPIV1 RNA SPEC QL NAA+PROBE: NOT DETECTED
HPIV2 RNA SPEC QL NAA+PROBE: NOT DETECTED
HPIV3 RNA SPEC QL NAA+PROBE: NOT DETECTED
HPIV4 RNA SPEC QL NAA+PROBE: NOT DETECTED
LVEF ECHO: NORMAL
M PNEUMO DNA SPEC QL NAA+PROBE: NOT DETECTED
MCH RBC QN AUTO: 23.7 PG (ref 26.5–33)
MCHC RBC AUTO-ENTMCNC: 28 G/DL (ref 31.5–36.5)
MCV RBC AUTO: 85 FL (ref 78–100)
PLATELET # BLD AUTO: 211 10E3/UL (ref 150–450)
POTASSIUM SERPL-SCNC: 4.4 MMOL/L (ref 3.4–5.3)
POTASSIUM SERPL-SCNC: 4.7 MMOL/L (ref 3.4–5.3)
RBC # BLD AUTO: 3.55 10E6/UL (ref 4.4–5.9)
RSV RNA SPEC QL NAA+PROBE: NOT DETECTED
RSV RNA SPEC QL NAA+PROBE: NOT DETECTED
RV+EV RNA SPEC QL NAA+PROBE: NOT DETECTED
SODIUM SERPL-SCNC: 142 MMOL/L (ref 135–145)
SODIUM SERPL-SCNC: 143 MMOL/L (ref 135–145)
WBC # BLD AUTO: 4.9 10E3/UL (ref 4–11)

## 2024-09-25 PROCEDURE — 250N000011 HC RX IP 250 OP 636: Performed by: STUDENT IN AN ORGANIZED HEALTH CARE EDUCATION/TRAINING PROGRAM

## 2024-09-25 PROCEDURE — 36415 COLL VENOUS BLD VENIPUNCTURE: CPT | Performed by: EMERGENCY MEDICINE

## 2024-09-25 PROCEDURE — 99232 SBSQ HOSP IP/OBS MODERATE 35: CPT | Performed by: STUDENT IN AN ORGANIZED HEALTH CARE EDUCATION/TRAINING PROGRAM

## 2024-09-25 PROCEDURE — 80048 BASIC METABOLIC PNL TOTAL CA: CPT | Performed by: EMERGENCY MEDICINE

## 2024-09-25 PROCEDURE — 85027 COMPLETE CBC AUTOMATED: CPT

## 2024-09-25 PROCEDURE — 250N000011 HC RX IP 250 OP 636

## 2024-09-25 PROCEDURE — 120N000001 HC R&B MED SURG/OB

## 2024-09-25 PROCEDURE — 255N000002 HC RX 255 OP 636

## 2024-09-25 PROCEDURE — 999N000111 HC STATISTIC OT IP EVAL DEFER

## 2024-09-25 PROCEDURE — 93306 TTE W/DOPPLER COMPLETE: CPT | Mod: 26 | Performed by: INTERNAL MEDICINE

## 2024-09-25 PROCEDURE — 999N000208 ECHOCARDIOGRAM COMPLETE

## 2024-09-25 PROCEDURE — 80048 BASIC METABOLIC PNL TOTAL CA: CPT | Performed by: STUDENT IN AN ORGANIZED HEALTH CARE EDUCATION/TRAINING PROGRAM

## 2024-09-25 PROCEDURE — 36415 COLL VENOUS BLD VENIPUNCTURE: CPT | Performed by: STUDENT IN AN ORGANIZED HEALTH CARE EDUCATION/TRAINING PROGRAM

## 2024-09-25 PROCEDURE — 250N000013 HC RX MED GY IP 250 OP 250 PS 637

## 2024-09-25 PROCEDURE — 87486 CHLMYD PNEUM DNA AMP PROBE: CPT

## 2024-09-25 RX ORDER — FUROSEMIDE 10 MG/ML
40 INJECTION INTRAMUSCULAR; INTRAVENOUS ONCE
Status: COMPLETED | OUTPATIENT
Start: 2024-09-25 | End: 2024-09-25

## 2024-09-25 RX ADMIN — TAMSULOSIN HYDROCHLORIDE 0.4 MG: 0.4 CAPSULE ORAL at 10:14

## 2024-09-25 RX ADMIN — FUROSEMIDE 40 MG: 10 INJECTION, SOLUTION INTRAMUSCULAR; INTRAVENOUS at 14:16

## 2024-09-25 RX ADMIN — FUROSEMIDE 20 MG: 10 INJECTION, SOLUTION INTRAMUSCULAR; INTRAVENOUS at 05:45

## 2024-09-25 RX ADMIN — HUMAN ALBUMIN MICROSPHERES AND PERFLUTREN 2 ML: 10; .22 INJECTION, SOLUTION INTRAVENOUS at 10:04

## 2024-09-25 RX ADMIN — RIVAROXABAN 20 MG: 10 TABLET, FILM COATED ORAL at 17:21

## 2024-09-25 RX ADMIN — FUROSEMIDE 40 MG: 10 INJECTION, SOLUTION INTRAMUSCULAR; INTRAVENOUS at 18:45

## 2024-09-25 RX ADMIN — PANTOPRAZOLE SODIUM 40 MG: 40 TABLET, DELAYED RELEASE ORAL at 20:35

## 2024-09-25 RX ADMIN — ATORVASTATIN CALCIUM 80 MG: 80 TABLET, FILM COATED ORAL at 17:21

## 2024-09-25 RX ADMIN — PANTOPRAZOLE SODIUM 40 MG: 40 TABLET, DELAYED RELEASE ORAL at 10:14

## 2024-09-25 ASSESSMENT — ACTIVITIES OF DAILY LIVING (ADL)
ADLS_ACUITY_SCORE: 25
ADLS_ACUITY_SCORE: 25
ADLS_ACUITY_SCORE: 33
ADLS_ACUITY_SCORE: 25
ADLS_ACUITY_SCORE: 33
ADLS_ACUITY_SCORE: 25
ADLS_ACUITY_SCORE: 33
ADLS_ACUITY_SCORE: 33
ADLS_ACUITY_SCORE: 31
ADLS_ACUITY_SCORE: 25
ADLS_ACUITY_SCORE: 31
ADLS_ACUITY_SCORE: 25
ADLS_ACUITY_SCORE: 25
ADLS_ACUITY_SCORE: 31
ADLS_ACUITY_SCORE: 25
ADLS_ACUITY_SCORE: 25
ADLS_ACUITY_SCORE: 33
ADLS_ACUITY_SCORE: 31
ADLS_ACUITY_SCORE: 25
ADLS_ACUITY_SCORE: 25

## 2024-09-25 NOTE — PLAN OF CARE
Problem: Adult Inpatient Plan of Care  Goal: Plan of Care Review  Flowsheets (Taken 9/25/2024 1607)  Plan of Care Reviewed With:   patient   spouse  Overall Patient Progress: improving    Problem: Gas Exchange Impaired  Goal: Optimal Gas Exchange  Outcome: Progressing  Intervention: Optimize Oxygenation and Ventilation  Flowsheets (Taken 9/25/2024 1607)  Head of Bed (HOB) Positioning: HOB at 20-30 degrees     Problem: Comorbidity Management  Goal: Maintenance of Heart Failure Symptom Control  Outcome: Progressing  Intervention: Maintain Heart Failure Management  Flowsheets (Taken 9/25/2024 1607)  Medication Review/Management: medications reviewed     Goal Outcome Evaluation:  Pt is A&O and is able to make his needs known appropriately, pt's wife at bedside for most of AM, pt's daughter at bedside for portion of afternoon. Pt continues to be dyspneic upon exertion, been on 3 L all shift. Oxygen saturation at rest in the mid to high 90's and with activity will drop in the high 80's to low 90's. Slight wheezing noted. Pt had shower this AM with assistance from this author and pt's wife. Pt ambulated in the hallways with A1 assistance with walker and gait belt. Pt received dose of IV lasix and has been urinating appropriately throughout the shift. Pt on 2,000 mL fluid restriction and had about 700 mL at end of this shift. Will continue to monitor per plan of care.       Plan of Care Reviewed With: patient, spouse    Overall Patient Progress: improving

## 2024-09-25 NOTE — PROGRESS NOTES
[facility]    Medicine Progress Note - Hospitalist Service    Date of Admission:  9/24/2024    Assessment & Plan      Richard You is a 84 year old male with past medical history significant for CAD s/p CABG (1990s), ischemic cardiomyopathy HFrEF, permanent atrial fibrillation on xarelto, HTN, HLD, abdominal aortic aneurysm, Carey's esophagus, recent diagnosis of aspiration pneumonitis (09/13/24) at outpatient clinic, who presents on 9/24/2024 with shortness of breath, hypoxia, weight gain with abdominal distension and bilateral lower extremity edema. Admitted with HF exacerbation.      # Acute respiratory failure with hypoxia and mild hypercapnia, improving    Noted to be hypoxic at cardiology clinic on day of admission with SpO2 85% on room air. No leukocytosis (5.3). Anemia (Hgb 9.4) similar to previous. Renal function appears at baseline. CRP mildly elevated (5.65). Procalcitonin wnl (0.05), Covid PCR negative. Serial troponin flat (45 ? 39). VBG revealed pH 7.32 / pCO2 62 / bicarb 32, no prior comparison. Alert and oriented x4 at time of admission, no hx of sleep apnea. EKG with rate controlled atrial fibrillation with nonspecific ST-T changes. CT PE study with trace pleural fluid bilaterally and bibasilar opacities, negative for PE. Patient appears nontoxic, hypervolemic on exam, develops mild dyspnea with minimal exertion such as moving in bed. Hemodynamically stable on 2L via NC. Likely secondary to acute heart failure exacerbation, low suspicion for pneumonia.  - Supplemental O2 to maintain SpO2 >90%  - Diuresis as below      # HFrEF   # Ischemic cardiomyopathy   Presents with weight gain, with abdominal distension and 2+ bilateral lower extremity edema, progressive over 1-2 months. Echocardiogram 07/25/2018 with EF 40-45%. BNP on admission 2444, no prior to compare. CT PE study with trace pleural fluid bilaterally and bibasilar opacities. Bibasilar crackles, faint wheezing  in upper lobes on auscultation. Appears hypervolemic. TTE this admission with EF 45-50% with some apical and septal hypokinesis noted. IVC noted to be over 2.1 cm with less than 5-% respiratory collapse.   - Continued Diuresis with IV furosemide today   - Will restart goal directed medical therapy as able      # Low suspicion Pneumonia of both lower lobes due to infectious organism  Pt has had nonproductive cough x 3 months, gradual onset. Denies fevers, chills, sick contacts, acute URI sx. No leukocytosis (5.3), procalcitonin wnl (0.05), covid PCR negative. Aside from hypoxia, VS reassuring, patient is afebrile. Initially started on ceftriaxone and azithromycin but discontinued with low suspicion for infection.      # Anemia, acute on chronic   On admission Hgb 9.4. MCV 83. On 09/13/24 Iron 27, , Iron sat index 7, Ferratin 44. Thought to be secondary to CKD vs GI losses, has a hx of Carey's disease. BUN elevated (40.7). No signs/sx of acute bleeding.   Most recent endoscopy 07/12/2018 revealed Normal nasopharynx and oropharynx. Esophageal mucosal changes consistent with short-segment Carey's esophagus. Biopsied. Gastritis. Normal examined duodenum.   - AM CBC   - Stopping PTA ASA 81 given on rivaroxaban       # Chronic atrial fibrillation (H) with slow ventricular response / asymptomatic bradycardia  Bradycardia noted during EGD 07/2018, and EKG at that time revealed atrial fibrillation with no clear P waves. Atrial fibrillation confirmed with 30-day event monitor which showed HR as low as 30 bpm when sleeping and 50-60 bpm while awake. HR 50-70bpm in the ED. Denies syncope or presyncope, palpitations.   - Continued pta xarelto      # Chronic kidney disease   Creatinine 1.27 on admission, GFR 55. Similar to recent baseline creatinine 1.22 and GFR 58 on 04/12/24. Appears stable.   - Holding pta lisinopril, hctz as below       # Hypertension   Managed prior to admission with amlodipine 5mg daily,  lisinopril 40mg daily, hydrochlorothiazide 25mg daily. BP stable, well controlled on admission.     - Holding amlodipine, lisinopril and hydrochlorothiazide while initiating diuresis - reassess in AM.      # Carey's esophagus  Managed prior to admission with omeprazole 40mg daily. Declined scoping at clinic visit 07/31/24, was suggested as part of workup for anemia. States he has been asymptomatic for many years. No new or changing symptoms.   - Substituted formulary pantoprazole for PTA omeprazole      # Coronary atherosclerosis S/P CABG x2   # Hyperlipidemia LDL goal <100  Managed prior to admission with atorvastatin 80mg daily; continued      # Hx of Abdominal aortic aneurysm s/p endovascular repair (2005)  Most recently visualized on US aorta/Ivc/iliac 06/05/24 which revealed Postoperative changes EVAR with widely patent aortobiiliac endograft. The excluded infrarenal abdominal aortic aneurysm sac measures 6.3 x 6.3 cm sonographically on today's exam, similar to prior ultrasound 5/26/2020. The AAA sac measured 4.6 x 5.2 cm on more recent CTA 6/2/2023. This discrepancy in measurements is favored to represent differences in technique. Otherwise, no evidence of endoleak on Doppler exam.     # Former smoker  Quit smoking cigarettes 28 years ago. 5 pack-year history.          Diet: Combination Diet 2 gm NA Diet; No Caffeine Diet (and additional linked orders)  Fluid restriction 2000 ML FLUID (and additional linked orders)    DVT Prophylaxis: DOAC  Pruitt Catheter: Not present  Lines: None     Cardiac Monitoring: ACTIVE order. Indication: Acute decompensated heart failure (48 hours)  Code Status: No CPR- Do NOT Intubate      Clinically Significant Risk Factors Present on Admission               # Drug Induced Coagulation Defect: home medication list includes an anticoagulant medication  # Drug Induced Platelet Defect: home medication list includes an antiplatelet medication   # Hypertension: Noted on problem list  #  "Heart failure, NOS: heart failure noted on the problem list and last echo with EF 40-50%   # Anemia: based on hgb <11       # Overweight: Estimated body mass index is 28.45 kg/m  as calculated from the following:    Height as of this encounter: 1.854 m (6' 1\").    Weight as of this encounter: 97.8 kg (215 lb 9.8 oz).              Disposition Plan     Medically Ready for Discharge: Anticipated Tomorrow     Dariusz Augustin MD  Hospitalist Service  [facility]  Securely message with [employer] (more info)  Text page via [facility] Paging/Directory   ______________________________________________________________________    Interval History   Patient admitted yesterday, no acute events since admission. He reports that his breathing has improved since admission but that he still feels SOB at times. Denies having any fevers or chills overnight. He states that he likes to try to stay active at home and likes to use a rowing machine. He does not get chest pain with activity.     Physical Exam   Vital Signs: Temp: 98  F (36.7  C) Temp src: Oral BP: 102/49 Pulse: 54   Resp: 18 SpO2: 98 % O2 Device: Nasal cannula Oxygen Delivery: 3 LPM  Weight: 215 lbs 9.76 oz    General Appearance: Awake and alert, sitting up in bed in no acute distress   Respiratory: Breathing easily on nasal canula, some crackles in bilateral lung bases   Cardiovascular: Regular rate and rhythm, extremities warm and well perfused, Pitting edema in bilateral lower extremities   GI: Abdomen soft, non-tender, and non-distended   Skin: No rashes or lesions   Other: Appropriate mood and affect, no focal deficits appreciated      Medical Decision Making       45 MINUTES SPENT BY ME on the date of service doing chart review, history, exam, documentation & further activities per the note.      Data     I have personally reviewed the following data over the past 24 hrs:    4.9  \   8.4 (L)   / 211     142 101 37.6 (H) /  113 (H)   4.4 35 (H) " 1.31 (H) \     Procal: N/A CRP: N/A Lactic Acid: N/A         Imaging results reviewed over the past 24 hrs:   Recent Results (from the past 24 hour(s))   Echocardiogram Complete   Result Value    LVEF  45-50%    Narrative    [id number]  [id number]  [id number]  [id number]^GERARD^SLIME     [facility]  Echocardiography Laboratory  [address] [place] [address]     Name: EDWIGE BLANCO  MRN: [id number]  : 1940  Study Date: 2024 09:43 AM  Age: 84 yrs  Gender: Male  Patient Location: [facility]  Reason For Study: Heart Failure  Ordering Physician: SLIME GEE  Referring Physician: Kamari Rojas  Performed By: Letty King RDCS     BSA: 2.2 m2  Height: 73 in  Weight: 217 lb  HR: 59  BP: 122/59 mmHg  ______________________________________________________________________________  Procedure  Complete Portable Echo Adult. Optison (NDC #[id number]) given intravenously.  ______________________________________________________________________________  Interpretation Summary     Left ventricular systolic function is mildly reduced.The visual ejection  fraction is 45-50%.Apical and septal wall hypokinesia noted.  Mildly decreased right ventricular systolic function  The left atrium is severely dilated.  There is mild (1+) mitral regurgitation.There is mild (1+) tricuspid  regurgitation.  Pulmonary hypertension- RVSP 33 mm hg +RA.  IVC diameter >2.1 cm collapsing <50% with sniff suggests a high RA pressure  estimated at 15 mmHg or greater.     Echo dated  in  noted LVEF of 40 to 45%  ______________________________________________________________________________  Left Ventricle  The left ventricle is normal in size. There is mild to moderate concentric  left ventricular hypertrophy. Diastolic Doppler findings (E/E' ratio and/or  other parameters) suggest left ventricular filling pressures are increased.  Left ventricular systolic function is mildly reduced. The visual ejection  fraction  is 45-50%. Apical and septal wall hypokinesia noted.     Right Ventricle  The right ventricle is mildly dilated. Mildly decreased right ventricular  systolic function.     Atria  The left atrium is severely dilated. The right atrium is moderately dilated.  There is no color Doppler evidence of an atrial shunt.     Mitral Valve  There is mild mitral annular calcification. There is mild (1+) mitral  regurgitation.     Tricuspid Valve  There is mild (1+) tricuspid regurgitation. The right ventricular systolic  pressure is approximated at 33.4 mmHg plus the right atrial pressure.  Pulmonary hypertension.     Aortic Valve  Mild aortic valve sclerosis noted. There is trace aortic regurgitation. No  aortic stenosis is present.     Pulmonic Valve  There is no pulmonic valvular stenosis.     Vessels  The aortic root is normal size. 3.8 cm. Borderline dilated ascending aorta  measuring 3.9 cm. IVC diameter >2.1 cm collapsing <50% with sniff suggests a  high RA pressure estimated at 15 mmHg or greater.     Pericardium  There is no pericardial effusion.     Rhythm  The rhythm was atrial fibrillation.  ______________________________________________________________________________  MMode/2D Measurements & Calculations  IVSd: 1.4 cm     LVIDd: 5.4 cm  LVIDs: 3.5 cm  LVPWd: 1.4 cm  FS: 36.1 %  LV mass(C)d: 342.0 grams  LV mass(C)dI: 153.5 grams/m2  Ao root diam: 3.8 cm  LA dimension: 6.0 cm  asc Aorta Diam: 3.9 cm  LA/Ao: 1.6  Ao root diam index Ht(cm/m): 2.1  Ao root diam index BSA (cm/m2): 1.7  Asc Ao diam index BSA (cm/m2): 1.7  Asc Ao diam index Ht(cm/m): 2.1  EF Biplane: 53.4 %  LA Volume (BP): 179.0 ml     LA Volume Index (BP): 80.3 ml/m2  RWT: 0.53     Doppler Measurements & Calculations  MV E max carola: 147.4 cm/sec  TR max carola: 288.5 cm/sec  TR max P.4 mmHg  E/E' av.2  Lateral E/e': 9.5  Medial E/e': 28.9     ______________________________________________________________________________  Report approved by:  Marixa Baca 09/25/2024 11:02 AM

## 2024-09-25 NOTE — PLAN OF CARE
Problem: Adult Inpatient Plan of Care  Goal: Plan of Care Review  Description: The Plan of Care Review/Shift note should be completed every shift.  The Outcome Evaluation is a brief statement about your assessment that the patient is improving, declining, or no change.  This information will be displayed automatically on your shift  note.  Flowsheets (Taken 9/25/2024 0447)  Outcome Evaluation: Patient slept comfortably throughout the night. Patient voiding in the urinal at bedside. O2 on 3L/NC on with sats at 91%, Patient denies pain. IV SL, Lung sounds diminished with inspiratory wheezes in the lower bases. Patient on telemetry with A-Fib, HR 40-50's. An Echo is scheduled for the morning. Wife is at bedside  Plan of Care Reviewed With:   patient   spouse  Overall Patient Progress: no change  Goal: Absence of Hospital-Acquired Illness or Injury  Intervention: Identify and Manage Fall Risk  Recent Flowsheet Documentation  Taken 9/25/2024 0000 by Luanne Bruce RN  Safety Promotion/Fall Prevention:   activity supervised   room near nurse's station   safety round/check completed   nonskid shoes/slippers when out of bed   lighting adjusted   clutter free environment maintained  Intervention: Prevent Skin Injury  Recent Flowsheet Documentation  Taken 9/25/2024 0000 by Luanne Bruce RN  Body Position: position changed independently  Skin Protection: adhesive use limited  Device Skin Pressure Protection:   tubing/devices free from skin contact   adhesive use limited  Goal: Readiness for Transition of Care  Recent Flowsheet Documentation  Taken 9/24/2024 2200 by Luanne Bruce RN  Transportation Anticipated: family or friend will provide  Intervention: Mutually Develop Transition Plan  Recent Flowsheet Documentation  Taken 9/24/2024 2200 by Luanne Bruce RN  Transportation Anticipated: family or friend will provide  Patient/Family Anticipated Services at Transition: none  Patient/Family Anticipates  Transition to: home     Problem: Risk for Delirium  Goal: Optimal Coping  Intervention: Optimize Psychosocial Adjustment to Delirium  Recent Flowsheet Documentation  Taken 9/25/2024 0000 by Luanne Bruce RN  Supportive Measures: active listening utilized  Goal: Improved Behavioral Control  Intervention: Minimize Safety Risk  Recent Flowsheet Documentation  Taken 9/25/2024 0000 by Luanne Bruce RN  Communication Enhancement Strategies: extra time allowed for response  Enhanced Safety Measures: room near unit station  Goal: Improved Attention and Thought Clarity  Intervention: Maximize Cognitive Function  Recent Flowsheet Documentation  Taken 9/25/2024 0000 by Luanne Bruce RN  Sensory Stimulation Regulation:   lighting decreased   care clustered   quiet environment promoted  Reorientation Measures:   clock in view   glasses use encouraged     Problem: Gas Exchange Impaired  Goal: Optimal Gas Exchange  Intervention: Optimize Oxygenation and Ventilation  Recent Flowsheet Documentation  Taken 9/25/2024 0000 by Luanne Bruce RN  Head of Bed (HOB) Positioning: HOB at 20-30 degrees     Problem: Comorbidity Management  Goal: Maintenance of Heart Failure Symptom Control  Intervention: Maintain Heart Failure Management  Recent Flowsheet Documentation  Taken 9/25/2024 0000 by Luanne Bruce RN  Medication Review/Management: medications reviewed     Problem: Fall Injury Risk  Goal: Absence of Fall and Fall-Related Injury  Intervention: Identify and Manage Contributors  Recent Flowsheet Documentation  Taken 9/25/2024 0000 by Luanne Bruce RN  Medication Review/Management: medications reviewed  Intervention: Promote Injury-Free Environment  Recent Flowsheet Documentation  Taken 9/25/2024 0000 by Luanne Bruce RN  Safety Promotion/Fall Prevention:   activity supervised   room near nurse's station   safety round/check completed   nonskid shoes/slippers when out of bed   lighting adjusted    clutter free environment maintained   Goal Outcome Evaluation:      Plan of Care Reviewed With: patient, spouse    Overall Patient Progress: no changeOverall Patient Progress: no change    Outcome Evaluation: Patient slept comfortably throughout the night. Patient voiding in the urinal at bedside. O2 on 3L/NC on with sats at 91%, Patient denies pain. IV SL, Lung sounds diminished with inspiratory wheezes in the lower bases. Patient on telemetry with A-Fib, HR 40-50's. An Echo is scheduled for the morning. Wife is at bedside

## 2024-09-25 NOTE — PLAN OF CARE
OT: Orders received for CHF. Met with patient/spouse introduced self and role. Pt with no concerns regarding heart failure management and/or ADLs/IADLs. States he goes to the gym daily completing row machine. Pt has been walking the hallways here with no difficulty. Educated pt on benefits of OP cardiac rehab to monitor heart/O2 levels during workouts- verbalizes understanding. Will discontinue OT orders as no further needs identified.

## 2024-09-25 NOTE — PLAN OF CARE
"Patient has been able to wean off of 3 liters of O2 down to 1 liter this evening. Patient is alert and oriented x3- confused about the situation.     Writer covered education for heart failure, letting him know to weigh himself every morning for the best chance at catching a CHF exacerbation early. Patient has walked the halls with SBA and walker. 40 mg lasix given IV. Patient has had good urine output.    /49 (BP Location: Right arm)   Pulse 54   Temp 98  F (36.7  C) (Oral)   Resp 18   Ht 1.854 m (6' 1\")   Wt 97.8 kg (215 lb 9.8 oz)   SpO2 95%   BMI 28.45 kg/m     Goal Outcome Evaluation:                        Problem: Adult Inpatient Plan of Care  Goal: Plan of Care Review  Description: The Plan of Care Review/Shift note should be completed every shift.  The Outcome Evaluation is a brief statement about your assessment that the patient is improving, declining, or no change.  This information will be displayed automatically on your shift  note.  Outcome: Progressing  Goal: Patient-Specific Goal (Individualized)  Description: You can add care plan individualizations to a care plan. Examples of Individualization might be:  \"Parent requests to be called daily at 9am for status\", \"I have a hard time hearing out of my right ear\", or \"Do not touch me to wake me up as it startles  me\".  Outcome: Progressing  Goal: Absence of Hospital-Acquired Illness or Injury  Outcome: Progressing  Intervention: Identify and Manage Fall Risk  Recent Flowsheet Documentation  Taken 9/25/2024 1723 by Dilan Echevarria, RN  Safety Promotion/Fall Prevention:   activity supervised   room near nurse's station   safety round/check completed   nonskid shoes/slippers when out of bed   lighting adjusted   clutter free environment maintained  Intervention: Prevent Skin Injury  Recent Flowsheet Documentation  Taken 9/25/2024 1723 by Dilan Echevarria, RN  Body Position: position changed independently  Goal: Optimal Comfort and " Wellbeing  Outcome: Progressing  Goal: Readiness for Transition of Care  Outcome: Progressing     Problem: Risk for Delirium  Goal: Optimal Coping  Outcome: Progressing  Goal: Improved Behavioral Control  Outcome: Progressing  Intervention: Minimize Safety Risk  Recent Flowsheet Documentation  Taken 9/25/2024 1723 by Dilan Echevarria, RN  Communication Enhancement Strategies: extra time allowed for response  Goal: Improved Attention and Thought Clarity  Outcome: Progressing  Intervention: Maximize Cognitive Function  Recent Flowsheet Documentation  Taken 9/25/2024 1723 by Dilan Echevarria RN  Sensory Stimulation Regulation:   lighting decreased   care clustered   quiet environment promoted  Reorientation Measures:   clock in view   glasses use encouraged  Goal: Improved Sleep  Outcome: Progressing     Problem: Gas Exchange Impaired  Goal: Optimal Gas Exchange  Outcome: Progressing  Intervention: Optimize Oxygenation and Ventilation  Recent Flowsheet Documentation  Taken 9/25/2024 1723 by Dilan Echevarria RN  Head of Bed (HOB) Positioning: HOB at 20-30 degrees

## 2024-09-25 NOTE — PROGRESS NOTES
SPIRITUAL HEALTH SERVICES Progress Note  [facility]     Saw pt Richard You per patient request on admission.    Patient/Family Understanding of Illness and Goals of Care - Yes, Sergo was aware of what he was dealing with and what treatments are planned    Distress and Loss - None identified    Strengths, Coping, and Resources - Sergo and wife, Soledad, spoke of their family and their Faith - both sources of important support for them.  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    Meaning, Beliefs, and Spirituality - They stated they have attended [employer] [facility] in [place] for over 40 years.  Sergo shared some stories about their early years there and their move [place] the [place] with a full police escort.    Plan of Care -  will remain available for support during Sergo's LOS.    Chuy Burrows MA, [facility]  Staff  - Spiritual Health Services  Office: [phone]

## 2024-09-25 NOTE — PROGRESS NOTES
"[facility] ADMISSION NOTE    Patient admitted to room 2304 at approximately 2042 via cart from emergency room. Patient was accompanied by Spouse.     Verbal SBAR report received from Charge Nurse prior to patient arrival.     Patient ambulated to bed with stand-by assist. Patient alert and oriented X 3. The patient is not having any pain.  . Admission vital signs: Blood pressure 122/59, pulse 58, temperature 98  F (36.7  C), temperature source Oral, resp. rate 20, height 1.854 m (6' 1\"), weight 98.8 kg (217 lb 13 oz), SpO2 99%. Patient and spouse was oriented to plan of care, call light, bed controls, tv, telephone, bathroom, and visiting hours.     Risk Assessment    The following safety risks were identified during admission: none. Yellow risk band applied: NO.     Skin Initial Assessment    This writer admitted this patient and completed a full skin assessment and Erick score in the Adult PCS flowsheet.   Photo documentation of skin problem and/or wound competed via [employer] application (located under Media):  N/A    Appropriate interventions initiated as needed.     Secondary skin check completed by Jennie Parrish RN.         Education    Patient has a [place] to Observation order: No  Observation education completed and documented: N/A      Luanne Bruce RN      "

## 2024-09-26 ENCOUNTER — TELEPHONE (OUTPATIENT)
Dept: FAMILY MEDICINE | Facility: CLINIC | Age: 84
End: 2024-09-26
Payer: COMMERCIAL

## 2024-09-26 LAB
ANION GAP SERPL CALCULATED.3IONS-SCNC: 9 MMOL/L (ref 7–15)
BUN SERPL-MCNC: 38.8 MG/DL (ref 8–23)
CALCIUM SERPL-MCNC: 8.7 MG/DL (ref 8.8–10.4)
CHLORIDE SERPL-SCNC: 102 MMOL/L (ref 98–107)
CREAT SERPL-MCNC: 1.41 MG/DL (ref 0.67–1.17)
EGFRCR SERPLBLD CKD-EPI 2021: 49 ML/MIN/1.73M2
ERYTHROCYTE [DISTWIDTH] IN BLOOD BY AUTOMATED COUNT: 18.6 % (ref 10–15)
GLUCOSE SERPL-MCNC: 91 MG/DL (ref 70–99)
HCO3 SERPL-SCNC: 33 MMOL/L (ref 22–29)
HCT VFR BLD AUTO: 29.5 % (ref 40–53)
HGB BLD-MCNC: 8.4 G/DL (ref 13.3–17.7)
MCH RBC QN AUTO: 23.6 PG (ref 26.5–33)
MCHC RBC AUTO-ENTMCNC: 28.5 G/DL (ref 31.5–36.5)
MCV RBC AUTO: 83 FL (ref 78–100)
PLATELET # BLD AUTO: 179 10E3/UL (ref 150–450)
POTASSIUM SERPL-SCNC: 4.5 MMOL/L (ref 3.4–5.3)
RBC # BLD AUTO: 3.56 10E6/UL (ref 4.4–5.9)
SODIUM SERPL-SCNC: 144 MMOL/L (ref 135–145)
WBC # BLD AUTO: 4.6 10E3/UL (ref 4–11)

## 2024-09-26 PROCEDURE — 80048 BASIC METABOLIC PNL TOTAL CA: CPT | Performed by: STUDENT IN AN ORGANIZED HEALTH CARE EDUCATION/TRAINING PROGRAM

## 2024-09-26 PROCEDURE — 36415 COLL VENOUS BLD VENIPUNCTURE: CPT | Performed by: STUDENT IN AN ORGANIZED HEALTH CARE EDUCATION/TRAINING PROGRAM

## 2024-09-26 PROCEDURE — 99232 SBSQ HOSP IP/OBS MODERATE 35: CPT | Performed by: STUDENT IN AN ORGANIZED HEALTH CARE EDUCATION/TRAINING PROGRAM

## 2024-09-26 PROCEDURE — 250N000013 HC RX MED GY IP 250 OP 250 PS 637

## 2024-09-26 PROCEDURE — 120N000001 HC R&B MED SURG/OB

## 2024-09-26 PROCEDURE — 85027 COMPLETE CBC AUTOMATED: CPT | Performed by: STUDENT IN AN ORGANIZED HEALTH CARE EDUCATION/TRAINING PROGRAM

## 2024-09-26 PROCEDURE — 250N000011 HC RX IP 250 OP 636: Performed by: STUDENT IN AN ORGANIZED HEALTH CARE EDUCATION/TRAINING PROGRAM

## 2024-09-26 RX ORDER — FUROSEMIDE 10 MG/ML
40 INJECTION INTRAMUSCULAR; INTRAVENOUS ONCE
Status: COMPLETED | OUTPATIENT
Start: 2024-09-26 | End: 2024-09-26

## 2024-09-26 RX ADMIN — TAMSULOSIN HYDROCHLORIDE 0.4 MG: 0.4 CAPSULE ORAL at 08:01

## 2024-09-26 RX ADMIN — PANTOPRAZOLE SODIUM 40 MG: 40 TABLET, DELAYED RELEASE ORAL at 08:01

## 2024-09-26 RX ADMIN — SENNOSIDES AND DOCUSATE SODIUM 1 TABLET: 50; 8.6 TABLET ORAL at 15:34

## 2024-09-26 RX ADMIN — PANTOPRAZOLE SODIUM 40 MG: 40 TABLET, DELAYED RELEASE ORAL at 20:23

## 2024-09-26 RX ADMIN — ATORVASTATIN CALCIUM 80 MG: 80 TABLET, FILM COATED ORAL at 17:35

## 2024-09-26 RX ADMIN — FUROSEMIDE 40 MG: 10 INJECTION, SOLUTION INTRAMUSCULAR; INTRAVENOUS at 10:34

## 2024-09-26 RX ADMIN — RIVAROXABAN 15 MG: 15 TABLET, FILM COATED ORAL at 17:35

## 2024-09-26 ASSESSMENT — ACTIVITIES OF DAILY LIVING (ADL)
ADLS_ACUITY_SCORE: 33

## 2024-09-26 NOTE — PROVIDER NOTIFICATION
"   09/26/24 0316   Vital Signs   Temp 97.7  F (36.5  C)   Temp src Oral   Resp 16   Pulse 53   Pulse Rate Source Monitor   /42   BP Location Left arm   Oxygen Therapy   SpO2 97 %   O2 Device Nasal cannula   Oxygen Delivery 2 LPM     Received call at 0315 from [facility] with report of \"pause\" Had ICU review Tele and no pauses seen.  Asymptomatic. Pt asleep.   "

## 2024-09-26 NOTE — PROGRESS NOTES
[facility]    Medicine Progress Note - Hospitalist Service    Date of Admission:  9/24/2024    Assessment & Plan      Richard You is a 84 year old male with past medical history significant for CAD s/p CABG (1990s), ischemic cardiomyopathy HFrEF, permanent atrial fibrillation on xarelto, HTN, HLD, abdominal aortic aneurysm, Carey's esophagus, recent diagnosis of aspiration pneumonitis (09/13/24) at outpatient clinic, who presents on 9/24/2024 with shortness of breath, hypoxia, weight gain with abdominal distension and bilateral lower extremity edema. Admitted with HF exacerbation.      # Acute respiratory failure with hypoxia and mild hypercapnia, improving    Noted to be hypoxic at cardiology clinic on day of admission with SpO2 85% on room air. No leukocytosis (5.3). Anemia (Hgb 9.4) similar to previous. Renal function appears at baseline. CRP mildly elevated (5.65). Procalcitonin wnl (0.05), Covid PCR negative. Serial troponin flat (45 ? 39). VBG revealed pH 7.32 / pCO2 62 / bicarb 32, no prior comparison. Alert and oriented x4 at time of admission, no hx of sleep apnea. EKG with rate controlled atrial fibrillation with nonspecific ST-T changes. CT PE study with trace pleural fluid bilaterally and bibasilar opacities, negative for PE. Patient appears nontoxic, hypervolemic on exam, develops mild dyspnea with minimal exertion such as moving in bed. Hemodynamically stable on 2L via NC. Likely secondary to acute heart failure exacerbation, low suspicion for pneumonia.  - Supplemental O2 to maintain SpO2 >90%  - Diuresis as below      # HFrEF   # Ischemic cardiomyopathy   Presents with weight gain, with abdominal distension and 2+ bilateral lower extremity edema, progressive over 1-2 months. Echocardiogram 07/25/2018 with EF 40-45%. BNP on admission 2444, no prior to compare. CT PE study with trace pleural fluid bilaterally and bibasilar opacities. Bibasilar crackles, faint wheezing  in upper lobes on auscultation. Appears hypervolemic. TTE this admission with EF 45-50% with some apical and septal hypokinesis noted. IVC noted to be over 2.1 cm with less than 5-% respiratory collapse.   - Continued Diuresis with IV furosemide today, 40 mg this morning potential re-dose later today   - Will restart goal directed medical therapy as able      # Low suspicion Pneumonia of both lower lobes due to infectious organism  Pt has had nonproductive cough x 3 months, gradual onset. Denies fevers, chills, sick contacts, acute URI sx. No leukocytosis (5.3), procalcitonin wnl (0.05), covid PCR negative. Aside from hypoxia, VS reassuring, patient is afebrile. Initially started on ceftriaxone and azithromycin but discontinued with low suspicion for infection.      # Anemia, acute on chronic, stable  On admission Hgb 9.4. MCV 83. On 09/13/24 Iron 27, , Iron sat index 7, Ferratin 44. Thought to be secondary to CKD vs GI losses, has a hx of Carey's disease. BUN elevated (40.7). No signs/sx of acute bleeding.   Most recent endoscopy 07/12/2018 revealed Normal nasopharynx and oropharynx. Esophageal mucosal changes consistent with short-segment Carey's esophagus. Biopsied. Gastritis. Normal examined duodenum.   - AM CBC   - Stopping PTA ASA 81 given on rivaroxaban       # Chronic atrial fibrillation (H) with slow ventricular response / asymptomatic bradycardia  Bradycardia noted during EGD 07/2018, and EKG at that time revealed atrial fibrillation with no clear P waves. Atrial fibrillation confirmed with 30-day event monitor which showed HR as low as 30 bpm when sleeping and 50-60 bpm while awake. HR 50-70bpm in the ED. Denies syncope or presyncope, palpitations.   - Continued pta xarelto      # Chronic kidney disease   Creatinine 1.27 on admission, GFR 55. Similar to recent baseline creatinine 1.22 and GFR 58 on 04/12/24. Cr creeping but continued diuresis today.   - Holding pta lisinopril, hctz as below        # Hypertension   Managed prior to admission with amlodipine 5mg daily, lisinopril 40mg daily, hydrochlorothiazide 25mg daily. BP stable, well controlled on admission.     - Holding amlodipine, lisinopril and hydrochlorothiazide while initiating diuresis given low BP to normal BP      # Carey's esophagus  Managed prior to admission with omeprazole 40mg daily. Declined scoping at clinic visit 07/31/24, was suggested as part of workup for anemia. States he has been asymptomatic for many years. No new or changing symptoms.   - Substituted formulary pantoprazole for PTA omeprazole      # Coronary atherosclerosis S/P CABG x2   # Hyperlipidemia LDL goal <100  Managed prior to admission with atorvastatin 80mg daily; continued      # Hx of Abdominal aortic aneurysm s/p endovascular repair (2005)  Most recently visualized on US aorta/Ivc/iliac 06/05/24 which revealed Postoperative changes EVAR with widely patent aortobiiliac endograft. The excluded infrarenal abdominal aortic aneurysm sac measures 6.3 x 6.3 cm sonographically on today's exam, similar to prior ultrasound 5/26/2020. The AAA sac measured 4.6 x 5.2 cm on more recent CTA 6/2/2023. This discrepancy in measurements is favored to represent differences in technique. Otherwise, no evidence of endoleak on Doppler exam.     # Former smoker  Quit smoking cigarettes 28 years ago. 5 pack-year history.          Diet: Combination Diet 2 gm NA Diet; No Caffeine Diet (and additional linked orders)  Fluid restriction 2000 ML FLUID (and additional linked orders)    DVT Prophylaxis: DOAC  Pruitt Catheter: Not present  Lines: None     Cardiac Monitoring: ACTIVE order. Indication: Acute decompensated heart failure (48 hours)  Code Status: No CPR- Do NOT Intubate      Clinically Significant Risk Factors                  # Hypertension: Noted on problem list  # Heart failure, NOS: heart failure noted on the problem list and last echo with EF 40-50%          # Overweight: Estimated  "body mass index is 28.48 kg/m  as calculated from the following:    Height as of this encounter: 1.854 m (6' 1\").    Weight as of this encounter: 97.9 kg (215 lb 13.3 oz)., PRESENT ON ADMISSION            Disposition Plan     Medically Ready for Discharge: Anticipated Tomorrow possibly      Dariusz Augustin MD  Hospitalist Service  [facility]  Securely message with [employer] (more info)  Text page via [facility] [place] Paging/Directory   ______________________________________________________________________    Interval History   Breathing improving and patient feeling better today. States he feels more awake and interactive today. Denies fevers and chills.     Physical Exam   Vital Signs: Temp: 98  F (36.7  C) Temp src: Oral BP: 126/61 Pulse: 56   Resp: 16 SpO2: 93 % O2 Device: Nasal cannula Oxygen Delivery: 2 LPM  Weight: 215 lbs 13.29 oz    General Appearance: Awake and alert, sitting up in bed in no acute distress   Respiratory: Breathing easily on nasal canula, lungs clear to auscultation   Cardiovascular: Regular rate and rhythm, extremities warm and well perfused, Pitting edema in bilateral lower extremities continues to improve  GI: Abdomen soft, non-tender, and non-distended   Skin: No rashes or lesions   Other: Appropriate mood and affect, no focal deficits appreciated      Medical Decision Making       45 MINUTES SPENT BY ME on the date of service doing chart review, history, exam, documentation & further activities per the note.      Data     I have personally reviewed the following data over the past 24 hrs:    4.6  \   8.4 (L)   / 179     144 102 38.8 (H) /  91   4.5 33 (H) 1.41 (H) \       Imaging results reviewed over the past 24 hrs:   No results found for this or any previous visit (from the past 24 hour(s)).    "

## 2024-09-26 NOTE — PLAN OF CARE
Goal Outcome Evaluation:      Plan of Care Reviewed With: patient    Overall Patient Progress: no changeOverall Patient Progress: no change    Outcome Evaluation: Patient is A & O x3. He is a SBA and steady on his feet. Up to bathroom multiple times. O2 varied throughout the night between 2 and 4L NC depending significantly on his position in bed. Patient tends to slide down flatter in bed and his Sats decrease when laying flat in bed. Repositioned multiple times and Sats improved. Currently on 2L with sats at 92%.  IV Saline Locked. Flushes well. Denies pain when asked.

## 2024-09-26 NOTE — TELEPHONE ENCOUNTER
Disabled Parking Cert Form completed and placed at clinic  for  - Copy to scanning and pt notified.

## 2024-09-26 NOTE — PLAN OF CARE
Pt denies CP or SOA.  Attempted RA, O2 sats dropped to 86%.  Placed on 1-2 L per NC to maintain sat above 90%.  Ambulates in room with SBA and walker, up in halls today with portable O2.  Last BM 9/23, senokot given. Pt hoping to discharge home tomorrow.    Problem: Adult Inpatient Plan of Care  Goal: Absence of Hospital-Acquired Illness or Injury  Outcome: Progressing  Intervention: Identify and Manage Fall Risk  Recent Flowsheet Documentation  Taken 9/26/2024 1520 by Vaishnavi Jo RN  Safety Promotion/Fall Prevention:   clutter free environment maintained   nonskid shoes/slippers when out of bed  Taken 9/26/2024 0755 by Vaishnavi Jo RN  Safety Promotion/Fall Prevention:   clutter free environment maintained   nonskid shoes/slippers when out of bed  Intervention: Prevent Skin Injury  Recent Flowsheet Documentation  Taken 9/26/2024 1520 by Vaishnavi Jo RN  Body Position: position changed independently  Taken 9/26/2024 0755 by Vaishnavi Jo RN  Body Position: position changed independently     Problem: Adult Inpatient Plan of Care  Goal: Absence of Hospital-Acquired Illness or Injury  Intervention: Identify and Manage Fall Risk  Recent Flowsheet Documentation  Taken 9/26/2024 1520 by Vaishnavi Jo RN  Safety Promotion/Fall Prevention:   clutter free environment maintained   nonskid shoes/slippers when out of bed  Taken 9/26/2024 0755 by Vaishnavi Jo RN  Safety Promotion/Fall Prevention:   clutter free environment maintained   nonskid shoes/slippers when out of bed     Problem: Adult Inpatient Plan of Care  Goal: Optimal Comfort and Wellbeing  Outcome: Progressing   Goal Outcome Evaluation:      Plan of Care Reviewed With: patient, spouse    Overall Patient Progress: improving

## 2024-09-27 LAB
ANION GAP SERPL CALCULATED.3IONS-SCNC: 8 MMOL/L (ref 7–15)
BUN SERPL-MCNC: 39 MG/DL (ref 8–23)
CALCIUM SERPL-MCNC: 8.5 MG/DL (ref 8.8–10.4)
CHLORIDE SERPL-SCNC: 100 MMOL/L (ref 98–107)
CREAT SERPL-MCNC: 1.3 MG/DL (ref 0.67–1.17)
EGFRCR SERPLBLD CKD-EPI 2021: 54 ML/MIN/1.73M2
ERYTHROCYTE [DISTWIDTH] IN BLOOD BY AUTOMATED COUNT: 18.5 % (ref 10–15)
GLUCOSE SERPL-MCNC: 95 MG/DL (ref 70–99)
HCO3 SERPL-SCNC: 33 MMOL/L (ref 22–29)
HCT VFR BLD AUTO: 27.9 % (ref 40–53)
HGB BLD-MCNC: 8.2 G/DL (ref 13.3–17.7)
MAGNESIUM SERPL-MCNC: 1.8 MG/DL (ref 1.7–2.3)
MCH RBC QN AUTO: 23.8 PG (ref 26.5–33)
MCHC RBC AUTO-ENTMCNC: 29.4 G/DL (ref 31.5–36.5)
MCV RBC AUTO: 81 FL (ref 78–100)
PLATELET # BLD AUTO: 188 10E3/UL (ref 150–450)
POTASSIUM SERPL-SCNC: 4.2 MMOL/L (ref 3.4–5.3)
POTASSIUM SERPL-SCNC: 4.2 MMOL/L (ref 3.4–5.3)
RBC # BLD AUTO: 3.45 10E6/UL (ref 4.4–5.9)
SODIUM SERPL-SCNC: 141 MMOL/L (ref 135–145)
WBC # BLD AUTO: 4.7 10E3/UL (ref 4–11)

## 2024-09-27 PROCEDURE — 250N000011 HC RX IP 250 OP 636: Performed by: STUDENT IN AN ORGANIZED HEALTH CARE EDUCATION/TRAINING PROGRAM

## 2024-09-27 PROCEDURE — 99232 SBSQ HOSP IP/OBS MODERATE 35: CPT | Performed by: STUDENT IN AN ORGANIZED HEALTH CARE EDUCATION/TRAINING PROGRAM

## 2024-09-27 PROCEDURE — 36415 COLL VENOUS BLD VENIPUNCTURE: CPT | Performed by: STUDENT IN AN ORGANIZED HEALTH CARE EDUCATION/TRAINING PROGRAM

## 2024-09-27 PROCEDURE — 120N000001 HC R&B MED SURG/OB

## 2024-09-27 PROCEDURE — 250N000013 HC RX MED GY IP 250 OP 250 PS 637

## 2024-09-27 PROCEDURE — 83735 ASSAY OF MAGNESIUM: CPT

## 2024-09-27 PROCEDURE — 36415 COLL VENOUS BLD VENIPUNCTURE: CPT

## 2024-09-27 PROCEDURE — 250N000013 HC RX MED GY IP 250 OP 250 PS 637: Performed by: STUDENT IN AN ORGANIZED HEALTH CARE EDUCATION/TRAINING PROGRAM

## 2024-09-27 PROCEDURE — 85027 COMPLETE CBC AUTOMATED: CPT | Performed by: STUDENT IN AN ORGANIZED HEALTH CARE EDUCATION/TRAINING PROGRAM

## 2024-09-27 PROCEDURE — 84132 ASSAY OF SERUM POTASSIUM: CPT

## 2024-09-27 PROCEDURE — 80048 BASIC METABOLIC PNL TOTAL CA: CPT | Performed by: STUDENT IN AN ORGANIZED HEALTH CARE EDUCATION/TRAINING PROGRAM

## 2024-09-27 RX ORDER — FUROSEMIDE 10 MG/ML
40 INJECTION INTRAMUSCULAR; INTRAVENOUS ONCE
Status: COMPLETED | OUTPATIENT
Start: 2024-09-27 | End: 2024-09-27

## 2024-09-27 RX ADMIN — SENNOSIDES AND DOCUSATE SODIUM 1 TABLET: 50; 8.6 TABLET ORAL at 18:05

## 2024-09-27 RX ADMIN — FUROSEMIDE 40 MG: 10 INJECTION, SOLUTION INTRAMUSCULAR; INTRAVENOUS at 08:46

## 2024-09-27 RX ADMIN — TAMSULOSIN HYDROCHLORIDE 0.4 MG: 0.4 CAPSULE ORAL at 08:46

## 2024-09-27 RX ADMIN — FUROSEMIDE 40 MG: 10 INJECTION, SOLUTION INTRAMUSCULAR; INTRAVENOUS at 14:39

## 2024-09-27 RX ADMIN — PANTOPRAZOLE SODIUM 40 MG: 40 TABLET, DELAYED RELEASE ORAL at 08:46

## 2024-09-27 RX ADMIN — ATORVASTATIN CALCIUM 80 MG: 80 TABLET, FILM COATED ORAL at 17:22

## 2024-09-27 RX ADMIN — PANTOPRAZOLE SODIUM 40 MG: 40 TABLET, DELAYED RELEASE ORAL at 20:23

## 2024-09-27 RX ADMIN — RIVAROXABAN 20 MG: 10 TABLET, FILM COATED ORAL at 17:22

## 2024-09-27 ASSESSMENT — ACTIVITIES OF DAILY LIVING (ADL)
ADLS_ACUITY_SCORE: 33
ADLS_ACUITY_SCORE: 32
ADLS_ACUITY_SCORE: 33
ADLS_ACUITY_SCORE: 33
ADLS_ACUITY_SCORE: 32
ADLS_ACUITY_SCORE: 33
ADLS_ACUITY_SCORE: 32
ADLS_ACUITY_SCORE: 33
ADLS_ACUITY_SCORE: 33
ADLS_ACUITY_SCORE: 32
ADLS_ACUITY_SCORE: 33
ADLS_ACUITY_SCORE: 32
ADLS_ACUITY_SCORE: 33
ADLS_ACUITY_SCORE: 32
ADLS_ACUITY_SCORE: 33

## 2024-09-27 NOTE — PLAN OF CARE
Problem: Heart Failure  Goal: Optimal Cardiac Output  Outcome: Progressing     Problem: Heart Failure  Goal: Optimal Functional Ability  Intervention: Optimize Functional Ability  Recent Flowsheet Documentation  Taken 9/27/2024 0840 by Salome Sultana RN  Activity Management:   activity adjusted per tolerance   ambulated to bathroom     Problem: Heart Failure  Goal: Effective Oxygenation and Ventilation  Intervention: Promote Airway Secretion Clearance  Recent Flowsheet Documentation  Taken 9/27/2024 0840 by Salome Sultana RN  Cough And Deep Breathing: done independently per patient  Activity Management:   activity adjusted per tolerance   ambulated to bathroom        Patient has been up walking in the hallway with wife this afternoon. Patient 02 sats at rest 92%. LS clear. Voiding moderate amounts of urine. Appetite is good. Alert, oriented , at times forgetful. Self care Heart Failure education with HF stoplight tool given to patient, wife in room and aware of education. Denies pain. Independent in room, does not use an assistive device.

## 2024-09-27 NOTE — PLAN OF CARE
Problem: Adult Inpatient Plan of Care  Goal: Plan of Care Review  Description: The Plan of Care Review/Shift note should be completed every shift.  The Outcome Evaluation is a brief statement about your assessment that the patient is improving, declining, or no change.  This information will be displayed automatically on your shift  note.  Flowsheets (Taken 9/27/2024 0625)  Outcome Evaluation: Patient alert and oriented x3, still remains on 1L O2 overnight.  Repositioned throughout the night.  PIV saline locked per orders.  Denies pain.  Continue with plan of care and try to wean off O2 today  Plan of Care Reviewed With: patient  Overall Patient Progress: improving  Goal: Absence of Hospital-Acquired Illness or Injury  Intervention: Identify and Manage Fall Risk  Recent Flowsheet Documentation  Taken 9/27/2024 0001 by iNdhi Vilchis RN  Safety Promotion/Fall Prevention:   clutter free environment maintained   nonskid shoes/slippers when out of bed  Intervention: Prevent Skin Injury  Recent Flowsheet Documentation  Taken 9/27/2024 0001 by Nidhi Vilchis RN  Skin Protection: adhesive use limited  Device Skin Pressure Protection:   tubing/devices free from skin contact   adhesive use limited     Problem: Risk for Delirium  Goal: Improved Behavioral Control  Intervention: Minimize Safety Risk  Recent Flowsheet Documentation  Taken 9/27/2024 0001 by Nidhi Vilchis RN  Enhanced Safety Measures: room near unit station     Problem: Comorbidity Management  Goal: Maintenance of Heart Failure Symptom Control  Intervention: Maintain Heart Failure Management  Recent Flowsheet Documentation  Taken 9/27/2024 0001 by Nidhi Vilchis RN  Medication Review/Management: medications reviewed     Problem: Fall Injury Risk  Goal: Absence of Fall and Fall-Related Injury  Intervention: Identify and Manage Contributors  Recent Flowsheet Documentation  Taken 9/27/2024 0001 by Nidhi Vilchis RN  Medication  Review/Management: medications reviewed  Intervention: Promote Injury-Free Environment  Recent Flowsheet Documentation  Taken 9/27/2024 0001 by Nidhi Vilchis RN  Safety Promotion/Fall Prevention:   clutter free environment maintained   nonskid shoes/slippers when out of bed   Goal Outcome Evaluation:      Plan of Care Reviewed With: patient    Overall Patient Progress: improvingOverall Patient Progress: improving    Outcome Evaluation: Patient alert and oriented x3, still remains on 1L O2 overnight.  Repositioned throughout the night.  PIV saline locked per orders.  Denies pain.  Continue with plan of care and try to wean off O2 today

## 2024-09-27 NOTE — PROGRESS NOTES
[facility]    Medicine Progress Note - Hospitalist Service    Date of Admission:  9/24/2024    Assessment & Plan      Richard You is a 84 year old male with past medical history significant for CAD s/p CABG (1990s), ischemic cardiomyopathy HFrEF, permanent atrial fibrillation on xarelto, HTN, HLD, abdominal aortic aneurysm, Carey's esophagus, recent diagnosis of aspiration pneumonitis (09/13/24) at outpatient clinic, who presents on 9/24/2024 with shortness of breath, hypoxia, weight gain with abdominal distension and bilateral lower extremity edema. Admitted with HF exacerbation. Improving but hospitalization continued with hypoxia.      # Acute respiratory failure with hypoxia and mild hypercapnia, improving    Noted to be hypoxic at cardiology clinic on day of admission with SpO2 85% on room air. No leukocytosis (5.3). Anemia (Hgb 9.4) similar to previous. Renal function appears at baseline. CRP mildly elevated (5.65). Procalcitonin wnl (0.05), Covid PCR negative. Serial troponin flat (45 ? 39). VBG revealed pH 7.32 / pCO2 62 / bicarb 32, no prior comparison. Alert and oriented x4 at time of admission, no hx of sleep apnea. EKG with rate controlled atrial fibrillation with nonspecific ST-T changes. CT PE study with trace pleural fluid bilaterally and bibasilar opacities, negative for PE. Patient appears nontoxic, hypervolemic on exam, develops mild dyspnea with minimal exertion such as moving in bed. Hemodynamically stable on 2L via NC. Likely secondary to acute heart failure exacerbation, low suspicion for pneumonia.  - Supplemental O2 to maintain SpO2 >90%  - Diuresis as below      # HFrEF   # Ischemic cardiomyopathy   Presents with weight gain, with abdominal distension and 2+ bilateral lower extremity edema, progressive over 1-2 months. Echocardiogram 07/25/2018 with EF 40-45%. BNP on admission 2444, no prior to compare. CT PE study with trace pleural fluid bilaterally and  bibasilar opacities. Bibasilar crackles, faint wheezing in upper lobes on auscultation. Appears hypervolemic. TTE this admission with EF 45-50% with some apical and septal hypokinesis noted. IVC noted to be over 2.1 cm with less than 5-% respiratory collapse. Gradually improving but still requiring oxygen with activity.   - Continued Diuresis with IV furosemide today BID, close to discharge but still feel he needs further IV diuresis   - Will restart goal directed medical therapy as able      # Low suspicion Pneumonia of both lower lobes due to infectious organism  Pt has had nonproductive cough x 3 months, gradual onset. Denies fevers, chills, sick contacts, acute URI sx. No leukocytosis (5.3), procalcitonin wnl (0.05), covid PCR negative. Aside from hypoxia, VS reassuring, patient is afebrile. Initially started on ceftriaxone and azithromycin but discontinued with low suspicion for infection.      # Anemia, acute on chronic, stable  On admission Hgb 9.4. MCV 83. On 09/13/24 Iron 27, , Iron sat index 7, Ferratin 44. Thought to be secondary to CKD vs GI losses, has a hx of Carey's disease. BUN elevated (40.7). No signs/sx of acute bleeding.   Most recent endoscopy 07/12/2018 revealed Normal nasopharynx and oropharynx. Esophageal mucosal changes consistent with short-segment Carey's esophagus. Biopsied. Gastritis. Normal examined duodenum.   - AM CBC   - Stopping PTA ASA 81 given on rivaroxaban       # Chronic atrial fibrillation (H) with slow ventricular response / asymptomatic bradycardia  Bradycardia noted during EGD 07/2018, and EKG at that time revealed atrial fibrillation with no clear P waves. Atrial fibrillation confirmed with 30-day event monitor which showed HR as low as 30 bpm when sleeping and 50-60 bpm while awake. HR 50-70bpm in the ED. Denies syncope or presyncope, palpitations.   - Continued pta xarelto      # Chronic kidney disease   Creatinine 1.27 on admission, GFR 55. Similar to recent  baseline creatinine 1.22 and GFR 58 on 04/12/24. Cr creeping but continued diuresis today.   - Holding pta lisinopril, hctz as below       # Hypertension   Managed prior to admission with amlodipine 5mg daily, lisinopril 40mg daily, hydrochlorothiazide 25mg daily. BP stable, well controlled on admission.     - Holding amlodipine, lisinopril and hydrochlorothiazide while initiating diuresis given low BP to normal BP      # Carey's esophagus  Managed prior to admission with omeprazole 40mg daily. Declined scoping at clinic visit 07/31/24, was suggested as part of workup for anemia. States he has been asymptomatic for many years. No new or changing symptoms.   - Substituted formulary pantoprazole for PTA omeprazole      # Coronary atherosclerosis S/P CABG x2   # Hyperlipidemia LDL goal <100  Managed prior to admission with atorvastatin 80mg daily; continued      # Hx of Abdominal aortic aneurysm s/p endovascular repair (2005)  Most recently visualized on US aorta/Ivc/iliac 06/05/24 which revealed Postoperative changes EVAR with widely patent aortobiiliac endograft. The excluded infrarenal abdominal aortic aneurysm sac measures 6.3 x 6.3 cm sonographically on today's exam, similar to prior ultrasound 5/26/2020. The AAA sac measured 4.6 x 5.2 cm on more recent CTA 6/2/2023. This discrepancy in measurements is favored to represent differences in technique. Otherwise, no evidence of endoleak on Doppler exam.     # Former smoker  Quit smoking cigarettes 28 years ago. 5 pack-year history.          Diet: Combination Diet 2 gm NA Diet; No Caffeine Diet (and additional linked orders)  Fluid restriction 2000 ML FLUID (and additional linked orders)    DVT Prophylaxis: DOAC  Pruitt Catheter: Not present  Lines: None     Cardiac Monitoring: None  Code Status: No CPR- Do NOT Intubate      Clinically Significant Risk Factors                  # Hypertension: Noted on problem list  # Heart failure, NOS: heart failure noted on the  "problem list and last echo with EF 40-50%          # Overweight: Estimated body mass index is 28.23 kg/m  as calculated from the following:    Height as of this encounter: 1.854 m (6' 1\").    Weight as of this encounter: 97.1 kg (214 lb)., PRESENT ON ADMISSION            Disposition Plan     Medically Ready for Discharge: Anticipated Tomorrow      Dariusz Augustin MD  Hospitalist Service  [facility]  Securely message with [employer] (more info)  Text page via [facility] Paging/Directory   ______________________________________________________________________    Interval History   No acute events overnight. Patient states breathing continues to improve. He denies having any fevers of chills. He is hopefull be able to discharge from the hospital soon.      Physical Exam   Vital Signs: Temp: 98  F (36.7  C) Temp src: Oral BP: 115/46 Pulse: 52   Resp: 18 SpO2: 91 % (after two minutes of rest, went to 91 briefly.) O2 Device: None (Room air) Oxygen Delivery: 1 LPM  Weight: 214 lbs 0 oz    General Appearance: Awake and alert, sitting up in bed in no acute distress   Respiratory: Breathing easily on nasal canula, lungs clear to auscultation   Cardiovascular: Regular rate and rhythm, extremities warm and well perfused, Pitting edema in bilateral lower extremities continues to improve  GI: Abdomen soft, non-tender, and non-distended   Skin: No rashes or lesions   Other: Appropriate mood and affect, no focal deficits appreciated      Medical Decision Making       40 MINUTES SPENT BY ME on the date of service doing chart review, history, exam, documentation & further activities per the note.      Data     I have personally reviewed the following data over the past 24 hrs:    4.7  \   8.2 (L)   / 188     141 100 39.0 (H) /  95   4.2 33 (H) 1.30 (H) \       Imaging results reviewed over the past 24 hrs:   No results found for this or any previous visit (from the past 24 hour(s)).    "

## 2024-09-27 NOTE — PROVIDER NOTIFICATION
09/27/24 1110 09/27/24 1115 09/27/24 1119   Oxygen Therapy   SpO2 94 %  (at rest) (!) 84 %  (when walking in hallway about 80 feet) 91 %  (after two minutes of rest, went to 91 briefly.)   O2 Device None (Room air)  --  None (Room air)     Patient denies SOA. Has diuresed 900 ml since administration of lasix 40 mg. LS clear. Left 0xygen off.

## 2024-09-28 VITALS
TEMPERATURE: 97.8 F | RESPIRATION RATE: 18 BRPM | DIASTOLIC BLOOD PRESSURE: 66 MMHG | SYSTOLIC BLOOD PRESSURE: 130 MMHG | WEIGHT: 206.4 LBS | HEART RATE: 58 BPM | BODY MASS INDEX: 27.35 KG/M2 | OXYGEN SATURATION: 91 % | HEIGHT: 73 IN

## 2024-09-28 LAB
ANION GAP SERPL CALCULATED.3IONS-SCNC: 11 MMOL/L (ref 7–15)
BUN SERPL-MCNC: 38.2 MG/DL (ref 8–23)
CALCIUM SERPL-MCNC: 9 MG/DL (ref 8.8–10.4)
CHLORIDE SERPL-SCNC: 97 MMOL/L (ref 98–107)
CREAT SERPL-MCNC: 1.26 MG/DL (ref 0.67–1.17)
EGFRCR SERPLBLD CKD-EPI 2021: 56 ML/MIN/1.73M2
ERYTHROCYTE [DISTWIDTH] IN BLOOD BY AUTOMATED COUNT: 18.5 % (ref 10–15)
GLUCOSE SERPL-MCNC: 101 MG/DL (ref 70–99)
HCO3 SERPL-SCNC: 33 MMOL/L (ref 22–29)
HCT VFR BLD AUTO: 30.4 % (ref 40–53)
HGB BLD-MCNC: 9.2 G/DL (ref 13.3–17.7)
MAGNESIUM SERPL-MCNC: 1.8 MG/DL (ref 1.7–2.3)
MCH RBC QN AUTO: 23.7 PG (ref 26.5–33)
MCHC RBC AUTO-ENTMCNC: 30.3 G/DL (ref 31.5–36.5)
MCV RBC AUTO: 78 FL (ref 78–100)
PHOSPHATE SERPL-MCNC: 3.3 MG/DL (ref 2.5–4.5)
PLATELET # BLD AUTO: 199 10E3/UL (ref 150–450)
POTASSIUM SERPL-SCNC: 4 MMOL/L (ref 3.4–5.3)
RBC # BLD AUTO: 3.89 10E6/UL (ref 4.4–5.9)
SODIUM SERPL-SCNC: 141 MMOL/L (ref 135–145)
WBC # BLD AUTO: 4.3 10E3/UL (ref 4–11)

## 2024-09-28 PROCEDURE — 99239 HOSP IP/OBS DSCHRG MGMT >30: CPT | Performed by: STUDENT IN AN ORGANIZED HEALTH CARE EDUCATION/TRAINING PROGRAM

## 2024-09-28 PROCEDURE — 250N000013 HC RX MED GY IP 250 OP 250 PS 637: Performed by: STUDENT IN AN ORGANIZED HEALTH CARE EDUCATION/TRAINING PROGRAM

## 2024-09-28 PROCEDURE — 83735 ASSAY OF MAGNESIUM: CPT | Performed by: STUDENT IN AN ORGANIZED HEALTH CARE EDUCATION/TRAINING PROGRAM

## 2024-09-28 PROCEDURE — 250N000013 HC RX MED GY IP 250 OP 250 PS 637

## 2024-09-28 PROCEDURE — 85027 COMPLETE CBC AUTOMATED: CPT | Performed by: STUDENT IN AN ORGANIZED HEALTH CARE EDUCATION/TRAINING PROGRAM

## 2024-09-28 PROCEDURE — 84100 ASSAY OF PHOSPHORUS: CPT | Performed by: STUDENT IN AN ORGANIZED HEALTH CARE EDUCATION/TRAINING PROGRAM

## 2024-09-28 PROCEDURE — 80048 BASIC METABOLIC PNL TOTAL CA: CPT | Performed by: STUDENT IN AN ORGANIZED HEALTH CARE EDUCATION/TRAINING PROGRAM

## 2024-09-28 PROCEDURE — 36415 COLL VENOUS BLD VENIPUNCTURE: CPT | Performed by: STUDENT IN AN ORGANIZED HEALTH CARE EDUCATION/TRAINING PROGRAM

## 2024-09-28 RX ORDER — LISINOPRIL 10 MG/1
10 TABLET ORAL DAILY
Status: DISCONTINUED | OUTPATIENT
Start: 2024-09-28 | End: 2024-09-28 | Stop reason: HOSPADM

## 2024-09-28 RX ORDER — FUROSEMIDE 40 MG
40 TABLET ORAL DAILY
Qty: 30 TABLET | Refills: 0 | Status: SHIPPED | OUTPATIENT
Start: 2024-09-29 | End: 2024-09-28

## 2024-09-28 RX ORDER — LISINOPRIL 10 MG/1
10 TABLET ORAL DAILY
Qty: 30 TABLET | Refills: 0 | Status: SHIPPED | OUTPATIENT
Start: 2024-09-29 | End: 2024-09-28

## 2024-09-28 RX ORDER — FUROSEMIDE 40 MG
40 TABLET ORAL DAILY
Qty: 30 TABLET | Refills: 0 | Status: SHIPPED | OUTPATIENT
Start: 2024-09-29

## 2024-09-28 RX ORDER — LISINOPRIL 20 MG/1
20 TABLET ORAL DAILY
Status: DISCONTINUED | OUTPATIENT
Start: 2024-09-28 | End: 2024-09-28

## 2024-09-28 RX ORDER — LISINOPRIL 10 MG/1
10 TABLET ORAL DAILY
Qty: 30 TABLET | Refills: 0 | Status: SHIPPED | OUTPATIENT
Start: 2024-09-29

## 2024-09-28 RX ORDER — FUROSEMIDE 40 MG
40 TABLET ORAL DAILY
Status: DISCONTINUED | OUTPATIENT
Start: 2024-09-28 | End: 2024-09-28 | Stop reason: HOSPADM

## 2024-09-28 RX ADMIN — LISINOPRIL 10 MG: 10 TABLET ORAL at 10:03

## 2024-09-28 RX ADMIN — FUROSEMIDE 40 MG: 40 TABLET ORAL at 10:03

## 2024-09-28 RX ADMIN — PANTOPRAZOLE SODIUM 40 MG: 40 TABLET, DELAYED RELEASE ORAL at 08:12

## 2024-09-28 RX ADMIN — TAMSULOSIN HYDROCHLORIDE 0.4 MG: 0.4 CAPSULE ORAL at 08:12

## 2024-09-28 ASSESSMENT — ACTIVITIES OF DAILY LIVING (ADL)
ADLS_ACUITY_SCORE: 32

## 2024-09-28 NOTE — PROGRESS NOTES
Patient has been assessed for Home Oxygen needs.     Pulse oximetry (SpO2) and Oxygen flow readings:    SpO2 =  92% on room air at rest while awake.    SpO2 improved to  95% on   liters/minute at rest.    SpO2 =  86% on room air during activity/with exercise.    *SpO2 improved to  93% on   liters/minute during activity/with exercise.

## 2024-09-28 NOTE — DISCHARGE SUMMARY
"[facility]  Hospitalist Discharge Summary      Date of Admission:  9/24/2024  Date of Discharge:  9/28/2024  Discharging Provider: Dariusz Augustin MD  Discharge Service: Hospitalist Service    Discharge Diagnoses   # Acute respiratory failure with hypoxia and mild hypercapnia, improved  # HFrEF   # Ischemic cardiomyopathy   # Low suspicion Pneumonia of both lower lobes due to infectious organism  # Anemia, acute on chronic, stable  # Chronic atrial fibrillation (H) with slow ventricular response / asymptomatic bradycardia  # Chronic kidney disease   # Hypertension   # Carey's esophagus  # Coronary atherosclerosis S/P CABG x2   # Hyperlipidemia LDL goal <100  # Hx of Abdominal aortic aneurysm s/p endovascular repair (2005)  # Former smoker    Clinically Significant Risk Factors     # Overweight: Estimated body mass index is 27.23 kg/m  as calculated from the following:    Height as of this encounter: 1.854 m (6' 1\").    Weight as of this encounter: 93.6 kg (206 lb 6.4 oz).       Follow-ups Needed After Discharge   Follow-up Appointments     Follow-up and recommended labs and tests       Follow up with primary care provider, Kamari Rojas, within 7   days to evaluate medication change and for hospital follow- up.          Unresulted Labs Ordered in the Past 30 Days of this Admission       No orders found from 8/25/2024 to 9/25/2024.        These results will be followed up by Dariusz Augustin MD.    Discharge Disposition   Discharged to home  Condition at discharge: Stable    Hospital Course   Richard You is a 84 year old male with past medical history significant for CAD s/p CABG (1990s), ischemic cardiomyopathy HFrEF, permanent atrial fibrillation on xarelto, HTN, HLD, abdominal aortic aneurysm, Carey's esophagus, recent diagnosis of aspiration pneumonitis (09/13/24) at outpatient clinic, who presents on 9/24/2024 with shortness of breath, hypoxia, weight gain with " abdominal distension and bilateral lower extremity edema. Admitted with HF exacerbation. Improving but hospitalization continued with hypoxia.      # Acute respiratory failure with hypoxia and mild hypercapnia, improved  Noted to be hypoxic at cardiology clinic on day of admission with SpO2 85% on room air. No leukocytosis (5.3). Anemia (Hgb 9.4) similar to previous. Renal function appears at baseline. CRP mildly elevated (5.65). Procalcitonin wnl (0.05), Covid PCR negative. Serial troponin flat (45 ? 39). VBG revealed pH 7.32 / pCO2 62 / bicarb 32, no prior comparison. Alert and oriented x4 at time of admission, no hx of sleep apnea. EKG with rate controlled atrial fibrillation with nonspecific ST-T changes. CT PE study with trace pleural fluid bilaterally and bibasilar opacities, negative for PE. Patient appears nontoxic, hypervolemic on initial exam, develops mild dyspnea with minimal exertion such as moving in bed. Hemodynamically stable on 2L via NC. Likely secondary to acute heart failure exacerbation, low suspicion for pneumonia. Currently improved throughout admission though continues to require some supplemental oxygen with activities.  -Given persistent oxygen requirement with activity despite appearing euvolemic to discharged with supplemental oxygen with activity    # HFrEF   # Ischemic cardiomyopathy   Presents with weight gain, with abdominal distension and 2+ bilateral lower extremity edema, progressive over 1-2 months. Echocardiogram 07/25/2018 with EF 40-45%. BNP on admission 2444, no prior to compare. CT PE study with trace pleural fluid bilaterally and bibasilar opacities. Bibasilar crackles, faint wheezing in upper lobes on auscultation. Appears hypervolemic. TTE this admission with EF 45-50% with some apical and septal hypokinesis noted. IVC noted to be over 2.1 cm with less than 5-% respiratory collapse. Gradually improving but still requiring oxygen with activity.   -Continued lisinopril but  at a lower dose than PTA  -Transition from hydrochlorothiazide to furosemide 40 daily at discharge  -With bradycardia throughout admission did not start beta-blocker     # Low suspicion Pneumonia of both lower lobes due to infectious organism  Pt has had nonproductive cough x 3 months, gradual onset. Denies fevers, chills, sick contacts, acute URI sx. No leukocytosis (5.3), procalcitonin wnl (0.05), covid PCR negative. Aside from hypoxia, VS reassuring, patient is afebrile. Initially started on ceftriaxone and azithromycin but discontinued with low suspicion for infection.      # Anemia, acute on chronic, stable  On admission Hgb 9.4. MCV 83. On 09/13/24 Iron 27, , Iron sat index 7, Ferratin 44. Thought to be secondary to CKD vs GI losses, has a hx of Carey's disease. BUN elevated (40.7). No signs/sx of acute bleeding.   Most recent endoscopy 07/12/2018 revealed Normal nasopharynx and oropharynx. Esophageal mucosal changes consistent with short-segment Carey's esophagus. Biopsied. Gastritis. Normal examined duodenum.   - Consider hemoglobin recheck outpatient  - Stopping PTA ASA 81 given on rivaroxaban       # Chronic atrial fibrillation (H) with slow ventricular response / asymptomatic bradycardia  Bradycardia noted during EGD 07/2018, and EKG at that time revealed atrial fibrillation with no clear P waves. Atrial fibrillation confirmed with 30-day event monitor which showed HR as low as 30 bpm when sleeping and 50-60 bpm while awake. HR 50-70bpm in the ED. Denies syncope or presyncope, palpitations.   - Continued pta xarelto      # Chronic kidney disease   Creatinine 1.27 on admission, GFR 55. Similar to recent baseline creatinine 1.22 and GFR 58 on 04/12/24. Cr creeping but continued diuresis today.   -Decreased PTA lisinopril  -Consider BMP rechecked outpatient     # Hypertension   Managed prior to admission with amlodipine 5mg daily, lisinopril 40mg daily, hydrochlorothiazide 25mg daily. BP stable,  well controlled on admission.     -Discontinued amlodipine and decrease lisinopril to 10 mg daily  -Switched hydrochlorothiazide for furosemide 40mg daily    # Carey's esophagus  Managed prior to admission with omeprazole 40mg daily. Declined scoping at clinic visit 07/31/24, was suggested as part of workup for anemia. States he has been asymptomatic for many years. No new or changing symptoms.   - Continued PTA omeprazole      # Coronary atherosclerosis S/P CABG x2   # Hyperlipidemia LDL goal <100  Managed prior to admission with atorvastatin 80mg daily; continued      # Hx of Abdominal aortic aneurysm s/p endovascular repair (2005)  Most recently visualized on US aorta/Ivc/iliac 06/05/24 which revealed Postoperative changes EVAR with widely patent aortobiiliac endograft. The excluded infrarenal abdominal aortic aneurysm sac measures 6.3 x 6.3 cm sonographically on today's exam, similar to prior ultrasound 5/26/2020. The AAA sac measured 4.6 x 5.2 cm on more recent CTA 6/2/2023. This discrepancy in measurements is favored to represent differences in technique. Otherwise, no evidence of endoleak on Doppler exam.     # Former smoker  Quit smoking cigarettes 28 years ago. 5 pack-year history.     Consultations This Hospital Stay   OCCUPATIONAL THERAPY ADULT IP CONSULT  SPIRITUAL HEALTH SERVICES IP CONSULT    Code Status   No CPR- Do NOT Intubate    Time Spent on this Encounter   I, Dariusz Augustin MD, personally saw the patient today and spent greater than 30 minutes discharging this patient.       Dariusz Augustin MD  [facility] SURGICAL  [address] [address]  Phone: [phone]  Fax: [phone]  ______________________________________________________________________    Physical Exam   Vital Signs: Temp: 97.8  F (36.6  C) Temp src: Oral BP: 130/66 Pulse: 58   Resp: 18 SpO2: 91 % O2 Device: None (Room air) Oxygen Delivery: 1 LPM  Weight: 206 lbs 6.4 oz  General  Appearance:  Awake and alert, sitting up in bed in no acute distress   Respiratory: Breathing easily on nasal canula, lungs clear to auscultation   Cardiovascular: Regular rate and rhythm, extremities warm and well perfused, Pitting edema in bilateral lower extremities continues to improve  GI: Abdomen soft, non-tender, and non-distended   Skin: No rashes or lesions   Other:  Appropriate mood and affect, no focal deficits appreciated       Primary Care Physician   Kamari Rojas    Discharge Orders      Reason for your hospital stay    You were hospitalized with low oxygen levels in your blood secondary to a heart failure exacerbation. Your symptoms improved with IV diuretic medicine and you are being discharged back to home with some changes to your medications. Given you persistent oxygen requirement with activity you are being discharged with supplemental oxygen.     Follow-up and recommended labs and tests     Follow up with primary care provider, Kamari Rojas, within 7 days to evaluate medication change and for hospital follow- up.     Activity    Your activity upon discharge: activity as tolerated     Oxygen Adult/Peds    Oxygen Documentation  I certify that this patient, Richard You has been under my care (or a nurse practitioner or physican's assistant working with me). This is the face-to-face encounter for oxygen medical necessity.      At the time of this encounter, I have reviewed the qualifying testing and have determined that supplemental oxygen is reasonable and necessary and is expected to improve the patient's condition in a home setting.         Patient has continued oxygen desaturation due to Chronic Heart Failure I50.    If portability is ordered, is the patient mobile within the home? yes    Was this visit performed as a telehealth visit: No     Diet    Follow this diet upon discharge: Regular adult diet       Significant Results and Procedures   Results for orders placed or  performed during the hospital encounter of 24   CT Chest Pulmonary Embolism w Contrast    Narrative    CT CHEST PULMONARY EMBOLISM WITH CONTRAST  2024 3:39 PM    CLINICAL HISTORY: Chest pain. Low saturations, leg swelling.    TECHNIQUE: CT angiogram chest during arterial phase injection IV  contrast. 2D and 3D MIP reconstructions were performed by the CT  technologist. Dose reduction techniques were used.     CONTRAST: 90 mL isovue 370    COMPARISON: None.    FINDINGS:  ANGIOGRAM CHEST: Pulmonary arteries are normal caliber and negative  for pulmonary emboli. Thoracic aorta is negative for dissection. No CT  evidence of right heart strain.    LUNGS AND PLEURA: Trace pleural fluid bilaterally. Bibasilar opacities  could represent infiltrate.    MEDIASTINUM/AXILLAE: Cardiomegaly. No aneurysm. No adenopathy. Tiny  hiatal hernia.    CORONARY ARTERY CALCIFICATIONS: Coronary artery bypass change.    UPPER ABDOMEN: No acute findings.    MUSCULOSKELETAL: No frankly destructive bony lesions.      Impression    IMPRESSION:  1.  No pulmonary embolism demonstrated.  2.  Question bibasilar infiltrates.    NOREEN WEISS MD         SYSTEM ID:  [id number]   Echocardiogram Complete     Value    LVEF  45-50%    Narrative    [id number]  [id number]  [id number]^GERARD^SLIME     [facility]  Echocardiography Laboratory  [address] [place] [address]     Name: EDWIGE BLANCO  MRN: [id number]  : 1940  Study Date: 2024 09:43 AM  Age: 84 yrs  Gender: Male  Patient Location: [facility]  Reason For Study: Heart Failure  Ordering Physician: SLIME GEE  Referring Physician: Kamari Rojas  Performed By: Letty King RDCS     BSA: 2.2 m2  Height: 73 in  Weight: 217 lb  HR: 59  BP: 122/59 mmHg  ______________________________________________________________________________  Procedure  Complete Portable Echo Adult. [employer] (NDC #[id number]) given  intravenously.  ______________________________________________________________________________  Interpretation Summary     Left ventricular systolic function is mildly reduced.The visual ejection  fraction is 45-50%.Apical and septal wall hypokinesia noted.  Mildly decreased right ventricular systolic function  The left atrium is severely dilated.  There is mild (1+) mitral regurgitation.There is mild (1+) tricuspid  regurgitation.  Pulmonary hypertension- RVSP 33 mm hg +RA.  IVC diameter >2.1 cm collapsing <50% with sniff suggests a high RA pressure  estimated at 15 mmHg or greater.     Echo dated 07/26 in 2018 noted LVEF of 40 to 45%  ______________________________________________________________________________  Left Ventricle  The left ventricle is normal in size. There is mild to moderate concentric  left ventricular hypertrophy. Diastolic Doppler findings (E/E' ratio and/or  other parameters) suggest left ventricular filling pressures are increased.  Left ventricular systolic function is mildly reduced. The visual ejection  fraction is 45-50%. Apical and septal wall hypokinesia noted.     Right Ventricle  The right ventricle is mildly dilated. Mildly decreased right ventricular  systolic function.     Atria  The left atrium is severely dilated. The right atrium is moderately dilated.  There is no color Doppler evidence of an atrial shunt.     Mitral Valve  There is mild mitral annular calcification. There is mild (1+) mitral  regurgitation.     Tricuspid Valve  There is mild (1+) tricuspid regurgitation. The right ventricular systolic  pressure is approximated at 33.4 mmHg plus the right atrial pressure.  Pulmonary hypertension.     Aortic Valve  Mild aortic valve sclerosis noted. There is trace aortic regurgitation. No  aortic stenosis is present.     Pulmonic Valve  There is no pulmonic valvular stenosis.     Vessels  The aortic root is normal size. 3.8 cm. Borderline dilated ascending aorta  measuring 3.9  cm. IVC diameter >2.1 cm collapsing <50% with sniff suggests a  high RA pressure estimated at 15 mmHg or greater.     Pericardium  There is no pericardial effusion.     Rhythm  The rhythm was atrial fibrillation.  ______________________________________________________________________________  MMode/2D Measurements & Calculations  IVSd: 1.4 cm     LVIDd: 5.4 cm  LVIDs: 3.5 cm  LVPWd: 1.4 cm  FS: 36.1 %  LV mass(C)d: 342.0 grams  LV mass(C)dI: 153.5 grams/m2  Ao root diam: 3.8 cm  LA dimension: 6.0 cm  asc Aorta Diam: 3.9 cm  LA/Ao: 1.6  Ao root diam index Ht(cm/m): 2.1  Ao root diam index BSA (cm/m2): 1.7  Asc Ao diam index BSA (cm/m2): 1.7  Asc Ao diam index Ht(cm/m): 2.1  EF Biplane: 53.4 %  LA Volume (BP): 179.0 ml     LA Volume Index (BP): 80.3 ml/m2  RWT: 0.53     Doppler Measurements & Calculations  MV E max carola: 147.4 cm/sec  TR max carola: 288.5 cm/sec  TR max P.4 mmHg  E/E' av.2  Lateral E/e': 9.5  Medial E/e': 28.9     ______________________________________________________________________________  Report approved by: Marixa Baca 2024 11:02 AM             Discharge Medications   Current Discharge Medication List        START taking these medications    Details   furosemide (LASIX) 40 MG tablet Take 1 tablet (40 mg) by mouth daily.  Qty: 30 tablet, Refills: 0    Associated Diagnoses: Congestive heart failure, unspecified HF chronicity, unspecified heart failure type (H); Acute respiratory failure with hypoxia and hypercapnia (H)      lisinopril (ZESTRIL) 10 MG tablet Take 1 tablet (10 mg) by mouth daily.  Qty: 30 tablet, Refills: 0    Associated Diagnoses: Congestive heart failure, unspecified HF chronicity, unspecified heart failure type (H)           CONTINUE these medications which have NOT CHANGED    Details   acetaminophen (TYLENOL 8 HOUR) 650 MG CR tablet Take 1,300 mg by mouth daily as needed for mild pain or fever (hip pain).      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      Multiple Vitamin (MULTI-VITAMIN DAILY PO) Take 1 tablet by mouth daily      Multiple Vitamins-Minerals (ICAPS PO) Take 1 tablet by mouth 2 times daily       omeprazole (PRILOSEC) 40 MG DR capsule Take 1 capsule (40 mg) by mouth daily  Qty: 90 capsule, Refills: 4    Associated Diagnoses: Carey's esophagus without dysplasia      rivaroxaban ANTICOAGULANT (XARELTO ANTICOAGULANT) 20 MG TABS tablet Take 1 tablet (20 mg) by mouth daily (with dinner)  Qty: 90 tablet, Refills: 4    Associated Diagnoses: Chronic atrial fibrillation (H)      tamsulosin (FLOMAX) 0.4 MG capsule Take 1 capsule (0.4 mg) by mouth daily.  Qty: 90 capsule, Refills: 4    Associated Diagnoses: Urinary incontinence, unspecified type           STOP taking these medications       amLODIPine (NORVASC) 5 MG tablet Comments:   Reason for Stopping:         aspirin 81 MG chewable tablet Comments:   Reason for Stopping:         lisinopril-hydrochlorothiazide (ZESTORETIC) 20-12.5 MG tablet Comments:   Reason for Stopping:             Allergies   No Known Allergies

## 2024-09-28 NOTE — PROGRESS NOTES
[facility] [facility] DISCHARGE NOTE    Patient discharged to home at 1:45 PM via wheel chair. Accompanied by spouse and daughter and staff. Discharge instructions reviewed with patient, spouse, and daughter, opportunity offered to ask questions. Prescriptions filled and sent with patient upon discharge. All belongings sent with patient.    Irene Handley RN

## 2024-09-30 ENCOUNTER — PATIENT OUTREACH (OUTPATIENT)
Dept: CARE COORDINATION | Facility: CLINIC | Age: 84
End: 2024-09-30
Payer: COMMERCIAL

## 2024-09-30 NOTE — PROGRESS NOTES
Clinic Care Coordination Contact  Transitions of Care Outreach    Chief Complaint   Patient presents with    Clinic Care Coordination - Post Hospital       Most Recent Admission Date: 9/24/2024   Most Recent Admission Diagnosis: Pneumonia of both lower lobes due to infectious organism - J18.9  Dyspnea, unspecified type - R06.00  Congestive heart failure, unspecified HF chronicity, unspecified heart failure type (H) - I50.9     Most Recent Discharge Date: 9/28/2024   Most Recent Discharge Diagnosis: Dyspnea, unspecified type - R06.00  Congestive heart failure, unspecified HF chronicity, unspecified heart failure type (H) - I50.9  Pneumonia of both lower lobes due to infectious organism - J18.9  Acute respiratory failure with hypoxia and hypercapnia (H) - J96.01, J96.02     Transitions of Care Assessment    Discharge Assessment  How are you doing now that you are home?: feeling better  How are your symptoms? (Red Flag symptoms escalate to triage hotline per guidelines): Improved  Do you know how to contact your clinic care team if you have future questions or changes to your health status? : Yes  Does the patient have their discharge instructions? : Yes  Does the patient have questions regarding their discharge instructions? : No  Were you started on any new medications or were there changes to any of your previous medications? : Yes  Does the patient have all of their medications?: Yes  Do you have questions regarding any of your medications? : No  Do you have all of your needed medical supplies or equipment (DME)?  (i.e. oxygen tank, CPAP, cane, etc.): Yes    Post-op ([facility] CTA Only)  If the patient had a surgery or procedure, do they have any questions for a nurse?: No         [facility] Explained and offered Care Coordination support to eligible patients: Yes    Patient accepted? No    Follow up Plan     Follow-up and recommended labs and tests      Follow up with primary care provider, Kamari Rojas, within 7  days to evaluate medication change and for hospital follow- up.     Future Appointments   Date Time Provider Department Center   10/2/2024  2:45 PM Dat Chavez MD [facility]   10/18/2024  4:20 PM Kamari Rojas MD [facility]       Outpatient Plan as outlined on AVS reviewed with patient.    For any urgent concerns, please contact our 24 hour nurse triage line: [phone]     Ирина, W  [phone]  [facility]

## 2024-10-02 ENCOUNTER — VIRTUAL VISIT (OUTPATIENT)
Dept: CARDIOLOGY | Facility: CLINIC | Age: 84
End: 2024-10-02
Payer: COMMERCIAL

## 2024-10-02 DIAGNOSIS — I25.5 ISCHEMIC CARDIOMYOPATHY: Primary | ICD-10-CM

## 2024-10-02 PROCEDURE — 99215 OFFICE O/P EST HI 40 MIN: CPT | Mod: 95 | Performed by: INTERNAL MEDICINE

## 2024-10-02 NOTE — PROGRESS NOTES
Richard is a 84 year old who is being evaluated via a billable video visit.    How would you like to obtain your AVS? MyChart  If the video visit is dropped, the invitation should be resent by: Text to cell phone: [phone]  Will anyone else be joining your video visit? No    Video-Visit Details    Type of service:  Video Visit   Video End Time:3:31 PM  Originating Location (pt. Location): Home    Distant Location (provider location):  On-site  [facility]     Patient reported vitals:  BP:140/74  Heart rate: 58  Weight: 202.3 lb    Review Of Systems  Eyes: negative, glasses  Respiratory: No shortness of breath, dyspnea on exertion, cough, or hemoptysis  Cardiovascular: negative    Ramon Shin - Clinical Asst. 10/2/24    CARDIOLOGY CLINIC CONSULTATION    PRIMARY CARE PHYSICIAN:  Kamari Rojas    HISTORY OF PRESENT ILLNESS:  This is a very pleasant 84-year-old gentleman who is doing a follow-up virtual visit with me.  He was seen by me urgently in September 2024 for the first time.  Prior to that in 2019 he was being followed by one of my partners who retired from practice.  The patient has the following pertinent medical issues    The patient has a history of bypass surgery in the 1990s with LIMA to LAD and vein graft to the obtuse marginal.    Subsequent angiography has shown patent stents in 2018.    He has ischemic cardiomyopathy with an EF of 40 to 45% in the past.    Also has permanent atrial fibrillation with underlying bradycardia without symptoms.    Chronic anticoagulation for above   Hypertension hyperlipidemia and former history of smoking.  Carey's esophagus and anemia without significant bleeding    The patient was seen by me in September 2024 and at that time he complained of resting NYHA class IV heart failure symptoms.  He was desaturating in my clinic and I sent him to the emergency department.  He was noted to be markedly volume overloaded.  Echocardiogram  however showed stable LV function of around 45%, NT-proBNP was elevated, he underwent nice diuresis with about 15 pounds of weight loss.  He was discharged home on 40 mg of Lasix.  He is not on beta-blockers due to low heart rates in the setting of A-fib.  He is on 10 mg of lisinopril that was continued and hydrochlorothiazide was discontinued.  Lastly he is also on aspirin and Xarelto.  His aspirin was appropriately discontinued.    Today the patient is doing a virtual follow-up visit with me after being discharged from the hospital.  He says he feels markedly better.  Heart failure symptoms have essentially resolved.  He is now NYHA class II.  Denies edema.  His weight is stable around 206 pounds now.  No syncope presyncope.  He is tolerating Lasix 40 mg daily well.     PAST MEDICAL HISTORY:  Past Medical History:   Diagnosis Date    Abdominal aneurysm without mention of rupture 06/24/2005    Repaired with percutaneous stent 8/05   Has annual CT scan to monitor stent placement. Last done in sept 2012 and stable     Atrial fibrillation (H)     Basal cell carcinoma     CAD 04/05/2005 12/05/12 Patient denies any recent chest pain or NTG use.  Patient is taking meds regularly and denies significant side effects. Last stress test many years ago, but bikes 30-40 mi/day without symptoms      Colon polyp, hyperplastic 05/14/2013    Hyperplastic  Polyp on colonoscopy 5-2013. 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    Squamous cell carcinoma of skin, unspecified        MEDICATIONS:  Current Outpatient Medications   Medication Sig Dispense Refill     acetaminophen (TYLENOL 8 HOUR) 650 MG CR tablet Take 1,300 mg by mouth daily as needed for mild pain or fever (hip pain).      atorvastatin (LIPITOR) 80 MG tablet Take 1 tablet (80 mg) by mouth daily 90 tablet 4    furosemide (LASIX) 40 MG tablet Take 1 tablet (40 mg) by mouth daily. 30 tablet 0    lisinopril (ZESTRIL) 10 MG tablet Take 1 tablet (10 mg) by mouth daily. 30 tablet 0    Multiple Vitamin (MULTI-VITAMIN DAILY PO) Take 1 tablet by mouth daily      Multiple Vitamins-Minerals (ICAPS PO) Take 1 tablet by mouth 2 times daily       omeprazole (PRILOSEC) 40 MG DR capsule Take 1 capsule (40 mg) by mouth daily 90 capsule 4    rivaroxaban ANTICOAGULANT (XARELTO ANTICOAGULANT) 20 MG TABS tablet Take 1 tablet (20 mg) by mouth daily (with dinner) 90 tablet 4    tamsulosin (FLOMAX) 0.4 MG capsule Take 1 capsule (0.4 mg) by mouth daily. 90 capsule 4     No current facility-administered medications for this visit.       SOCIAL HISTORY:  I have reviewed this patient's social history and updated it with pertinent information if needed. Richard You  reports that he quit smoking about 28 years ago. His smoking use included cigarettes. He started smoking about 38 years ago. He has a 5 pack-year smoking history. He has never used smokeless tobacco. He reports that he does not drink alcohol and does not use drugs.    PHYSICAL EXAM:     0 lbs 0 oz    This was a virtual visit and as such limited physical exam possible    Constitutional: alert, no distress  Respiratory: Non labored  Cardiovascular: No significant edema reported  Neuropsychiatric: appropriate affact    ASSESSMENT and RECOMMENDATIONS:  Recent admission for acute decompensated heart failure with midrange LV function.  Agree with current medical therapy including lisinopril and Lasix.  If he gets dehydrated or loses weight by 5 more pounds I have told him to cut the Lasix down to 20 mg daily.  Labs in 7 to 10 days are recommended.  If creatinine is stable,  recommend initiation of Jardiance 10 mg daily.  No need for aspirin while on Xarelto therapy.  Although he is anemic he denies any bleeding problems.  We will keep an eye on his hemoglobin and if it declines further in the future, this patient could be considered for left atrial appendage occlusion.  Follow-up in 3 months with KRISTINE or myself.  We discussed consideration for CardioMEMS rationale and utility risk and benefits.  They are open to the idea and will read about it.  They will reach out to our nursing team if they decide to proceed.    It was a pleasure seeing this patient in clinic today. Please do not hesitate to contact me with any future questions.     CONSTANTINE Arnold, [facility]  Cardiology - [facility]  October 2, 2024    Total visit time including chart review documentation and virtual care today was 44 minutes    This note was completed in part using dictation via the Dragon voice recognition software. Some word and grammatical errors may occur and must be interpreted in the appropriate clinical context.  If there are any questions pertaining to this issue, please contact me for further clarification.

## 2024-10-02 NOTE — LETTER
10/2/2024    Kamari Rojas MD  [address]  [facility] [address]    RE: Richard LYNN Kenyatta       Dear Colleague,     I had the [facility] of seeing [facility] in the [facility].  Richard is a 84 year old who is being evaluated via a billable video visit.    How would you like to obtain your AVS? MyChart  If the video visit is dropped, the invitation should be resent by: Text to cell phone: [phone]  Will anyone else be joining your video visit? No    Video-Visit Details    Type of service:  Video Visit   Video End Time:3:31 PM  Originating Location (pt. Location): Home    Distant Location (provider location):  On-site  Platform used for Video Visit: "[employer]     Patient reported vitals:  BP:140/74  Heart rate: 58  Weight: 202.3 lb    Review Of Systems  Eyes: negative, glasses  Respiratory: No shortness of breath, dyspnea on exertion, cough, or hemoptysis  Cardiovascular: negative    Ramon Kip - Clinical Asst. 10/2/24    CARDIOLOGY CLINIC CONSULTATION    PRIMARY CARE PHYSICIAN:  Kamari Rojas    HISTORY OF PRESENT ILLNESS:  This is a very pleasant 84-year-old gentleman who is doing a follow-up virtual visit with me.  He was seen by me urgently in September 2024 for the first time.  Prior to that in 2019 he was being followed by one of my partners who retired from practice.  The patient has the following pertinent medical issues    The patient has a history of bypass surgery in the 1990s with LIMA to LAD and vein graft to the obtuse marginal.    Subsequent angiography has shown patent stents in 2018.    He has ischemic cardiomyopathy with an EF of 40 to 45% in the past.    Also has permanent atrial fibrillation with underlying bradycardia without symptoms.    Chronic anticoagulation for above   Hypertension hyperlipidemia and former history of smoking.  Carey's esophagus and anemia without significant bleeding    The patient was seen by me in September 2024 and at that  time he complained of resting NYHA class IV heart failure symptoms.  He was desaturating in my clinic and I sent him to the emergency department.  He was noted to be markedly volume overloaded.  Echocardiogram however showed stable LV function of around 45%, NT-proBNP was elevated, he underwent nice diuresis with about 15 pounds of weight loss.  He was discharged home on 40 mg of Lasix.  He is not on beta-blockers due to low heart rates in the setting of A-fib.  He is on 10 mg of lisinopril that was continued and hydrochlorothiazide was discontinued.  Lastly he is also on aspirin and Xarelto.  His aspirin was appropriately discontinued.    Today the patient is doing a virtual follow-up visit with me after being discharged from the hospital.  He says he feels markedly better.  Heart failure symptoms have essentially resolved.  He is now NYHA class II.  Denies edema.  His weight is stable around 206 pounds now.  No syncope presyncope.  He is tolerating Lasix 40 mg daily well.     PAST MEDICAL HISTORY:  Past Medical History:   Diagnosis Date     Abdominal aneurysm without mention of rupture 06/24/2005    Repaired with percutaneous stent 8/05   Has annual CT scan to monitor stent placement. Last done in sept 2012 and stable      Atrial fibrillation (H)      Basal cell carcinoma      CAD 04/05/2005 12/05/12 Patient denies any recent chest pain or NTG use.  Patient is taking meds regularly and denies significant side effects. Last stress test many years ago, but bikes 30-40 mi/day without symptoms       Colon polyp, hyperplastic 05/14/2013    Hyperplastic  Polyp on colonoscopy 5-2013. Recommend that the next colonoscopy be in 5 years because of family history      Family history of tremor 07/14/2014    father      Former smoker 07/14/2014     HTN, goal below 130/80 05/10/2012    History of AAA      Hyperlipidaemia      Hyperlipidemia LDL goal <100 10/31/2010     Hypertension      Macular degeneration, wet (H)  04/22/2013     Myocardial infarction (H)      S/P CABG (coronary artery bypass graft) 07/14/2014     Squamous cell carcinoma of skin, unspecified        MEDICATIONS:  Current Outpatient Medications   Medication Sig Dispense Refill     acetaminophen (TYLENOL 8 HOUR) 650 MG CR tablet Take 1,300 mg by mouth daily as needed for mild pain or fever (hip pain).       atorvastatin (LIPITOR) 80 MG tablet Take 1 tablet (80 mg) by mouth daily 90 tablet 4     furosemide (LASIX) 40 MG tablet Take 1 tablet (40 mg) by mouth daily. 30 tablet 0     lisinopril (ZESTRIL) 10 MG tablet Take 1 tablet (10 mg) by mouth daily. 30 tablet 0     Multiple Vitamin (MULTI-VITAMIN DAILY PO) Take 1 tablet by mouth daily       Multiple Vitamins-Minerals (ICAPS PO) Take 1 tablet by mouth 2 times daily        omeprazole (PRILOSEC) 40 MG DR capsule Take 1 capsule (40 mg) by mouth daily 90 capsule 4     rivaroxaban ANTICOAGULANT (XARELTO ANTICOAGULANT) 20 MG TABS tablet Take 1 tablet (20 mg) by mouth daily (with dinner) 90 tablet 4     tamsulosin (FLOMAX) 0.4 MG capsule Take 1 capsule (0.4 mg) by mouth daily. 90 capsule 4     No current facility-administered medications for this visit.       SOCIAL HISTORY:  I have reviewed this patient's social history and updated it with pertinent information if needed. Richard You  reports that he quit smoking about 28 years ago. His smoking use included cigarettes. He started smoking about 38 years ago. He has a 5 pack-year smoking history. He has never used smokeless tobacco. He reports that he does not drink alcohol and does not use drugs.    PHYSICAL EXAM:     0 lbs 0 oz    This was a virtual visit and as such limited physical exam possible    Constitutional: alert, no distress  Respiratory: Non labored  Cardiovascular: No significant edema reported  Neuropsychiatric: appropriate affact    ASSESSMENT and RECOMMENDATIONS:  Recent admission for acute decompensated heart failure with midrange LV function.  Agree  with current medical therapy including lisinopril and Lasix.  If he gets dehydrated or loses weight by 5 more pounds I have told him to cut the Lasix down to 20 mg daily.  Labs in 7 to 10 days are recommended.  If creatinine is stable, recommend initiation of Jardiance 10 mg daily.  No need for aspirin while on Xarelto therapy.  Although he is anemic he denies any bleeding problems.  We will keep an eye on his hemoglobin and if it declines further in the future, this patient could be considered for left atrial appendage occlusion.  Follow-up in 3 months with KRISTINE or myself.  We discussed consideration for CardioMEMS rationale and utility risk and benefits.  They are open to the idea and will read about it.  They will reach out to our nursing team if they decide to proceed.    It was a pleasure seeing this patient in clinic today. Please do not hesitate to contact me with any future questions.     CONSTANTINE Arnold, [facility]  Cardiology - [facility]  October 2, 2024    Total visit time including chart review documentation and virtual care today was 44 minutes    This note was completed in part using dictation via the Dragon voice recognition software. Some word and grammatical errors may occur and must be interpreted in the appropriate clinical context.  If there are any questions pertaining to this issue, please contact me for further clarification.      Thank you for allowing me to participate in the care of your patient.      Sincerely,     Dat Chavez MD     [facility]  cc:   Dat Chavez MD  [address]  [place] [address]

## 2024-10-07 ENCOUNTER — TRANSFERRED RECORDS (OUTPATIENT)
Dept: HEALTH INFORMATION MANAGEMENT | Facility: CLINIC | Age: 84
End: 2024-10-07
Payer: COMMERCIAL

## 2024-10-11 ENCOUNTER — OFFICE VISIT (OUTPATIENT)
Dept: FAMILY MEDICINE | Facility: CLINIC | Age: 84
End: 2024-10-11
Payer: COMMERCIAL

## 2024-10-11 ENCOUNTER — MYC MEDICAL ADVICE (OUTPATIENT)
Dept: FAMILY MEDICINE | Facility: CLINIC | Age: 84
End: 2024-10-11

## 2024-10-11 VITALS
SYSTOLIC BLOOD PRESSURE: 162 MMHG | RESPIRATION RATE: 18 BRPM | OXYGEN SATURATION: 95 % | HEIGHT: 71 IN | TEMPERATURE: 97.7 F | DIASTOLIC BLOOD PRESSURE: 100 MMHG | HEART RATE: 57 BPM | BODY MASS INDEX: 29.12 KG/M2 | WEIGHT: 208 LBS

## 2024-10-11 DIAGNOSIS — I50.41 ACUTE COMBINED SYSTOLIC AND DIASTOLIC CONGESTIVE HEART FAILURE (H): Primary | ICD-10-CM

## 2024-10-11 DIAGNOSIS — J96.02 ACUTE RESPIRATORY FAILURE WITH HYPOXIA AND HYPERCAPNIA (H): ICD-10-CM

## 2024-10-11 DIAGNOSIS — J96.01 ACUTE RESPIRATORY FAILURE WITH HYPOXIA AND HYPERCAPNIA (H): ICD-10-CM

## 2024-10-11 DIAGNOSIS — I50.9 CONGESTIVE HEART FAILURE, UNSPECIFIED HF CHRONICITY, UNSPECIFIED HEART FAILURE TYPE (H): ICD-10-CM

## 2024-10-11 LAB
ANION GAP SERPL CALCULATED.3IONS-SCNC: 10 MMOL/L (ref 7–15)
BASOPHILS # BLD AUTO: 0.1 10E3/UL (ref 0–0.2)
BASOPHILS NFR BLD AUTO: 1 %
BUN SERPL-MCNC: 28.9 MG/DL (ref 8–23)
CALCIUM SERPL-MCNC: 9.1 MG/DL (ref 8.8–10.4)
CHLORIDE SERPL-SCNC: 101 MMOL/L (ref 98–107)
CREAT SERPL-MCNC: 1.15 MG/DL (ref 0.67–1.17)
EGFRCR SERPLBLD CKD-EPI 2021: 62 ML/MIN/1.73M2
EOSINOPHIL # BLD AUTO: 0 10E3/UL (ref 0–0.7)
EOSINOPHIL NFR BLD AUTO: 0 %
ERYTHROCYTE [DISTWIDTH] IN BLOOD BY AUTOMATED COUNT: 19.6 % (ref 10–15)
FERRITIN SERPL-MCNC: 40 NG/ML (ref 31–409)
GLUCOSE SERPL-MCNC: 114 MG/DL (ref 70–99)
HCO3 SERPL-SCNC: 31 MMOL/L (ref 22–29)
HCT VFR BLD AUTO: 34.2 % (ref 40–53)
HGB BLD-MCNC: 9.7 G/DL (ref 13.3–17.7)
IMM GRANULOCYTES # BLD: 0 10E3/UL
IMM GRANULOCYTES NFR BLD: 0 %
IRON BINDING CAPACITY (ROCHE): 439 UG/DL (ref 240–430)
IRON SATN MFR SERPL: 6 % (ref 15–46)
IRON SERPL-MCNC: 25 UG/DL (ref 61–157)
LYMPHOCYTES # BLD AUTO: 0.4 10E3/UL (ref 0.8–5.3)
LYMPHOCYTES NFR BLD AUTO: 8 %
MCH RBC QN AUTO: 22.8 PG (ref 26.5–33)
MCHC RBC AUTO-ENTMCNC: 28.4 G/DL (ref 31.5–36.5)
MCV RBC AUTO: 80 FL (ref 78–100)
MONOCYTES # BLD AUTO: 0.1 10E3/UL (ref 0–1.3)
MONOCYTES NFR BLD AUTO: 3 %
NEUTROPHILS # BLD AUTO: 4.4 10E3/UL (ref 1.6–8.3)
NEUTROPHILS NFR BLD AUTO: 88 %
PLATELET # BLD AUTO: 251 10E3/UL (ref 150–450)
POTASSIUM SERPL-SCNC: 4.5 MMOL/L (ref 3.4–5.3)
RBC # BLD AUTO: 4.26 10E6/UL (ref 4.4–5.9)
SODIUM SERPL-SCNC: 142 MMOL/L (ref 135–145)
WBC # BLD AUTO: 5 10E3/UL (ref 4–11)

## 2024-10-11 PROCEDURE — 99214 OFFICE O/P EST MOD 30 MIN: CPT | Performed by: FAMILY MEDICINE

## 2024-10-11 PROCEDURE — 83540 ASSAY OF IRON: CPT | Performed by: FAMILY MEDICINE

## 2024-10-11 PROCEDURE — 80048 BASIC METABOLIC PNL TOTAL CA: CPT | Performed by: FAMILY MEDICINE

## 2024-10-11 PROCEDURE — 83550 IRON BINDING TEST: CPT | Performed by: FAMILY MEDICINE

## 2024-10-11 PROCEDURE — 85025 COMPLETE CBC W/AUTO DIFF WBC: CPT | Performed by: FAMILY MEDICINE

## 2024-10-11 PROCEDURE — 36415 COLL VENOUS BLD VENIPUNCTURE: CPT | Performed by: FAMILY MEDICINE

## 2024-10-11 PROCEDURE — 82728 ASSAY OF FERRITIN: CPT | Performed by: FAMILY MEDICINE

## 2024-10-11 ASSESSMENT — PAIN SCALES - GENERAL: PAINLEVEL: NO PAIN (0)

## 2024-10-11 NOTE — NURSING NOTE
"Initial BP (!) 162/100   Pulse 57   Temp 97.7  F (36.5  C) (Tympanic)   Resp 18   Ht 1.803 m (5' 11\")   Wt 94.3 kg (208 lb)   SpO2 95%   BMI 29.01 kg/m   Estimated body mass index is 29.01 kg/m  as calculated from the following:    Height as of this encounter: 1.803 m (5' 11\").    Weight as of this encounter: 94.3 kg (208 lb). .    "

## 2024-10-11 NOTE — PROGRESS NOTES
"  Assessment & Plan     Acute combined systolic and diastolic congestive heart failure (H)  Recent hospitalization for CHF. He has a history of CAD s/p CABG in the 1990's and has largely done well since then. Recent progressive anemia of unclear etiology (has declined colonoscopy/endoscopy). Well controlled a. Fib.  Had sudden increase in fluid retention and exertional dyspnea. Hospitalized and improved with diuresis. ECHO showed mild reduction in EF 45-50% improved from previous ECHO 2018. Similar apical hypokineses from previous ECHO 2018.  No obvious trigger for his acute exacerbation.  Had follow-up with cardiology. They had suggested CardioMEMS device and he has questions about this. Tolerating furosemide. Will have him follow-up with cardiology to discuss CardioMEMS in further detail. Will continue to monitor hemoglobin. Follow-up with me in 1 month for a re-check.  - Basic metabolic panel; Future  - CBC with Platelets & Differential; Future  - Ferritin; Future  - Iron & Iron Binding Capacity; Future  - Basic metabolic panel  - CBC with Platelets & Differential  - Ferritin  - Iron & Iron Binding Capacity        MED REC REQUIRED  Post Medication Reconciliation Status:  Discharge medications reconciled, continue medications without change  BMI  Estimated body mass index is 29.01 kg/m  as calculated from the following:    Height as of this encounter: 1.803 m (5' 11\").    Weight as of this encounter: 94.3 kg (208 lb).             Fercho Ramos is a 84 year old, presenting for the following health issues:  Hospital F/U        9/13/2024     9:56 AM   Additional Questions   Roomed by Irina WELLS CMA     [facility]        Hospital Follow-up Visit:    Hospital/Nursing Home/IP Rehab Facility: [facility]  Date of Admission: 9/24/2024  Date of Discharge: 9/28/2024  Reason(s) for Admission: Acute respiratory failure with hypoxia and hypercapnia (H)   Was the patient in the ICU or did the patient " "experience delirium during hospitalization?  No  Do you have any other stressors you would like to discuss with your provider? Health Concerns    Problems taking medications regularly:  None  Medication changes since discharge: started Medrol Dose Pack  Problems adhering to non-medication therapy:  None    Summary of hospitalization:  [facility] discharge summary reviewed  Diagnostic Tests/Treatments reviewed.  Follow up needed: As per A&P.  Other Healthcare Providers Involved in Patient s Care:         Specialist appointment - Cardiology.  Update since discharge: improved.         Plan of care communicated with patient and family         ROS:   Constitutional, neuro, ENT, endocrine, pulmonary, cardiac, gastrointestinal, genitourinary, musculoskeletal, integument and psychiatric systems are negative, except as otherwise noted.       Objective    BP (!) 162/100   Pulse 57   Temp 97.7  F (36.5  C) (Tympanic)   Resp 18   Ht 1.803 m (5' 11\")   Wt 94.3 kg (208 lb)   SpO2 95%   BMI 29.01 kg/m    Body mass index is 29.01 kg/m .  Physical Exam   GENERAL: Pleasant, well appearing male.  HEENT: PERRL, EOMI.  NECK: supple, no thyromegaly or thyroid masses, no lymphadenopathy.  CV: irr irr, no murmurs, rubs or gallops.  LUNGS: Clear to auscultation bilaterally, normal effort.  ABDOMEN: Soft, non-distended, non-tender.  No hepatosplenomegaly or palpable masses.    SKIN: warm and dry without obvious rashes.   EXTREMITIES: trace bilateral pedal edema, normal pulses.             Signed Electronically by: Kamari Rojas MD    "

## 2024-10-11 NOTE — TELEPHONE ENCOUNTER
Dr. Rojas, please see my chart message    Noted office visit with you today    Requesting refills    pended    Demetra Givens RN on 10/11/2024 at 2:39 PM

## 2024-10-14 RX ORDER — FUROSEMIDE 40 MG/1
40 TABLET ORAL DAILY
Qty: 90 TABLET | Refills: 1 | Status: SHIPPED | OUTPATIENT
Start: 2024-10-14

## 2024-10-14 RX ORDER — LISINOPRIL 10 MG/1
10 TABLET ORAL DAILY
Qty: 90 TABLET | Refills: 1 | Status: SHIPPED | OUTPATIENT
Start: 2024-10-14

## 2024-10-16 DIAGNOSIS — I25.5 ISCHEMIC CARDIOMYOPATHY: Primary | ICD-10-CM

## 2024-11-06 ENCOUNTER — OFFICE VISIT (OUTPATIENT)
Dept: FAMILY MEDICINE | Facility: CLINIC | Age: 84
End: 2024-11-06
Payer: COMMERCIAL

## 2024-11-06 VITALS
TEMPERATURE: 97.4 F | BODY MASS INDEX: 27.5 KG/M2 | DIASTOLIC BLOOD PRESSURE: 74 MMHG | OXYGEN SATURATION: 92 % | SYSTOLIC BLOOD PRESSURE: 122 MMHG | WEIGHT: 203 LBS | HEIGHT: 72 IN | HEART RATE: 58 BPM | RESPIRATION RATE: 18 BRPM

## 2024-11-06 DIAGNOSIS — I50.9 CONGESTIVE HEART FAILURE, UNSPECIFIED HF CHRONICITY, UNSPECIFIED HEART FAILURE TYPE (H): ICD-10-CM

## 2024-11-06 DIAGNOSIS — E61.1 IRON DEFICIENCY: Primary | ICD-10-CM

## 2024-11-06 NOTE — PROGRESS NOTES
{PROVIDER CHARTING PREFERENCE:031319}    Fercho Ramos is a 84 year old, presenting for the following health issues:  Heart Problem        11/6/2024     9:52 AM   Additional Questions   Roomed by Ellen SAINI     Heart Problem         Atrial Fibrillation Follow-up    Symptoms: no recent chest pain, significant palpitations, dizziness/lightheadedness, dyspnea, or increased peripheral edema.  Stroke prevention: DOAC (Eliquis, Xarelto, Pradaxa)        9/28/2024    12:02 AM 9/28/2024    12:59 AM 9/28/2024     4:33 AM 10/11/2024     8:57 AM 11/6/2024     9:58 AM   Date   Pulse 66 55 58 57 58     Current  {Provider  [id number] Calculator to review specific risk factors :099744}  How many servings of fruits and vegetables do you eat daily?  2-3  On average, how many sweetened beverages do you drink each day (Examples: soda, juice, sweet tea, etc.  Do NOT count diet or artificially sweetened beverages)?   1  How many days per week do you exercise enough to make your heart beat faster? 6  How many minutes a day do you exercise enough to make your heart beat faster? 30 - 60  How many days per week do you miss taking your medication? 0  {additonal problems for provider to add (Optional):207962}    {ROS Picklists (Optional):131811}      Objective    /74   Pulse 58   Temp 97.4  F (36.3  C) (Tympanic)   Resp 18   Ht 1.829 m (6')   Wt 92.1 kg (203 lb)   SpO2 92%   BMI 27.53 kg/m    Body mass index is 27.53 kg/m .  Physical Exam   {Exam List (Optional):917265}    {Diagnostic Test Results (Optional):016510}        Signed Electronically by: Kamari Rojas MD  {Email feedback regarding this note to [email or web address]   :070391}   neuro, ENT, endocrine, pulmonary, cardiac, gastrointestinal, genitourinary, musculoskeletal, integument and psychiatric systems are negative, except as otherwise noted.       Objective    /74   Pulse 58   Temp 97.4  F (36.3  C) (Tympanic)   Resp 18   Ht 1.829 m (6')   Wt 92.1 kg (203 lb)   SpO2 92%   BMI 27.53 kg/m    Body mass index is 27.53 kg/m .  Physical Exam               Signed Electronically by: Kamari Rojas MD

## 2024-11-06 NOTE — PATIENT INSTRUCTIONS
"Slow-FE 1 tab daily.    * * *  If you have a [employer] account results will appear in your MyChart.  If you do not have a MyChart account results will be mailed or called to you.    Lab and imaging results are released \"real time\" into My Chart.  This may mean that you see the results before I have a chance to review them. My Chart will alert you again when I review the results and enter comments.  Sometimes with imaging or labs there may be serious or unexpected results. Critical results are paged to me or the after hours on-call provider so that they can be reviewed immediately.  This is not true of non-critical abnormal results. Unfortunately, this means that it's possible you may be alerted of a serious finding before I have a chance to review it.  If you ever receive a result that you are concerned about and I have not already contacted you, please feel free to reach out to me or the care team so that you get the answers you need. You can call the care team at [phone] and say \"Care Team\".    Additionally, it is my goal that you understand the care plan discussed at your visit and that any questions you have are answered.  Please feel free to reach out if you need clarification or explanation of any information addressed at your office visit.      "

## 2024-11-25 ENCOUNTER — OFFICE VISIT (OUTPATIENT)
Dept: CARDIOLOGY | Facility: CLINIC | Age: 84
End: 2024-11-25
Attending: INTERNAL MEDICINE
Payer: COMMERCIAL

## 2024-11-25 ENCOUNTER — LAB (OUTPATIENT)
Dept: LAB | Facility: CLINIC | Age: 84
End: 2024-11-25
Payer: COMMERCIAL

## 2024-11-25 VITALS
HEIGHT: 72 IN | BODY MASS INDEX: 27.63 KG/M2 | RESPIRATION RATE: 24 BRPM | WEIGHT: 204 LBS | SYSTOLIC BLOOD PRESSURE: 129 MMHG | HEART RATE: 68 BPM | DIASTOLIC BLOOD PRESSURE: 82 MMHG | OXYGEN SATURATION: 97 %

## 2024-11-25 DIAGNOSIS — I25.10 CORONARY ARTERY DISEASE INVOLVING NATIVE CORONARY ARTERY OF NATIVE HEART WITHOUT ANGINA PECTORIS: ICD-10-CM

## 2024-11-25 DIAGNOSIS — I50.22 CHRONIC HFREF (HEART FAILURE WITH REDUCED EJECTION FRACTION) (H): ICD-10-CM

## 2024-11-25 DIAGNOSIS — I25.5 ISCHEMIC CARDIOMYOPATHY: ICD-10-CM

## 2024-11-25 DIAGNOSIS — I25.5 ISCHEMIC CARDIOMYOPATHY: Primary | ICD-10-CM

## 2024-11-25 LAB
ANION GAP SERPL CALCULATED.3IONS-SCNC: 13 MMOL/L (ref 7–15)
BUN SERPL-MCNC: 30.4 MG/DL (ref 8–23)
CALCIUM SERPL-MCNC: 10 MG/DL (ref 8.8–10.4)
CHLORIDE SERPL-SCNC: 104 MMOL/L (ref 98–107)
CREAT SERPL-MCNC: 1.21 MG/DL (ref 0.67–1.17)
EGFRCR SERPLBLD CKD-EPI 2021: 58 ML/MIN/1.73M2
GLUCOSE SERPL-MCNC: 132 MG/DL (ref 70–99)
HCO3 SERPL-SCNC: 27 MMOL/L (ref 22–29)
POTASSIUM SERPL-SCNC: 5.1 MMOL/L (ref 3.4–5.3)
SODIUM SERPL-SCNC: 144 MMOL/L (ref 135–145)

## 2024-11-25 PROCEDURE — 99215 OFFICE O/P EST HI 40 MIN: CPT | Performed by: NURSE PRACTITIONER

## 2024-11-25 PROCEDURE — 36415 COLL VENOUS BLD VENIPUNCTURE: CPT | Performed by: INTERNAL MEDICINE

## 2024-11-25 PROCEDURE — 80048 BASIC METABOLIC PNL TOTAL CA: CPT | Performed by: INTERNAL MEDICINE

## 2024-11-25 NOTE — LETTER
11/25/2024    Kamari Rojas MD  [address]  [facility] [address]    RE: Richard Hernandezon       Dear Colleague,     I had the pleasure of seeing Richard You in the [facility].    Cardiology Clinic Progress Note    C.O.R.E. Clinic Visit (Heart Failure Specialty Clinic)    Service Date: November 25, 2024  Primary Cardiology Team: Dr. Chavez    HPI:   I had the pleasure of meeting Richard You in the clinic today. He is a very pleasant 84 year old male with a past medical history notable for the following:  Coronary artery disease status post CABG in the 1990s with LIMA to LAD and vein graft to the obtuse marginal. Subsequent angiography has shown patent stents in 2018.    Ischemic cardiomyopathy with an EF of 40-45% in the past.    Permanent atrial fibrillation with underlying bradycardia without symptoms. Anticoagulated with xarelto.  Hypertension   Hyperlipidemia   Former history of smoking.  Carey's esophagus   Chronic anemia without significant bleeding    The patient was seen by Dr. Chavez in September 2024 and at that time he complained of resting NYHA class IV heart failure symptoms. He was hypoxic on room air in clinic and so he was sent to the emergency department. He was noted to be markedly volume overloaded. Echocardiogram however showed stable LV function with EF around 45%. NT-proBNP was elevated at 2,444, and he underwent nice diuresis with about 15 pounds of weight loss. He was discharged home on Lasix 40 mg once daily. He is not on beta-blockers due to low heart rates in the setting of A.Fib. He is on lisinopril 10 mg daily that was continued, and hydrochlorothiazide was discontinued.    Option of considering CardioMEMs device implant and adding SGLT2 inhibitor therapy in follow up was mentioned at the last visit.    Today, Mr. You presents to the clinic in routine follow up. He is accompanied by his wife Soledad who is a retired RN. He tells me has been  feeling well from a cardiac standpoint. He had been walking several days a week this fall, and now has been going to the gym and exercising regularly several days a week since the weather got colder. He has been tolerating his usual activity level well without dyspnea on exertion or chest pain. He has not had palpitations, dizziness, lightheadedness, lower extremity edema, orthopnea, or PND. His weight has been stable at home has been stable around 193-195 lbs on his home scale.     Labs were checked prior to the visit today for a basic metabolic panel but results were not back yet in time for review at the visit.     ASSESSMENT:  Coronary artery disease s/p CABG in the 1990s (LIMA-LAD, VG-OM) and prior PCI. Subsequent angiography has shown patent stents in 2018.  Ischemic cardiomyopathy with an EF of 45%  Chronic HFrEF secondary to #2. Appears euvolemic on exam today with NYHA class I symptoms currently. On lisinopril 10 mg once daily and Lasix 40 mg daily. Not on beta-blocker due to baseline bradycardia.  Permanent atrial fibrillation with underlying bradycardia without symptoms. Anticoagulated with xarelto.  Hypertension   Hyperlipidemia   Former history of smoking.  Carey's esophagus   Chronic anemia without significant bleeding    PLAN:  - Will await the results of the BMP today. We can update them on these results by [employer] message. If renal function and electrolytes are stable, would suggest adding Jardiance 10 mg once daily which the patient is agreeable to. Reviewed to continue to monitor blood pressure with starting on this and potential adverse effect of increased risk for genitourinary infections. Would suggest repeating a BMP in 1-2 weeks after starting on Jardiance.  - Discussed the option of CardioMEMS device placement. They wanted to read about this more and consider this further down the road.  - Recommend continuing to check daily weights and trying to stick to a low sodium diet (under 2,000  mg/day). Reviewed signs/symptoms of fluid retention to monitor for and when to contact the clinic.  - Follow-up with Wang in the [facility] clinic in about 3 months.    Thank you for the opportunity to participate in this patient's care. We would be happy to see him sooner if needed for any concerns in the meantime.    40 total minutes was spent today including chart review, precharting, history and exam, post visit documentation, and reviewing studies as outlined above.     Please kindly note that this document was completed in part using voice recognition software. Although reviewed after completion, some word substitutions and typographical errors may occur. Please contact me if clarification is needed.     MAMADOU Zavala, CNP   Nurse Practitioner  [facility]    Orders this Visit:  Orders Placed This Encounter   Procedures     Follow-Up with Cardiology- [facility]     No orders of the defined types were placed in this encounter.    There are no discontinued medications.  Encounter Diagnoses   Name Primary?     Ischemic cardiomyopathy Yes     Coronary artery disease involving native coronary artery of native heart without angina pectoris      Chronic HFrEF (heart failure with reduced ejection fraction) (H)        CURRENT MEDICATIONS:  Current Outpatient Medications   Medication Sig Dispense Refill     acetaminophen (TYLENOL 8 HOUR) 650 MG CR tablet Take 1,300 mg by mouth daily as needed for mild pain or fever (hip pain).       atorvastatin (LIPITOR) 80 MG tablet Take 1 tablet (80 mg) by mouth daily 90 tablet 4     furosemide (LASIX) 40 MG tablet Take 1 tablet (40 mg) by mouth daily. 90 tablet 1     lisinopril (ZESTRIL) 10 MG tablet Take 1 tablet (10 mg) by mouth daily. 90 tablet 1     Multiple Vitamin (MULTI-VITAMIN DAILY PO) Take 1 tablet by mouth daily       Multiple Vitamins-Minerals (ICAPS PO) Take 1 tablet by mouth 2 times daily        omeprazole (PRILOSEC) 40 MG DR capsule Take 1 capsule (40 mg) by  mouth daily 90 capsule 4     rivaroxaban ANTICOAGULANT (XARELTO ANTICOAGULANT) 20 MG TABS tablet Take 1 tablet (20 mg) by mouth daily (with dinner) 90 tablet 4     tamsulosin (FLOMAX) 0.4 MG capsule Take 1 capsule (0.4 mg) by mouth daily. 90 capsule 4       ALLERGIES  No Known Allergies    PAST MEDICAL, SURGICAL, FAMILY HISTORY:  History was reviewed and updated as needed, see medical record.    SOCIAL HISTORY:  Social History     Socioeconomic History     Marital status:      Spouse name: Soledad You     Number of children: 2     Years of education: 12     Highest education level: Not on file   Occupational History     Employer: RETIRED   Tobacco Use     Smoking status: Former     Current packs/day: 0.00     Average packs/day: 0.5 packs/day for 10.0 years (5.0 ttl pk-yrs)     Types: Cigarettes     Start date: 1985     Quit date: 1995     Years since quittin.0     Smokeless tobacco: Never   Vaping Use     Vaping status: Never Used   Substance and Sexual Activity     Alcohol use: No     Comment: beer occ, and wine     Drug use: No     Sexual activity: Not Currently     Partners: Female   Other Topics Concern     Parent/sibling w/ CABG, MI or angioplasty before 65F 55M? Yes   Social History Narrative        [employer]    Nonsmoker as of 2  Yrs ago    Drinks wine daily    Lives with wife soledad in [place]     Social Drivers of Health     Financial Resource Strain: Low Risk  (2024)    Financial Resource Strain      Within the past 12 months, have you or your family members you live with been unable to get utilities (heat, electricity) when it was really needed?: No   Food Insecurity: Low Risk  (2024)    Food Insecurity      Within the past 12 months, did you worry that your food would run out before you got money to buy more?: No      Within the past 12 months, did the food you bought just not last and you didn t have money to get more?: No   Transportation Needs: Low Risk   (9/24/2024)    Transportation Needs      Within the past 12 months, has lack of transportation kept you from medical appointments, getting your medicines, non-medical meetings or appointments, work, or from getting things that you need?: No   Physical Activity: Sufficiently Active (4/12/2024)    Exercise Vital Sign      Days of Exercise per Week: 7 days      Minutes of Exercise per Session: 40 min   Stress: No Stress Concern Present (4/12/2024)    [employer] [place] [employer] - Occupational Stress Questionnaire      Feeling of Stress : Not at all   Social Connections: Unknown (4/12/2024)    Social Connection and Isolation Panel [NHANES]      Frequency of Communication with Friends and Family: Not on file      Frequency of Social Gatherings with Friends and Family: Three times a week      Attends Bahai Services: Not on file      Active Member of Clubs or Organizations: Not on file      Attends Club or Organization Meetings: Not on file      Marital Status: Not on file   Interpersonal Safety: Low Risk  (9/24/2024)    Interpersonal Safety      Do you feel physically and emotionally safe where you currently live?: Yes      Within the past 12 months, have you been hit, slapped, kicked or otherwise physically hurt by someone?: No      Within the past 12 months, have you been humiliated or emotionally abused in other ways by your partner or ex-partner?: No   Housing Stability: Low Risk  (9/24/2024)    Housing Stability      Do you have housing? : Yes      Are you worried about losing your housing?: No       Review of Systems:  Focused cardiovascular and respiratory review of systems is negative other than the symptoms noted above in the HPI.     Physical Exam:  Vitals: /82 (BP Location: Right arm, Patient Position: Sitting, Cuff Size: Adult Large)   Pulse 68   Resp 24   Ht 1.829 m (6')   Wt 92.5 kg (204 lb)   SpO2 97%   BMI 27.67 kg/m     Wt Readings from Last 4 Encounters:   11/25/24 92.5 kg  (204 lb)   11/06/24 92.1 kg (203 lb)   10/11/24 94.3 kg (208 lb)   09/28/24 93.6 kg (206 lb 6.4 oz)     CONSTITUTIONAL: Alert and in no acute distress.  NECK: No JVD.  CARDIOVASCULAR:  Regular rate and rhythm. No murmur, rub or gallop.   RESPIRATORY: Breathing non-labored. Lungs are clear to auscultation with no wheezes or crackles bilaterally.  GASTROINTESTINAL: Abdomen non-distended.  EXTREMITIES: No lower extremity edema.   NEURO/PSYCHIATRIC: No gross focal deficits. Affect appropriate. Mentation normal.     Recent Lab Results:  LIPID RESULTS:  Lab Results   Component Value Date    CHOL 118 04/12/2024    CHOL 141 04/01/2021    HDL 43 04/12/2024    HDL 56 04/01/2021    LDL 59 04/12/2024    LDL 62 04/01/2021    TRIG 82 04/12/2024    TRIG 116 04/01/2021    CHOLHDLRATIO 2.9 04/14/2015     LIVER ENZYME RESULTS:  Lab Results   Component Value Date    AST 25 09/24/2024    AST 24 04/01/2021    ALT 18 09/24/2024    ALT 28 04/01/2021     CBC RESULTS:  Lab Results   Component Value Date    WBC 5.0 10/11/2024    WBC 5.2 07/25/2018    RBC 4.26 (L) 10/11/2024    RBC 4.27 (L) 07/25/2018    HGB 9.7 (L) 10/11/2024    HGB 13.6 07/25/2018    HCT 34.2 (L) 10/11/2024    HCT 40.6 07/25/2018    MCV 80 10/11/2024    MCV 95 07/25/2018    MCH 22.8 (L) 10/11/2024    MCH 31.9 07/25/2018    MCHC 28.4 (L) 10/11/2024    MCHC 33.5 07/25/2018    RDW 19.6 (H) 10/11/2024    RDW 12.8 07/25/2018     10/11/2024     07/25/2018     BMP RESULTS:  Lab Results   Component Value Date     11/25/2024     04/01/2021    POTASSIUM 5.1 11/25/2024    POTASSIUM 4.1 03/16/2022    POTASSIUM 4.9 04/01/2021    CHLORIDE 104 11/25/2024    CHLORIDE 107 03/16/2022    CHLORIDE 106 04/01/2021    CO2 27 11/25/2024    CO2 30 03/16/2022    CO2 28 04/01/2021    ANIONGAP 13 11/25/2024    ANIONGAP 4 03/16/2022    ANIONGAP 3 04/01/2021     (H) 11/25/2024    GLC 88 03/16/2022    GLC 91 04/01/2021    BUN 30.4 (H) 11/25/2024    BUN 27 03/16/2022     BUN 21 04/01/2021    CR 1.21 (H) 11/25/2024    CR 1.11 04/01/2021    GFRESTIMATED 58 (L) 11/25/2024    GFRESTIMATED >60 08/22/2024    GFRESTIMATED 64 06/08/2021    GFRESTBLACK 77 06/08/2021    MARLENE 10.0 11/25/2024    MARLENE 9.3 04/01/2021       CC  Dat Chavez MD  [address]  [place] [id number]      Thank you for allowing me to participate in the care of your patient.      Sincerely,     Marlo Chávez NP     [facility]  cc:   Dat Chavez MD  [address]  [place] [id number]

## 2024-11-25 NOTE — PATIENT INSTRUCTIONS
If you have questions or concerns please call the [facility] nurse team at [phone] or send a foodpanda / hellofood message (Mon-Fri 8am-4pm).  If you have concerns after hours, please call [phone], option 2 to speak with an on call Cardiologist.    Today's plan:  - We'll update you on the results of the labs today once the results come back.   - Consider starting jardiance 10 mg once daily. We would want to check non-fasting labs in about 1-2 weeks after starting this to recheck kidney function and electrolytes.   - CardioMEMs is the name of the device that we discussed today. Contact the clinic if you'd like to go ahead with this and we could arrange this.   - Continue to check your weights daily and try to stick to a low sodium diet (under 2,000 mg/day).  - Call the [facility] nurse team at the number listed above if you develop signs concerning for fluid retention, including weight gain of over 2 pounds in 1 day or over 5 pounds in 1 week, increasing shortness of breath, or increasing swelling in the legs or abdomen.  - Follow up with Wang in the clinic in about 3 months.    It was a pleasure to see you today!     MAMADOU Zavala, CNP  Nurse Practitioner  [facility]    Thank you for your visit with the [facility] today. [facility] stands for Cardiomyopathy [facility] and [facility].    The [facility] is a heart failure specialty clinic within the [facility] where you will work with specialized nurse practitioners, physician assistants, doctors, and registered nurses. They are dedicated to helping patients with heart failure to carefully adjust medications, receive education, and learn who and when to call if symptoms develop. They specialize in helping you better understand your condition, slow the progression of your disease, improve the length and quality of your life, help you detect future heart problems before they become life threatening, and avoid  hospitalizations.

## 2024-11-25 NOTE — PROGRESS NOTES
Cardiology Clinic Progress Note    [facility] Clinic Visit ([facility])    Service Date: November 25, 2024  Primary Cardiology Team: Dr. Chavez    HPI:   I had the pleasure of meeting Richard You in the clinic today. He is a very pleasant 84 year old male with a past medical history notable for the following:  Coronary artery disease status post CABG in the 1990s with LIMA to LAD and vein graft to the obtuse marginal. Subsequent angiography has shown patent stents in 2018.    Ischemic cardiomyopathy with an EF of 40-45% in the past.    Permanent atrial fibrillation with underlying bradycardia without symptoms. Anticoagulated with xarelto.  Hypertension   Hyperlipidemia   Former history of smoking.  Carey's esophagus   Chronic anemia without significant bleeding    The patient was seen by Dr. Chavez in September 2024 and at that time he complained of resting NYHA class IV heart failure symptoms. He was hypoxic on room air in clinic and so he was sent to the emergency department. He was noted to be markedly volume overloaded. Echocardiogram however showed stable LV function with EF around 45%. NT-proBNP was elevated at 2,444, and he underwent nice diuresis with about 15 pounds of weight loss. He was discharged home on Lasix 40 mg once daily. He is not on beta-blockers due to low heart rates in the setting of A.Fib. He is on lisinopril 10 mg daily that was continued, and hydrochlorothiazide was discontinued.    Option of considering CardioMEMs device implant and adding SGLT2 inhibitor therapy in follow up was mentioned at the last visit.    Today, Mr. You presents to the clinic in routine follow up. He is accompanied by his wife Soledad who is a retired RN. He tells me has been feeling well from a cardiac standpoint. He had been walking several days a week this fall, and now has been going to the gym and exercising regularly several days a week since the weather got colder. He has been  tolerating his usual activity level well without dyspnea on exertion or chest pain. He has not had palpitations, dizziness, lightheadedness, lower extremity edema, orthopnea, or PND. His weight has been stable at home has been stable around 193-195 lbs on his home scale.     Labs were checked prior to the visit today for a basic metabolic panel but results were not back yet in time for review at the visit.     ASSESSMENT:  Coronary artery disease s/p CABG in the 1990s (LIMA-LAD, VG-OM) and prior PCI. Subsequent angiography has shown patent stents in 2018.  Ischemic cardiomyopathy with an EF of 45%  Chronic HFrEF secondary to #2. Appears euvolemic on exam today with NYHA class I symptoms currently. On lisinopril 10 mg once daily and Lasix 40 mg daily. Not on beta-blocker due to baseline bradycardia.  Permanent atrial fibrillation with underlying bradycardia without symptoms. Anticoagulated with xarelto.  Hypertension   Hyperlipidemia   Former history of smoking.  Carey's esophagus   Chronic anemia without significant bleeding    PLAN:  - Will await the results of the BMP today. We can update them on these results by [employer] message. If renal function and electrolytes are stable, would suggest adding Jardiance 10 mg once daily which the patient is agreeable to. Reviewed to continue to monitor blood pressure with starting on this and potential adverse effect of increased risk for genitourinary infections. Would suggest repeating a BMP in 1-2 weeks after starting on Jardiance.  - Discussed the option of CardioMEMS device placement. They wanted to read about this more and consider this further down the road.  - Recommend continuing to check daily weights and trying to stick to a low sodium diet (under 2,000 mg/day). Reviewed signs/symptoms of fluid retention to monitor for and when to contact the clinic.  - Follow-up with Wang in the [facility] in about 3 months.    ADDENDUM:  - BMP results today are stable. Will  start Jardiance 10 mg once daily with repeat BMP in 1-2 weeks after starting this. Patient updated regarding lab results and plans via [employer] message.    Thank you for the opportunity to participate in this patient's care. We would be happy to see him sooner if needed for any concerns in the meantime.    40 total minutes was spent today including chart review, precharting, history and exam, post visit documentation, and reviewing studies as outlined above.     Please kindly note that this document was completed in part using voice recognition software. Although reviewed after completion, some word substitutions and typographical errors may occur. Please contact me if clarification is needed.     MAMADOU Zavala, CNP   Nurse Practitioner  [facility]    Orders this Visit:  Orders Placed This Encounter   Procedures    Follow-Up with Cardiology- [facility]     No orders of the defined types were placed in this encounter.    There are no discontinued medications.  Encounter Diagnoses   Name Primary?    Ischemic cardiomyopathy Yes    Coronary artery disease involving native coronary artery of native heart without angina pectoris     Chronic HFrEF (heart failure with reduced ejection fraction) (H)        CURRENT MEDICATIONS:  Current Outpatient Medications   Medication Sig Dispense Refill    acetaminophen (TYLENOL 8 HOUR) 650 MG CR tablet Take 1,300 mg by mouth daily as needed for mild pain or fever (hip pain).      atorvastatin (LIPITOR) 80 MG tablet Take 1 tablet (80 mg) by mouth daily 90 tablet 4    furosemide (LASIX) 40 MG tablet Take 1 tablet (40 mg) by mouth daily. 90 tablet 1    lisinopril (ZESTRIL) 10 MG tablet Take 1 tablet (10 mg) by mouth daily. 90 tablet 1    Multiple Vitamin (MULTI-VITAMIN DAILY PO) Take 1 tablet by mouth daily      Multiple Vitamins-Minerals (ICAPS PO) Take 1 tablet by mouth 2 times daily       omeprazole (PRILOSEC) 40 MG DR capsule Take 1 capsule (40 mg) by mouth daily 90  capsule 4    rivaroxaban ANTICOAGULANT (XARELTO ANTICOAGULANT) 20 MG TABS tablet Take 1 tablet (20 mg) by mouth daily (with dinner) 90 tablet 4    tamsulosin (FLOMAX) 0.4 MG capsule Take 1 capsule (0.4 mg) by mouth daily. 90 capsule 4       ALLERGIES  No Known Allergies    PAST MEDICAL, SURGICAL, FAMILY HISTORY:  History was reviewed and updated as needed, see medical record.    SOCIAL HISTORY:  Social History     Socioeconomic History    Marital status:      Spouse name: Soledad You    Number of children: 2    Years of education: 12    Highest education level: Not on file   Occupational History     Employer: RETIRED   Tobacco Use    Smoking status: Former     Current packs/day: 0.00     Average packs/day: 0.5 packs/day for 10.0 years (5.0 ttl pk-yrs)     Types: Cigarettes     Start date: 1985     Quit date: 1995     Years since quittin.0    Smokeless tobacco: Never   Vaping Use    Vaping status: Never Used   Substance and Sexual Activity    Alcohol use: No     Comment: beer occ, and wine    Drug use: No    Sexual activity: Not Currently     Partners: Female   Other Topics Concern    Parent/sibling w/ CABG, MI or angioplasty before 65F 55M? Yes   Social History Narrative        [place]    Nonsmoker as of 2  Yrs ago    Drinks wine daily    Lives with wife soledad in [place]     Social Drivers of Health     Financial Resource Strain: Low Risk  (2024)    Financial Resource Strain     Within the past 12 months, have you or your family members you live with been unable to get utilities (heat, electricity) when it was really needed?: No   Food Insecurity: Low Risk  (2024)    Food Insecurity     Within the past 12 months, did you worry that your food would run out before you got money to buy more?: No     Within the past 12 months, did the food you bought just not last and you didn t have money to get more?: No   Transportation Needs: Low Risk  (2024)    Transportation  Needs     Within the past 12 months, has lack of transportation kept you from medical appointments, getting your medicines, non-medical meetings or appointments, work, or from getting things that you need?: No   Physical Activity: Sufficiently Active (4/12/2024)    Exercise Vital Sign     Days of Exercise per Week: 7 days     Minutes of Exercise per Session: 40 min   Stress: No Stress Concern Present (4/12/2024)    [employer] [place] [employer] - Occupational Stress Questionnaire     Feeling of Stress : Not at all   Social Connections: Unknown (4/12/2024)    Social Connection and Isolation Panel [NHANES]     Frequency of Communication with Friends and Family: Not on file     Frequency of Social Gatherings with Friends and Family: Three times a week     Attends Baptism Services: Not on file     Active Member of Clubs or Organizations: Not on file     Attends Club or Organization Meetings: Not on file     Marital Status: Not on file   Interpersonal Safety: Low Risk  (9/24/2024)    Interpersonal Safety     Do you feel physically and emotionally safe where you currently live?: Yes     Within the past 12 months, have you been hit, slapped, kicked or otherwise physically hurt by someone?: No     Within the past 12 months, have you been humiliated or emotionally abused in other ways by your partner or ex-partner?: No   Housing Stability: Low Risk  (9/24/2024)    Housing Stability     Do you have housing? : Yes     Are you worried about losing your housing?: No       Review of Systems:  Focused cardiovascular and respiratory review of systems is negative other than the symptoms noted above in the HPI.     Physical Exam:  Vitals: /82 (BP Location: Right arm, Patient Position: Sitting, Cuff Size: Adult Large)   Pulse 68   Resp 24   Ht 1.829 m (6')   Wt 92.5 kg (204 lb)   SpO2 97%   BMI 27.67 kg/m     Wt Readings from Last 4 Encounters:   11/25/24 92.5 kg (204 lb)   11/06/24 92.1 kg (203 lb)   10/11/24  94.3 kg (208 lb)   09/28/24 93.6 kg (206 lb 6.4 oz)     CONSTITUTIONAL: Alert and in no acute distress.  NECK: No JVD.  CARDIOVASCULAR:  Regular rate and rhythm. No murmur, rub or gallop.   RESPIRATORY: Breathing non-labored. Lungs are clear to auscultation with no wheezes or crackles bilaterally.  GASTROINTESTINAL: Abdomen non-distended.  EXTREMITIES: No lower extremity edema.   NEURO/PSYCHIATRIC: No gross focal deficits. Affect appropriate. Mentation normal.     Recent Lab Results:  LIPID RESULTS:  Lab Results   Component Value Date    CHOL 118 04/12/2024    CHOL 141 04/01/2021    HDL 43 04/12/2024    HDL 56 04/01/2021    LDL 59 04/12/2024    LDL 62 04/01/2021    TRIG 82 04/12/2024    TRIG 116 04/01/2021    CHOLHDLRATIO 2.9 04/14/2015     LIVER ENZYME RESULTS:  Lab Results   Component Value Date    AST 25 09/24/2024    AST 24 04/01/2021    ALT 18 09/24/2024    ALT 28 04/01/2021     CBC RESULTS:  Lab Results   Component Value Date    WBC 5.0 10/11/2024    WBC 5.2 07/25/2018    RBC 4.26 (L) 10/11/2024    RBC 4.27 (L) 07/25/2018    HGB 9.7 (L) 10/11/2024    HGB 13.6 07/25/2018    HCT 34.2 (L) 10/11/2024    HCT 40.6 07/25/2018    MCV 80 10/11/2024    MCV 95 07/25/2018    MCH 22.8 (L) 10/11/2024    MCH 31.9 07/25/2018    MCHC 28.4 (L) 10/11/2024    MCHC 33.5 07/25/2018    RDW 19.6 (H) 10/11/2024    RDW 12.8 07/25/2018     10/11/2024     07/25/2018     BMP RESULTS:  Lab Results   Component Value Date     11/25/2024     04/01/2021    POTASSIUM 5.1 11/25/2024    POTASSIUM 4.1 03/16/2022    POTASSIUM 4.9 04/01/2021    CHLORIDE 104 11/25/2024    CHLORIDE 107 03/16/2022    CHLORIDE 106 04/01/2021    CO2 27 11/25/2024    CO2 30 03/16/2022    CO2 28 04/01/2021    ANIONGAP 13 11/25/2024    ANIONGAP 4 03/16/2022    ANIONGAP 3 04/01/2021     (H) 11/25/2024    GLC 88 03/16/2022    GLC 91 04/01/2021    BUN 30.4 (H) 11/25/2024    BUN 27 03/16/2022    BUN 21 04/01/2021    CR 1.21 (H) 11/25/2024    CR  1.11 04/01/2021    GFRESTIMATED 58 (L) 11/25/2024    GFRESTIMATED >60 08/22/2024    GFRESTIMATED 64 06/08/2021    GFRESTBLACK 77 06/08/2021    MARLENE 10.0 11/25/2024    MARLENE 9.3 04/01/2021       CC  Dat Chavez MD  [id number] [facility] [address]  [place] [id number]

## 2025-03-13 ENCOUNTER — PATIENT OUTREACH (OUTPATIENT)
Dept: CARE COORDINATION | Facility: CLINIC | Age: 85
End: 2025-03-13
Payer: COMMERCIAL

## 2025-03-17 ENCOUNTER — OFFICE VISIT (OUTPATIENT)
Dept: CARDIOLOGY | Facility: CLINIC | Age: 85
End: 2025-03-17
Attending: INTERNAL MEDICINE
Payer: COMMERCIAL

## 2025-03-17 VITALS
WEIGHT: 196.2 LBS | SYSTOLIC BLOOD PRESSURE: 135 MMHG | BODY MASS INDEX: 26.57 KG/M2 | HEART RATE: 52 BPM | RESPIRATION RATE: 18 BRPM | OXYGEN SATURATION: 96 % | DIASTOLIC BLOOD PRESSURE: 65 MMHG | HEIGHT: 72 IN

## 2025-03-17 DIAGNOSIS — I50.9 CONGESTIVE HEART FAILURE, UNSPECIFIED HF CHRONICITY, UNSPECIFIED HEART FAILURE TYPE (H): ICD-10-CM

## 2025-03-17 DIAGNOSIS — I25.10 CORONARY ARTERY DISEASE INVOLVING NATIVE CORONARY ARTERY OF NATIVE HEART WITHOUT ANGINA PECTORIS: ICD-10-CM

## 2025-03-17 DIAGNOSIS — I50.22 CHRONIC HFREF (HEART FAILURE WITH REDUCED EJECTION FRACTION) (H): ICD-10-CM

## 2025-03-17 DIAGNOSIS — I25.5 ISCHEMIC CARDIOMYOPATHY: ICD-10-CM

## 2025-03-17 RX ORDER — LISINOPRIL 20 MG/1
20 TABLET ORAL DAILY
Qty: 90 TABLET | Refills: 3 | Status: SHIPPED | OUTPATIENT
Start: 2025-03-17

## 2025-03-17 NOTE — PROGRESS NOTES
Cardiology Clinic Progress Note    C.[facility] Clinic Visit (Heart Failure Specialty Clinic)    Service Date: March 17, 2025  Primary Cardiology Team: Dr. Chavez    HPI:   I had the pleasure of seeing Richard You in the clinic today. He is a very pleasant 84 year old male with a past medical history notable for the following:  Coronary artery disease status post CABG in the 1990s with LIMA to LAD and vein graft to the obtuse marginal. Subsequent angiography has shown patent stents in 2018.    Ischemic cardiomyopathy with an EF of 40-45% in the past.    Permanent atrial fibrillation with underlying bradycardia without symptoms. Anticoagulated with xarelto.  Hypertension   Hyperlipidemia   Former history of smoking.  Carey's esophagus   Chronic anemia without significant bleeding    The patient was seen by Dr. Chavez in September 2024 and at that time he complained of resting NYHA class IV heart failure symptoms. He was hypoxic on room air in clinic and so he was sent to the emergency department. He was noted to be markedly volume overloaded. Echocardiogram however showed stable LV function with EF around 45%. NT-proBNP was elevated at 2,444, and he underwent nice diuresis with about 15 pounds of weight loss. He was discharged home on Lasix 40 mg once daily. He is not on beta-blockers due to low heart rates in the setting of A.Fib. He is on lisinopril 10 mg daily that was continued, and hydrochlorothiazide was discontinued.    Option of considering CardioMEMs device implant and adding SGLT2 inhibitor therapy in follow up was mentioned at the September visit.    I met Mr. You in follow up in November 2024 and started him on Jardiance 10 mg once daily. We discussed the option of CardioMEMS device placement. He and his wife who is a retired RN wanted to read about this more and consider this further down the road. I provided him with some educational materials about the device.    Today, Mr. You presents to  the clinic in routine follow up. He is again accompanied by his wife Soledad.  He tells me that he has been feeling excellent overall over the past few months since our last visit.  He has been going to the gym most days of the week doing about an hour on either the NuStep machine, treadmill, or stationary bike.  He feels that he has been tolerating this well without limitations or exertional symptoms.  He really has not noticed anything as far as symptoms of shortness of breath, chest pain, palpitations, dizziness, lightheadedness, or lower extremity edema. His weight has been stable around 193-195 lbs on his home scale.  He has been checking his blood pressure periodically at home and notes that readings have been consistently around the 130s systolic range.    ASSESSMENT:  Coronary artery disease s/p CABG in the 1990s (LIMA-LAD, VG-OM) and prior PCI. Subsequent angiography has shown patent stents in 2018.  Stable with no angina.  Ischemic cardiomyopathy with an EF of 45%  Chronic HFrEF secondary to #2. Appears euvolemic on exam today with NYHA class I symptoms currently. On lisinopril 10 mg once daily, Jardiance 10 mg daily, and Lasix 40 mg daily. Not on beta-blocker due to baseline bradycardia.  Permanent atrial fibrillation with underlying bradycardia without symptoms. Anticoagulated with xarelto.  Hypertension   Hyperlipidemia   Former history of smoking.  Carey's esophagus   Chronic anemia without significant bleeding    PLAN:  - Discussed the option of switching from lisinopril to Entresto, but this is unfortunately cost prohibitive at $168/month per the pharmacy liaison team after completing a check. We will instead increase the dose of lisinopril from 10 mg to 20 mg once daily.  - Repeat a basic metabolic panel in 1-2 weeks after increasing lisinopril.  - Recommend continuing to check daily weights and trying to stick to a low sodium diet (under 2,000 mg/day). Reviewed signs/symptoms of fluid retention to  monitor for and when to contact the clinic.  - We revisited the option of CardioMEMS device implant today.  Since patient has been quite stable from a volume status standpoint past 6 months or so, they declined to pursue this at this time. Reviewed that we could always revisit this if needed he runs into further issues with acute CHF and volume overload down the road to help us stay on top of this earlier and better guide diuresis.  - Follow-up with Wang in the [facility] in about 6 months with a repeat BMP and NT pro BNP level beforehand.    Thank you for the opportunity to participate in this patient's care. We would be happy to see him sooner if needed for any concerns in the meantime.    30 total minutes was spent today including chart review, precharting, history and exam, post visit documentation, and reviewing studies as outlined above.     Please kindly note that this document was completed in part using voice recognition software. Although reviewed after completion, some word substitutions and typographical errors may occur. Please contact me if clarification is needed.     MAMADOU Zavala, CNP   Nurse Practitioner  [facility]    Orders this Visit:  Orders Placed This Encounter   Procedures    Basic metabolic panel    CBC with platelets    Ferritin    Basic metabolic panel    N terminal pro BNP outpatient    Follow-Up with Cardiology [facility]     Orders Placed This Encounter   Medications    lisinopril (ZESTRIL) 20 MG tablet     Sig: Take 1 tablet (20 mg) by mouth daily.     Dispense:  90 tablet     Refill:  3     Medications Discontinued During This Encounter   Medication Reason    lisinopril (ZESTRIL) 10 MG tablet Reorder (No AVS)     Encounter Diagnoses   Name Primary?    Ischemic cardiomyopathy     Coronary artery disease involving native coronary artery of native heart without angina pectoris     Chronic HFrEF (heart failure with reduced ejection fraction) (H)     Congestive heart  failure, unspecified HF chronicity, unspecified heart failure type (H)        CURRENT MEDICATIONS:  Current Outpatient Medications   Medication Sig Dispense Refill    acetaminophen (TYLENOL 8 HOUR) 650 MG CR tablet Take 1,300 mg by mouth daily as needed for mild pain or fever (hip pain).      atorvastatin (LIPITOR) 80 MG tablet Take 1 tablet (80 mg) by mouth daily 90 tablet 4    empagliflozin (JARDIANCE) 10 MG TABS tablet Take 1 tablet (10 mg) by mouth daily. 90 tablet 3    furosemide (LASIX) 40 MG tablet Take 1 tablet (40 mg) by mouth daily. 90 tablet 1    lisinopril (ZESTRIL) 20 MG tablet Take 1 tablet (20 mg) by mouth daily. 90 tablet 3    Multiple Vitamin (MULTI-VITAMIN DAILY PO) Take 1 tablet by mouth daily      Multiple Vitamins-Minerals (ICAPS PO) Take 1 tablet by mouth 2 times daily       omeprazole (PRILOSEC) 40 MG DR capsule Take 1 capsule (40 mg) by mouth daily 90 capsule 4    rivaroxaban ANTICOAGULANT (XARELTO ANTICOAGULANT) 20 MG TABS tablet Take 1 tablet (20 mg) by mouth daily (with dinner) 90 tablet 4    tamsulosin (FLOMAX) 0.4 MG capsule Take 1 capsule (0.4 mg) by mouth daily. 90 capsule 4     ALLERGIES  No Known Allergies    PAST MEDICAL, SURGICAL, FAMILY HISTORY:  History was reviewed and updated as needed, see medical record.    SOCIAL HISTORY:  Social History     Socioeconomic History    Marital status:      Spouse name: Soledad You    Number of children: 2    Years of education: 12    Highest education level: Not on file   Occupational History     Employer: RETIRED   Tobacco Use    Smoking status: Former     Current packs/day: 0.00     Average packs/day: 0.5 packs/day for 10.0 years (5.0 ttl pk-yrs)     Types: Cigarettes     Start date: 1985     Quit date: 1995     Years since quittin.3    Smokeless tobacco: Never   Vaping Use    Vaping status: Never Used   Substance and Sexual Activity    Alcohol use: No     Comment: beer occ, and wine    Drug use: No    Sexual  activity: Not Currently     Partners: Female   Other Topics Concern    Parent/sibling w/ CABG, MI or angioplasty before 65F 55M? Yes   Social History Narrative        [place]    Nonsmoker as of 2  Yrs ago    Drinks wine daily    Lives with wife kelly in [place]     Social Drivers of Health     Financial Resource Strain: Low Risk  (9/24/2024)    Financial Resource Strain     Within the past 12 months, have you or your family members you live with been unable to get utilities (heat, electricity) when it was really needed?: No   Food Insecurity: Low Risk  (9/24/2024)    Food Insecurity     Within the past 12 months, did you worry that your food would run out before you got money to buy more?: No     Within the past 12 months, did the food you bought just not last and you didn t have money to get more?: No   Transportation Needs: Low Risk  (9/24/2024)    Transportation Needs     Within the past 12 months, has lack of transportation kept you from medical appointments, getting your medicines, non-medical meetings or appointments, work, or from getting things that you need?: No   Physical Activity: Sufficiently Active (4/12/2024)    Exercise Vital Sign     Days of Exercise per Week: 7 days     Minutes of Exercise per Session: 40 min   Stress: No Stress Concern Present (4/12/2024)    [place] [employer] - Occupational Stress Questionnaire     Feeling of Stress : Not at all   Social Connections: Unknown (4/12/2024)    Social Connection and Isolation Panel [NHANES]     Frequency of Communication with Friends and Family: Not on file     Frequency of Social Gatherings with Friends and Family: Three times a week     Attends L[employer]-day Services: Not on file     Active Member of Clubs or Organizations: Not on file     Attends Club or Organization Meetings: Not on file     Marital Status: Not on file   Interpersonal Safety: Low Risk  (9/24/2024)    Interpersonal Safety     Do you feel physically and  emotionally safe where you currently live?: Yes     Within the past 12 months, have you been hit, slapped, kicked or otherwise physically hurt by someone?: No     Within the past 12 months, have you been humiliated or emotionally abused in other ways by your partner or ex-partner?: No   Housing Stability: Low Risk  (9/24/2024)    Housing Stability     Do you have housing? : Yes     Are you worried about losing your housing?: No       Review of Systems:  Focused cardiovascular and respiratory review of systems is negative other than the symptoms noted above in the HPI.     Physical Exam:  Vitals: /65 (BP Location: Right arm, Patient Position: Sitting, Cuff Size: Adult Regular)   Pulse 52   Resp 18   Ht 1.829 m (6')   Wt 89 kg (196 lb 3.2 oz)   SpO2 96%   BMI 26.61 kg/m     Wt Readings from Last 4 Encounters:   03/17/25 89 kg (196 lb 3.2 oz)   11/25/24 92.5 kg (204 lb)   11/06/24 92.1 kg (203 lb)   10/11/24 94.3 kg (208 lb)     CONSTITUTIONAL: Alert and in no acute distress.  NECK: No JVD.  CARDIOVASCULAR:  Regular rate and rhythm. No murmur, rub or gallop.   RESPIRATORY: Breathing non-labored. Lungs are clear to auscultation with no wheezes or crackles bilaterally.  GASTROINTESTINAL: Abdomen non-distended.  EXTREMITIES: No lower extremity edema.   NEURO/PSYCHIATRIC: No gross focal deficits. Affect appropriate. Mentation normal.     Recent Lab Results:  LIPID RESULTS:  Lab Results   Component Value Date    CHOL 118 04/12/2024    CHOL 141 04/01/2021    HDL 43 04/12/2024    HDL 56 04/01/2021    LDL 59 04/12/2024    LDL 62 04/01/2021    TRIG 82 04/12/2024    TRIG 116 04/01/2021    CHOLHDLRATIO 2.9 04/14/2015     LIVER ENZYME RESULTS:  Lab Results   Component Value Date    AST 25 09/24/2024    AST 24 04/01/2021    ALT 18 09/24/2024    ALT 28 04/01/2021     CBC RESULTS:  Lab Results   Component Value Date    WBC 5.3 12/20/2024    WBC 5.2 07/25/2018    RBC 4.66 12/20/2024    RBC 4.27 (L) 07/25/2018    HGB 12.4  (L) 12/20/2024    HGB 13.6 07/25/2018    HCT 39.4 (L) 12/20/2024    HCT 40.6 07/25/2018    MCV 85 12/20/2024    MCV 95 07/25/2018    MCH 26.6 12/20/2024    MCH 31.9 07/25/2018    MCHC 31.5 12/20/2024    MCHC 33.5 07/25/2018    RDW 24.6 (H) 12/20/2024    RDW 12.8 07/25/2018     12/20/2024     07/25/2018     BMP RESULTS:  Lab Results   Component Value Date     12/20/2024     04/01/2021    POTASSIUM 4.6 12/20/2024    POTASSIUM 4.1 03/16/2022    POTASSIUM 4.9 04/01/2021    CHLORIDE 101 12/20/2024    CHLORIDE 107 03/16/2022    CHLORIDE 106 04/01/2021    CO2 29 12/20/2024    CO2 30 03/16/2022    CO2 28 04/01/2021    ANIONGAP 10 12/20/2024    ANIONGAP 4 03/16/2022    ANIONGAP 3 04/01/2021    GLC 89 12/20/2024    GLC 88 03/16/2022    GLC 91 04/01/2021    BUN 34.0 (H) 12/20/2024    BUN 27 03/16/2022    BUN 21 04/01/2021    CR 1.32 (H) 12/20/2024    CR 1.11 04/01/2021    GFRESTIMATED 53 (L) 12/20/2024    GFRESTIMATED >60 08/22/2024    GFRESTIMATED 64 06/08/2021    GFRESTBLACK 77 06/08/2021    MARLENE 9.6 12/20/2024    MARLENE 9.3 04/01/2021     CC  Dat Chavez MD  [id number]  [place] [id number]

## 2025-03-17 NOTE — PATIENT INSTRUCTIONS
If you have questions or concerns please call the [facility] nurse team at [phone] or send a [employer] message (Mon-Fri 8am-4pm).  If you have concerns after hours, please call [phone], option 2 to speak with an on call Cardiologist.    Today's plan:  - Increase the dose dose of the lisinopril from 10 mg to 20 mg once daily for better blood pressure control.  - Check non-fasting labs in 1-2 weeks after increasing the lisinopril to recheck kidney function and electrolytes.   - Check your weights daily and try to stick to a low sodium diet (under 2,000 mg/day).  - Call the [facility] nurse team at the number listed above if you develop signs concerning for fluid retention, including weight gain of over 2 pounds in 1 day or over 5 pounds in 1 week, increasing shortness of breath, or increasing swelling in the legs or abdomen.  - Follow up with Wang in the clinic in about 6 months with labs beforehand.     It was a pleasure to see you today!     MAMADOU Zavala, CNP  Nurse Practitioner  [facility]    Thank you for your visit with the [facility] today. [facility] stands for Cardiomy[facility].    The [facility] is a heart failure specialty clinic within the [facility] where you will work with specialized nurse practitioners, physician assistants, doctors, and registered nurses. They are dedicated to helping patients with heart failure to carefully adjust medications, receive education, and learn who and when to call if symptoms develop. They specialize in helping you better understand your condition, slow the progression of your disease, improve the length and quality of your life, help you detect future heart problems before they become life threatening, and avoid hospitalizations.

## 2025-03-17 NOTE — LETTER
3/17/2025    Kamari Rojas MD  [address]  [facility] [address]    RE: Richard BAILEY Kenyatta       Dear Colleague,     I had the pleasure of seeing Richard You in the [facility].    Cardiology Clinic Progress Note    C.O.R.E. Clinic Visit (Heart Failure Specialty Clinic)    Service Date: March 17, 2025  Primary Cardiology Team: Dr. Chavez    HPI:   I had the pleasure of seeing Richard You in the clinic today. He is a very pleasant 84 year old male with a past medical history notable for the following:  Coronary artery disease status post CABG in the 1990s with LIMA to LAD and vein graft to the obtuse marginal. Subsequent angiography has shown patent stents in 2018.    Ischemic cardiomyopathy with an EF of 40-45% in the past.    Permanent atrial fibrillation with underlying bradycardia without symptoms. Anticoagulated with xarelto.  Hypertension   Hyperlipidemia   Former history of smoking.  Carey's esophagus   Chronic anemia without significant bleeding    The patient was seen by Dr. Chavez in September 2024 and at that time he complained of resting NYHA class IV heart failure symptoms. He was hypoxic on room air in clinic and so he was sent to the emergency department. He was noted to be markedly volume overloaded. Echocardiogram however showed stable LV function with EF around 45%. NT-proBNP was elevated at 2,444, and he underwent nice diuresis with about 15 pounds of weight loss. He was discharged home on Lasix 40 mg once daily. He is not on beta-blockers due to low heart rates in the setting of A.Fib. He is on lisinopril 10 mg daily that was continued, and hydrochlorothiazide was discontinued.    Option of considering CardioMEMs device implant and adding SGLT2 inhibitor therapy in follow up was mentioned at the September visit.    I met Mr. You in follow up in November 2024 and started him on Jardiance 10 mg once daily. We discussed the option of CardioMEMS device  placement. He and his wife who is a retired RN wanted to read about this more and consider this further down the road. I provided him with some educational materials about the device.    Today, Mr. You presents to the clinic in routine follow up. He is again accompanied by his wife Soledad.  He tells me that he has been feeling excellent overall over the past few months since our last visit.  He has been going to the gym most days of the week doing about an hour on either the NuStep machine, treadmill, or stationary bike.  He feels that he has been tolerating this well without limitations or exertional symptoms.  He really has not noticed anything as far as symptoms of shortness of breath, chest pain, palpitations, dizziness, lightheadedness, or lower extremity edema. His weight has been stable around 193-195 lbs on his home scale.  He has been checking his blood pressure periodically at home and notes that readings have been consistently around the 130s systolic range.    ASSESSMENT:  Coronary artery disease s/p CABG in the 1990s (LIMA-LAD, VG-OM) and prior PCI. Subsequent angiography has shown patent stents in 2018.  Stable with no angina.  Ischemic cardiomyopathy with an EF of 45%  Chronic HFrEF secondary to #2. Appears euvolemic on exam today with NYHA class I symptoms currently. On lisinopril 10 mg once daily, Jardiance 10 mg daily, and Lasix 40 mg daily. Not on beta-blocker due to baseline bradycardia.  Permanent atrial fibrillation with underlying bradycardia without symptoms. Anticoagulated with xarelto.  Hypertension   Hyperlipidemia   Former history of smoking.  Carey's esophagus   Chronic anemia without significant bleeding    PLAN:  - Discussed the option of switching from lisinopril to Entresto, but this is unfortunately cost prohibitive at $168/month per the pharmacy liaison team after completing a check. We will instead increase the dose of lisinopril from 10 mg to 20 mg once daily.  - Repeat a  basic metabolic panel in 1-2 weeks after increasing lisinopril.  - Recommend continuing to check daily weights and trying to stick to a low sodium diet (under 2,000 mg/day). Reviewed signs/symptoms of fluid retention to monitor for and when to contact the clinic.  - We revisited the option of CardioMEMS device implant today.  Since patient has been quite stable from a volume status standpoint past 6 months or so, they declined to pursue this at this time. Reviewed that we could always revisit this if needed he runs into further issues with acute CHF and volume overload down the road to help us stay on top of this earlier and better guide diuresis.  - Follow-up with Wang in [facility] in about 6 months with a repeat BMP and NT pro BNP level beforehand.    Thank you for the opportunity to participate in this patient's care. We would be happy to see him sooner if needed for any concerns in the meantime.    30 total minutes was spent today including chart review, precharting, history and exam, post visit documentation, and reviewing studies as outlined above.     Please kindly note that this document was completed in part using voice recognition software. Although reviewed after completion, some word substitutions and typographical errors may occur. Please contact me if clarification is needed.     MAMADOU Zavala, CNP   Nurse Practitioner  [facility]    Orders this Visit:  Orders Placed This Encounter   Procedures     Basic metabolic panel     CBC with platelets     Ferritin     Basic metabolic panel     N terminal pro BNP outpatient     Follow-Up with Cardiology KRISTINE Core     Orders Placed This Encounter   Medications     lisinopril (ZESTRIL) 20 MG tablet     Sig: Take 1 tablet (20 mg) by mouth daily.     Dispense:  90 tablet     Refill:  3     Medications Discontinued During This Encounter   Medication Reason     lisinopril (ZESTRIL) 10 MG tablet Reorder (No AVS)     Encounter Diagnoses   Name  Primary?     Ischemic cardiomyopathy      Coronary artery disease involving native coronary artery of native heart without angina pectoris      Chronic HFrEF (heart failure with reduced ejection fraction) (H)      Congestive heart failure, unspecified HF chronicity, unspecified heart failure type (H)        CURRENT MEDICATIONS:  Current Outpatient Medications   Medication Sig Dispense Refill     acetaminophen (TYLENOL 8 HOUR) 650 MG CR tablet Take 1,300 mg by mouth daily as needed for mild pain or fever (hip pain).       atorvastatin (LIPITOR) 80 MG tablet Take 1 tablet (80 mg) by mouth daily 90 tablet 4     empagliflozin (JARDIANCE) 10 MG TABS tablet Take 1 tablet (10 mg) by mouth daily. 90 tablet 3     furosemide (LASIX) 40 MG tablet Take 1 tablet (40 mg) by mouth daily. 90 tablet 1     lisinopril (ZESTRIL) 20 MG tablet Take 1 tablet (20 mg) by mouth daily. 90 tablet 3     Multiple Vitamin (MULTI-VITAMIN DAILY PO) Take 1 tablet by mouth daily       Multiple Vitamins-Minerals (ICAPS PO) Take 1 tablet by mouth 2 times daily        omeprazole (PRILOSEC) 40 MG DR capsule Take 1 capsule (40 mg) by mouth daily 90 capsule 4     rivaroxaban ANTICOAGULANT (XARELTO ANTICOAGULANT) 20 MG TABS tablet Take 1 tablet (20 mg) by mouth daily (with dinner) 90 tablet 4     tamsulosin (FLOMAX) 0.4 MG capsule Take 1 capsule (0.4 mg) by mouth daily. 90 capsule 4     ALLERGIES  No Known Allergies    PAST MEDICAL, SURGICAL, FAMILY HISTORY:  History was reviewed and updated as needed, see medical record.    SOCIAL HISTORY:  Social History     Socioeconomic History     Marital status:      Spouse name: Soledad You     Number of children: 2     Years of education: 12     Highest education level: Not on file   Occupational History     Employer: RETIRED   Tobacco Use     Smoking status: Former     Current packs/day: 0.00     Average packs/day: 0.5 packs/day for 10.0 years (5.0 ttl pk-yrs)     Types: Cigarettes     Start date:  1985     Quit date: 1995     Years since quittin.3     Smokeless tobacco: Never   Vaping Use     Vaping status: Never Used   Substance and Sexual Activity     Alcohol use: No     Comment: beer occ, and wine     Drug use: No     Sexual activity: Not Currently     Partners: Female   Other Topics Concern     Parent/sibling w/ CABG, MI or angioplasty before 65F 55M? Yes   Social History Narrative        [employer]    Nonsmoker as of 2  Yrs ago    Drinks wine daily    Lives with wife kelly in [place]     Social Drivers of Health     Financial Resource Strain: Low Risk  (2024)    Financial Resource Strain      Within the past 12 months, have you or your family members you live with been unable to get utilities (heat, electricity) when it was really needed?: No   Food Insecurity: Low Risk  (2024)    Food Insecurity      Within the past 12 months, did you worry that your food would run out before you got money to buy more?: No      Within the past 12 months, did the food you bought just not last and you didn t have money to get more?: No   Transportation Needs: Low Risk  (2024)    Transportation Needs      Within the past 12 months, has lack of transportation kept you from medical appointments, getting your medicines, non-medical meetings or appointments, work, or from getting things that you need?: No   Physical Activity: Sufficiently Active (2024)    Exercise Vital Sign      Days of Exercise per Week: 7 days      Minutes of Exercise per Session: 40 min   Stress: No Stress Concern Present (2024)    [place] [employer] - Occupational Stress Questionnaire      Feeling of Stress : Not at all   Social Connections: Unknown (2024)    Social Connection and Isolation Panel [NHANES]      Frequency of Communication with Friends and Family: Not on file      Frequency of Social Gatherings with Friends and Family: Three times a week      Attends Faith  Services: Not on file      Active Member of Clubs or Organizations: Not on file      Attends Club or Organization Meetings: Not on file      Marital Status: Not on file   Interpersonal Safety: Low Risk  (9/24/2024)    Interpersonal Safety      Do you feel physically and emotionally safe where you currently live?: Yes      Within the past 12 months, have you been hit, slapped, kicked or otherwise physically hurt by someone?: No      Within the past 12 months, have you been humiliated or emotionally abused in other ways by your partner or ex-partner?: No   Housing Stability: Low Risk  (9/24/2024)    Housing Stability      Do you have housing? : Yes      Are you worried about losing your housing?: No       Review of Systems:  Focused cardiovascular and respiratory review of systems is negative other than the symptoms noted above in the HPI.     Physical Exam:  Vitals: /65 (BP Location: Right arm, Patient Position: Sitting, Cuff Size: Adult Regular)   Pulse 52   Resp 18   Ht 1.829 m (6')   Wt 89 kg (196 lb 3.2 oz)   SpO2 96%   BMI 26.61 kg/m     Wt Readings from Last 4 Encounters:   03/17/25 89 kg (196 lb 3.2 oz)   11/25/24 92.5 kg (204 lb)   11/06/24 92.1 kg (203 lb)   10/11/24 94.3 kg (208 lb)     CONSTITUTIONAL: Alert and in no acute distress.  NECK: No JVD.  CARDIOVASCULAR:  Regular rate and rhythm. No murmur, rub or gallop.   RESPIRATORY: Breathing non-labored. Lungs are clear to auscultation with no wheezes or crackles bilaterally.  GASTROINTESTINAL: Abdomen non-distended.  EXTREMITIES: No lower extremity edema.   NEURO/PSYCHIATRIC: No gross focal deficits. Affect appropriate. Mentation normal.     Recent Lab Results:  LIPID RESULTS:  Lab Results   Component Value Date    CHOL 118 04/12/2024    CHOL 141 04/01/2021    HDL 43 04/12/2024    HDL 56 04/01/2021    LDL 59 04/12/2024    LDL 62 04/01/2021    TRIG 82 04/12/2024    TRIG 116 04/01/2021    CHOLHDLRATIO 2.9 04/14/2015     LIVER ENZYME RESULTS:  Lab  Results   Component Value Date    AST 25 09/24/2024    AST 24 04/01/2021    ALT 18 09/24/2024    ALT 28 04/01/2021     CBC RESULTS:  Lab Results   Component Value Date    WBC 5.3 12/20/2024    WBC 5.2 07/25/2018    RBC 4.66 12/20/2024    RBC 4.27 (L) 07/25/2018    HGB 12.4 (L) 12/20/2024    HGB 13.6 07/25/2018    HCT 39.4 (L) 12/20/2024    HCT 40.6 07/25/2018    MCV 85 12/20/2024    MCV 95 07/25/2018    MCH 26.6 12/20/2024    MCH 31.9 07/25/2018    MCHC 31.5 12/20/2024    MCHC 33.5 07/25/2018    RDW 24.6 (H) 12/20/2024    RDW 12.8 07/25/2018     12/20/2024     07/25/2018     BMP RESULTS:  Lab Results   Component Value Date     12/20/2024     04/01/2021    POTASSIUM 4.6 12/20/2024    POTASSIUM 4.1 03/16/2022    POTASSIUM 4.9 04/01/2021    CHLORIDE 101 12/20/2024    CHLORIDE 107 03/16/2022    CHLORIDE 106 04/01/2021    CO2 29 12/20/2024    CO2 30 03/16/2022    CO2 28 04/01/2021    ANIONGAP 10 12/20/2024    ANIONGAP 4 03/16/2022    ANIONGAP 3 04/01/2021    GLC 89 12/20/2024    GLC 88 03/16/2022    GLC 91 04/01/2021    BUN 34.0 (H) 12/20/2024    BUN 27 03/16/2022    BUN 21 04/01/2021    CR 1.32 (H) 12/20/2024    CR 1.11 04/01/2021    GFRESTIMATED 53 (L) 12/20/2024    GFRESTIMATED >60 08/22/2024    GFRESTIMATED 64 06/08/2021    GFRESTBLACK 77 06/08/2021    MARLENE 9.6 12/20/2024    MARLENE 9.3 04/01/2021     CC  Dat Chavez MD  [id number]  [place] [id number]      Thank you for allowing me to participate in the care of your patient.      Sincerely,     Marlo Chávez NP     [facility]  cc:   Dat Chavez MD  [address]  [place] [id number]

## 2025-03-20 ENCOUNTER — PATIENT OUTREACH (OUTPATIENT)
Dept: CARE COORDINATION | Facility: CLINIC | Age: 85
End: 2025-03-20
Payer: COMMERCIAL

## 2025-03-27 ENCOUNTER — PATIENT OUTREACH (OUTPATIENT)
Dept: CARE COORDINATION | Facility: CLINIC | Age: 85
End: 2025-03-27
Payer: COMMERCIAL

## 2025-04-08 ENCOUNTER — LAB (OUTPATIENT)
Dept: LAB | Facility: CLINIC | Age: 85
End: 2025-04-08
Payer: COMMERCIAL

## 2025-04-08 DIAGNOSIS — I50.9 CONGESTIVE HEART FAILURE, UNSPECIFIED HF CHRONICITY, UNSPECIFIED HEART FAILURE TYPE (H): ICD-10-CM

## 2025-04-08 DIAGNOSIS — I50.22 CHRONIC HFREF (HEART FAILURE WITH REDUCED EJECTION FRACTION) (H): ICD-10-CM

## 2025-04-08 DIAGNOSIS — I25.10 CORONARY ARTERY DISEASE INVOLVING NATIVE CORONARY ARTERY OF NATIVE HEART WITHOUT ANGINA PECTORIS: ICD-10-CM

## 2025-04-08 DIAGNOSIS — I25.5 ISCHEMIC CARDIOMYOPATHY: ICD-10-CM

## 2025-04-08 LAB
ANION GAP SERPL CALCULATED.3IONS-SCNC: 15 MMOL/L (ref 7–15)
BUN SERPL-MCNC: 36.2 MG/DL (ref 8–23)
CALCIUM SERPL-MCNC: 9.9 MG/DL (ref 8.8–10.4)
CHLORIDE SERPL-SCNC: 103 MMOL/L (ref 98–107)
CREAT SERPL-MCNC: 1.3 MG/DL (ref 0.67–1.17)
EGFRCR SERPLBLD CKD-EPI 2021: 53 ML/MIN/1.73M2
ERYTHROCYTE [DISTWIDTH] IN BLOOD BY AUTOMATED COUNT: 12.7 % (ref 10–15)
FERRITIN SERPL-MCNC: 92 NG/ML (ref 31–409)
GLUCOSE SERPL-MCNC: 99 MG/DL (ref 70–99)
HCO3 SERPL-SCNC: 25 MMOL/L (ref 22–29)
HCT VFR BLD AUTO: 42.9 % (ref 40–53)
HGB BLD-MCNC: 14.2 G/DL (ref 13.3–17.7)
MCH RBC QN AUTO: 32 PG (ref 26.5–33)
MCHC RBC AUTO-ENTMCNC: 33.1 G/DL (ref 31.5–36.5)
MCV RBC AUTO: 97 FL (ref 78–100)
PLATELET # BLD AUTO: 196 10E3/UL (ref 150–450)
POTASSIUM SERPL-SCNC: 5.3 MMOL/L (ref 3.4–5.3)
RBC # BLD AUTO: 4.44 10E6/UL (ref 4.4–5.9)
SODIUM SERPL-SCNC: 143 MMOL/L (ref 135–145)
WBC # BLD AUTO: 5 10E3/UL (ref 4–11)

## 2025-04-08 PROCEDURE — 82728 ASSAY OF FERRITIN: CPT

## 2025-04-08 PROCEDURE — 85027 COMPLETE CBC AUTOMATED: CPT

## 2025-04-08 PROCEDURE — 36415 COLL VENOUS BLD VENIPUNCTURE: CPT

## 2025-04-08 PROCEDURE — 80048 BASIC METABOLIC PNL TOTAL CA: CPT

## 2025-04-24 ENCOUNTER — ANCILLARY PROCEDURE (OUTPATIENT)
Dept: GENERAL RADIOLOGY | Facility: CLINIC | Age: 85
End: 2025-04-24
Attending: FAMILY MEDICINE
Payer: COMMERCIAL

## 2025-04-24 ENCOUNTER — OFFICE VISIT (OUTPATIENT)
Dept: FAMILY MEDICINE | Facility: CLINIC | Age: 85
End: 2025-04-24
Payer: COMMERCIAL

## 2025-04-24 VITALS
TEMPERATURE: 97.9 F | RESPIRATION RATE: 18 BRPM | HEIGHT: 72 IN | WEIGHT: 190 LBS | BODY MASS INDEX: 25.73 KG/M2 | OXYGEN SATURATION: 95 % | HEART RATE: 72 BPM | SYSTOLIC BLOOD PRESSURE: 138 MMHG | DIASTOLIC BLOOD PRESSURE: 68 MMHG

## 2025-04-24 DIAGNOSIS — Z23 NEED FOR VACCINATION: ICD-10-CM

## 2025-04-24 DIAGNOSIS — I25.810 CORONARY ATHEROSCLEROSIS OF AUTOLOGOUS VEIN BYPASS GRAFT WITHOUT ANGINA: ICD-10-CM

## 2025-04-24 DIAGNOSIS — I50.9 CONGESTIVE HEART FAILURE, UNSPECIFIED HF CHRONICITY, UNSPECIFIED HEART FAILURE TYPE (H): ICD-10-CM

## 2025-04-24 DIAGNOSIS — M25.551 HIP PAIN, RIGHT: ICD-10-CM

## 2025-04-24 DIAGNOSIS — I25.83 CORONARY ATHEROSCLEROSIS DUE TO LIPID RICH PLAQUE: ICD-10-CM

## 2025-04-24 DIAGNOSIS — Z00.00 ENCOUNTER FOR MEDICARE ANNUAL WELLNESS EXAM: ICD-10-CM

## 2025-04-24 DIAGNOSIS — E78.5 HYPERLIPIDEMIA LDL GOAL <100: ICD-10-CM

## 2025-04-24 DIAGNOSIS — I25.10 CORONARY ATHEROSCLEROSIS: ICD-10-CM

## 2025-04-24 DIAGNOSIS — I48.20 CHRONIC ATRIAL FIBRILLATION (H): ICD-10-CM

## 2025-04-24 DIAGNOSIS — N18.31 CHRONIC KIDNEY DISEASE, STAGE 3A (H): ICD-10-CM

## 2025-04-24 DIAGNOSIS — Z13.6 SCREENING FOR CARDIOVASCULAR CONDITION: Primary | ICD-10-CM

## 2025-04-24 DIAGNOSIS — R32 URINARY INCONTINENCE, UNSPECIFIED TYPE: ICD-10-CM

## 2025-04-24 DIAGNOSIS — J96.01 ACUTE RESPIRATORY FAILURE WITH HYPOXIA AND HYPERCAPNIA (H): ICD-10-CM

## 2025-04-24 DIAGNOSIS — J96.02 ACUTE RESPIRATORY FAILURE WITH HYPOXIA AND HYPERCAPNIA (H): ICD-10-CM

## 2025-04-24 DIAGNOSIS — Z00.00 ENCOUNTER FOR MEDICARE ANNUAL WELLNESS EXAM: Primary | ICD-10-CM

## 2025-04-24 DIAGNOSIS — K22.70 BARRETT'S ESOPHAGUS WITHOUT DYSPLASIA: Chronic | ICD-10-CM

## 2025-04-24 DIAGNOSIS — H35.3290 EXUDATIVE AGE-RELATED MACULAR DEGENERATION, UNSPECIFIED LATERALITY, UNSPECIFIED STAGE (H): ICD-10-CM

## 2025-04-24 PROCEDURE — 3078F DIAST BP <80 MM HG: CPT | Performed by: FAMILY MEDICINE

## 2025-04-24 PROCEDURE — 3075F SYST BP GE 130 - 139MM HG: CPT | Performed by: FAMILY MEDICINE

## 2025-04-24 PROCEDURE — G0439 PPPS, SUBSEQ VISIT: HCPCS | Performed by: FAMILY MEDICINE

## 2025-04-24 PROCEDURE — 1126F AMNT PAIN NOTED NONE PRSNT: CPT | Performed by: FAMILY MEDICINE

## 2025-04-24 PROCEDURE — G2211 COMPLEX E/M VISIT ADD ON: HCPCS | Performed by: FAMILY MEDICINE

## 2025-04-24 PROCEDURE — 73502 X-RAY EXAM HIP UNI 2-3 VIEWS: CPT | Mod: TC | Performed by: RADIOLOGY

## 2025-04-24 PROCEDURE — 99214 OFFICE O/P EST MOD 30 MIN: CPT | Mod: 25 | Performed by: FAMILY MEDICINE

## 2025-04-24 RX ORDER — FUROSEMIDE 40 MG/1
40 TABLET ORAL DAILY
Qty: 90 TABLET | Refills: 4 | Status: SHIPPED | OUTPATIENT
Start: 2025-04-24

## 2025-04-24 RX ORDER — OMEPRAZOLE 40 MG/1
40 CAPSULE, DELAYED RELEASE ORAL DAILY
Qty: 90 CAPSULE | Refills: 4 | Status: SHIPPED | OUTPATIENT
Start: 2025-04-24

## 2025-04-24 RX ORDER — ATORVASTATIN CALCIUM 80 MG/1
80 TABLET, FILM COATED ORAL DAILY
Qty: 90 TABLET | Refills: 4 | Status: SHIPPED | OUTPATIENT
Start: 2025-04-24

## 2025-04-24 RX ORDER — LISINOPRIL 20 MG/1
20 TABLET ORAL DAILY
Qty: 90 TABLET | Refills: 4 | Status: SHIPPED | OUTPATIENT
Start: 2025-04-24

## 2025-04-24 RX ORDER — TAMSULOSIN HYDROCHLORIDE 0.4 MG/1
0.4 CAPSULE ORAL DAILY
Qty: 90 CAPSULE | Refills: 4 | Status: SHIPPED | OUTPATIENT
Start: 2025-04-24

## 2025-04-24 SDOH — HEALTH STABILITY: PHYSICAL HEALTH: ON AVERAGE, HOW MANY DAYS PER WEEK DO YOU ENGAGE IN MODERATE TO STRENUOUS EXERCISE (LIKE A BRISK WALK)?: 7 DAYS

## 2025-04-24 SDOH — HEALTH STABILITY: PHYSICAL HEALTH: ON AVERAGE, HOW MANY MINUTES DO YOU ENGAGE IN EXERCISE AT THIS LEVEL?: 40 MIN

## 2025-04-24 ASSESSMENT — SOCIAL DETERMINANTS OF HEALTH (SDOH): HOW OFTEN DO YOU GET TOGETHER WITH FRIENDS OR RELATIVES?: PATIENT DECLINED

## 2025-04-24 ASSESSMENT — PAIN SCALES - GENERAL: PAINLEVEL_OUTOF10: NO PAIN (0)

## 2025-04-24 NOTE — NURSING NOTE
Initial /68   Pulse 72   Temp 97.9  F (36.6  C) (Tympanic)   Resp 18   Ht 1.829 m (6')   Wt 86.2 kg (190 lb)   SpO2 95%   BMI 25.77 kg/m   Estimated body mass index is 25.77 kg/m  as calculated from the following:    Height as of this encounter: 1.829 m (6').    Weight as of this encounter: 86.2 kg (190 lb). .

## 2025-04-24 NOTE — PATIENT INSTRUCTIONS
Patient Education   Preventive Care Advice   This is general advice given by our system to help you stay healthy. However, your care team may have specific advice just for you. Please talk to your care team about your preventive care needs.  Nutrition  Eat 5 or more servings of fruits and vegetables each day.  Try wheat bread, brown rice and whole grain pasta (instead of white bread, rice, and pasta).  Get enough calcium and vitamin D. Check the label on foods and aim for 100% of the RDA (recommended daily allowance).  Lifestyle  Exercise at least 150 minutes each week  (30 minutes a day, 5 days a week).  Do muscle strengthening activities 2 days a week. These help control your weight and prevent disease.  No smoking.  Wear sunscreen to prevent skin cancer.  Have a dental exam and cleaning every 6 months.  Yearly exams  See your health care team every year to talk about:  Any changes in your health.  Any medicines your care team has prescribed.  Preventive care, family planning, and ways to prevent chronic diseases.  Shots (vaccines)   HPV shots (up to age 26), if you've never had them before.  Hepatitis B shots (up to age 59), if you've never had them before.  COVID-19 shot: Get this shot when it's due.  Flu shot: Get a flu shot every year.  Tetanus shot: Get a tetanus shot every 10 years.  Pneumococcal, hepatitis A, and RSV shots: Ask your care team if you need these based on your risk.  Shingles shot (for age 50 and up)  General health tests  Diabetes screening:  Starting at age 35, Get screened for diabetes at least every 3 years.  If you are younger than age 35, ask your care team if you should be screened for diabetes.  Cholesterol test: At age 39, start having a cholesterol test every 5 years, or more often if advised.  Bone density scan (DEXA): At age 50, ask your care team if you should have this scan for osteoporosis (brittle bones).  Hepatitis C: Get tested at least once in your life.  STIs (sexually  transmitted infections)  Before age 24: Ask your care team if you should be screened for STIs.  After age 24: Get screened for STIs if you're at risk. You are at risk for STIs (including HIV) if:  You are sexually active with more than one person.  You don't use condoms every time.  You or a partner was diagnosed with a sexually transmitted infection.  If you are at risk for HIV, ask about PrEP medicine to prevent HIV.  Get tested for HIV at least once in your life, whether you are at risk for HIV or not.  Cancer screening tests  Cervical cancer screening: If you have a cervix, begin getting regular cervical cancer screening tests starting at age 21.  Breast cancer scan (mammogram): If you've ever had breasts, begin having regular mammograms starting at age 40. This is a scan to check for breast cancer.  Colon cancer screening: It is important to start screening for colon cancer at age 45.  Have a colonoscopy test every 10 years (or more often if you're at risk) Or, ask your provider about stool tests like a FIT test every year or Cologuard test every 3 years.  To learn more about your testing options, visit:   .  For help making a decision, visit:   https://bit.ly/rj67581.  Prostate cancer screening test: If you have a prostate, ask your care team if a prostate cancer screening test (PSA) at age 55 is right for you.  Lung cancer screening: If you are a current or former smoker ages 50 to 80, ask your care team if ongoing lung cancer screenings are right for you.  For informational purposes only. Not to replace the advice of your health care provider. Copyright   2023 [employer]. All rights reserved. Clinically reviewed by the [facility] Transitions Program. castaclip [id number] - REV 01/24.

## 2025-04-24 NOTE — PROGRESS NOTES
Preventive Care Visit  [facility]  Kamari Rojas MD, Family Medicine  Apr 24, 2025  {Provider  Link to SmartSet :269010}    {PROVIDER CHARTING PREFERENCE:614492}    Fercho Ramos is a 85 year old, presenting for the following:  Wellness Visit        4/24/2025     1:09 PM   Additional Questions   Roomed by Naomy WELLS CMA     {ROOMER if patient is in their first year of Medicare a vision screen is required click here to document the Vison screen and then refresh the note to pull in results  :687649}      HPI  ***   {MA/LPN/RN Pre-Provider Visit Orders- hCG/UA/Strep (Optional):665040}  {SUPERLIST (Optional):932097}  {additonal problems for provider to add (Optional):661146}  Advance Care Planning  {The storyboard will display whether the patient has ACP docs on file. Hover over the Code section in the storyboard to access the ACP documents. :834511}  {(AWV REQUIRED) Advance Care Planning Reviewed:053312}        4/24/2025   General Health   How would you rate your overall physical health? Good   Feel stress (tense, anxious, or unable to sleep) Not at all         4/24/2025   Nutrition   Diet: I don't know         4/24/2025   Exercise   Days per week of moderate/strenous exercise 7 days   Average minutes spent exercising at this level 40 min         4/24/2025   Social Factors   Frequency of gathering with friends or relatives Patient declined   Worry food won't last until get money to buy more No   Food not last or not have enough money for food? No   Do you have housing? (Housing is defined as stable permanent housing and does not include staying outside in a car, in a tent, in an abandoned building, in an overnight shelter, or couch-surfing.) Yes   Are you worried about losing your housing? No   Lack of transportation? No   Unable to get utilities (heat,electricity)? No         4/24/2025   Fall Risk   Fallen 2 or more times in the past year? No   Trouble with walking or  balance? No          2025   Activities of Daily Living- Home Safety   Needs help with the following daily activites None of the above   Safety concerns in the home None of the above         2025   Dental   Dentist two times every year? (!) DECLINE         2025   Hearing Screening   Hearing concerns? None of the above         2025   Driving Risk Screening   Patient/family members have concerns about driving No         2025   General Alertness/Fatigue Screening   Have you been more tired than usual lately? No         2025   Urinary Incontinence Screening   Bothered by leaking urine in past 6 months No         Today's PHQ-2 Score:       2025     1:00 PM   PHQ-2 (  [employer])   Q1: Little interest or pleasure in doing things 0   Q2: Feeling down, depressed or hopeless 0   PHQ-2 Score 0    Q1: Little interest or pleasure in doing things Not at all   Q2: Feeling down, depressed or hopeless Not at all   PHQ-2 Score 0       Patient-reported           2025   Substance Use   Alcohol more than 3/day or more than 7/wk No   Do you have a current opioid prescription? No   How severe/bad is pain from 1 to 10? 0/10 (No Pain)   Do you use any other substances recreationally? No     Social History     Tobacco Use    Smoking status: Former     Current packs/day: 0.00     Average packs/day: 0.5 packs/day for 10.0 years (5.0 ttl pk-yrs)     Types: Cigarettes     Start date: 1985     Quit date: 1995     Years since quittin.4    Smokeless tobacco: Never   Vaping Use    Vaping status: Never Used   Substance Use Topics    Alcohol use: No     Comment: beer occ, and wine    Drug use: No     {Provider  If there are gaps in the social history shown above, please follow the link to update and then refresh the note Link to Social and Substance History :359601}    {Provider  REQUIRED FOR AWV Use the storyboard to review patient history, after sections have been marked as reviewed, refresh  note to capture documentation:036500}  {Provider   REQUIRED AWV use this link to review and update sexual activity history  after section has been marked as reviewed, refresh note to capture documentation:684253}  Reviewed and updated as needed this visit by Provider                    {HISTORY OPTIONS (Optional):558425}  Current providers sharing in care for this patient include:  Patient Care Team:  Kamari Rojas MD as PCP - General (Family Practice)  Kamari Rojas MD as Assigned PCP  Dat Chavez MD as MD (Cardiovascular Disease)  Rn,  Cardiology C.O.R.E. as Specialty Care Coordinator (Cardiology)  Marlo Chávez NP as Assigned Heart and Vascular Provider  Nikita Seo MD as Assigned Dermatology Provider    The following health maintenance items are reviewed in Epic and correct as of today:  Health Maintenance   Topic Date Due    HF ACTION PLAN  Never done    RSV VACCINE (1 - 1-dose 75+ series) Never done    EGD  07/12/2020    LIPID  04/12/2025    ANNUAL REVIEW OF HM ORDERS  04/12/2025    MEDICARE ANNUAL WELLNESS VISIT  04/12/2025    COVID-19 Vaccine (8 - 2024-25 season) 05/06/2025    ALT  09/24/2025    BMP  04/08/2026    CBC  04/08/2026    FALL RISK ASSESSMENT  04/24/2026    DTAP/TDAP/TD IMMUNIZATION (2 - Td or Tdap) 12/07/2028    ADVANCE CARE PLANNING  04/30/2029    TSH W/FREE T4 REFLEX  Completed    PHQ-2 (once per calendar year)  Completed    INFLUENZA VACCINE  Completed    Pneumococcal Vaccine: 50+ Years  Completed    ZOSTER IMMUNIZATION  Completed    HPV IMMUNIZATION  Aged Out    MENINGITIS IMMUNIZATION  Aged Out       {ROS Picklists (Optional):995314}     Objective    Exam  There were no vitals taken for this visit.   Estimated body mass index is 26.61 kg/m  as calculated from the following:    Height as of 3/17/25: 1.829 m (6').    Weight as of 3/17/25: 89 kg (196 lb 3.2 oz).    Physical Exam  {Exam Choices (Optional):473430}  {Provider  The Mini-Cog is  incomplete, use link to complete and refresh note Link to Mini-Cog :944318}      4/12/2024   Mini Cog   Clock Draw Score 2 Normal   3 Item Recall 3 objects recalled   Mini Cog Total Score 5     {A Mini-Cog total score of 0-2 suggests the possibility of dementia, score of 3-5 suggests no dementia:949445}         Signed Electronically by: Kamari Rojas MD  {Email feedback regarding this note to [email or web address]   :466081}   smoker    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)    Esophagitis    Carey's esophagus    Arteriosclerotic vascular disease    Benign essential tremor    Bradycardia    Chronic atrial fibrillation (H)    Pneumonia of both lower lobes due to infectious organism    Dyspnea, unspecified type    Congestive heart failure, unspecified HF chronicity, unspecified heart failure type (H)    Chronic kidney disease, stage 3a (H)     Past Surgical History:   Procedure Laterality Date    BYPASS CARDIOPULMONARY  1996    CA bypass x2 LAD    CARDIAC SURGERY      CYSTECTOMY PILONIDAL      ESOPHAGOSCOPY, GASTROSCOPY, DUODENOSCOPY (EGD), COMBINED N/A 2015    Procedure: COMBINED ESOPHAGOSCOPY, GASTROSCOPY, DUODENOSCOPY (EGD), BIOPSY SINGLE OR MULTIPLE;  Surgeon: Fuad Holder MD;  Location: [facility]    ESOPHAGOSCOPY, GASTROSCOPY, DUODENOSCOPY (EGD), COMBINED N/A 2016    Procedure: COMBINED ESOPHAGOSCOPY, GASTROSCOPY, DUODENOSCOPY (EGD), BIOPSY SINGLE OR MULTIPLE;  Surgeon: Lucas Dang MD;  Location: [facility]    ESOPHAGOSCOPY, GASTROSCOPY, DUODENOSCOPY (EGD), COMBINED N/A 2018    Procedure: COMBINED ESOPHAGOSCOPY, GASTROSCOPY, DUODENOSCOPY (EGD), BIOPSY SINGLE OR MULTIPLE;  gastroscopy;  Surgeon: Richard Crowder MD;  Location: [facility]    HERNIA REPAIR      VASECTOMY         Social History     Tobacco Use    Smoking status: Former     Current packs/day: 0.00     Average packs/day: 0.5 packs/day for 10.0 years (5.0 ttl pk-yrs)     Types: Cigarettes     Start date: 1985     Quit date: 1995     Years since quittin.4    Smokeless tobacco: Never   Substance Use Topics    Alcohol use: No     Comment: beer occ, and wine     Family History   Problem Relation Age of Onset    Cancer - colorectal Mother     Cardiovascular Father     Alcohol/Drug Father     Neurologic Disorder Father         tremor    Cardiovascular Brother     Coronary Artery Disease Brother     Breast Cancer Sister     Cancer  Brother         bladder cancer         Current Outpatient Medications   Medication Sig Dispense Refill    acetaminophen (TYLENOL 8 HOUR) 650 MG CR tablet Take 1,300 mg by mouth daily as needed for mild pain or fever (hip pain).      atorvastatin (LIPITOR) 80 MG tablet Take 1 tablet (80 mg) by mouth daily. 90 tablet 4    empagliflozin (JARDIANCE) 10 MG TABS tablet Take 1 tablet (10 mg) by mouth daily. 90 tablet 3    furosemide (LASIX) 40 MG tablet Take 1 tablet (40 mg) by mouth daily. 90 tablet 4    lisinopril (ZESTRIL) 20 MG tablet Take 1 tablet (20 mg) by mouth daily. 90 tablet 4    Multiple Vitamin (MULTI-VITAMIN DAILY PO) Take 1 tablet by mouth daily      Multiple Vitamins-Minerals (ICAPS PO) Take 1 tablet by mouth 2 times daily       omeprazole (PRILOSEC) 40 MG DR capsule Take 1 capsule (40 mg) by mouth daily. 90 capsule 4    rivaroxaban ANTICOAGULANT (XARELTO ANTICOAGULANT) 20 MG TABS tablet Take 1 tablet (20 mg) by mouth daily (with dinner). 90 tablet 4    tamsulosin (FLOMAX) 0.4 MG capsule Take 1 capsule (0.4 mg) by mouth daily. 90 capsule 4     No Known Allergies  Current providers sharing in care for this patient include:  Patient Care Team:  Kamari Rojas MD as PCP - General (Family Practice)  Kamari Rojas MD as Assigned PCP  Dat Chavez MD as MD (Cardiovascular Disease)  Rn, [facility] C.O.R.E. as Specialty Care Coordinator (Cardiology)  Marlo Chávez NP as Assigned Heart and Vascular Provider  Nikita Seo MD as Assigned Dermatology Provider    The following health maintenance items are reviewed in Epic and correct as of today:  Health Maintenance   Topic Date Due    HF ACTION PLAN  Never done    RSV VACCINE (1 - 1-dose 75+ series) Never done    EGD  07/12/2020    LIPID  04/12/2025    COVID-19 Vaccine (8 - 2024-25 season) 05/06/2025    ALT  09/24/2025    BMP  04/08/2026    CBC  04/08/2026    MEDICARE ANNUAL WELLNESS VISIT  04/24/2026    ANNUAL REVIEW OF HM  ORDERS  04/24/2026    FALL RISK ASSESSMENT  04/24/2026    DTAP/TDAP/TD IMMUNIZATION (2 - Td or Tdap) 12/07/2028    ADVANCE CARE PLANNING  05/02/2030    TSH W/FREE T4 REFLEX  Completed    PHQ-2 (once per calendar year)  Completed    INFLUENZA VACCINE  Completed    Pneumococcal Vaccine: 50+ Years  Completed    ZOSTER IMMUNIZATION  Completed    HPV IMMUNIZATION  Aged Out    MENINGITIS IMMUNIZATION  Aged Out         Review of Systems  Constitutional, neuro, ENT, endocrine, pulmonary, cardiac, gastrointestinal, genitourinary, musculoskeletal, integument and psychiatric systems are negative, except as otherwise noted.     Objective    Exam  /68   Pulse 72   Temp 97.9  F (36.6  C) (Tympanic)   Resp 18   Ht 1.829 m (6')   Wt 86.2 kg (190 lb)   SpO2 95%   BMI 25.77 kg/m     Estimated body mass index is 25.77 kg/m  as calculated from the following:    Height as of this encounter: 1.829 m (6').    Weight as of this encounter: 86.2 kg (190 lb).    Physical Exam  GENERAL: alert and no distress  EYES: Eyes grossly normal to inspection, PERRL and conjunctivae and sclerae normal  HENT: normal cephalic/atraumatic and ear canals and TM's normal  NECK: no adenopathy, no asymmetry, masses, or scars  RESP: lungs clear to auscultation - no rales, rhonchi or wheezes  CV: irregularly irregular rhythm, normal S1 S2, no S3 or S4, no murmur, click or rub, peripheral pulses strong, and no peripheral edema  ABDOMEN: soft, nontender, no hepatosplenomegaly, no masses and bowel sounds normal  MS: no gross musculoskeletal defects noted, no edema  SKIN: no suspicious lesions or rashes  NEURO: Normal strength and tone, mentation intact and speech normal  PSYCH: mentation appears normal, affect normal/bright        4/24/2025   Mini Cog   Clock Draw Score 2 Normal   3 Item Recall 2 objects recalled   Mini Cog Total Score 4              Signed Electronically by: Kamari Rojas MD

## 2025-04-27 ENCOUNTER — MYC MEDICAL ADVICE (OUTPATIENT)
Dept: FAMILY MEDICINE | Facility: CLINIC | Age: 85
End: 2025-04-27
Payer: COMMERCIAL

## 2025-04-28 ENCOUNTER — PATIENT OUTREACH (OUTPATIENT)
Dept: CARE COORDINATION | Facility: CLINIC | Age: 85
End: 2025-04-28
Payer: COMMERCIAL

## 2025-04-29 ENCOUNTER — DOCUMENTATION ONLY (OUTPATIENT)
Dept: OTHER | Facility: CLINIC | Age: 85
End: 2025-04-29
Payer: COMMERCIAL

## 2025-05-03 ENCOUNTER — HEALTH MAINTENANCE LETTER (OUTPATIENT)
Age: 85
End: 2025-05-03

## 2025-05-03 PROBLEM — J96.02 ACUTE RESPIRATORY FAILURE WITH HYPOXIA AND HYPERCAPNIA (H): Status: RESOLVED | Noted: 2024-09-24 | Resolved: 2025-05-03

## 2025-05-03 PROBLEM — N18.31 CHRONIC KIDNEY DISEASE, STAGE 3A (H): Status: ACTIVE | Noted: 2025-05-03

## 2025-05-03 PROBLEM — J96.01 ACUTE RESPIRATORY FAILURE WITH HYPOXIA AND HYPERCAPNIA (H): Status: RESOLVED | Noted: 2024-09-24 | Resolved: 2025-05-03

## 2025-05-09 ENCOUNTER — ANCILLARY PROCEDURE (OUTPATIENT)
Dept: GENERAL RADIOLOGY | Facility: CLINIC | Age: 85
End: 2025-05-09
Attending: PEDIATRICS
Payer: COMMERCIAL

## 2025-05-09 PROCEDURE — 72170 X-RAY EXAM OF PELVIS: CPT | Mod: TC | Performed by: RADIOLOGY

## 2025-05-14 ENCOUNTER — THERAPY VISIT (OUTPATIENT)
Dept: PHYSICAL THERAPY | Facility: CLINIC | Age: 85
End: 2025-05-14
Attending: PEDIATRICS
Payer: COMMERCIAL

## 2025-05-14 DIAGNOSIS — M16.11 ARTHRITIS OF RIGHT HIP: ICD-10-CM

## 2025-05-14 DIAGNOSIS — G89.29 CHRONIC HIP PAIN, RIGHT: Primary | ICD-10-CM

## 2025-05-14 DIAGNOSIS — M25.551 CHRONIC HIP PAIN, RIGHT: Primary | ICD-10-CM

## 2025-05-14 DIAGNOSIS — M25.559 GREATER TROCHANTERIC PAIN SYNDROME: ICD-10-CM

## 2025-05-14 PROCEDURE — 97161 PT EVAL LOW COMPLEX 20 MIN: CPT | Mod: GP | Performed by: PHYSICAL THERAPIST

## 2025-05-14 PROCEDURE — 97116 GAIT TRAINING THERAPY: CPT | Mod: GP | Performed by: PHYSICAL THERAPIST

## 2025-05-14 PROCEDURE — 97110 THERAPEUTIC EXERCISES: CPT | Mod: GP | Performed by: PHYSICAL THERAPIST

## 2025-05-14 ASSESSMENT — ACTIVITIES OF DAILY LIVING (ADL)
WALKING_DOWN_STEEP_HILLS: NO DIFFICULTY AT ALL
HOS_ADL_SCORE(%): 73.33
HOS_ADL_ITEM_SCORE_TOTAL: 44
SWINGING_OBJECTS_LIKE_A_GOLF_CLUB: UNABLE TO DO
ROLLING OVER IN BED: SLIGHT DIFFICULTY
WALKING_15_MINUTES_OR_GREATER: MODERATE DIFFICULTY
STANDING_FOR_15_MINUTES: SLIGHT DIFFICULTY
ADL_HIGHEST_POTENTIAL_SCORE: 68
GOING UP 1 FLIGHT OF STAIRS: NO DIFFICULTY AT ALL
ROLLING_OVER_IN_BED: SLIGHT DIFFICULTY
SITTING FOR 15 MINUTES: NO DIFFICULTY AT ALL
JUMPING: UNABLE TO DO
GOING DOWN 1 FLIGHT OF STAIRS: NO DIFFICULTY AT ALL
WALKING_INITIALLY: SLIGHT DIFFICULTY
WALKING_DOWN_STEEP_HILLS: NO DIFFICULTY AT ALL
RECREATIONAL ACTIVITIES: SLIGHT DIFFICULTY
ADL_SCORE(%): 0
PUTTING ON SOCKS AND SHOES: NO DIFFICULTY AT ALL
SPORTS_SCORE(%): 0
ABILITY_TO_PERFORM_ACTIVITY_WITH_YOUR_NORMAL_TECHNIQUE: SLIGHT DIFFICULTY
STANDING FOR 15 MINUTES: SLIGHT DIFFICULTY
WALKING_15_MINUTES_OR_GREATER: MODERATE DIFFICULTY
STEPPING UP AND DOWN CURBS: SLIGHT DIFFICULTY
GOING_UP_1_FLIGHT_OF_STAIRS: NO DIFFICULTY AT ALL
GETTING INTO AND OUT OF AN AVERAGE CAR: SLIGHT DIFFICULTY
TWISTING/PIVOTING ON INVOLVED LEG: NO DIFFICULTY AT ALL
HOW_WOULD_YOU_RATE_YOUR_CURRENT_LEVEL_OF_FUNCTION_DURING_YOUR_USUAL_ACTIVITIES_OF_DAILY_LIVING_FROM_0_TO_100_WITH_100_BEING_YOUR_LEVEL_OF_FUNCTION_PRIOR_TO_YOUR_HIP_PROBLEM_AND_0_BEING_THE_INABILITY_TO_PERFORM_ANY_OF_YOUR_USUAL_DAILY_ACTIVITIES?: 50
ADL_COUNT: 17
GETTING_INTO_AND_OUT_OF_AN_AVERAGE_CAR: SLIGHT DIFFICULTY
WALKING_UP_STEEP_HILLS: MODERATE DIFFICULTY
GOING_DOWN_1_FLIGHT_OF_STAIRS: NO DIFFICULTY AT ALL
LIGHT_TO_MODERATE_WORK: MODERATE DIFFICULTY
LIGHT_TO_MODERATE_WORK: MODERATE DIFFICULTY
PLEASE_INDICATE_YOR_PRIMARY_REASON_FOR_REFERRAL_TO_THERAPY:: HIP
HOW_WOULD_YOU_RATE_YOUR_CURRENT_LEVEL_OF_FUNCTION_DURING_YOUR_SPORTS_RELATED_ACTIVITIES_FROM_0_TO_100_WITH_100_BEING_YOUR_LEVEL_OF_FUNCTION_PRIOR_TO_YOUR_HIP_PROBLEM_AND_0_BEING_THE_INABILITY_TO_PERFORM_ANY_OF_YOUR_USUAL_DAILY_ACTIVITIES?: 25
HOS_ADL_HIGHEST_POTENTIAL_SCORE: 60
HOW_WOULD_YOU_RATE_YOUR_CURRENT_LEVEL_OF_FUNCTION?: NEARLY NORMAL
RECREATIONAL_ACTIVITIES: SLIGHT DIFFICULTY
DEEP SQUATTING: MODERATE DIFFICULTY
SPORTS_COUNT: 9
SPORTS_HIGHEST_POTENTIAL_SCORE: 36
CUTTING/LATERAL_MOVEMENTS: UNABLE TO DO
PUTTING_ON_SOCKS_AND_SHOES: NO DIFFICULTY AT ALL
WALKING_FOR_APPROXIMATELY_10_MINUTES: MODERATE DIFFICULTY
HOW_WOULD_YOU_RATE_YOUR_CURRENT_LEVEL_OF_FUNCTION_DURING_YOUR_USUAL_ACTIVITIES_OF_DAILY_LIVING_FROM_0_TO_100_WITH_100_BEING_YOUR_LEVEL_OF_FUNCTION_PRIOR_TO_YOUR_HIP_PROBLEM_AND_0_BEING_THE_INABILITY_TO_PERFORM_ANY_OF_YOUR_USUAL_DAILY_ACTIVITIES?: 50
RUNNING_ONE_MILE: UNABLE TO DO
SPORTS_TOTAL_ITEM_SCORE: 0
LOW_IMPACT_ACTIVITIES_LIKE_FAST_WALKING: UNABLE TO DO
SITTING_FOR_15_MINUTES: NO DIFFICULTY AT ALL
STARTING_AND_STOPPING_QUICKLY: UNABLE TO DO
TWISTING/PIVOTING_ON_INVOLVED_LEG: NO DIFFICULTY AT ALL
ABILITY_TO_PARTICIPATE_IN_YOUR_DESIRED_SPORT_AS_LONG_AS_YOU_WOULD_LIKE: UNABLE TO DO
WALKING_INITIALLY: SLIGHT DIFFICULTY
WALKING_APPROXIMATELY_10_MINUTES: MODERATE DIFFICULTY
WALKING_UP_STEEP_HILLS: MODERATE DIFFICULTY
DEEP_SQUATTING: MODERATE DIFFICULTY
LANDING: UNABLE TO DO
ADL_TOTAL_ITEM_SCORE: 0
STEPPING_UP_AND_DOWN_CURBS: SLIGHT DIFFICULTY

## 2025-05-14 NOTE — PROGRESS NOTES
PHYSICAL THERAPY EVALUATION  Type of Visit: Evaluation       Fall Risk Screen:  Have you fallen 2 or more times in the past year?: No  Have you fallen and had an injury in the past year?: No    Subjective       for past yr or so   Pain decreased by heat and/or rest   Pain increased standing in kitchen ~1 hour; sit 15 min then can stand again   Increased with  walking uses walker with less hip pain   Presenting condition or subjective complaint: right hip discomfort  Date of onset: 05/09/25 (date of PT order)    Relevant medical history: Heart problems congestive heart failure   Dates & types of surgery: heart bypass aaa hernia repair    Prior diagnostic imaging/testing results: X-ray     Mild osteoarthrosis of the left hip. Diffuse bone demineralization  Prior therapy history for the same diagnosis, illness or injury: No      Prior Level of Function  Transfers: Independent  Ambulation: Independent, uses walker to increase distance as hip pain is decreased with use of walker  ADL: Independent    Living Environment  Social support: With a significant other or spouse   Type of home: [facility]   Stairs to enter the home: No       Ramp: No   Stairs inside the home: No       Help at home: None  Employment: No      Patient goals for therapy: walk without discomfort     Objective     HIP EVALUATION  PAIN: standing more than 60'  POSTURE: Posterior pelvic tilt; decreased lumbar lordosis; rounded shoulders; forward head  GAIT:  antalgic no assistive device typically  BALANCE/PROPRIOCEPTION: SLS EO 2 sec; feet together EO x 30 sec  EC x 30 sec with difficulty  ROM: very limited IR ~10* R or L others WNL  STRENGTH: WNL except R hip abd 3/5  LE FLEXIBILITY:   Hamstring: moderately tight pop angle ~145*  Quad/HF:  moderately tight prone knee flxn to ~95*  SPECIAL TESTS:  FADIR:     -          SHAHID: -  JOINT MOBILITY: minimal IR      Assessment & Plan   CLINICAL IMPRESSIONS  Medical Diagnosis: chronic hip pain right    Treatment  Diagnosis: chronic hip pain right   Impression/Assessment: Patient is a 85 year old male with R hip pain complaints.  The following significant findings have been identified: Pain, Decreased ROM/flexibility, Decreased joint mobility, Decreased strength, Impaired balance, Decreased proprioception, Impaired gait, and Impaired muscle performance. These impairments interfere with their ability to perform recreational activities, household chores, and community mobility as compared to previous level of function.     Clinical Decision Making (Complexity):  Clinical Presentation: Stable/Uncomplicated  Clinical Presentation Rationale: based on medical and personal factors listed in PT evaluation  Clinical Decision Making (Complexity): Low complexity    PLAN OF CARE  Treatment Interventions:  Modalities: Cupping, Dry Needling  Interventions: Gait Training, Manual Therapy, Neuromuscular Re-education, Therapeutic Activity, Therapeutic Exercise, Self-Care/Home Management    Long Term Goals     PT Goal 1  Goal Description: independent HEP incorporated into regular fitness routine  Rationale: to maximize safety and independence with performance of ADLs and functional tasks  Target Date: 08/11/25  PT Goal 2  Goal Description: decrease pain R hip to allow more than one hour standing in kitchen baking/cooking  Rationale: to maximize safety and independence with performance of ADLs and functional tasks  Target Date: 08/11/25      Frequency of Treatment: 1x/wk  Duration of Treatment: 90 days    Education Assessment:   Learner/Method: Patient;Listening;Reading;Demonstration;Pictures/Video;No Barriers to Learning  Education Comments: PTRx    Risks and benefits of evaluation/treatment have been explained.   Patient/Family/caregiver agrees with Plan of Care.     Evaluation Time:     PT Eval, Low Complexity Minutes (71150): 15     Signing Clinician: Frances Ramon, PT        [facility]                                                                                    OUTPATIENT PHYSICAL THERAPY      PLAN OF TREATMENT FOR OUTPATIENT REHABILITATION   Patient's Last Name, First Name, Richard Chavira YOB: 1940   Provider's Name   [facility]   Medical Record No.  [id number]     Onset Date: 05/09/25 (date of PT order)  Start of Care Date: 05/14/25     Medical Diagnosis:  chronic hip pain right      PT Treatment Diagnosis:  chronic hip pain right Plan of Treatment  Frequency/Duration: 1x/wk/ 90 days    Certification date from 05/14/25 to 08/11/25         See note for plan of treatment details and functional goals     Frances Ramon, PT DPT                         I CERTIFY THE NEED FOR THESE SERVICES FURNISHED UNDER        THIS PLAN OF TREATMENT AND WHILE UNDER MY CARE     (Physician attestation of this document indicates review and certification of the therapy plan).              Referring Provider:  Valentine Barbour    Initial Assessment  See Epic Evaluation- Start of Care Date: 05/14/25

## 2025-05-28 ENCOUNTER — THERAPY VISIT (OUTPATIENT)
Dept: PHYSICAL THERAPY | Facility: CLINIC | Age: 85
End: 2025-05-28
Attending: PEDIATRICS
Payer: COMMERCIAL

## 2025-05-28 DIAGNOSIS — M25.551 CHRONIC HIP PAIN, RIGHT: Primary | ICD-10-CM

## 2025-05-28 DIAGNOSIS — G89.29 CHRONIC HIP PAIN, RIGHT: Primary | ICD-10-CM

## 2025-05-28 PROCEDURE — 97110 THERAPEUTIC EXERCISES: CPT | Mod: GP | Performed by: PHYSICAL THERAPIST

## 2025-06-10 ENCOUNTER — THERAPY VISIT (OUTPATIENT)
Dept: PHYSICAL THERAPY | Facility: CLINIC | Age: 85
End: 2025-06-10
Attending: PEDIATRICS
Payer: COMMERCIAL

## 2025-06-10 DIAGNOSIS — G89.29 CHRONIC HIP PAIN, RIGHT: Primary | ICD-10-CM

## 2025-06-10 DIAGNOSIS — M25.551 CHRONIC HIP PAIN, RIGHT: Primary | ICD-10-CM

## 2025-06-10 PROCEDURE — 97110 THERAPEUTIC EXERCISES: CPT | Mod: GP | Performed by: PHYSICAL THERAPIST

## 2025-06-10 NOTE — PROGRESS NOTES
DISCHARGE  Reason for Discharge: Patient has met all goals. Pt wishes to continue ex on own    Equipment Issued: none    Discharge Plan: Patient to continue home program.    Referring Provider:  Valentine Barbour     06/10/25 0500   Appointment Info   Signing clinician's name / credentials Frances Louis PT DPT   Total/Authorized Visits [employer] medicare   Visits Used 3 soc 5/14/25   Medical Diagnosis chronic hip pain right   PT Tx Diagnosis chronic hip pain right   Progress Note/Certification   Start of Care Date 05/14/25   Onset of illness/injury or Date of Surgery 05/09/25  (date of PT order)   Therapy Frequency 1x/wk   Predicted Duration 90 days   Certification date from 05/14/25   Certification date to 08/11/25   GOALS   PT Goals 2   PT Goal 1   Goal Description independent HEP incorporated into regular fitness routine   Rationale to maximize safety and independence with performance of ADLs and functional tasks   Goal Progress wants to try on own   Target Date 08/11/25   PT Goal 2   Goal Description decrease pain R hip to allow more than one hour standing in kitchen baking/cooking   Rationale to maximize safety and independence with performance of ADLs and functional tasks   Target Date 08/11/25   Date Met 06/10/25   Subjective Report   Subjective Report feels good; walking 1-2x/day; doing exerises one to 2 times /day   Therapeutic Procedure/Exercise   Therapeutic Procedures: strength, endurance, ROM, flexibility minutes (84810) 45   Ther Proc 1 Nu Step   Ther Proc 1 - Details x 5 min wkld 5-7   PTRx Ther Proc 1 Bridging #1   PTRx Ther Proc 1 - Details 10-20 reps   PTRx Ther Proc 2 Hip Flexion Straight Leg Raise   PTRx Ther Proc 2 - Details 10 reps B   PTRx Ther Proc 3 Clamshell Feet Together   PTRx Ther Proc 3 - Details cues to roll toward tummy x 10 reps   PTRx Ther Proc 4 Piriformis Stretch Above 90 Degress Supine   PTRx Ther Proc 4 - Details cues for technique   PTRx Ther Proc 5 Supine Hamstring  Stretch   PTRx Ther Proc 5 - Details cues for techniquehold 30 sec   PTRx Ther Proc 6 Propped Knee Extension Stretch in Chair   PTRx Ther Proc 6 - Details Review, option for pop angle HS stretch   PTRx Ther Proc 7 Squat With Chair   PTRx Ther Proc 7 - Details touch pillow on seat; x 5 reps cues to reach forward   PTRx Ther Proc 8 Standing Quadriceps Stretch using Chair   PTRx Ther Proc 8 - Details hold 30 sec; use chair in fornt for balance   Skilled Intervention exercise instruction tempo,dosage, with manual, verbal, tactile cues for proper technique to decrease pain and improve functional mobility   Patient Response/Progress good deom of ex   Education   Learner/Method Patient;Listening;Reading;Demonstration;Pictures/Video;No Barriers to Learning   Education Comments PTRx   Plan   Home program added above; continue previous   Updates to plan of care Pt wants to continue ex on own   Plan for next session hold chart openfor 50 days then dc if not contact from pt   Total Session Time   Timed Code Treatment Minutes 45   Total Treatment Time (sum of timed and untimed services) 45

## 2025-06-21 ENCOUNTER — HOSPITAL ENCOUNTER (OUTPATIENT)
Dept: CT IMAGING | Facility: CLINIC | Age: 85
Discharge: HOME | End: 2025-06-21
Attending: RADIOLOGY | Admitting: RADIOLOGY
Payer: COMMERCIAL

## 2025-06-21 DIAGNOSIS — I71.40 ABDOMINAL AORTIC ANEURYSM (AAA) WITHOUT RUPTURE: ICD-10-CM

## 2025-06-21 PROCEDURE — 74150 CT ABDOMEN W/O CONTRAST: CPT

## 2025-06-25 ENCOUNTER — MYC MEDICAL ADVICE (OUTPATIENT)
Dept: FAMILY MEDICINE | Facility: CLINIC | Age: 85
End: 2025-06-25
Payer: COMMERCIAL

## 2025-06-25 DIAGNOSIS — R91.8 PULMONARY NODULES: Primary | ICD-10-CM

## 2025-06-30 DIAGNOSIS — I71.40 ABDOMINAL AORTIC ANEURYSM (AAA) WITHOUT RUPTURE: Primary | ICD-10-CM

## 2025-07-09 DIAGNOSIS — E78.5 HYPERLIPIDEMIA LDL GOAL <100: ICD-10-CM

## 2025-07-09 DIAGNOSIS — I25.83 CORONARY ATHEROSCLEROSIS DUE TO LIPID RICH PLAQUE: ICD-10-CM

## 2025-07-10 RX ORDER — ATORVASTATIN CALCIUM 80 MG/1
80 TABLET, FILM COATED ORAL DAILY
Qty: 90 TABLET | Refills: 4 | OUTPATIENT
Start: 2025-07-10

## (undated) RX ORDER — GLYCOPYRROLATE 0.2 MG/ML
INJECTION, SOLUTION INTRAMUSCULAR; INTRAVENOUS
Status: DISPENSED
Start: 2018-07-12